# Patient Record
Sex: FEMALE | Race: WHITE | Employment: FULL TIME | URBAN - NONMETROPOLITAN AREA
[De-identification: names, ages, dates, MRNs, and addresses within clinical notes are randomized per-mention and may not be internally consistent; named-entity substitution may affect disease eponyms.]

---

## 2017-06-22 ENCOUNTER — HOSPITAL ENCOUNTER (OUTPATIENT)
Dept: PREADMISSION TESTING | Age: 57
Discharge: HOME | End: 2017-06-22
Payer: COMMERCIAL

## 2017-06-22 VITALS — HEIGHT: 66 IN | BODY MASS INDEX: 47.09 KG/M2 | WEIGHT: 293 LBS

## 2017-06-22 LAB
ANION GAP SERPL CALCULATED.3IONS-SCNC: 14 MMOL/L (ref 7–19)
APTT: 26.3 SEC (ref 26–36.2)
BASOPHILS ABSOLUTE: 0 K/UL (ref 0–0.2)
BASOPHILS RELATIVE PERCENT: 0.4 % (ref 0–1)
BUN BLDV-MCNC: 13 MG/DL (ref 6–20)
CALCIUM SERPL-MCNC: 9.6 MG/DL (ref 8.6–10)
CHLORIDE BLD-SCNC: 97 MMOL/L (ref 98–111)
CO2: 27 MMOL/L (ref 22–29)
CREAT SERPL-MCNC: 0.6 MG/DL (ref 0.5–0.9)
EKG P AXIS: 49 DEGREES
EKG P-R INTERVAL: 132 MS
EKG Q-T INTERVAL: 390 MS
EKG QRS DURATION: 78 MS
EKG QTC CALCULATION (BAZETT): 425 MS
EKG T AXIS: 50 DEGREES
EOSINOPHILS ABSOLUTE: 0.1 K/UL (ref 0–0.6)
EOSINOPHILS RELATIVE PERCENT: 1.5 % (ref 0–5)
GFR NON-AFRICAN AMERICAN: >60
GLUCOSE BLD-MCNC: 174 MG/DL (ref 74–109)
HCT VFR BLD CALC: 45.9 % (ref 37–47)
HEMOGLOBIN: 14.9 G/DL (ref 12–16)
INR BLD: 1 (ref 0.88–1.18)
LYMPHOCYTES ABSOLUTE: 2 K/UL (ref 1.1–4.5)
LYMPHOCYTES RELATIVE PERCENT: 27.5 % (ref 20–40)
MCH RBC QN AUTO: 31.2 PG (ref 27–31)
MCHC RBC AUTO-ENTMCNC: 32.5 G/DL (ref 33–37)
MCV RBC AUTO: 96.2 FL (ref 81–99)
MONOCYTES ABSOLUTE: 0.5 K/UL (ref 0–0.9)
MONOCYTES RELATIVE PERCENT: 6.5 % (ref 0–10)
NEUTROPHILS ABSOLUTE: 4.7 K/UL (ref 1.5–7.5)
NEUTROPHILS RELATIVE PERCENT: 63.7 % (ref 50–65)
PDW BLD-RTO: 12.4 % (ref 11.5–14.5)
PLATELET # BLD: 167 K/UL (ref 130–400)
PMV BLD AUTO: 10.8 FL (ref 9.4–12.3)
POTASSIUM SERPL-SCNC: 4.4 MMOL/L (ref 3.5–5)
PROTHROMBIN TIME: 13.1 SEC (ref 12–14.6)
RBC # BLD: 4.77 M/UL (ref 4.2–5.4)
SODIUM BLD-SCNC: 138 MMOL/L (ref 136–145)
WBC # BLD: 7.3 K/UL (ref 4.8–10.8)

## 2017-06-22 PROCEDURE — 93005 ELECTROCARDIOGRAM TRACING: CPT

## 2017-06-22 PROCEDURE — 85025 COMPLETE CBC W/AUTO DIFF WBC: CPT

## 2017-06-22 PROCEDURE — 87070 CULTURE OTHR SPECIMN AEROBIC: CPT

## 2017-06-22 PROCEDURE — 85730 THROMBOPLASTIN TIME PARTIAL: CPT

## 2017-06-22 PROCEDURE — 85610 PROTHROMBIN TIME: CPT

## 2017-06-22 PROCEDURE — 80048 BASIC METABOLIC PNL TOTAL CA: CPT

## 2017-06-22 RX ORDER — VARENICLINE TARTRATE 1 MG/1
1 TABLET, FILM COATED ORAL DAILY
COMMUNITY

## 2017-06-22 RX ORDER — LISINOPRIL 10 MG/1
TABLET ORAL DAILY
COMMUNITY

## 2017-06-22 RX ORDER — METFORMIN HYDROCHLORIDE 500 MG/1
500 TABLET, EXTENDED RELEASE ORAL
Status: ON HOLD | COMMUNITY
End: 2018-04-12

## 2017-06-23 LAB — MRSA CULTURE ONLY: NORMAL

## 2017-06-27 ENCOUNTER — PREP FOR PROCEDURE (OUTPATIENT)
Dept: SURGERY | Age: 57
End: 2017-06-27

## 2017-06-27 DIAGNOSIS — Z01.818 PRE-OP EVALUATION: Primary | ICD-10-CM

## 2017-06-27 RX ORDER — SODIUM CHLORIDE 0.9 % (FLUSH) 0.9 %
10 SYRINGE (ML) INJECTION PRN
Status: CANCELLED | OUTPATIENT
Start: 2017-06-27

## 2017-06-27 RX ORDER — SODIUM CHLORIDE 0.9 % (FLUSH) 0.9 %
10 SYRINGE (ML) INJECTION EVERY 12 HOURS SCHEDULED
Status: CANCELLED | OUTPATIENT
Start: 2017-06-27

## 2017-06-27 RX ORDER — SODIUM CHLORIDE, SODIUM LACTATE, POTASSIUM CHLORIDE, CALCIUM CHLORIDE 600; 310; 30; 20 MG/100ML; MG/100ML; MG/100ML; MG/100ML
INJECTION, SOLUTION INTRAVENOUS CONTINUOUS
Status: CANCELLED | OUTPATIENT
Start: 2017-06-27

## 2017-06-30 ENCOUNTER — ANESTHESIA (OUTPATIENT)
Dept: OPERATING ROOM | Age: 57
DRG: 470 | End: 2017-06-30
Payer: COMMERCIAL

## 2017-06-30 ENCOUNTER — ANESTHESIA EVENT (OUTPATIENT)
Dept: OPERATING ROOM | Age: 57
DRG: 470 | End: 2017-06-30
Payer: COMMERCIAL

## 2017-06-30 ENCOUNTER — HOSPITAL ENCOUNTER (INPATIENT)
Age: 57
LOS: 3 days | Discharge: HOME HEALTH | DRG: 470 | End: 2017-07-03
Attending: ORTHOPAEDIC SURGERY | Admitting: ORTHOPAEDIC SURGERY
Payer: COMMERCIAL

## 2017-06-30 VITALS
RESPIRATION RATE: 3 BRPM | SYSTOLIC BLOOD PRESSURE: 144 MMHG | DIASTOLIC BLOOD PRESSURE: 50 MMHG | OXYGEN SATURATION: 94 % | TEMPERATURE: 98 F

## 2017-06-30 PROBLEM — M17.12 PRIMARY OSTEOARTHRITIS OF LEFT KNEE: Status: ACTIVE | Noted: 2017-06-30

## 2017-06-30 LAB
ABO/RH: NORMAL
ANTIBODY SCREEN: NORMAL
GLUCOSE BLD-MCNC: 158 MG/DL (ref 70–99)
PERFORMED ON: ABNORMAL

## 2017-06-30 PROCEDURE — 7100000000 HC PACU RECOVERY - FIRST 15 MIN: Performed by: ORTHOPAEDIC SURGERY

## 2017-06-30 PROCEDURE — 2580000003 HC RX 258: Performed by: ANESTHESIOLOGY

## 2017-06-30 PROCEDURE — 6370000000 HC RX 637 (ALT 250 FOR IP): Performed by: ORTHOPAEDIC SURGERY

## 2017-06-30 PROCEDURE — 2720000001 HC MISC SURG SUPPLY STERILE $51-500: Performed by: ORTHOPAEDIC SURGERY

## 2017-06-30 PROCEDURE — 6360000002 HC RX W HCPCS: Performed by: ORTHOPAEDIC SURGERY

## 2017-06-30 PROCEDURE — 2700000000 HC OXYGEN THERAPY PER DAY

## 2017-06-30 PROCEDURE — C1776 JOINT DEVICE (IMPLANTABLE): HCPCS | Performed by: ORTHOPAEDIC SURGERY

## 2017-06-30 PROCEDURE — 86901 BLOOD TYPING SEROLOGIC RH(D): CPT

## 2017-06-30 PROCEDURE — 2500000003 HC RX 250 WO HCPCS: Performed by: NURSE ANESTHETIST, CERTIFIED REGISTERED

## 2017-06-30 PROCEDURE — G8979 MOBILITY GOAL STATUS: HCPCS

## 2017-06-30 PROCEDURE — 2580000003 HC RX 258: Performed by: ORTHOPAEDIC SURGERY

## 2017-06-30 PROCEDURE — 0SRD0J9 REPLACEMENT OF LEFT KNEE JOINT WITH SYNTHETIC SUBSTITUTE, CEMENTED, OPEN APPROACH: ICD-10-PCS | Performed by: ORTHOPAEDIC SURGERY

## 2017-06-30 PROCEDURE — 3600000015 HC SURGERY LEVEL 5 ADDTL 15MIN: Performed by: ORTHOPAEDIC SURGERY

## 2017-06-30 PROCEDURE — 82948 REAGENT STRIP/BLOOD GLUCOSE: CPT

## 2017-06-30 PROCEDURE — 2500000003 HC RX 250 WO HCPCS: Performed by: ORTHOPAEDIC SURGERY

## 2017-06-30 PROCEDURE — 1210000000 HC MED SURG R&B

## 2017-06-30 PROCEDURE — 3700000001 HC ADD 15 MINUTES (ANESTHESIA): Performed by: ORTHOPAEDIC SURGERY

## 2017-06-30 PROCEDURE — 86850 RBC ANTIBODY SCREEN: CPT

## 2017-06-30 PROCEDURE — G8978 MOBILITY CURRENT STATUS: HCPCS

## 2017-06-30 PROCEDURE — 97162 PT EVAL MOD COMPLEX 30 MIN: CPT

## 2017-06-30 PROCEDURE — 64447 NJX AA&/STRD FEMORAL NRV IMG: CPT | Performed by: NURSE ANESTHETIST, CERTIFIED REGISTERED

## 2017-06-30 PROCEDURE — 6370000000 HC RX 637 (ALT 250 FOR IP): Performed by: NURSE ANESTHETIST, CERTIFIED REGISTERED

## 2017-06-30 PROCEDURE — 6360000002 HC RX W HCPCS

## 2017-06-30 PROCEDURE — 2720000003 HC MISC SUTURE/STAPLES/RELOADS/ETC: Performed by: ORTHOPAEDIC SURGERY

## 2017-06-30 PROCEDURE — 3700000000 HC ANESTHESIA ATTENDED CARE: Performed by: ORTHOPAEDIC SURGERY

## 2017-06-30 PROCEDURE — 36415 COLL VENOUS BLD VENIPUNCTURE: CPT

## 2017-06-30 PROCEDURE — 3600000005 HC SURGERY LEVEL 5 BASE: Performed by: ORTHOPAEDIC SURGERY

## 2017-06-30 PROCEDURE — C1713 ANCHOR/SCREW BN/BN,TIS/BN: HCPCS | Performed by: ORTHOPAEDIC SURGERY

## 2017-06-30 PROCEDURE — 94664 DEMO&/EVAL PT USE INHALER: CPT

## 2017-06-30 PROCEDURE — 7100000001 HC PACU RECOVERY - ADDTL 15 MIN: Performed by: ORTHOPAEDIC SURGERY

## 2017-06-30 PROCEDURE — 86900 BLOOD TYPING SEROLOGIC ABO: CPT

## 2017-06-30 PROCEDURE — 6360000002 HC RX W HCPCS: Performed by: NURSE ANESTHETIST, CERTIFIED REGISTERED

## 2017-06-30 DEVICE — COMPONENT PAT DIA32MM KNEE POLY CEM MEDIALIZED ANAT ATTUNE: Type: IMPLANTABLE DEVICE | Site: KNEE | Status: FUNCTIONAL

## 2017-06-30 DEVICE — DISCONTINUED USE 416978 CEMENT PALACOS R SING DOSE 40GR: Type: IMPLANTABLE DEVICE | Site: KNEE | Status: FUNCTIONAL

## 2017-06-30 DEVICE — INSERT TIB SZ 5 THK7MM KNEE POST STBL ROT PLATFRM ATTUNE: Type: IMPLANTABLE DEVICE | Site: KNEE | Status: FUNCTIONAL

## 2017-06-30 DEVICE — COMPONENT FEM SZ 5 L KNEE POST STBL CEM ATTUNE: Type: IMPLANTABLE DEVICE | Site: KNEE | Status: FUNCTIONAL

## 2017-06-30 DEVICE — BASEPLATE TIB SZ 6 KNEE CEM ROT PLATFRM ATTUNE: Type: IMPLANTABLE DEVICE | Site: KNEE | Status: FUNCTIONAL

## 2017-06-30 RX ORDER — MORPHINE SULFATE 4 MG/ML
4 INJECTION, SOLUTION INTRAMUSCULAR; INTRAVENOUS
Status: DISCONTINUED | OUTPATIENT
Start: 2017-06-30 | End: 2017-07-03 | Stop reason: HOSPADM

## 2017-06-30 RX ORDER — VARENICLINE TARTRATE 0.5 MG/1
1 TABLET, FILM COATED ORAL DAILY
Status: DISCONTINUED | OUTPATIENT
Start: 2017-06-30 | End: 2017-07-03 | Stop reason: HOSPADM

## 2017-06-30 RX ORDER — MIDAZOLAM HYDROCHLORIDE 1 MG/ML
2 INJECTION INTRAMUSCULAR; INTRAVENOUS
Status: DISCONTINUED | OUTPATIENT
Start: 2017-06-30 | End: 2017-06-30 | Stop reason: HOSPADM

## 2017-06-30 RX ORDER — CLINDAMYCIN PHOSPHATE 900 MG/50ML
900 INJECTION INTRAVENOUS EVERY 8 HOURS
Status: COMPLETED | OUTPATIENT
Start: 2017-06-30 | End: 2017-06-30

## 2017-06-30 RX ORDER — SODIUM CHLORIDE, SODIUM LACTATE, POTASSIUM CHLORIDE, CALCIUM CHLORIDE 600; 310; 30; 20 MG/100ML; MG/100ML; MG/100ML; MG/100ML
INJECTION, SOLUTION INTRAVENOUS CONTINUOUS
Status: DISCONTINUED | OUTPATIENT
Start: 2017-06-30 | End: 2017-06-30

## 2017-06-30 RX ORDER — SODIUM CHLORIDE 0.9 % (FLUSH) 0.9 %
10 SYRINGE (ML) INJECTION EVERY 12 HOURS SCHEDULED
Status: DISCONTINUED | OUTPATIENT
Start: 2017-06-30 | End: 2017-07-03 | Stop reason: HOSPADM

## 2017-06-30 RX ORDER — SODIUM CHLORIDE, SODIUM LACTATE, POTASSIUM CHLORIDE, CALCIUM CHLORIDE 600; 310; 30; 20 MG/100ML; MG/100ML; MG/100ML; MG/100ML
INJECTION, SOLUTION INTRAVENOUS CONTINUOUS
Status: DISCONTINUED | OUTPATIENT
Start: 2017-06-30 | End: 2017-07-03 | Stop reason: HOSPADM

## 2017-06-30 RX ORDER — ONDANSETRON 2 MG/ML
INJECTION INTRAMUSCULAR; INTRAVENOUS PRN
Status: DISCONTINUED | OUTPATIENT
Start: 2017-06-30 | End: 2017-06-30 | Stop reason: SDUPTHER

## 2017-06-30 RX ORDER — METFORMIN HYDROCHLORIDE 500 MG/1
500 TABLET, EXTENDED RELEASE ORAL
Status: DISCONTINUED | OUTPATIENT
Start: 2017-07-01 | End: 2017-07-03 | Stop reason: HOSPADM

## 2017-06-30 RX ORDER — PROPOFOL 10 MG/ML
INJECTION, EMULSION INTRAVENOUS PRN
Status: DISCONTINUED | OUTPATIENT
Start: 2017-06-30 | End: 2017-06-30 | Stop reason: SDUPTHER

## 2017-06-30 RX ORDER — ESMOLOL HYDROCHLORIDE 10 MG/ML
INJECTION INTRAVENOUS PRN
Status: DISCONTINUED | OUTPATIENT
Start: 2017-06-30 | End: 2017-06-30 | Stop reason: SDUPTHER

## 2017-06-30 RX ORDER — OXYCODONE HYDROCHLORIDE AND ACETAMINOPHEN 5; 325 MG/1; MG/1
2 TABLET ORAL EVERY 4 HOURS PRN
Status: DISCONTINUED | OUTPATIENT
Start: 2017-06-30 | End: 2017-07-03 | Stop reason: HOSPADM

## 2017-06-30 RX ORDER — LACTULOSE 10 G/15ML
10 SOLUTION ORAL 2 TIMES DAILY
Status: DISCONTINUED | OUTPATIENT
Start: 2017-06-30 | End: 2017-07-03 | Stop reason: HOSPADM

## 2017-06-30 RX ORDER — MORPHINE SULFATE 4 MG/ML
4 INJECTION, SOLUTION INTRAMUSCULAR; INTRAVENOUS
Status: DISCONTINUED | OUTPATIENT
Start: 2017-06-30 | End: 2017-06-30 | Stop reason: HOSPADM

## 2017-06-30 RX ORDER — FAMOTIDINE 20 MG/1
20 TABLET, FILM COATED ORAL 2 TIMES DAILY
Status: DISCONTINUED | OUTPATIENT
Start: 2017-06-30 | End: 2017-07-03 | Stop reason: HOSPADM

## 2017-06-30 RX ORDER — LIDOCAINE HYDROCHLORIDE 10 MG/ML
1 INJECTION, SOLUTION EPIDURAL; INFILTRATION; INTRACAUDAL; PERINEURAL
Status: DISCONTINUED | OUTPATIENT
Start: 2017-06-30 | End: 2017-06-30 | Stop reason: HOSPADM

## 2017-06-30 RX ORDER — ACETAMINOPHEN 325 MG/1
650 TABLET ORAL EVERY 4 HOURS PRN
Status: DISCONTINUED | OUTPATIENT
Start: 2017-06-30 | End: 2017-07-03 | Stop reason: HOSPADM

## 2017-06-30 RX ORDER — LISINOPRIL 10 MG/1
10 TABLET ORAL DAILY
Status: DISCONTINUED | OUTPATIENT
Start: 2017-06-30 | End: 2017-07-03 | Stop reason: HOSPADM

## 2017-06-30 RX ORDER — SODIUM CHLORIDE 0.9 % (FLUSH) 0.9 %
10 SYRINGE (ML) INJECTION PRN
Status: DISCONTINUED | OUTPATIENT
Start: 2017-06-30 | End: 2017-06-30 | Stop reason: HOSPADM

## 2017-06-30 RX ORDER — EPHEDRINE SULFATE 50 MG/ML
INJECTION, SOLUTION INTRAVENOUS PRN
Status: DISCONTINUED | OUTPATIENT
Start: 2017-06-30 | End: 2017-06-30 | Stop reason: SDUPTHER

## 2017-06-30 RX ORDER — ONDANSETRON 2 MG/ML
4 INJECTION INTRAMUSCULAR; INTRAVENOUS EVERY 6 HOURS PRN
Status: DISCONTINUED | OUTPATIENT
Start: 2017-06-30 | End: 2017-07-01

## 2017-06-30 RX ORDER — SODIUM CHLORIDE 0.9 % (FLUSH) 0.9 %
10 SYRINGE (ML) INJECTION EVERY 12 HOURS SCHEDULED
Status: DISCONTINUED | OUTPATIENT
Start: 2017-06-30 | End: 2017-06-30 | Stop reason: HOSPADM

## 2017-06-30 RX ORDER — MELOXICAM 7.5 MG/1
15 TABLET ORAL DAILY
Status: DISCONTINUED | OUTPATIENT
Start: 2017-06-30 | End: 2017-07-02

## 2017-06-30 RX ORDER — LIDOCAINE HYDROCHLORIDE 10 MG/ML
INJECTION, SOLUTION INFILTRATION; PERINEURAL PRN
Status: DISCONTINUED | OUTPATIENT
Start: 2017-06-30 | End: 2017-06-30 | Stop reason: SDUPTHER

## 2017-06-30 RX ORDER — DEXAMETHASONE SODIUM PHOSPHATE 10 MG/ML
INJECTION INTRAMUSCULAR; INTRAVENOUS PRN
Status: DISCONTINUED | OUTPATIENT
Start: 2017-06-30 | End: 2017-06-30 | Stop reason: SDUPTHER

## 2017-06-30 RX ORDER — FENTANYL CITRATE 50 UG/ML
50 INJECTION, SOLUTION INTRAMUSCULAR; INTRAVENOUS
Status: DISCONTINUED | OUTPATIENT
Start: 2017-06-30 | End: 2017-06-30 | Stop reason: HOSPADM

## 2017-06-30 RX ORDER — SCOLOPAMINE TRANSDERMAL SYSTEM 1 MG/1
1 PATCH, EXTENDED RELEASE TRANSDERMAL
Status: DISCONTINUED | OUTPATIENT
Start: 2017-06-30 | End: 2017-06-30 | Stop reason: HOSPADM

## 2017-06-30 RX ORDER — MIDAZOLAM HYDROCHLORIDE 1 MG/ML
INJECTION INTRAMUSCULAR; INTRAVENOUS
Status: COMPLETED
Start: 2017-06-30 | End: 2017-06-30

## 2017-06-30 RX ORDER — MORPHINE SULFATE 4 MG/ML
2 INJECTION, SOLUTION INTRAMUSCULAR; INTRAVENOUS
Status: DISCONTINUED | OUTPATIENT
Start: 2017-06-30 | End: 2017-07-03 | Stop reason: HOSPADM

## 2017-06-30 RX ORDER — OXYCODONE HYDROCHLORIDE AND ACETAMINOPHEN 5; 325 MG/1; MG/1
1 TABLET ORAL EVERY 4 HOURS PRN
Status: DISCONTINUED | OUTPATIENT
Start: 2017-06-30 | End: 2017-07-03 | Stop reason: HOSPADM

## 2017-06-30 RX ORDER — ALBUTEROL SULFATE 90 UG/1
AEROSOL, METERED RESPIRATORY (INHALATION) PRN
Status: DISCONTINUED | OUTPATIENT
Start: 2017-06-30 | End: 2017-06-30 | Stop reason: SDUPTHER

## 2017-06-30 RX ORDER — FENTANYL CITRATE 50 UG/ML
INJECTION, SOLUTION INTRAMUSCULAR; INTRAVENOUS PRN
Status: DISCONTINUED | OUTPATIENT
Start: 2017-06-30 | End: 2017-06-30 | Stop reason: SDUPTHER

## 2017-06-30 RX ORDER — DOCUSATE SODIUM 100 MG/1
100 CAPSULE, LIQUID FILLED ORAL 2 TIMES DAILY
Status: DISCONTINUED | OUTPATIENT
Start: 2017-06-30 | End: 2017-07-03 | Stop reason: HOSPADM

## 2017-06-30 RX ORDER — SODIUM CHLORIDE 0.9 % (FLUSH) 0.9 %
10 SYRINGE (ML) INJECTION PRN
Status: DISCONTINUED | OUTPATIENT
Start: 2017-06-30 | End: 2017-07-03 | Stop reason: HOSPADM

## 2017-06-30 RX ORDER — ROCURONIUM BROMIDE 10 MG/ML
INJECTION, SOLUTION INTRAVENOUS PRN
Status: DISCONTINUED | OUTPATIENT
Start: 2017-06-30 | End: 2017-06-30 | Stop reason: SDUPTHER

## 2017-06-30 RX ADMIN — VARENICLINE TARTRATE 1 MG: 0.5 TABLET, FILM COATED ORAL at 14:20

## 2017-06-30 RX ADMIN — ESMOLOL HYDROCHLORIDE 10 MG: 10 INJECTION, SOLUTION INTRAVENOUS at 08:51

## 2017-06-30 RX ADMIN — MIDAZOLAM 2 MG: 1 INJECTION INTRAMUSCULAR; INTRAVENOUS at 07:31

## 2017-06-30 RX ADMIN — CEFAZOLIN 3 G: 1 INJECTION, POWDER, FOR SOLUTION INTRAMUSCULAR; INTRAVENOUS; PARENTERAL at 17:15

## 2017-06-30 RX ADMIN — LISINOPRIL 10 MG: 10 TABLET ORAL at 14:20

## 2017-06-30 RX ADMIN — LIDOCAINE HYDROCHLORIDE 50 MG: 10 INJECTION, SOLUTION INFILTRATION; PERINEURAL at 08:02

## 2017-06-30 RX ADMIN — MORPHINE SULFATE 4 MG: 4 INJECTION, SOLUTION INTRAMUSCULAR; INTRAVENOUS at 14:19

## 2017-06-30 RX ADMIN — SUGAMMADEX 550 MG: 100 INJECTION, SOLUTION INTRAVENOUS at 10:12

## 2017-06-30 RX ADMIN — MORPHINE SULFATE 4 MG: 4 INJECTION, SOLUTION INTRAMUSCULAR; INTRAVENOUS at 17:16

## 2017-06-30 RX ADMIN — HYDROMORPHONE HYDROCHLORIDE 0.5 MG: 1 INJECTION, SOLUTION INTRAMUSCULAR; INTRAVENOUS; SUBCUTANEOUS at 08:53

## 2017-06-30 RX ADMIN — EPHEDRINE SULFATE 10 MG: 50 INJECTION, SOLUTION INTRAMUSCULAR; INTRAVENOUS; SUBCUTANEOUS at 08:27

## 2017-06-30 RX ADMIN — FENTANYL CITRATE 50 MCG: 50 INJECTION INTRAMUSCULAR; INTRAVENOUS at 08:01

## 2017-06-30 RX ADMIN — CLINDAMYCIN PHOSPHATE 900 MG: 18 INJECTION, SOLUTION INTRAVENOUS at 14:31

## 2017-06-30 RX ADMIN — OXYCODONE AND ACETAMINOPHEN 1 TABLET: 5; 325 TABLET ORAL at 20:09

## 2017-06-30 RX ADMIN — SODIUM CHLORIDE, POTASSIUM CHLORIDE, SODIUM LACTATE AND CALCIUM CHLORIDE: 600; 310; 30; 20 INJECTION, SOLUTION INTRAVENOUS at 06:54

## 2017-06-30 RX ADMIN — ONDANSETRON HYDROCHLORIDE 4 MG: 2 INJECTION, SOLUTION INTRAVENOUS at 07:59

## 2017-06-30 RX ADMIN — ESMOLOL HYDROCHLORIDE 10 MG: 10 INJECTION, SOLUTION INTRAVENOUS at 08:49

## 2017-06-30 RX ADMIN — ROCURONIUM BROMIDE 10 MG: 10 INJECTION INTRAVENOUS at 08:57

## 2017-06-30 RX ADMIN — MELOXICAM 15 MG: 7.5 TABLET ORAL at 14:20

## 2017-06-30 RX ADMIN — PROPOFOL 200 MG: 10 INJECTION, EMULSION INTRAVENOUS at 08:02

## 2017-06-30 RX ADMIN — DOCUSATE SODIUM 100 MG: 100 CAPSULE, LIQUID FILLED ORAL at 14:20

## 2017-06-30 RX ADMIN — Medication 10 ML: at 20:10

## 2017-06-30 RX ADMIN — FENTANYL CITRATE 100 MCG: 50 INJECTION INTRAMUSCULAR; INTRAVENOUS at 07:59

## 2017-06-30 RX ADMIN — DEXAMETHASONE SODIUM PHOSPHATE 10 MG: 10 INJECTION INTRAMUSCULAR; INTRAVENOUS at 08:05

## 2017-06-30 RX ADMIN — RIVAROXABAN 10 MG: 10 TABLET, FILM COATED ORAL at 18:17

## 2017-06-30 RX ADMIN — HYDROMORPHONE HYDROCHLORIDE 0.5 MG: 1 INJECTION, SOLUTION INTRAMUSCULAR; INTRAVENOUS; SUBCUTANEOUS at 09:33

## 2017-06-30 RX ADMIN — SODIUM CHLORIDE, SODIUM LACTATE, POTASSIUM CHLORIDE, AND CALCIUM CHLORIDE: 600; 310; 30; 20 INJECTION, SOLUTION INTRAVENOUS at 08:57

## 2017-06-30 RX ADMIN — ROCURONIUM BROMIDE 10 MG: 10 INJECTION INTRAVENOUS at 09:40

## 2017-06-30 RX ADMIN — ROCURONIUM BROMIDE 50 MG: 10 INJECTION INTRAVENOUS at 08:06

## 2017-06-30 RX ADMIN — SODIUM CHLORIDE, SODIUM LACTATE, POTASSIUM CHLORIDE, AND CALCIUM CHLORIDE: 600; 310; 30; 20 INJECTION, SOLUTION INTRAVENOUS at 07:56

## 2017-06-30 RX ADMIN — EPHEDRINE SULFATE 10 MG: 50 INJECTION, SOLUTION INTRAMUSCULAR; INTRAVENOUS; SUBCUTANEOUS at 08:33

## 2017-06-30 RX ADMIN — ONDANSETRON 4 MG: 2 INJECTION INTRAMUSCULAR; INTRAVENOUS at 17:15

## 2017-06-30 RX ADMIN — CEFAZOLIN 3 G: 1 INJECTION, POWDER, FOR SOLUTION INTRAMUSCULAR; INTRAVENOUS; PARENTERAL at 07:56

## 2017-06-30 RX ADMIN — ALBUTEROL SULFATE 2 PUFF: 90 AEROSOL, METERED RESPIRATORY (INHALATION) at 08:04

## 2017-06-30 RX ADMIN — CLINDAMYCIN PHOSPHATE 900 MG: 18 INJECTION, SOLUTION INTRAVENOUS at 20:14

## 2017-06-30 RX ADMIN — FENTANYL CITRATE 100 MCG: 50 INJECTION INTRAMUSCULAR; INTRAVENOUS at 08:12

## 2017-06-30 RX ADMIN — LACTULOSE 10 G: 10 SOLUTION ORAL at 20:09

## 2017-06-30 RX ADMIN — FAMOTIDINE 20 MG: 20 TABLET ORAL at 20:09

## 2017-06-30 RX ADMIN — FAMOTIDINE 20 MG: 20 TABLET ORAL at 14:20

## 2017-06-30 RX ADMIN — ESMOLOL HYDROCHLORIDE 10 MG: 10 INJECTION, SOLUTION INTRAVENOUS at 08:53

## 2017-06-30 RX ADMIN — ESMOLOL HYDROCHLORIDE 10 MG: 10 INJECTION, SOLUTION INTRAVENOUS at 08:47

## 2017-06-30 RX ADMIN — DOCUSATE SODIUM 100 MG: 100 CAPSULE, LIQUID FILLED ORAL at 20:09

## 2017-06-30 ASSESSMENT — PAIN SCALES - GENERAL
PAINLEVEL_OUTOF10: 0
PAINLEVEL_OUTOF10: 8
PAINLEVEL_OUTOF10: 0
PAINLEVEL_OUTOF10: 7
PAINLEVEL_OUTOF10: 7
PAINLEVEL_OUTOF10: 0

## 2017-07-01 PROBLEM — E11.9 DIABETES MELLITUS (HCC): Chronic | Status: ACTIVE | Noted: 2017-07-01

## 2017-07-01 PROBLEM — I10 HYPERTENSION: Chronic | Status: ACTIVE | Noted: 2017-07-01

## 2017-07-01 LAB
GLUCOSE BLD-MCNC: 185 MG/DL (ref 70–99)
GLUCOSE BLD-MCNC: 199 MG/DL (ref 70–99)
HCT VFR BLD CALC: 40.8 % (ref 37–47)
HEMOGLOBIN: 13 G/DL (ref 12–16)
MCH RBC QN AUTO: 31.2 PG (ref 27–31)
MCHC RBC AUTO-ENTMCNC: 31.9 G/DL (ref 33–37)
MCV RBC AUTO: 97.8 FL (ref 81–99)
PDW BLD-RTO: 12.6 % (ref 11.5–14.5)
PERFORMED ON: ABNORMAL
PERFORMED ON: ABNORMAL
PLATELET # BLD: 140 K/UL (ref 130–400)
PMV BLD AUTO: 10.8 FL (ref 9.4–12.3)
RBC # BLD: 4.17 M/UL (ref 4.2–5.4)
WBC # BLD: 9.6 K/UL (ref 4.8–10.8)

## 2017-07-01 PROCEDURE — 6360000002 HC RX W HCPCS: Performed by: ORTHOPAEDIC SURGERY

## 2017-07-01 PROCEDURE — 85027 COMPLETE CBC AUTOMATED: CPT

## 2017-07-01 PROCEDURE — 6370000000 HC RX 637 (ALT 250 FOR IP): Performed by: ORTHOPAEDIC SURGERY

## 2017-07-01 PROCEDURE — 6370000000 HC RX 637 (ALT 250 FOR IP): Performed by: FAMILY MEDICINE

## 2017-07-01 PROCEDURE — 1210000000 HC MED SURG R&B

## 2017-07-01 PROCEDURE — 97116 GAIT TRAINING THERAPY: CPT

## 2017-07-01 PROCEDURE — 36415 COLL VENOUS BLD VENIPUNCTURE: CPT

## 2017-07-01 PROCEDURE — 2580000003 HC RX 258: Performed by: ORTHOPAEDIC SURGERY

## 2017-07-01 PROCEDURE — 97110 THERAPEUTIC EXERCISES: CPT

## 2017-07-01 PROCEDURE — 82948 REAGENT STRIP/BLOOD GLUCOSE: CPT

## 2017-07-01 RX ORDER — ONDANSETRON 2 MG/ML
4 INJECTION INTRAMUSCULAR; INTRAVENOUS EVERY 4 HOURS PRN
Status: DISCONTINUED | OUTPATIENT
Start: 2017-07-01 | End: 2017-07-03 | Stop reason: HOSPADM

## 2017-07-01 RX ORDER — DEXTROSE MONOHYDRATE 25 G/50ML
12.5 INJECTION, SOLUTION INTRAVENOUS PRN
Status: DISCONTINUED | OUTPATIENT
Start: 2017-07-01 | End: 2017-07-03 | Stop reason: HOSPADM

## 2017-07-01 RX ORDER — DEXTROSE MONOHYDRATE 50 MG/ML
100 INJECTION, SOLUTION INTRAVENOUS PRN
Status: DISCONTINUED | OUTPATIENT
Start: 2017-07-01 | End: 2017-07-03 | Stop reason: HOSPADM

## 2017-07-01 RX ORDER — NICOTINE POLACRILEX 4 MG
15 LOZENGE BUCCAL PRN
Status: DISCONTINUED | OUTPATIENT
Start: 2017-07-01 | End: 2017-07-03 | Stop reason: HOSPADM

## 2017-07-01 RX ADMIN — METFORMIN HYDROCHLORIDE 500 MG: 500 TABLET, EXTENDED RELEASE ORAL at 07:48

## 2017-07-01 RX ADMIN — OXYCODONE HYDROCHLORIDE AND ACETAMINOPHEN 2 TABLET: 5; 325 TABLET ORAL at 00:26

## 2017-07-01 RX ADMIN — Medication 10 ML: at 09:56

## 2017-07-01 RX ADMIN — ONDANSETRON 4 MG: 2 INJECTION INTRAMUSCULAR; INTRAVENOUS at 17:03

## 2017-07-01 RX ADMIN — DOCUSATE SODIUM 100 MG: 100 CAPSULE, LIQUID FILLED ORAL at 21:50

## 2017-07-01 RX ADMIN — ONDANSETRON 4 MG: 2 INJECTION INTRAMUSCULAR; INTRAVENOUS at 07:48

## 2017-07-01 RX ADMIN — ONDANSETRON 4 MG: 2 INJECTION INTRAMUSCULAR; INTRAVENOUS at 02:15

## 2017-07-01 RX ADMIN — INSULIN LISPRO 1 UNITS: 100 INJECTION, SOLUTION INTRAVENOUS; SUBCUTANEOUS at 17:20

## 2017-07-01 RX ADMIN — OXYCODONE AND ACETAMINOPHEN 1 TABLET: 5; 325 TABLET ORAL at 21:50

## 2017-07-01 RX ADMIN — LACTULOSE 10 G: 10 SOLUTION ORAL at 21:51

## 2017-07-01 RX ADMIN — VARENICLINE TARTRATE 1 MG: 0.5 TABLET, FILM COATED ORAL at 09:52

## 2017-07-01 RX ADMIN — OXYCODONE AND ACETAMINOPHEN 1 TABLET: 5; 325 TABLET ORAL at 05:05

## 2017-07-01 RX ADMIN — ONDANSETRON 4 MG: 2 INJECTION INTRAMUSCULAR; INTRAVENOUS at 12:59

## 2017-07-01 RX ADMIN — LISINOPRIL 10 MG: 10 TABLET ORAL at 09:52

## 2017-07-01 RX ADMIN — OXYCODONE AND ACETAMINOPHEN 1 TABLET: 5; 325 TABLET ORAL at 18:11

## 2017-07-01 RX ADMIN — LACTULOSE 10 G: 10 SOLUTION ORAL at 09:56

## 2017-07-01 RX ADMIN — RIVAROXABAN 10 MG: 10 TABLET, FILM COATED ORAL at 17:03

## 2017-07-01 RX ADMIN — ONDANSETRON 4 MG: 2 INJECTION INTRAMUSCULAR; INTRAVENOUS at 21:50

## 2017-07-01 RX ADMIN — MELOXICAM 15 MG: 7.5 TABLET ORAL at 09:52

## 2017-07-01 RX ADMIN — OXYCODONE AND ACETAMINOPHEN 1 TABLET: 5; 325 TABLET ORAL at 09:55

## 2017-07-01 RX ADMIN — DOCUSATE SODIUM 100 MG: 100 CAPSULE, LIQUID FILLED ORAL at 09:52

## 2017-07-01 RX ADMIN — FAMOTIDINE 20 MG: 20 TABLET ORAL at 21:50

## 2017-07-01 RX ADMIN — Medication 10 ML: at 21:51

## 2017-07-01 RX ADMIN — MORPHINE SULFATE 4 MG: 4 INJECTION, SOLUTION INTRAMUSCULAR; INTRAVENOUS at 02:15

## 2017-07-01 RX ADMIN — OXYCODONE AND ACETAMINOPHEN 1 TABLET: 5; 325 TABLET ORAL at 13:25

## 2017-07-01 RX ADMIN — FAMOTIDINE 20 MG: 20 TABLET ORAL at 09:52

## 2017-07-01 RX ADMIN — CEFAZOLIN 3 G: 1 INJECTION, POWDER, FOR SOLUTION INTRAMUSCULAR; INTRAVENOUS; PARENTERAL at 00:28

## 2017-07-01 RX ADMIN — OXYCODONE AND ACETAMINOPHEN 1 TABLET: 5; 325 TABLET ORAL at 14:52

## 2017-07-01 ASSESSMENT — PAIN SCALES - GENERAL
PAINLEVEL_OUTOF10: 0
PAINLEVEL_OUTOF10: 0
PAINLEVEL_OUTOF10: 10
PAINLEVEL_OUTOF10: 9
PAINLEVEL_OUTOF10: 7
PAINLEVEL_OUTOF10: 0
PAINLEVEL_OUTOF10: 0
PAINLEVEL_OUTOF10: 7
PAINLEVEL_OUTOF10: 0
PAINLEVEL_OUTOF10: 7
PAINLEVEL_OUTOF10: 7
PAINLEVEL_OUTOF10: 8
PAINLEVEL_OUTOF10: 9
PAINLEVEL_OUTOF10: 0
PAINLEVEL_OUTOF10: 10
PAINLEVEL_OUTOF10: 0

## 2017-07-02 LAB
GLUCOSE BLD-MCNC: 161 MG/DL (ref 70–99)
GLUCOSE BLD-MCNC: 210 MG/DL (ref 70–99)
GLUCOSE BLD-MCNC: 217 MG/DL (ref 70–99)
GLUCOSE BLD-MCNC: 231 MG/DL (ref 70–99)
HCT VFR BLD CALC: 40.4 % (ref 37–47)
HEMOGLOBIN: 12.5 G/DL (ref 12–16)
MCH RBC QN AUTO: 30.9 PG (ref 27–31)
MCHC RBC AUTO-ENTMCNC: 30.9 G/DL (ref 33–37)
MCV RBC AUTO: 99.8 FL (ref 81–99)
PDW BLD-RTO: 12.5 % (ref 11.5–14.5)
PERFORMED ON: ABNORMAL
PLATELET # BLD: 138 K/UL (ref 130–400)
PMV BLD AUTO: 10.7 FL (ref 9.4–12.3)
RBC # BLD: 4.05 M/UL (ref 4.2–5.4)
WBC # BLD: 8.6 K/UL (ref 4.8–10.8)

## 2017-07-02 PROCEDURE — 85027 COMPLETE CBC AUTOMATED: CPT

## 2017-07-02 PROCEDURE — 6370000000 HC RX 637 (ALT 250 FOR IP): Performed by: ORTHOPAEDIC SURGERY

## 2017-07-02 PROCEDURE — 6360000002 HC RX W HCPCS: Performed by: ORTHOPAEDIC SURGERY

## 2017-07-02 PROCEDURE — 2580000003 HC RX 258: Performed by: ORTHOPAEDIC SURGERY

## 2017-07-02 PROCEDURE — 1210000000 HC MED SURG R&B

## 2017-07-02 PROCEDURE — 82948 REAGENT STRIP/BLOOD GLUCOSE: CPT

## 2017-07-02 PROCEDURE — 97116 GAIT TRAINING THERAPY: CPT

## 2017-07-02 PROCEDURE — 6370000000 HC RX 637 (ALT 250 FOR IP): Performed by: FAMILY MEDICINE

## 2017-07-02 PROCEDURE — 97110 THERAPEUTIC EXERCISES: CPT

## 2017-07-02 PROCEDURE — 36415 COLL VENOUS BLD VENIPUNCTURE: CPT

## 2017-07-02 RX ORDER — BISACODYL 10 MG
10 SUPPOSITORY, RECTAL RECTAL DAILY PRN
Status: DISCONTINUED | OUTPATIENT
Start: 2017-07-02 | End: 2017-07-03 | Stop reason: HOSPADM

## 2017-07-02 RX ORDER — LUBIPROSTONE 8 UG/1
8 CAPSULE, GELATIN COATED ORAL 2 TIMES DAILY PRN
Status: DISCONTINUED | OUTPATIENT
Start: 2017-07-02 | End: 2017-07-03 | Stop reason: HOSPADM

## 2017-07-02 RX ORDER — POLYETHYLENE GLYCOL 3350 17 G/17G
17 POWDER, FOR SOLUTION ORAL DAILY PRN
Status: DISCONTINUED | OUTPATIENT
Start: 2017-07-02 | End: 2017-07-03 | Stop reason: HOSPADM

## 2017-07-02 RX ADMIN — MELOXICAM 15 MG: 7.5 TABLET ORAL at 08:06

## 2017-07-02 RX ADMIN — OXYCODONE AND ACETAMINOPHEN 1 TABLET: 5; 325 TABLET ORAL at 19:45

## 2017-07-02 RX ADMIN — DOCUSATE SODIUM 100 MG: 100 CAPSULE, LIQUID FILLED ORAL at 20:52

## 2017-07-02 RX ADMIN — OXYCODONE AND ACETAMINOPHEN 1 TABLET: 5; 325 TABLET ORAL at 17:12

## 2017-07-02 RX ADMIN — INSULIN LISPRO 2 UNITS: 100 INJECTION, SOLUTION INTRAVENOUS; SUBCUTANEOUS at 13:03

## 2017-07-02 RX ADMIN — INSULIN LISPRO 1 UNITS: 100 INJECTION, SOLUTION INTRAVENOUS; SUBCUTANEOUS at 08:07

## 2017-07-02 RX ADMIN — DOCUSATE SODIUM 100 MG: 100 CAPSULE, LIQUID FILLED ORAL at 08:06

## 2017-07-02 RX ADMIN — INSULIN LISPRO 2 UNITS: 100 INJECTION, SOLUTION INTRAVENOUS; SUBCUTANEOUS at 17:12

## 2017-07-02 RX ADMIN — POLYETHYLENE GLYCOL 3350 17 G: 17 POWDER, FOR SOLUTION ORAL at 13:09

## 2017-07-02 RX ADMIN — OXYCODONE AND ACETAMINOPHEN 1 TABLET: 5; 325 TABLET ORAL at 02:48

## 2017-07-02 RX ADMIN — Medication 10 ML: at 11:04

## 2017-07-02 RX ADMIN — LACTULOSE 10 G: 10 SOLUTION ORAL at 08:06

## 2017-07-02 RX ADMIN — FAMOTIDINE 20 MG: 20 TABLET ORAL at 20:52

## 2017-07-02 RX ADMIN — OXYCODONE AND ACETAMINOPHEN 1 TABLET: 5; 325 TABLET ORAL at 11:02

## 2017-07-02 RX ADMIN — ONDANSETRON 4 MG: 2 INJECTION INTRAMUSCULAR; INTRAVENOUS at 20:52

## 2017-07-02 RX ADMIN — OXYCODONE AND ACETAMINOPHEN 1 TABLET: 5; 325 TABLET ORAL at 06:17

## 2017-07-02 RX ADMIN — ONDANSETRON 4 MG: 2 INJECTION INTRAMUSCULAR; INTRAVENOUS at 02:47

## 2017-07-02 RX ADMIN — ONDANSETRON 4 MG: 2 INJECTION INTRAMUSCULAR; INTRAVENOUS at 06:18

## 2017-07-02 RX ADMIN — ASPIRIN 325 MG: 325 TABLET, DELAYED RELEASE ORAL at 17:12

## 2017-07-02 RX ADMIN — FAMOTIDINE 20 MG: 20 TABLET ORAL at 08:06

## 2017-07-02 RX ADMIN — METFORMIN HYDROCHLORIDE 500 MG: 500 TABLET, EXTENDED RELEASE ORAL at 08:06

## 2017-07-02 RX ADMIN — Medication 10 ML: at 20:52

## 2017-07-02 RX ADMIN — LACTULOSE 10 G: 10 SOLUTION ORAL at 20:52

## 2017-07-02 RX ADMIN — INSULIN LISPRO 1 UNITS: 100 INJECTION, SOLUTION INTRAVENOUS; SUBCUTANEOUS at 20:52

## 2017-07-02 RX ADMIN — ONDANSETRON 4 MG: 2 INJECTION INTRAMUSCULAR; INTRAVENOUS at 11:02

## 2017-07-02 RX ADMIN — LISINOPRIL 10 MG: 10 TABLET ORAL at 08:06

## 2017-07-02 RX ADMIN — VARENICLINE TARTRATE 1 MG: 0.5 TABLET, FILM COATED ORAL at 08:06

## 2017-07-02 RX ADMIN — ONDANSETRON 4 MG: 2 INJECTION INTRAMUSCULAR; INTRAVENOUS at 17:12

## 2017-07-02 RX ADMIN — OXYCODONE AND ACETAMINOPHEN 1 TABLET: 5; 325 TABLET ORAL at 07:06

## 2017-07-02 ASSESSMENT — PAIN SCALES - GENERAL
PAINLEVEL_OUTOF10: 5
PAINLEVEL_OUTOF10: 10
PAINLEVEL_OUTOF10: 9
PAINLEVEL_OUTOF10: 0
PAINLEVEL_OUTOF10: 4
PAINLEVEL_OUTOF10: 0
PAINLEVEL_OUTOF10: 9
PAINLEVEL_OUTOF10: 8
PAINLEVEL_OUTOF10: 2

## 2017-07-02 ASSESSMENT — PAIN DESCRIPTION - LOCATION: LOCATION: KNEE

## 2017-07-02 ASSESSMENT — PAIN DESCRIPTION - PAIN TYPE: TYPE: SURGICAL PAIN

## 2017-07-02 ASSESSMENT — PAIN DESCRIPTION - ORIENTATION: ORIENTATION: LEFT

## 2017-07-03 VITALS
TEMPERATURE: 98.4 F | HEIGHT: 66 IN | HEART RATE: 88 BPM | OXYGEN SATURATION: 90 % | RESPIRATION RATE: 18 BRPM | DIASTOLIC BLOOD PRESSURE: 70 MMHG | BODY MASS INDEX: 47.09 KG/M2 | SYSTOLIC BLOOD PRESSURE: 119 MMHG | WEIGHT: 293 LBS

## 2017-07-03 LAB
GLUCOSE BLD-MCNC: 212 MG/DL (ref 70–99)
HCT VFR BLD CALC: 36.3 % (ref 37–47)
HEMOGLOBIN: 11.5 G/DL (ref 12–16)
MCH RBC QN AUTO: 31.4 PG (ref 27–31)
MCHC RBC AUTO-ENTMCNC: 31.7 G/DL (ref 33–37)
MCV RBC AUTO: 99.2 FL (ref 81–99)
PDW BLD-RTO: 12.3 % (ref 11.5–14.5)
PERFORMED ON: ABNORMAL
PLATELET # BLD: 140 K/UL (ref 130–400)
PMV BLD AUTO: 11.3 FL (ref 9.4–12.3)
RBC # BLD: 3.66 M/UL (ref 4.2–5.4)
WBC # BLD: 7.6 K/UL (ref 4.8–10.8)

## 2017-07-03 PROCEDURE — 6370000000 HC RX 637 (ALT 250 FOR IP): Performed by: FAMILY MEDICINE

## 2017-07-03 PROCEDURE — 97530 THERAPEUTIC ACTIVITIES: CPT

## 2017-07-03 PROCEDURE — 36415 COLL VENOUS BLD VENIPUNCTURE: CPT

## 2017-07-03 PROCEDURE — 6360000002 HC RX W HCPCS: Performed by: ORTHOPAEDIC SURGERY

## 2017-07-03 PROCEDURE — 6370000000 HC RX 637 (ALT 250 FOR IP): Performed by: ORTHOPAEDIC SURGERY

## 2017-07-03 PROCEDURE — 97116 GAIT TRAINING THERAPY: CPT

## 2017-07-03 PROCEDURE — 85027 COMPLETE CBC AUTOMATED: CPT

## 2017-07-03 PROCEDURE — 82948 REAGENT STRIP/BLOOD GLUCOSE: CPT

## 2017-07-03 RX ORDER — OXYCODONE AND ACETAMINOPHEN 10; 325 MG/1; MG/1
1 TABLET ORAL EVERY 4 HOURS PRN
Qty: 90 TABLET | Refills: 0 | Status: SHIPPED | OUTPATIENT
Start: 2017-07-03 | End: 2017-07-10

## 2017-07-03 RX ORDER — PROMETHAZINE HYDROCHLORIDE 25 MG/1
25 TABLET ORAL EVERY 6 HOURS PRN
Qty: 40 TABLET | Refills: 2 | Status: SHIPPED | OUTPATIENT
Start: 2017-07-03 | End: 2017-07-10

## 2017-07-03 RX ORDER — ASPIRIN/CALCIUM/MAG/ALUMINUM 325 MG
1 TABLET ORAL DAILY
Qty: 30 TABLET | Refills: 0 | Status: SHIPPED | OUTPATIENT
Start: 2017-07-03

## 2017-07-03 RX ORDER — CEPHALEXIN 500 MG/1
500 CAPSULE ORAL 4 TIMES DAILY
Qty: 40 CAPSULE | Refills: 0 | Status: SHIPPED | OUTPATIENT
Start: 2017-07-03 | End: 2018-03-27 | Stop reason: ALTCHOICE

## 2017-07-03 RX ADMIN — LISINOPRIL 10 MG: 10 TABLET ORAL at 08:06

## 2017-07-03 RX ADMIN — ONDANSETRON 4 MG: 2 INJECTION INTRAMUSCULAR; INTRAVENOUS at 02:22

## 2017-07-03 RX ADMIN — VARENICLINE TARTRATE 1 MG: 0.5 TABLET, FILM COATED ORAL at 08:06

## 2017-07-03 RX ADMIN — BISACODYL 10 MG: 10 SUPPOSITORY RECTAL at 07:54

## 2017-07-03 RX ADMIN — ASPIRIN 325 MG: 325 TABLET, DELAYED RELEASE ORAL at 08:05

## 2017-07-03 RX ADMIN — INSULIN LISPRO 2 UNITS: 100 INJECTION, SOLUTION INTRAVENOUS; SUBCUTANEOUS at 08:03

## 2017-07-03 RX ADMIN — LACTULOSE 10 G: 10 SOLUTION ORAL at 08:05

## 2017-07-03 RX ADMIN — FAMOTIDINE 20 MG: 20 TABLET ORAL at 08:05

## 2017-07-03 RX ADMIN — DOCUSATE SODIUM 100 MG: 100 CAPSULE, LIQUID FILLED ORAL at 08:05

## 2017-07-03 RX ADMIN — OXYCODONE AND ACETAMINOPHEN 1 TABLET: 5; 325 TABLET ORAL at 02:22

## 2017-07-03 RX ADMIN — METFORMIN HYDROCHLORIDE 500 MG: 500 TABLET, EXTENDED RELEASE ORAL at 08:06

## 2017-07-03 RX ADMIN — ONDANSETRON 4 MG: 2 INJECTION INTRAMUSCULAR; INTRAVENOUS at 08:21

## 2017-07-03 RX ADMIN — OXYCODONE AND ACETAMINOPHEN 1 TABLET: 5; 325 TABLET ORAL at 08:21

## 2017-07-03 ASSESSMENT — PAIN DESCRIPTION - PAIN TYPE: TYPE: SURGICAL PAIN

## 2017-07-03 ASSESSMENT — PAIN SCALES - GENERAL
PAINLEVEL_OUTOF10: 5
PAINLEVEL_OUTOF10: 2
PAINLEVEL_OUTOF10: 9

## 2017-07-03 ASSESSMENT — PAIN DESCRIPTION - ORIENTATION: ORIENTATION: LEFT

## 2017-07-03 ASSESSMENT — PAIN DESCRIPTION - LOCATION: LOCATION: KNEE

## 2018-03-23 ENCOUNTER — HOSPITAL ENCOUNTER (OUTPATIENT)
Dept: GENERAL RADIOLOGY | Age: 58
Discharge: HOME | End: 2018-03-23
Payer: COMMERCIAL

## 2018-03-23 ENCOUNTER — HOSPITAL ENCOUNTER (OUTPATIENT)
Dept: WOUND CARE | Age: 58
Discharge: HOME | End: 2018-03-23
Payer: COMMERCIAL

## 2018-03-23 VITALS
HEART RATE: 87 BPM | SYSTOLIC BLOOD PRESSURE: 156 MMHG | RESPIRATION RATE: 18 BRPM | HEIGHT: 68 IN | DIASTOLIC BLOOD PRESSURE: 89 MMHG | WEIGHT: 266 LBS | BODY MASS INDEX: 40.32 KG/M2 | TEMPERATURE: 98.2 F

## 2018-03-23 DIAGNOSIS — E11.621 TYPE 2 DIABETES MELLITUS WITH DIABETIC TOE ULCER (HCC): ICD-10-CM

## 2018-03-23 DIAGNOSIS — F17.210 CIGARETTE SMOKER: ICD-10-CM

## 2018-03-23 DIAGNOSIS — E11.621 DIABETIC ULCER OF TOE OF RIGHT FOOT ASSOCIATED WITH TYPE 2 DIABETES MELLITUS, WITH FAT LAYER EXPOSED (HCC): ICD-10-CM

## 2018-03-23 DIAGNOSIS — L97.512 DIABETIC ULCER OF TOE OF RIGHT FOOT ASSOCIATED WITH TYPE 2 DIABETES MELLITUS, WITH FAT LAYER EXPOSED (HCC): ICD-10-CM

## 2018-03-23 DIAGNOSIS — I70.235 ATHSCL NATIVE ARTERIES OF RIGHT LEG W ULCER OTH PRT FOOT (HCC): ICD-10-CM

## 2018-03-23 DIAGNOSIS — L97.509 TYPE 2 DIABETES MELLITUS WITH DIABETIC TOE ULCER (HCC): ICD-10-CM

## 2018-03-23 DIAGNOSIS — L97.512 ULCER OF RIGHT FOOT, WITH FAT LAYER EXPOSED (HCC): ICD-10-CM

## 2018-03-23 DIAGNOSIS — L97.512 RIGHT FOOT ULCER, WITH FAT LAYER EXPOSED (HCC): ICD-10-CM

## 2018-03-23 DIAGNOSIS — E66.01 MORBID OBESITY (HCC): ICD-10-CM

## 2018-03-23 PROCEDURE — 99213 OFFICE O/P EST LOW 20 MIN: CPT

## 2018-03-23 PROCEDURE — 99214 OFFICE O/P EST MOD 30 MIN: CPT | Performed by: NURSE PRACTITIONER

## 2018-03-23 PROCEDURE — 73630 X-RAY EXAM OF FOOT: CPT

## 2018-03-23 RX ORDER — GABAPENTIN 100 MG/1
300 CAPSULE ORAL 3 TIMES DAILY
COMMUNITY

## 2018-03-23 NOTE — PROGRESS NOTES
mass.  Genitourinary - deferred. Musculoskeletal - ROM appears normal.  No significant edema. Neurologic - alert and oriented X 3. Physiologic. Psychiatric - mood, affect, and behavior appear normal.  Judgment and thought processes appear normal.  Skin - right 3rd toe wound     Post Debridement Measurements and Assessment:  Wound 03/23/18 Toe (Comment  which one) Right WOUND 1 RIGHT MEDIAL 3rd TOE DIABETIC WAG 1 (Active)   Wound Image    3/23/2018  8:31 AM   Wound Type Wound 3/23/2018  8:31 AM   Wound Diabetic Chan 1 3/23/2018  8:31 AM   Dressing Status Old drainage; Intact 3/23/2018  8:31 AM   Dressing Changed Changed/New 3/23/2018  8:31 AM   Wound Cleansed Rinsed/Irrigated with saline 3/23/2018  8:31 AM   Wound Length (cm) 0.7 cm 3/23/2018  9:22 AM   Wound Width (cm) 0.9 cm 3/23/2018  9:22 AM   Wound Depth (cm)  0.1 3/23/2018  9:22 AM   Calculated Wound Size (cm^2) (l*w) 0.63 cm^2 3/23/2018  9:22 AM   Change in Wound Size % (l*w) 0 3/23/2018  9:22 AM   Wound Assessment Granulation tissue;Pink;Red;Slough; Yellow 3/23/2018  8:31 AM   Drainage Amount Moderate 3/23/2018  8:31 AM   Drainage Description Serosanguinous 3/23/2018  8:31 AM   Odor None 3/23/2018  8:31 AM   Margins Attached edges 3/23/2018  8:31 AM   Yenifer-wound Assessment Red; White 3/23/2018  8:31 AM   Non-staged Wound Description Not applicable 4/01/2604  1:97 AM   [place]%Wound Bed 90 3/23/2018  8:31 AM   Yellow%Wound Bed 10 3/23/2018  8:31 AM   Debridement per physician None 3/23/2018  9:22 AM   Time out No 3/23/2018  9:22 AM   Procedural Pain 0 3/23/2018  9:22 AM   Post procedural Pain 0 3/23/2018  9:22 AM   Number of days: 0       Incision 06/30/17 Knee Left (Active)   Number of days: 266      The patients pain isPain Level: 0  . Please refer to nursing measurements and assessment regarding wound pre and post debridement. Plan    1. XRAY today   2. LEAs prior to next visit   3.  Offload between toes       Plan for wound - Dress per physician go to the nearest emergency room.

## 2018-03-27 ENCOUNTER — HOSPITAL ENCOUNTER (OUTPATIENT)
Dept: WOUND CARE | Age: 58
Discharge: HOME | End: 2018-03-27
Payer: COMMERCIAL

## 2018-03-27 ENCOUNTER — HOSPITAL ENCOUNTER (OUTPATIENT)
Dept: NON INVASIVE DIAGNOSTICS | Age: 58
Discharge: HOME | End: 2018-03-27
Payer: COMMERCIAL

## 2018-03-27 VITALS
RESPIRATION RATE: 20 BRPM | DIASTOLIC BLOOD PRESSURE: 90 MMHG | SYSTOLIC BLOOD PRESSURE: 142 MMHG | HEART RATE: 84 BPM | TEMPERATURE: 98.4 F

## 2018-03-27 DIAGNOSIS — F17.210 CIGARETTE SMOKER: ICD-10-CM

## 2018-03-27 DIAGNOSIS — L97.512 ULCER OF RIGHT FOOT, WITH FAT LAYER EXPOSED (HCC): ICD-10-CM

## 2018-03-27 DIAGNOSIS — I70.235 ATHSCL NATIVE ARTERIES OF RIGHT LEG W ULCER OTH PRT FOOT (HCC): ICD-10-CM

## 2018-03-27 DIAGNOSIS — E66.01 MORBID OBESITY (HCC): ICD-10-CM

## 2018-03-27 DIAGNOSIS — E11.621 DIABETIC ULCER OF TOE OF RIGHT FOOT ASSOCIATED WITH TYPE 2 DIABETES MELLITUS, WITH FAT LAYER EXPOSED (HCC): ICD-10-CM

## 2018-03-27 DIAGNOSIS — L97.512 DIABETIC ULCER OF TOE OF RIGHT FOOT ASSOCIATED WITH TYPE 2 DIABETES MELLITUS, WITH FAT LAYER EXPOSED (HCC): ICD-10-CM

## 2018-03-27 PROCEDURE — 93923 UPR/LXTR ART STDY 3+ LVLS: CPT

## 2018-03-27 PROCEDURE — 99213 OFFICE O/P EST LOW 20 MIN: CPT

## 2018-03-27 PROCEDURE — 99213 OFFICE O/P EST LOW 20 MIN: CPT | Performed by: SURGERY

## 2018-03-27 RX ORDER — CIPROFLOXACIN 500 MG/1
500 TABLET, FILM COATED ORAL 2 TIMES DAILY
COMMUNITY
End: 2018-04-11 | Stop reason: ALTCHOICE

## 2018-03-27 ASSESSMENT — PAIN DESCRIPTION - DESCRIPTORS: DESCRIPTORS: BURNING;DISCOMFORT

## 2018-03-27 ASSESSMENT — PAIN DESCRIPTION - ORIENTATION: ORIENTATION: RIGHT

## 2018-03-27 ASSESSMENT — PAIN SCALES - GENERAL: PAINLEVEL_OUTOF10: 8

## 2018-03-27 ASSESSMENT — PAIN DESCRIPTION - ONSET: ONSET: ON-GOING

## 2018-03-27 ASSESSMENT — PAIN DESCRIPTION - PROGRESSION: CLINICAL_PROGRESSION: NOT CHANGED

## 2018-03-27 ASSESSMENT — PAIN DESCRIPTION - LOCATION: LOCATION: FOOT

## 2018-03-27 ASSESSMENT — PAIN DESCRIPTION - PAIN TYPE: TYPE: ACUTE PAIN

## 2018-03-27 ASSESSMENT — PAIN DESCRIPTION - FREQUENCY: FREQUENCY: CONTINUOUS

## 2018-03-27 NOTE — PROGRESS NOTES
(GLUCOPHAGE-XR) 500 MG extended release tablet Take 500 mg by mouth daily (with breakfast) Indications: DIABETES      varenicline (CHANTIX) 1 MG tablet Take 1 mg by mouth daily Indications: SMOKING CESSATION       No current facility-administered medications on file prior to encounter. ALLERGIES    Codeine        Objective:         Vitals:    03/27/18 1556   BP: (!) 142/90   Pulse: 84   Resp: 20   Temp: 98.4 °F (36.9 °C)        BP (!) 142/90   Pulse 84   Temp 98.4 °F (36.9 °C) (Temporal)   Resp 20     ROS:  Constitutional - Alert and oriented x 3, no complaints, pleasant co-operative  Integumentary - edema improved, no erythema, no cellulitis, no new wounds   The patients pain isPain Level: 8 Pain Type: Acute pain. Wound Measurements and Assessment:  Wound 03/23/18 Toe (Comment  which one) Right WOUND 1 RIGHT MEDIAL 3rd TOE DIABETIC WAG 1 (Active)   Wound Image    3/27/2018  4:11 PM   Wound Type Wound 3/27/2018  4:11 PM   Wound Diabetic Chan 1 3/27/2018  4:11 PM   Dressing Status Old drainage 3/27/2018  4:11 PM   Dressing Changed Changed/New 3/23/2018  9:36 AM   Wound Cleansed Other (Comment) 3/27/2018  4:11 PM   Wound Length (cm) 0.8 cm 3/27/2018  4:11 PM   Wound Width (cm) 0.4 cm 3/27/2018  4:11 PM   Wound Depth (cm)  0.2 3/27/2018  4:11 PM   Calculated Wound Size (cm^2) (l*w) 0.32 cm^2 3/27/2018  4:11 PM   Change in Wound Size % (l*w) 49.21 3/27/2018  4:11 PM   Distance Tunneling (cm) 0 cm 3/27/2018  4:11 PM   Tunneling Position ___ O'Clock 0 3/27/2018  4:11 PM   Undermining Starts ___ O'Clock 0 3/27/2018  4:11 PM   Undermining Ends___ O'Clock 0 3/27/2018  4:11 PM   Undermining Maxium Distance (cm) 0 3/27/2018  4:11 PM   Wound Assessment Drainage;Red;Slough; Yellow 3/27/2018  4:11 PM   Drainage Amount Small 3/27/2018  4:11 PM   Drainage Description Serosanguinous 3/27/2018  4:11 PM   Odor None 3/27/2018  4:11 PM   Margins Attached edges 3/27/2018  4:11 PM   Yenifer-wound Assessment Red; White 3/23/2018 8:31 AM   Non-staged Wound Description Not applicable 0/72/6651  8:91 PM   [place]%Wound Bed 30 3/27/2018  4:11 PM   Red%Wound Bed 60 3/27/2018  4:11 PM   Yellow%Wound Bed 10 3/27/2018  4:11 PM   Black%Wound Bed 0 3/27/2018  4:11 PM   Purple%Wound Bed 0 3/27/2018  4:11 PM   Other%Wound Bed 0 3/27/2018  4:11 PM   Debridement per physician None 3/23/2018  9:22 AM   Time out No 3/23/2018  9:22 AM   Procedural Pain 0 3/23/2018  9:22 AM   Post procedural Pain 0 3/23/2018  9:22 AM   Number of days: 4       Incision 06/30/17 Knee Left (Active)   Number of days: 270        Wound is is unchanged. Please refer to nursing documentation for wound measurements. 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 Discussed appropriate home care of this wound. Wound redressed. 2. Patient instructions were given. 3. Follow up: 1 week(s). Discharge Instructions       Visit Discharge/Physician Orders    Discharge condition: Stable    Discharge to: Home    Left via:Private automobile    Accompanied by: Daughter    Dressing Orders: Right Third Toe:   Wash with soap and water. Apply Aquacell AG to wound bed. Cover with gauze. Secure with medipore tape. Change twice daily. Keep better check on Blood Sugar  Stop smoking   Treatment Orders: Protein rich diet (unless restricted by your physician); Multivitamin daily;    Elevate legs when sitting, avoid standing for long periods of time. [facility] followup visit __________1 week with Dr Christophe Jimenez two weeks with Dr Gallego_________________  (Please note your next appointment above and if you are unable to keep, kindly give a 24 hour notice. Thank you.)    If you experience any of the following, please call the [facility] [id number] during business hours:    * Increase in Pain  * Temperature over 101  * Increase in drainage from your wound  * Drainage with a foul odor  * Bleeding  * Increase in swelling  * Need for compression bandage changes due to slippage, breakthrough drainage. If you need medical attention outside of the business hours of the [facility] [address] please contact your PCP or go to the nearest emergency room.         Electronically signed by Erica Silva MD on 3/27/2018 at 4:45 PM

## 2018-04-03 ENCOUNTER — HOSPITAL ENCOUNTER (OUTPATIENT)
Dept: WOUND CARE | Age: 58
Discharge: HOME | End: 2018-04-03
Payer: COMMERCIAL

## 2018-04-03 VITALS
TEMPERATURE: 96.4 F | HEIGHT: 68 IN | RESPIRATION RATE: 18 BRPM | BODY MASS INDEX: 40.32 KG/M2 | WEIGHT: 266 LBS | DIASTOLIC BLOOD PRESSURE: 89 MMHG | SYSTOLIC BLOOD PRESSURE: 137 MMHG | HEART RATE: 93 BPM

## 2018-04-03 DIAGNOSIS — L97.512 DIABETIC ULCER OF TOE OF RIGHT FOOT ASSOCIATED WITH TYPE 2 DIABETES MELLITUS, WITH FAT LAYER EXPOSED (HCC): ICD-10-CM

## 2018-04-03 DIAGNOSIS — L97.512 ULCER OF RIGHT FOOT, WITH FAT LAYER EXPOSED (HCC): ICD-10-CM

## 2018-04-03 DIAGNOSIS — E66.01 MORBID OBESITY (HCC): ICD-10-CM

## 2018-04-03 DIAGNOSIS — I70.235 ATHSCL NATIVE ARTERIES OF RIGHT LEG W ULCER OTH PRT FOOT (HCC): ICD-10-CM

## 2018-04-03 DIAGNOSIS — E11.621 DIABETIC ULCER OF TOE OF RIGHT FOOT ASSOCIATED WITH TYPE 2 DIABETES MELLITUS, WITH FAT LAYER EXPOSED (HCC): ICD-10-CM

## 2018-04-03 DIAGNOSIS — F17.210 CIGARETTE SMOKER: ICD-10-CM

## 2018-04-03 PROBLEM — E11.9 DIABETES MELLITUS (HCC): Status: ACTIVE | Noted: 2017-07-01

## 2018-04-03 PROBLEM — I10 HYPERTENSION: Status: ACTIVE | Noted: 2017-07-01

## 2018-04-03 PROCEDURE — 97597 DBRDMT OPN WND 1ST 20 CM/<: CPT | Performed by: SURGERY

## 2018-04-03 PROCEDURE — 97597 DBRDMT OPN WND 1ST 20 CM/<: CPT

## 2018-04-03 ASSESSMENT — PAIN DESCRIPTION - FREQUENCY: FREQUENCY: CONTINUOUS

## 2018-04-03 ASSESSMENT — PAIN DESCRIPTION - ONSET: ONSET: ON-GOING

## 2018-04-03 ASSESSMENT — PAIN DESCRIPTION - PROGRESSION: CLINICAL_PROGRESSION: NOT CHANGED

## 2018-04-03 ASSESSMENT — PAIN DESCRIPTION - PAIN TYPE: TYPE: ACUTE PAIN

## 2018-04-03 ASSESSMENT — PAIN DESCRIPTION - DESCRIPTORS: DESCRIPTORS: BURNING

## 2018-04-03 ASSESSMENT — PAIN SCALES - GENERAL: PAINLEVEL_OUTOF10: 6

## 2018-04-03 ASSESSMENT — PAIN DESCRIPTION - ORIENTATION: ORIENTATION: RIGHT

## 2018-04-03 ASSESSMENT — PAIN DESCRIPTION - LOCATION: LOCATION: FOOT

## 2018-04-11 ENCOUNTER — HOSPITAL ENCOUNTER (OUTPATIENT)
Dept: WOUND CARE | Age: 58
Discharge: HOME | End: 2018-04-11
Payer: COMMERCIAL

## 2018-04-11 ENCOUNTER — HOSPITAL ENCOUNTER (OUTPATIENT)
Dept: PREADMISSION TESTING | Age: 58
Setting detail: OUTPATIENT SURGERY
Discharge: HOME | End: 2018-04-15
Payer: COMMERCIAL

## 2018-04-11 ENCOUNTER — PREP FOR PROCEDURE (OUTPATIENT)
Dept: VASCULAR SURGERY | Age: 58
End: 2018-04-11

## 2018-04-11 VITALS
HEIGHT: 68 IN | BODY MASS INDEX: 40.32 KG/M2 | TEMPERATURE: 97 F | DIASTOLIC BLOOD PRESSURE: 93 MMHG | RESPIRATION RATE: 16 BRPM | SYSTOLIC BLOOD PRESSURE: 171 MMHG | WEIGHT: 266 LBS | HEART RATE: 90 BPM

## 2018-04-11 VITALS — HEIGHT: 68 IN | BODY MASS INDEX: 40.32 KG/M2 | WEIGHT: 266 LBS

## 2018-04-11 DIAGNOSIS — I70.235 ATHSCL NATIVE ARTERIES OF RIGHT LEG W ULCER OTH PRT FOOT (HCC): ICD-10-CM

## 2018-04-11 DIAGNOSIS — M86.9 OSTEOMYELITIS OF TOE OF RIGHT FOOT (HCC): Chronic | ICD-10-CM

## 2018-04-11 DIAGNOSIS — E66.01 MORBID OBESITY (HCC): ICD-10-CM

## 2018-04-11 DIAGNOSIS — E11.621 DIABETIC ULCER OF TOE OF RIGHT FOOT ASSOCIATED WITH TYPE 2 DIABETES MELLITUS, WITH FAT LAYER EXPOSED (HCC): ICD-10-CM

## 2018-04-11 DIAGNOSIS — L97.512 ULCER OF RIGHT FOOT, WITH FAT LAYER EXPOSED (HCC): ICD-10-CM

## 2018-04-11 DIAGNOSIS — L97.512 DIABETIC ULCER OF TOE OF RIGHT FOOT ASSOCIATED WITH TYPE 2 DIABETES MELLITUS, WITH FAT LAYER EXPOSED (HCC): ICD-10-CM

## 2018-04-11 DIAGNOSIS — F17.210 CIGARETTE SMOKER: ICD-10-CM

## 2018-04-11 LAB
ANION GAP SERPL CALCULATED.3IONS-SCNC: 14 MMOL/L (ref 7–19)
BASOPHILS ABSOLUTE: 0 K/UL (ref 0–0.2)
BASOPHILS RELATIVE PERCENT: 0.4 % (ref 0–1)
BUN BLDV-MCNC: 14 MG/DL (ref 6–20)
CALCIUM SERPL-MCNC: 9.1 MG/DL (ref 8.6–10)
CHLORIDE BLD-SCNC: 93 MMOL/L (ref 98–111)
CO2: 26 MMOL/L (ref 22–29)
CREAT SERPL-MCNC: <0.5 MG/DL (ref 0.5–0.9)
EKG P AXIS: 52 DEGREES
EKG P-R INTERVAL: 138 MS
EKG Q-T INTERVAL: 444 MS
EKG QRS DURATION: 84 MS
EKG QTC CALCULATION (BAZETT): 451 MS
EKG T AXIS: 33 DEGREES
EOSINOPHILS ABSOLUTE: 0.2 K/UL (ref 0–0.6)
EOSINOPHILS RELATIVE PERCENT: 3 % (ref 0–5)
GFR NON-AFRICAN AMERICAN: >60
GLUCOSE BLD-MCNC: 326 MG/DL (ref 74–109)
HCT VFR BLD CALC: 51.3 % (ref 37–47)
HEMOGLOBIN: 16.5 G/DL (ref 12–16)
LYMPHOCYTES ABSOLUTE: 1.9 K/UL (ref 1.1–4.5)
LYMPHOCYTES RELATIVE PERCENT: 28.6 % (ref 20–40)
MCH RBC QN AUTO: 30 PG (ref 27–31)
MCHC RBC AUTO-ENTMCNC: 32.2 G/DL (ref 33–37)
MCV RBC AUTO: 93.3 FL (ref 81–99)
MONOCYTES ABSOLUTE: 0.5 K/UL (ref 0–0.9)
MONOCYTES RELATIVE PERCENT: 7 % (ref 0–10)
NEUTROPHILS ABSOLUTE: 4.1 K/UL (ref 1.5–7.5)
NEUTROPHILS RELATIVE PERCENT: 60.7 % (ref 50–65)
PDW BLD-RTO: 12.3 % (ref 11.5–14.5)
PLATELET # BLD: 143 K/UL (ref 130–400)
PMV BLD AUTO: 11 FL (ref 9.4–12.3)
POTASSIUM SERPL-SCNC: 4.2 MMOL/L (ref 3.5–5)
RBC # BLD: 5.5 M/UL (ref 4.2–5.4)
SODIUM BLD-SCNC: 133 MMOL/L (ref 136–145)
WBC # BLD: 6.7 K/UL (ref 4.8–10.8)

## 2018-04-11 PROCEDURE — 99213 OFFICE O/P EST LOW 20 MIN: CPT

## 2018-04-11 PROCEDURE — 85025 COMPLETE CBC W/AUTO DIFF WBC: CPT

## 2018-04-11 PROCEDURE — 93005 ELECTROCARDIOGRAM TRACING: CPT

## 2018-04-11 PROCEDURE — 80048 BASIC METABOLIC PNL TOTAL CA: CPT

## 2018-04-11 PROCEDURE — 99212 OFFICE O/P EST SF 10 MIN: CPT

## 2018-04-11 PROCEDURE — 99214 OFFICE O/P EST MOD 30 MIN: CPT | Performed by: SURGERY

## 2018-04-11 RX ORDER — SODIUM CHLORIDE 0.9 % (FLUSH) 0.9 %
10 SYRINGE (ML) INJECTION PRN
Status: CANCELLED | OUTPATIENT
Start: 2018-04-11

## 2018-04-11 RX ORDER — M-VIT,TX,IRON,MINS/CALC/FOLIC 27MG-0.4MG
1 TABLET ORAL DAILY
COMMUNITY

## 2018-04-11 RX ORDER — SODIUM CHLORIDE 0.9 % (FLUSH) 0.9 %
10 SYRINGE (ML) INJECTION EVERY 12 HOURS SCHEDULED
Status: CANCELLED | OUTPATIENT
Start: 2018-04-11

## 2018-04-12 ENCOUNTER — HOSPITAL ENCOUNTER (OUTPATIENT)
Age: 58
Setting detail: OUTPATIENT SURGERY
Discharge: HOME | End: 2018-04-12
Attending: SURGERY | Admitting: SURGERY
Payer: COMMERCIAL

## 2018-04-12 ENCOUNTER — ANESTHESIA EVENT (OUTPATIENT)
Dept: OPERATING ROOM | Age: 58
End: 2018-04-12
Payer: COMMERCIAL

## 2018-04-12 ENCOUNTER — ANESTHESIA (OUTPATIENT)
Dept: OPERATING ROOM | Age: 58
End: 2018-04-12
Payer: COMMERCIAL

## 2018-04-12 VITALS
HEIGHT: 68 IN | DIASTOLIC BLOOD PRESSURE: 84 MMHG | RESPIRATION RATE: 17 BRPM | SYSTOLIC BLOOD PRESSURE: 172 MMHG | OXYGEN SATURATION: 92 % | BODY MASS INDEX: 40.32 KG/M2 | TEMPERATURE: 97.9 F | HEART RATE: 74 BPM | WEIGHT: 266 LBS

## 2018-04-12 VITALS
DIASTOLIC BLOOD PRESSURE: 69 MMHG | RESPIRATION RATE: 2 BRPM | SYSTOLIC BLOOD PRESSURE: 147 MMHG | OXYGEN SATURATION: 96 % | TEMPERATURE: 98 F

## 2018-04-12 LAB
GLUCOSE BLD-MCNC: 201 MG/DL (ref 70–99)
PERFORMED ON: ABNORMAL

## 2018-04-12 PROCEDURE — 28820 AMPUTATION OF TOE: CPT | Performed by: SURGERY

## 2018-04-12 PROCEDURE — 2580000003 HC RX 258: Performed by: SURGERY

## 2018-04-12 PROCEDURE — 6370000000 HC RX 637 (ALT 250 FOR IP): Performed by: SURGERY

## 2018-04-12 PROCEDURE — 7100000000 HC PACU RECOVERY - FIRST 15 MIN: Performed by: SURGERY

## 2018-04-12 PROCEDURE — 6360000002 HC RX W HCPCS: Performed by: SURGERY

## 2018-04-12 PROCEDURE — 7100000011 HC PHASE II RECOVERY - ADDTL 15 MIN: Performed by: SURGERY

## 2018-04-12 PROCEDURE — 3600000014 HC SURGERY LEVEL 4 ADDTL 15MIN: Performed by: SURGERY

## 2018-04-12 PROCEDURE — 3700000000 HC ANESTHESIA ATTENDED CARE: Performed by: SURGERY

## 2018-04-12 PROCEDURE — 6360000002 HC RX W HCPCS: Performed by: NURSE ANESTHETIST, CERTIFIED REGISTERED

## 2018-04-12 PROCEDURE — 88305 TISSUE EXAM BY PATHOLOGIST: CPT

## 2018-04-12 PROCEDURE — 3600000004 HC SURGERY LEVEL 4 BASE: Performed by: SURGERY

## 2018-04-12 PROCEDURE — 7100000001 HC PACU RECOVERY - ADDTL 15 MIN: Performed by: SURGERY

## 2018-04-12 PROCEDURE — 7100000010 HC PHASE II RECOVERY - FIRST 15 MIN: Performed by: SURGERY

## 2018-04-12 PROCEDURE — 82948 REAGENT STRIP/BLOOD GLUCOSE: CPT

## 2018-04-12 PROCEDURE — 2500000003 HC RX 250 WO HCPCS: Performed by: ANESTHESIOLOGY

## 2018-04-12 PROCEDURE — 2580000003 HC RX 258: Performed by: NURSE ANESTHETIST, CERTIFIED REGISTERED

## 2018-04-12 PROCEDURE — 3700000001 HC ADD 15 MINUTES (ANESTHESIA): Performed by: SURGERY

## 2018-04-12 PROCEDURE — 2500000003 HC RX 250 WO HCPCS: Performed by: NURSE ANESTHETIST, CERTIFIED REGISTERED

## 2018-04-12 PROCEDURE — 2720000001 HC MISC SURG SUPPLY STERILE $51-500: Performed by: SURGERY

## 2018-04-12 PROCEDURE — 2580000003 HC RX 258: Performed by: ANESTHESIOLOGY

## 2018-04-12 RX ORDER — MIDAZOLAM HYDROCHLORIDE 1 MG/ML
2 INJECTION INTRAMUSCULAR; INTRAVENOUS
Status: DISCONTINUED | OUTPATIENT
Start: 2018-04-12 | End: 2018-04-12 | Stop reason: HOSPADM

## 2018-04-12 RX ORDER — DIPHENHYDRAMINE HYDROCHLORIDE 50 MG/ML
12.5 INJECTION INTRAMUSCULAR; INTRAVENOUS
Status: DISCONTINUED | OUTPATIENT
Start: 2018-04-12 | End: 2018-04-12 | Stop reason: HOSPADM

## 2018-04-12 RX ORDER — FENTANYL CITRATE 50 UG/ML
INJECTION, SOLUTION INTRAMUSCULAR; INTRAVENOUS PRN
Status: DISCONTINUED | OUTPATIENT
Start: 2018-04-12 | End: 2018-04-12 | Stop reason: SDUPTHER

## 2018-04-12 RX ORDER — LABETALOL HYDROCHLORIDE 5 MG/ML
5 INJECTION, SOLUTION INTRAVENOUS EVERY 10 MIN PRN
Status: DISCONTINUED | OUTPATIENT
Start: 2018-04-12 | End: 2018-04-12 | Stop reason: HOSPADM

## 2018-04-12 RX ORDER — SODIUM CHLORIDE 0.9 % (FLUSH) 0.9 %
10 SYRINGE (ML) INJECTION EVERY 12 HOURS SCHEDULED
Status: DISCONTINUED | OUTPATIENT
Start: 2018-04-12 | End: 2018-04-12 | Stop reason: HOSPADM

## 2018-04-12 RX ORDER — FENTANYL CITRATE 50 UG/ML
25 INJECTION, SOLUTION INTRAMUSCULAR; INTRAVENOUS
Status: DISCONTINUED | OUTPATIENT
Start: 2018-04-12 | End: 2018-04-12 | Stop reason: HOSPADM

## 2018-04-12 RX ORDER — MIDAZOLAM HYDROCHLORIDE 1 MG/ML
INJECTION INTRAMUSCULAR; INTRAVENOUS PRN
Status: DISCONTINUED | OUTPATIENT
Start: 2018-04-12 | End: 2018-04-12 | Stop reason: SDUPTHER

## 2018-04-12 RX ORDER — DEXAMETHASONE SODIUM PHOSPHATE 10 MG/ML
INJECTION INTRAMUSCULAR; INTRAVENOUS PRN
Status: DISCONTINUED | OUTPATIENT
Start: 2018-04-12 | End: 2018-04-12 | Stop reason: SDUPTHER

## 2018-04-12 RX ORDER — MEPERIDINE HYDROCHLORIDE 50 MG/ML
12.5 INJECTION INTRAMUSCULAR; INTRAVENOUS; SUBCUTANEOUS EVERY 5 MIN PRN
Status: DISCONTINUED | OUTPATIENT
Start: 2018-04-12 | End: 2018-04-12 | Stop reason: HOSPADM

## 2018-04-12 RX ORDER — FENTANYL CITRATE 50 UG/ML
50 INJECTION, SOLUTION INTRAMUSCULAR; INTRAVENOUS
Status: DISCONTINUED | OUTPATIENT
Start: 2018-04-12 | End: 2018-04-12 | Stop reason: HOSPADM

## 2018-04-12 RX ORDER — SODIUM CHLORIDE 0.9 % (FLUSH) 0.9 %
10 SYRINGE (ML) INJECTION PRN
Status: DISCONTINUED | OUTPATIENT
Start: 2018-04-12 | End: 2018-04-12 | Stop reason: HOSPADM

## 2018-04-12 RX ORDER — MORPHINE SULFATE 4 MG/ML
2 INJECTION, SOLUTION INTRAMUSCULAR; INTRAVENOUS EVERY 5 MIN PRN
Status: DISCONTINUED | OUTPATIENT
Start: 2018-04-12 | End: 2018-04-12 | Stop reason: HOSPADM

## 2018-04-12 RX ORDER — ENALAPRILAT 2.5 MG/2ML
1.25 INJECTION INTRAVENOUS
Status: DISCONTINUED | OUTPATIENT
Start: 2018-04-12 | End: 2018-04-12 | Stop reason: HOSPADM

## 2018-04-12 RX ORDER — HYDROCODONE BITARTRATE AND ACETAMINOPHEN 7.5; 325 MG/1; MG/1
1 TABLET ORAL ONCE
Status: COMPLETED | OUTPATIENT
Start: 2018-04-12 | End: 2018-04-12

## 2018-04-12 RX ORDER — SODIUM CHLORIDE, SODIUM LACTATE, POTASSIUM CHLORIDE, CALCIUM CHLORIDE 600; 310; 30; 20 MG/100ML; MG/100ML; MG/100ML; MG/100ML
INJECTION, SOLUTION INTRAVENOUS CONTINUOUS PRN
Status: DISCONTINUED | OUTPATIENT
Start: 2018-04-12 | End: 2018-04-12 | Stop reason: SDUPTHER

## 2018-04-12 RX ORDER — HYDROCODONE BITARTRATE AND ACETAMINOPHEN 7.5; 325 MG/1; MG/1
1 TABLET ORAL EVERY 6 HOURS PRN
Qty: 25 TABLET | Refills: 0 | Status: SHIPPED | OUTPATIENT
Start: 2018-04-12 | End: 2018-04-15

## 2018-04-12 RX ORDER — HYDROMORPHONE HCL IN 0.9% NACL 0.5 MG/ML
0.5 SYRINGE (ML) INTRAVENOUS EVERY 5 MIN PRN
Status: DISCONTINUED | OUTPATIENT
Start: 2018-04-12 | End: 2018-04-12 | Stop reason: HOSPADM

## 2018-04-12 RX ORDER — ONDANSETRON 2 MG/ML
INJECTION INTRAMUSCULAR; INTRAVENOUS PRN
Status: DISCONTINUED | OUTPATIENT
Start: 2018-04-12 | End: 2018-04-12 | Stop reason: SDUPTHER

## 2018-04-12 RX ORDER — ONDANSETRON 2 MG/ML
4 INJECTION INTRAMUSCULAR; INTRAVENOUS
Status: DISCONTINUED | OUTPATIENT
Start: 2018-04-12 | End: 2018-04-12 | Stop reason: HOSPADM

## 2018-04-12 RX ORDER — SODIUM CHLORIDE, SODIUM LACTATE, POTASSIUM CHLORIDE, CALCIUM CHLORIDE 600; 310; 30; 20 MG/100ML; MG/100ML; MG/100ML; MG/100ML
INJECTION, SOLUTION INTRAVENOUS CONTINUOUS
Status: DISCONTINUED | OUTPATIENT
Start: 2018-04-12 | End: 2018-04-12 | Stop reason: HOSPADM

## 2018-04-12 RX ORDER — MORPHINE SULFATE 4 MG/ML
1 INJECTION, SOLUTION INTRAMUSCULAR; INTRAVENOUS EVERY 5 MIN PRN
Status: DISCONTINUED | OUTPATIENT
Start: 2018-04-12 | End: 2018-04-12 | Stop reason: HOSPADM

## 2018-04-12 RX ORDER — LIDOCAINE HYDROCHLORIDE 10 MG/ML
1 INJECTION, SOLUTION EPIDURAL; INFILTRATION; INTRACAUDAL; PERINEURAL
Status: COMPLETED | OUTPATIENT
Start: 2018-04-12 | End: 2018-04-12

## 2018-04-12 RX ORDER — HYDRALAZINE HYDROCHLORIDE 20 MG/ML
5 INJECTION INTRAMUSCULAR; INTRAVENOUS EVERY 10 MIN PRN
Status: DISCONTINUED | OUTPATIENT
Start: 2018-04-12 | End: 2018-04-12 | Stop reason: HOSPADM

## 2018-04-12 RX ORDER — PROPOFOL 10 MG/ML
INJECTION, EMULSION INTRAVENOUS PRN
Status: DISCONTINUED | OUTPATIENT
Start: 2018-04-12 | End: 2018-04-12 | Stop reason: SDUPTHER

## 2018-04-12 RX ORDER — HYDROMORPHONE HCL IN 0.9% NACL 0.5 MG/ML
0.25 SYRINGE (ML) INTRAVENOUS EVERY 5 MIN PRN
Status: DISCONTINUED | OUTPATIENT
Start: 2018-04-12 | End: 2018-04-12 | Stop reason: HOSPADM

## 2018-04-12 RX ORDER — EPHEDRINE SULFATE 50 MG/ML
INJECTION, SOLUTION INTRAVENOUS PRN
Status: DISCONTINUED | OUTPATIENT
Start: 2018-04-12 | End: 2018-04-12 | Stop reason: SDUPTHER

## 2018-04-12 RX ORDER — LIDOCAINE HYDROCHLORIDE 10 MG/ML
INJECTION, SOLUTION EPIDURAL; INFILTRATION; INTRACAUDAL; PERINEURAL PRN
Status: DISCONTINUED | OUTPATIENT
Start: 2018-04-12 | End: 2018-04-12 | Stop reason: SDUPTHER

## 2018-04-12 RX ORDER — PROMETHAZINE HYDROCHLORIDE 25 MG/ML
6.25 INJECTION, SOLUTION INTRAMUSCULAR; INTRAVENOUS
Status: DISCONTINUED | OUTPATIENT
Start: 2018-04-12 | End: 2018-04-12 | Stop reason: HOSPADM

## 2018-04-12 RX ORDER — METFORMIN HYDROCHLORIDE 500 MG/1
500 TABLET, EXTENDED RELEASE ORAL
Qty: 30 TABLET | Refills: 3 | Status: SHIPPED | OUTPATIENT
Start: 2018-04-14

## 2018-04-12 RX ADMIN — SODIUM CHLORIDE, POTASSIUM CHLORIDE, SODIUM LACTATE AND CALCIUM CHLORIDE: 600; 310; 30; 20 INJECTION, SOLUTION INTRAVENOUS at 10:32

## 2018-04-12 RX ADMIN — CEFAZOLIN 3 G: 10 INJECTION, POWDER, FOR SOLUTION INTRAVENOUS at 14:18

## 2018-04-12 RX ADMIN — PROPOFOL 200 MG: 10 INJECTION, EMULSION INTRAVENOUS at 14:14

## 2018-04-12 RX ADMIN — PROPOFOL 50 MG: 10 INJECTION, EMULSION INTRAVENOUS at 14:36

## 2018-04-12 RX ADMIN — DEXAMETHASONE SODIUM PHOSPHATE 10 MG: 10 INJECTION INTRAMUSCULAR; INTRAVENOUS at 14:24

## 2018-04-12 RX ADMIN — EPHEDRINE SULFATE 10 MG: 50 INJECTION, SOLUTION INTRAMUSCULAR; INTRAVENOUS; SUBCUTANEOUS at 14:27

## 2018-04-12 RX ADMIN — MORPHINE SULFATE 2 MG: 4 INJECTION INTRAVENOUS at 15:24

## 2018-04-12 RX ADMIN — FENTANYL CITRATE 50 MCG: 50 INJECTION, SOLUTION INTRAMUSCULAR; INTRAVENOUS at 14:18

## 2018-04-12 RX ADMIN — LIDOCAINE HYDROCHLORIDE 1 ML: 10 INJECTION, SOLUTION EPIDURAL; INFILTRATION; INTRACAUDAL; PERINEURAL at 10:32

## 2018-04-12 RX ADMIN — FENTANYL CITRATE 75 MCG: 50 INJECTION, SOLUTION INTRAMUSCULAR; INTRAVENOUS at 14:14

## 2018-04-12 RX ADMIN — PHENYLEPHRINE HYDROCHLORIDE 100 MCG: 10 INJECTION INTRAVENOUS at 14:50

## 2018-04-12 RX ADMIN — HYDROCODONE BITARTRATE AND ACETAMINOPHEN 1 TABLET: 7.5; 325 TABLET ORAL at 16:22

## 2018-04-12 RX ADMIN — MIDAZOLAM HYDROCHLORIDE 2 MG: 1 INJECTION, SOLUTION INTRAMUSCULAR; INTRAVENOUS at 14:10

## 2018-04-12 RX ADMIN — LIDOCAINE HYDROCHLORIDE 5 ML: 10 INJECTION, SOLUTION EPIDURAL; INFILTRATION; INTRACAUDAL; PERINEURAL at 14:14

## 2018-04-12 RX ADMIN — SODIUM CHLORIDE, SODIUM LACTATE, POTASSIUM CHLORIDE, AND CALCIUM CHLORIDE: 600; 310; 30; 20 INJECTION, SOLUTION INTRAVENOUS at 14:10

## 2018-04-12 RX ADMIN — PHENYLEPHRINE HYDROCHLORIDE 100 MCG: 10 INJECTION INTRAVENOUS at 14:36

## 2018-04-12 RX ADMIN — VANCOMYCIN HYDROCHLORIDE 2000 MG: 1 INJECTION, POWDER, LYOPHILIZED, FOR SOLUTION INTRAVENOUS at 10:55

## 2018-04-12 RX ADMIN — ONDANSETRON HYDROCHLORIDE 4 MG: 2 SOLUTION INTRAMUSCULAR; INTRAVENOUS at 14:46

## 2018-04-12 ASSESSMENT — PAIN - FUNCTIONAL ASSESSMENT: PAIN_FUNCTIONAL_ASSESSMENT: 0-10

## 2018-04-12 ASSESSMENT — PAIN DESCRIPTION - LOCATION: LOCATION: FOOT

## 2018-04-12 ASSESSMENT — PAIN SCALES - GENERAL
PAINLEVEL_OUTOF10: 0
PAINLEVEL_OUTOF10: 2
PAINLEVEL_OUTOF10: 8
PAINLEVEL_OUTOF10: 5
PAINLEVEL_OUTOF10: 4

## 2018-04-12 ASSESSMENT — PAIN DESCRIPTION - FREQUENCY: FREQUENCY: CONTINUOUS

## 2018-04-12 ASSESSMENT — PAIN DESCRIPTION - DESCRIPTORS: DESCRIPTORS: BURNING;THROBBING

## 2018-04-12 ASSESSMENT — LIFESTYLE VARIABLES: SMOKING_STATUS: 1

## 2018-04-12 ASSESSMENT — PAIN DESCRIPTION - ORIENTATION: ORIENTATION: RIGHT

## 2018-04-12 ASSESSMENT — PAIN DESCRIPTION - PAIN TYPE: TYPE: SURGICAL PAIN

## 2018-04-18 ENCOUNTER — HOSPITAL ENCOUNTER (OUTPATIENT)
Dept: WOUND CARE | Age: 58
Discharge: HOME | End: 2018-04-18
Payer: COMMERCIAL

## 2018-04-18 VITALS
WEIGHT: 266 LBS | DIASTOLIC BLOOD PRESSURE: 87 MMHG | TEMPERATURE: 98.1 F | RESPIRATION RATE: 16 BRPM | HEART RATE: 82 BPM | HEIGHT: 68 IN | SYSTOLIC BLOOD PRESSURE: 135 MMHG | BODY MASS INDEX: 40.32 KG/M2

## 2018-04-18 PROCEDURE — 99213 OFFICE O/P EST LOW 20 MIN: CPT

## 2018-04-18 ASSESSMENT — PAIN SCALES - GENERAL: PAINLEVEL_OUTOF10: 0

## 2018-04-19 PROCEDURE — 99024 POSTOP FOLLOW-UP VISIT: CPT | Performed by: SURGERY

## 2018-04-25 ENCOUNTER — HOSPITAL ENCOUNTER (OUTPATIENT)
Dept: WOUND CARE | Age: 58
Discharge: HOME | End: 2018-04-25
Payer: COMMERCIAL

## 2018-04-25 VITALS
HEART RATE: 102 BPM | SYSTOLIC BLOOD PRESSURE: 129 MMHG | TEMPERATURE: 98.2 F | HEIGHT: 68 IN | RESPIRATION RATE: 14 BRPM | WEIGHT: 266 LBS | DIASTOLIC BLOOD PRESSURE: 76 MMHG | BODY MASS INDEX: 40.32 KG/M2

## 2018-04-25 DIAGNOSIS — I70.235 ATHSCL NATIVE ARTERIES OF RIGHT LEG W ULCER OTH PRT FOOT (HCC): ICD-10-CM

## 2018-04-25 DIAGNOSIS — L97.512 DIABETIC ULCER OF TOE OF RIGHT FOOT ASSOCIATED WITH TYPE 2 DIABETES MELLITUS, WITH FAT LAYER EXPOSED (HCC): ICD-10-CM

## 2018-04-25 DIAGNOSIS — F17.210 CIGARETTE SMOKER: ICD-10-CM

## 2018-04-25 DIAGNOSIS — E11.621 DIABETIC ULCER OF TOE OF RIGHT FOOT ASSOCIATED WITH TYPE 2 DIABETES MELLITUS, WITH FAT LAYER EXPOSED (HCC): ICD-10-CM

## 2018-04-25 DIAGNOSIS — E66.01 MORBID OBESITY (HCC): ICD-10-CM

## 2018-04-25 DIAGNOSIS — L97.512 ULCER OF RIGHT FOOT, WITH FAT LAYER EXPOSED (HCC): ICD-10-CM

## 2018-04-25 PROCEDURE — 29580 STRAPPING UNNA BOOT: CPT

## 2018-05-02 ENCOUNTER — HOSPITAL ENCOUNTER (OUTPATIENT)
Dept: WOUND CARE | Age: 58
Discharge: HOME | End: 2018-05-02
Payer: COMMERCIAL

## 2018-05-02 PROCEDURE — 99024 POSTOP FOLLOW-UP VISIT: CPT | Performed by: SURGERY

## 2018-05-02 PROCEDURE — 99212 OFFICE O/P EST SF 10 MIN: CPT

## 2018-08-26 ENCOUNTER — HOSPITAL ENCOUNTER (EMERGENCY)
Facility: HOSPITAL | Age: 58
Discharge: HOME | End: 2018-08-26
Admitting: NURSE PRACTITIONER

## 2018-08-26 VITALS
SYSTOLIC BLOOD PRESSURE: 165 MMHG | HEART RATE: 69 BPM | OXYGEN SATURATION: 96 % | TEMPERATURE: 98 F | HEIGHT: 65 IN | DIASTOLIC BLOOD PRESSURE: 97 MMHG | BODY MASS INDEX: 44.98 KG/M2 | RESPIRATION RATE: 20 BRPM | WEIGHT: 270 LBS

## 2018-08-26 DIAGNOSIS — L02.91 ABSCESS: Primary | ICD-10-CM

## 2018-08-26 LAB
ALBUMIN SERPL-MCNC: 3.7 G/DL (ref 3.5–5)
ALBUMIN/GLOB SERPL: 1.4 G/DL (ref 1.1–2.5)
ALP SERPL-CCNC: 51 U/L (ref 24–120)
ALT SERPL W P-5'-P-CCNC: 35 U/L (ref 0–54)
ANION GAP SERPL CALCULATED.3IONS-SCNC: 7 MMOL/L (ref 4–13)
AST SERPL-CCNC: 26 U/L (ref 7–45)
BASOPHILS # BLD AUTO: 0.04 10*3/MM3 (ref 0–0.2)
BASOPHILS NFR BLD AUTO: 0.4 % (ref 0–2)
BILIRUB SERPL-MCNC: 0.4 MG/DL (ref 0.1–1)
BUN BLD-MCNC: 16 MG/DL (ref 5–21)
BUN/CREAT SERPL: 28.6 (ref 7–25)
CALCIUM SPEC-SCNC: 9 MG/DL (ref 8.4–10.4)
CHLORIDE SERPL-SCNC: 105 MMOL/L (ref 98–110)
CO2 SERPL-SCNC: 29 MMOL/L (ref 24–31)
CREAT BLD-MCNC: 0.56 MG/DL (ref 0.5–1.4)
DEPRECATED RDW RBC AUTO: 42.9 FL (ref 40–54)
EOSINOPHIL # BLD AUTO: 0.14 10*3/MM3 (ref 0–0.7)
EOSINOPHIL NFR BLD AUTO: 1.5 % (ref 0–4)
ERYTHROCYTE [DISTWIDTH] IN BLOOD BY AUTOMATED COUNT: 12.5 % (ref 12–15)
GFR SERPL CREATININE-BSD FRML MDRD: 111 ML/MIN/1.73
GLOBULIN UR ELPH-MCNC: 2.6 GM/DL
GLUCOSE BLD-MCNC: 147 MG/DL (ref 70–100)
HCT VFR BLD AUTO: 44 % (ref 37–47)
HGB BLD-MCNC: 14.5 G/DL (ref 12–16)
IMM GRANULOCYTES # BLD: 0.04 10*3/MM3 (ref 0–0.03)
IMM GRANULOCYTES NFR BLD: 0.4 % (ref 0–5)
LYMPHOCYTES # BLD AUTO: 2.04 10*3/MM3 (ref 0.72–4.86)
LYMPHOCYTES NFR BLD AUTO: 21.1 % (ref 15–45)
MCH RBC QN AUTO: 30.6 PG (ref 28–32)
MCHC RBC AUTO-ENTMCNC: 33 G/DL (ref 33–36)
MCV RBC AUTO: 92.8 FL (ref 82–98)
MONOCYTES # BLD AUTO: 0.51 10*3/MM3 (ref 0.19–1.3)
MONOCYTES NFR BLD AUTO: 5.3 % (ref 4–12)
NEUTROPHILS # BLD AUTO: 6.88 10*3/MM3 (ref 1.87–8.4)
NEUTROPHILS NFR BLD AUTO: 71.3 % (ref 39–78)
NRBC BLD MANUAL-RTO: 0 /100 WBC (ref 0–0)
PLATELET # BLD AUTO: 173 10*3/MM3 (ref 130–400)
PMV BLD AUTO: 10.6 FL (ref 6–12)
POTASSIUM BLD-SCNC: 4.3 MMOL/L (ref 3.5–5.3)
PROT SERPL-MCNC: 6.3 G/DL (ref 6.3–8.7)
RBC # BLD AUTO: 4.74 10*6/MM3 (ref 4.2–5.4)
SODIUM BLD-SCNC: 141 MMOL/L (ref 135–145)
WBC NRBC COR # BLD: 9.65 10*3/MM3 (ref 4.8–10.8)

## 2018-08-26 PROCEDURE — 87040 BLOOD CULTURE FOR BACTERIA: CPT | Performed by: NURSE PRACTITIONER

## 2018-08-26 PROCEDURE — 80053 COMPREHEN METABOLIC PANEL: CPT | Performed by: NURSE PRACTITIONER

## 2018-08-26 PROCEDURE — 96365 THER/PROPH/DIAG IV INF INIT: CPT

## 2018-08-26 PROCEDURE — 85025 COMPLETE CBC W/AUTO DIFF WBC: CPT | Performed by: NURSE PRACTITIONER

## 2018-08-26 PROCEDURE — 99283 EMERGENCY DEPT VISIT LOW MDM: CPT

## 2018-08-26 RX ORDER — GINSENG 100 MG
CAPSULE ORAL 2 TIMES DAILY
Qty: 14 G | Refills: 0 | Status: ON HOLD | OUTPATIENT
Start: 2018-08-26 | End: 2022-05-10

## 2018-08-26 RX ORDER — GABAPENTIN 300 MG/1
300 CAPSULE ORAL 3 TIMES DAILY
COMMUNITY

## 2018-08-26 RX ORDER — CLINDAMYCIN HYDROCHLORIDE 300 MG/1
300 CAPSULE ORAL 4 TIMES DAILY
Qty: 40 CAPSULE | Refills: 0 | Status: SHIPPED | OUTPATIENT
Start: 2018-08-26 | End: 2018-09-05

## 2018-08-26 RX ORDER — LISINOPRIL 40 MG/1
40 TABLET ORAL DAILY
COMMUNITY
End: 2022-05-11 | Stop reason: HOSPADM

## 2018-08-26 RX ORDER — CLINDAMYCIN PHOSPHATE 600 MG/50ML
600 INJECTION INTRAVENOUS ONCE
Status: COMPLETED | OUTPATIENT
Start: 2018-08-26 | End: 2018-08-26

## 2018-08-26 RX ADMIN — CLINDAMYCIN IN 5 PERCENT DEXTROSE 600 MG: 12 INJECTION, SOLUTION INTRAVENOUS at 19:30

## 2018-08-31 LAB
BACTERIA SPEC AEROBE CULT: NORMAL
BACTERIA SPEC AEROBE CULT: NORMAL

## 2019-05-23 ENCOUNTER — OFFICE VISIT (OUTPATIENT)
Dept: WOUND CARE | Facility: HOSPITAL | Age: 59
End: 2019-05-23

## 2019-05-23 PROCEDURE — G0463 HOSPITAL OUTPT CLINIC VISIT: HCPCS

## 2019-05-29 ENCOUNTER — OFFICE VISIT (OUTPATIENT)
Dept: WOUND CARE | Facility: HOSPITAL | Age: 59
End: 2019-05-29

## 2019-05-29 PROCEDURE — G0463 HOSPITAL OUTPT CLINIC VISIT: HCPCS

## 2021-10-06 ENCOUNTER — HOSPITAL ENCOUNTER (OUTPATIENT)
Dept: GENERAL RADIOLOGY | Facility: HOSPITAL | Age: 61
Discharge: HOME | End: 2021-10-06
Admitting: NURSE PRACTITIONER

## 2021-10-06 ENCOUNTER — TRANSCRIBE ORDERS (OUTPATIENT)
Dept: ADMINISTRATIVE | Facility: HOSPITAL | Age: 61
End: 2021-10-06

## 2021-10-06 DIAGNOSIS — R05.9 COUGH: ICD-10-CM

## 2021-10-06 DIAGNOSIS — R06.00 DYSPNEA, UNSPECIFIED TYPE: ICD-10-CM

## 2021-10-06 DIAGNOSIS — R05.9 COUGH: Primary | ICD-10-CM

## 2021-10-06 DIAGNOSIS — Z72.0 TOBACCO USE: ICD-10-CM

## 2021-10-06 PROCEDURE — 71046 X-RAY EXAM CHEST 2 VIEWS: CPT

## 2021-11-24 ENCOUNTER — HOSPITAL ENCOUNTER (OUTPATIENT)
Dept: GENERAL RADIOLOGY | Facility: HOSPITAL | Age: 61
Discharge: HOME | End: 2021-11-24
Admitting: FAMILY MEDICINE

## 2021-11-24 ENCOUNTER — TRANSCRIBE ORDERS (OUTPATIENT)
Dept: ADMINISTRATIVE | Facility: HOSPITAL | Age: 61
End: 2021-11-24

## 2021-11-24 DIAGNOSIS — J18.9 PNEUMONIA DUE TO INFECTIOUS ORGANISM, UNSPECIFIED LATERALITY, UNSPECIFIED PART OF LUNG: Primary | ICD-10-CM

## 2021-11-24 PROCEDURE — 71046 X-RAY EXAM CHEST 2 VIEWS: CPT

## 2021-12-22 ENCOUNTER — TRANSCRIBE ORDERS (OUTPATIENT)
Dept: ADMINISTRATIVE | Facility: HOSPITAL | Age: 61
End: 2021-12-22

## 2021-12-22 ENCOUNTER — HOSPITAL ENCOUNTER (OUTPATIENT)
Dept: GENERAL RADIOLOGY | Facility: HOSPITAL | Age: 61
Discharge: HOME | End: 2021-12-22
Admitting: NURSE PRACTITIONER

## 2021-12-22 DIAGNOSIS — J18.9 PNEUMONIA DUE TO INFECTIOUS ORGANISM, UNSPECIFIED LATERALITY, UNSPECIFIED PART OF LUNG: Primary | ICD-10-CM

## 2021-12-22 DIAGNOSIS — J18.9 PNEUMONIA DUE TO INFECTIOUS ORGANISM, UNSPECIFIED LATERALITY, UNSPECIFIED PART OF LUNG: ICD-10-CM

## 2021-12-22 PROCEDURE — 71046 X-RAY EXAM CHEST 2 VIEWS: CPT

## 2022-05-09 ENCOUNTER — HOSPITAL ENCOUNTER (INPATIENT)
Facility: HOSPITAL | Age: 62
LOS: 2 days | Discharge: HOME | End: 2022-05-11
Attending: EMERGENCY MEDICINE | Admitting: FAMILY MEDICINE

## 2022-05-09 ENCOUNTER — APPOINTMENT (OUTPATIENT)
Dept: GENERAL RADIOLOGY | Facility: HOSPITAL | Age: 62
End: 2022-05-09

## 2022-05-09 ENCOUNTER — APPOINTMENT (OUTPATIENT)
Dept: CT IMAGING | Facility: HOSPITAL | Age: 62
End: 2022-05-09

## 2022-05-09 DIAGNOSIS — R42 DIZZINESS: Primary | ICD-10-CM

## 2022-05-09 DIAGNOSIS — J43.9 PULMONARY EMPHYSEMA, UNSPECIFIED EMPHYSEMA TYPE: ICD-10-CM

## 2022-05-09 DIAGNOSIS — D75.1 POLYCYTHEMIA: ICD-10-CM

## 2022-05-09 DIAGNOSIS — F17.200 SMOKER: ICD-10-CM

## 2022-05-09 DIAGNOSIS — E11.69 TYPE 2 DIABETES MELLITUS WITH OTHER SPECIFIED COMPLICATION, UNSPECIFIED WHETHER LONG TERM INSULIN USE: ICD-10-CM

## 2022-05-09 DIAGNOSIS — I48.91 ATRIAL FIBRILLATION WITH RAPID VENTRICULAR RESPONSE: ICD-10-CM

## 2022-05-09 DIAGNOSIS — I48.91 ATRIAL FIBRILLATION WITH RVR: ICD-10-CM

## 2022-05-09 PROBLEM — E11.9 DM (DIABETES MELLITUS): Status: ACTIVE | Noted: 2022-05-09

## 2022-05-09 PROBLEM — J44.9 COPD (CHRONIC OBSTRUCTIVE PULMONARY DISEASE) (HCC): Status: ACTIVE | Noted: 2022-05-09

## 2022-05-09 LAB
ALBUMIN SERPL-MCNC: 4.2 G/DL (ref 3.5–5.2)
ALBUMIN/GLOB SERPL: 1.8 G/DL
ALP SERPL-CCNC: 65 U/L (ref 39–117)
ALT SERPL W P-5'-P-CCNC: 18 U/L (ref 1–33)
ANION GAP SERPL CALCULATED.3IONS-SCNC: 12 MMOL/L (ref 5–15)
APTT PPP: 26.7 SECONDS (ref 24.1–35)
AST SERPL-CCNC: 16 U/L (ref 1–32)
BASOPHILS # BLD AUTO: 0.04 10*3/MM3 (ref 0–0.2)
BASOPHILS NFR BLD AUTO: 0.4 % (ref 0–1.5)
BILIRUB SERPL-MCNC: 0.5 MG/DL (ref 0–1.2)
BUN SERPL-MCNC: 22 MG/DL (ref 8–23)
BUN/CREAT SERPL: 33.3 (ref 7–25)
CALCIUM SPEC-SCNC: 10.2 MG/DL (ref 8.6–10.5)
CHLORIDE SERPL-SCNC: 103 MMOL/L (ref 98–107)
CO2 SERPL-SCNC: 22 MMOL/L (ref 22–29)
CREAT SERPL-MCNC: 0.66 MG/DL (ref 0.57–1)
D DIMER PPP FEU-MCNC: 1 MCGFEU/ML (ref 0–0.5)
DEPRECATED RDW RBC AUTO: 50.1 FL (ref 37–54)
EGFRCR SERPLBLD CKD-EPI 2021: 98.7 ML/MIN/1.73
EOSINOPHIL # BLD AUTO: 0.08 10*3/MM3 (ref 0–0.4)
EOSINOPHIL NFR BLD AUTO: 0.9 % (ref 0.3–6.2)
ERYTHROCYTE [DISTWIDTH] IN BLOOD BY AUTOMATED COUNT: 14 % (ref 12.3–15.4)
GLOBULIN UR ELPH-MCNC: 2.3 GM/DL
GLUCOSE BLDC GLUCOMTR-MCNC: 185 MG/DL (ref 70–130)
GLUCOSE SERPL-MCNC: 198 MG/DL (ref 65–99)
HCT VFR BLD AUTO: 53 % (ref 34–46.6)
HGB BLD-MCNC: 17.1 G/DL (ref 12–15.9)
IMM GRANULOCYTES # BLD AUTO: 0.03 10*3/MM3 (ref 0–0.05)
IMM GRANULOCYTES NFR BLD AUTO: 0.3 % (ref 0–0.5)
INR PPP: 1.06 (ref 0.91–1.09)
LYMPHOCYTES # BLD AUTO: 1.77 10*3/MM3 (ref 0.7–3.1)
LYMPHOCYTES NFR BLD AUTO: 18.9 % (ref 19.6–45.3)
MCH RBC QN AUTO: 31.5 PG (ref 26.6–33)
MCHC RBC AUTO-ENTMCNC: 32.3 G/DL (ref 31.5–35.7)
MCV RBC AUTO: 97.6 FL (ref 79–97)
MONOCYTES # BLD AUTO: 0.57 10*3/MM3 (ref 0.1–0.9)
MONOCYTES NFR BLD AUTO: 6.1 % (ref 5–12)
NEUTROPHILS NFR BLD AUTO: 6.88 10*3/MM3 (ref 1.7–7)
NEUTROPHILS NFR BLD AUTO: 73.4 % (ref 42.7–76)
NRBC BLD AUTO-RTO: 0 /100 WBC (ref 0–0.2)
NT-PROBNP SERPL-MCNC: 2383 PG/ML (ref 0–900)
PLATELET # BLD AUTO: 157 10*3/MM3 (ref 140–450)
PMV BLD AUTO: 10.5 FL (ref 6–12)
POTASSIUM SERPL-SCNC: 4.7 MMOL/L (ref 3.5–5.2)
PROT SERPL-MCNC: 6.5 G/DL (ref 6–8.5)
PROTHROMBIN TIME: 13.4 SECONDS (ref 11.9–14.6)
RBC # BLD AUTO: 5.43 10*6/MM3 (ref 3.77–5.28)
SARS-COV-2 RNA PNL SPEC NAA+PROBE: NOT DETECTED
SODIUM SERPL-SCNC: 137 MMOL/L (ref 136–145)
T4 FREE SERPL-MCNC: 1.28 NG/DL (ref 0.93–1.7)
TROPONIN T SERPL-MCNC: <0.01 NG/ML (ref 0–0.03)
TROPONIN T SERPL-MCNC: <0.01 NG/ML (ref 0–0.03)
TSH SERPL DL<=0.05 MIU/L-ACNC: 2.02 UIU/ML (ref 0.27–4.2)
WBC NRBC COR # BLD: 9.37 10*3/MM3 (ref 3.4–10.8)

## 2022-05-09 PROCEDURE — 87635 SARS-COV-2 COVID-19 AMP PRB: CPT | Performed by: EMERGENCY MEDICINE

## 2022-05-09 PROCEDURE — 94640 AIRWAY INHALATION TREATMENT: CPT

## 2022-05-09 PROCEDURE — 25010000002 ENOXAPARIN PER 10 MG: Performed by: FAMILY MEDICINE

## 2022-05-09 PROCEDURE — 94799 UNLISTED PULMONARY SVC/PX: CPT

## 2022-05-09 PROCEDURE — 84484 ASSAY OF TROPONIN QUANT: CPT | Performed by: EMERGENCY MEDICINE

## 2022-05-09 PROCEDURE — 94761 N-INVAS EAR/PLS OXIMETRY MLT: CPT

## 2022-05-09 PROCEDURE — 85610 PROTHROMBIN TIME: CPT | Performed by: EMERGENCY MEDICINE

## 2022-05-09 PROCEDURE — 84439 ASSAY OF FREE THYROXINE: CPT | Performed by: NURSE PRACTITIONER

## 2022-05-09 PROCEDURE — 93010 ELECTROCARDIOGRAM REPORT: CPT | Performed by: INTERNAL MEDICINE

## 2022-05-09 PROCEDURE — 93005 ELECTROCARDIOGRAM TRACING: CPT | Performed by: EMERGENCY MEDICINE

## 2022-05-09 PROCEDURE — 0 IOPAMIDOL PER 1 ML: Performed by: EMERGENCY MEDICINE

## 2022-05-09 PROCEDURE — 84484 ASSAY OF TROPONIN QUANT: CPT | Performed by: FAMILY MEDICINE

## 2022-05-09 PROCEDURE — 93005 ELECTROCARDIOGRAM TRACING: CPT | Performed by: FAMILY MEDICINE

## 2022-05-09 PROCEDURE — 99284 EMERGENCY DEPT VISIT MOD MDM: CPT

## 2022-05-09 PROCEDURE — 99222 1ST HOSP IP/OBS MODERATE 55: CPT | Performed by: INTERNAL MEDICINE

## 2022-05-09 PROCEDURE — 85379 FIBRIN DEGRADATION QUANT: CPT | Performed by: EMERGENCY MEDICINE

## 2022-05-09 PROCEDURE — 94664 DEMO&/EVAL PT USE INHALER: CPT

## 2022-05-09 PROCEDURE — 82962 GLUCOSE BLOOD TEST: CPT

## 2022-05-09 PROCEDURE — 71275 CT ANGIOGRAPHY CHEST: CPT

## 2022-05-09 PROCEDURE — 80053 COMPREHEN METABOLIC PANEL: CPT | Performed by: EMERGENCY MEDICINE

## 2022-05-09 PROCEDURE — 85025 COMPLETE CBC W/AUTO DIFF WBC: CPT | Performed by: EMERGENCY MEDICINE

## 2022-05-09 PROCEDURE — 85730 THROMBOPLASTIN TIME PARTIAL: CPT | Performed by: EMERGENCY MEDICINE

## 2022-05-09 PROCEDURE — 83880 ASSAY OF NATRIURETIC PEPTIDE: CPT | Performed by: EMERGENCY MEDICINE

## 2022-05-09 PROCEDURE — 71045 X-RAY EXAM CHEST 1 VIEW: CPT

## 2022-05-09 PROCEDURE — 84443 ASSAY THYROID STIM HORMONE: CPT | Performed by: NURSE PRACTITIONER

## 2022-05-09 RX ORDER — ALUMINA, MAGNESIA, AND SIMETHICONE 2400; 2400; 240 MG/30ML; MG/30ML; MG/30ML
15 SUSPENSION ORAL EVERY 6 HOURS PRN
Status: DISCONTINUED | OUTPATIENT
Start: 2022-05-09 | End: 2022-05-11 | Stop reason: HOSPADM

## 2022-05-09 RX ORDER — GABAPENTIN 300 MG/1
300 CAPSULE ORAL 3 TIMES DAILY
Status: DISCONTINUED | OUTPATIENT
Start: 2022-05-09 | End: 2022-05-11 | Stop reason: HOSPADM

## 2022-05-09 RX ORDER — NYSTATIN 100000 [USP'U]/G
POWDER TOPICAL EVERY 12 HOURS SCHEDULED
Status: DISCONTINUED | OUTPATIENT
Start: 2022-05-09 | End: 2022-05-11 | Stop reason: HOSPADM

## 2022-05-09 RX ORDER — LISINOPRIL 10 MG/1
10 TABLET ORAL DAILY
Status: DISCONTINUED | OUTPATIENT
Start: 2022-05-10 | End: 2022-05-11 | Stop reason: HOSPADM

## 2022-05-09 RX ORDER — SODIUM CHLORIDE 0.9 % (FLUSH) 0.9 %
10 SYRINGE (ML) INJECTION AS NEEDED
Status: DISCONTINUED | OUTPATIENT
Start: 2022-05-09 | End: 2022-05-11 | Stop reason: HOSPADM

## 2022-05-09 RX ORDER — AMLODIPINE BESYLATE 10 MG/1
10 TABLET ORAL DAILY
COMMUNITY
End: 2022-05-11 | Stop reason: HOSPADM

## 2022-05-09 RX ORDER — SODIUM CHLORIDE 0.9 % (FLUSH) 0.9 %
10 SYRINGE (ML) INJECTION EVERY 12 HOURS SCHEDULED
Status: DISCONTINUED | OUTPATIENT
Start: 2022-05-09 | End: 2022-05-11 | Stop reason: HOSPADM

## 2022-05-09 RX ORDER — FAMOTIDINE 10 MG/ML
20 INJECTION, SOLUTION INTRAVENOUS 2 TIMES DAILY
Status: DISCONTINUED | OUTPATIENT
Start: 2022-05-09 | End: 2022-05-11 | Stop reason: HOSPADM

## 2022-05-09 RX ORDER — BUDESONIDE AND FORMOTEROL FUMARATE DIHYDRATE 160; 4.5 UG/1; UG/1
2 AEROSOL RESPIRATORY (INHALATION)
Status: DISCONTINUED | OUTPATIENT
Start: 2022-05-09 | End: 2022-05-11 | Stop reason: HOSPADM

## 2022-05-09 RX ORDER — ONDANSETRON 2 MG/ML
4 INJECTION INTRAMUSCULAR; INTRAVENOUS EVERY 6 HOURS PRN
Status: DISCONTINUED | OUTPATIENT
Start: 2022-05-09 | End: 2022-05-11 | Stop reason: HOSPADM

## 2022-05-09 RX ORDER — ENOXAPARIN SODIUM 150 MG/ML
1 INJECTION SUBCUTANEOUS EVERY 12 HOURS
Status: DISCONTINUED | OUTPATIENT
Start: 2022-05-09 | End: 2022-05-11

## 2022-05-09 RX ORDER — SIMETHICONE 80 MG
80 TABLET,CHEWABLE ORAL 4 TIMES DAILY PRN
Status: DISCONTINUED | OUTPATIENT
Start: 2022-05-09 | End: 2022-05-11 | Stop reason: HOSPADM

## 2022-05-09 RX ORDER — METOPROLOL TARTRATE 50 MG/1
50 TABLET, FILM COATED ORAL EVERY 12 HOURS SCHEDULED
Status: DISCONTINUED | OUTPATIENT
Start: 2022-05-09 | End: 2022-05-10

## 2022-05-09 RX ADMIN — GABAPENTIN 300 MG: 300 CAPSULE ORAL at 16:52

## 2022-05-09 RX ADMIN — ALUMINUM HYDROXIDE, MAGNESIUM HYDROXIDE, AND DIMETHICONE 15 ML: 400; 400; 40 SUSPENSION ORAL at 13:50

## 2022-05-09 RX ADMIN — DILTIAZEM HYDROCHLORIDE 30 MG: 30 TABLET, FILM COATED ORAL at 17:50

## 2022-05-09 RX ADMIN — IOPAMIDOL 100 ML: 755 INJECTION, SOLUTION INTRAVENOUS at 08:38

## 2022-05-09 RX ADMIN — GABAPENTIN 300 MG: 300 CAPSULE ORAL at 21:16

## 2022-05-09 RX ADMIN — BUDESONIDE AND FORMOTEROL FUMARATE DIHYDRATE 2 PUFF: 160; 4.5 AEROSOL RESPIRATORY (INHALATION) at 20:11

## 2022-05-09 RX ADMIN — Medication 10 ML: at 21:16

## 2022-05-09 RX ADMIN — SODIUM CHLORIDE, POTASSIUM CHLORIDE, SODIUM LACTATE AND CALCIUM CHLORIDE 1000 ML: 600; 310; 30; 20 INJECTION, SOLUTION INTRAVENOUS at 07:51

## 2022-05-09 RX ADMIN — NYSTATIN: 100000 POWDER TOPICAL at 16:49

## 2022-05-09 RX ADMIN — DILTIAZEM HYDROCHLORIDE 5 MG/HR: 5 INJECTION, SOLUTION INTRAVENOUS at 08:03

## 2022-05-09 RX ADMIN — SIMETHICONE 80 MG: 80 TABLET, CHEWABLE ORAL at 17:50

## 2022-05-09 RX ADMIN — METOPROLOL TARTRATE 25 MG: 25 TABLET, FILM COATED ORAL at 14:19

## 2022-05-09 RX ADMIN — NYSTATIN: 100000 POWDER TOPICAL at 21:16

## 2022-05-09 RX ADMIN — METOPROLOL TARTRATE 50 MG: 50 TABLET, FILM COATED ORAL at 21:16

## 2022-05-09 RX ADMIN — FAMOTIDINE 20 MG: 10 INJECTION INTRAVENOUS at 21:16

## 2022-05-09 RX ADMIN — Medication 10 ML: at 14:20

## 2022-05-09 RX ADMIN — ENOXAPARIN SODIUM 120 MG: 120 INJECTION SUBCUTANEOUS at 14:19

## 2022-05-09 RX ADMIN — DILTIAZEM HYDROCHLORIDE 15 MG/HR: 5 INJECTION, SOLUTION INTRAVENOUS at 16:45

## 2022-05-09 NOTE — CONSULTS
Referring Provider: No Known Provider    Reason for Consultation: Atrial Fibrillation     Chief complaint:   Chief Complaint   Patient presents with   • Dizziness       Subjective .     History of present illness:  Claudette Keeling is a 61 y.o. female with hypertension, type 2 diabetes, emphysema and tobacco abuse presented to [facility] ER today after having an episodes of dizziness and near syncope at work. Patient denies any previous cardiac history. She reports that her mother had a history of congestive heart failure. Patient reports that she has been feeling worsening shortness of breath/dyspnea on exertion for the past couple of days. On arrival to ER patient was noted to be in Atrial fibrillation with rates in 130's. Ddimer was 1.00, Troponin <0.010 x 2, BNP 2, 383. CXR showed mild cardiomegaly with no consolidating infiltrates.,CTA chest was negative for pulmonary embolism, no pneumonia or pneumothorax. Patient is lying in bed comfortably resting now with family at bedside. She denies any chest pain. She denies any leg swelling. She reports that she chronically sleeps elevated in the bed, she denies any orthopnea or PND prior to admission. She reports some heart racing and palpitations. Patient reports that she snores and denies any sleep study in the past.     History  Past Medical History:   Diagnosis Date   • Arthritis    • Diabetes mellitus (HCC)    • Hypertension    • Neuropathy    ,   Past Surgical History:   Procedure Laterality Date   • APPENDECTOMY     • HYSTERECTOMY     • JOINT REPLACEMENT Left     knee   • TOE OSTEOPHYTE REMOVAL     ,   Family History   Problem Relation Age of Onset   • Hypertension Mother    • Diabetes Mother    • Hypertension Father    • Diabetes Father    ,   Social History     Tobacco Use   • Smoking status: Current Every Day Smoker     Packs/day: 1.00     Years: 25.00     Pack years: 25.00     Types: Cigarettes   Substance Use Topics   • Alcohol use: No   • Drug use: No   ,      Medications    Prior to Admission medications    Medication Sig Start Date End Date Taking? Authorizing Provider   amLODIPine (NORVASC) 10 MG tablet Take 10 mg by mouth Daily.   Yes Bibiana Noguera MD   bacitracin 500 UNIT/GM ointment Apply  topically to the appropriate area as directed 2 (Two) Times a Day. 8/26/18  Yes Kaylan Nascimento APRN   empagliflozin (JARDIANCE) 25 MG tablet tablet Take  by mouth Daily.   Yes Bibiana Noguera MD   gabapentin (NEURONTIN) 100 MG capsule Take 300 mg by mouth 3 (Three) Times a Day.   Yes Bibiana Noguera MD   Glycopyrrolate-Formoterol 9-4.8 MCG/ACT aerosol Inhale As Needed.   Yes Bibiana Noguera MD   lisinopril (PRINIVIL,ZESTRIL) 10 MG tablet Take 10 mg by mouth Daily.   Yes Bibiana Noguera MD   MELOXICAM PO Take 15 mg by mouth Daily.   Yes Bibiana Noguera MD   metFORMIN (GLUCOPHAGE) 500 MG tablet Take 1,000 mg by mouth 2 (Two) Times a Day With Meals.   Yes Bibiana Noguera MD   Semaglutide (OZEMPIC) 0.25 or 0.5 MG/DOSE solution pen-injector Inject 0.25 mg under the skin into the appropriate area as directed Every 7 (Seven) Days.  5/9/22  Bibiana Noguera MD       Current Facility-Administered Medications   Medication Dose Route Frequency Provider Last Rate Last Admin   • aluminum-magnesium hydroxide-simethicone (MAALOX MAX) 400-400-40 MG/5ML suspension 15 mL  15 mL Oral Q6H PRN Brad Moran MD   15 mL at 05/09/22 1350   • budesonide-formoterol (SYMBICORT) 160-4.5 MCG/ACT inhaler 2 puff  2 puff Inhalation BID - RT Brad Moran MD       • dilTIAZem (CARDIZEM) 125 mg in 125 mL NS infusion  5-15 mg/hr Intravenous Titrated Brad Moran MD 15 mL/hr at 05/09/22 1257 15 mg/hr at 05/09/22 1257   • [START ON 5/10/2022] empagliflozin (JARDIANCE) tablet 25 mg  25 mg Oral Daily Brad Moran MD       • Enoxaparin Sodium (LOVENOX) syringe 120 mg  1 mg/kg Subcutaneous Q12H Brad Moran MD   120 mg at 05/09/22 1419   • famotidine  "(PEPCID) injection 20 mg  20 mg Intravenous BID Brad Moran MD       • gabapentin (NEURONTIN) capsule 300 mg  300 mg Oral TID Brad Moran MD       • [START ON 5/10/2022] lisinopril (PRINIVIL,ZESTRIL) tablet 10 mg  10 mg Oral Daily Brad Moran MD       • metoprolol tartrate (LOPRESSOR) tablet 25 mg  25 mg Oral Q12H Brad Moran MD   25 mg at 05/09/22 1419   • nystatin (MYCOSTATIN) powder   Topical Q12H Brad Moran MD       • ondansetron (ZOFRAN) injection 4 mg  4 mg Intravenous Q6H PRN Brad Moran MD       • simethicone (MYLICON) chewable tablet 80 mg  80 mg Oral 4x Daily PRN Brad Moran MD       • sodium chloride 0.9 % flush 10 mL  10 mL Intravenous PRN Brad Moran MD       • sodium chloride 0.9 % flush 10 mL  10 mL Intravenous Q12H Brad Moran MD   10 mL at 05/09/22 1420   • sodium chloride 0.9 % flush 10 mL  10 mL Intravenous PRN Brad Moran MD           Allergies:  Codeine    Review of Systems  Review of Systems   Eyes: Negative for visual disturbance.   Cardiovascular: Positive for dyspnea on exertion, irregular heartbeat, near-syncope and palpitations. Negative for chest pain, leg swelling, orthopnea, paroxysmal nocturnal dyspnea and syncope.   Hematologic/Lymphatic: Does not bruise/bleed easily.   All other systems reviewed and are negative.      Objective     Physical Exam:  Patient Vitals for the past 24 hrs:   BP Temp Temp src Pulse Resp SpO2 Height Weight   05/09/22 1520 109/84 98.4 °F (36.9 °C) Oral 107 20 92 % -- --   05/09/22 1254 110/78 98 °F (36.7 °C) Oral (!) 121 20 93 % 165.1 cm (65\") 119 kg (261 lb 9.6 oz)   05/09/22 1215 117/98 -- -- 117 18 95 % -- --   05/09/22 1119 107/62 -- -- 119 18 94 % -- --   05/09/22 1027 -- -- -- 113 18 96 % -- --   05/09/22 1001 (!) 140/102 -- -- 117 -- 94 % -- --   05/09/22 0946 116/91 -- -- (!) 136 -- 97 % -- --   05/09/22 0943 -- -- -- (!) 160 -- 97 % -- --   05/09/22 0931 111/86 -- -- (!) 122 -- 95 % -- --   05/09/22 0916 126/76 " "-- -- (!) 128 -- -- -- --   05/09/22 0849 -- -- -- (!) 123 -- -- -- --   05/09/22 0848 118/89 -- -- -- -- -- -- --   05/09/22 0843 (!) 135/105 -- -- (!) 155 -- -- -- --   05/09/22 0838 (!) 135/105 -- -- 112 -- 92 % -- --   05/09/22 0816 (!) 129/102 -- -- (!) 149 -- 96 % -- --   05/09/22 0803 100/80 -- -- (!) 154 -- -- -- --   05/09/22 0746 97/83 -- -- (!) 161 -- 92 % -- --   05/09/22 0733 -- -- -- (!) 152 -- 91 % -- --   05/09/22 0721 114/78 97.6 °F (36.4 °C) Oral 110 20 96 % 162.6 cm (64\") 120 kg (265 lb)       Intake/Output Summary (Last 24 hours) at 5/9/2022 1635  Last data filed at 5/9/2022 1520  Gross per 24 hour   Intake 1240 ml   Output 800 ml   Net 440 ml     Telemetry: Atrial Fibrillation rate currently 92; rates 100-114 with rates up to 140's earlier today     Vitals reviewed.   Constitutional:       General: Not in acute distress.     Appearance: Normal appearance. Well-developed. Morbidly obese.   Eyes:      Pupils: Pupils are equal, round, and reactive to light.   HENT:      Head: Normocephalic and atraumatic.      Nose: Nose normal.   Neck:      Vascular: No carotid bruit.   Pulmonary:      Effort: Pulmonary effort is normal. No respiratory distress.      Breath sounds: Wheezing present. No rales.   Cardiovascular:      Tachycardia present. Irregularly irregular rhythm.      No gallop.   Edema:     Peripheral edema absent.   Abdominal:      General: There is no distension.      Palpations: Abdomen is soft.   Musculoskeletal: Normal range of motion.      Cervical back: Normal range of motion and neck supple. Skin:     General: Skin is warm.      Findings: No erythema or rash.   Neurological:      General: No focal deficit present.      Mental Status: Alert and oriented to person, place, and time.   Psychiatric:         Attention and Perception: Attention normal.         Mood and Affect: Mood normal.         Speech: Speech normal.         Behavior: Behavior normal.         Thought Content: Thought " content normal.         Judgment: Judgment normal.         Results Review:   I reviewed the patient's new clinical results.    Lab Results (last 72 hours)     Procedure Component Value Units Date/Time    Troponin [[id number]]  (Normal) Collected: 05/09/22 1330    Specimen: Blood Updated: 05/09/22 1407     Troponin T <0.010 ng/mL     Narrative:      Troponin T Reference Range:  <= 0.03 ng/mL-   Negative for AMI  >0.03 ng/mL-     Abnormal for myocardial necrosis.  Clinicians would have to utilize clinical acumen, EKG, Troponin and serial changes to determine if it is an Acute Myocardial Infarction or myocardial injury due to an underlying chronic condition.       Results may be falsely decreased if patient taking Biotin.      COVID-19,[facility] IN-HOUSE,Nasal Swab No Transport Media 3-4 HR TAT - Swab, Nasal Cavity [[id number]]  (Normal) Collected: 05/09/22 0752    Specimen: Swab from Nasal Cavity Updated: 05/09/22 0837     COVID19 Not Detected    Narrative:      Fact sheet for providers: https://www.fda.gov/media/269658/download     Fact sheet for patients: https://www.fda.gov/media/433913/download    Test performed by PCR.    Consider negative results in combination with clinical observations, patient history, and epidemiological information.    Comprehensive Metabolic Panel [[id number]]  (Abnormal) Collected: 05/09/22 0743    Specimen: Blood Updated: 05/09/22 0814     Glucose 198 mg/dL      BUN 22 mg/dL      Creatinine 0.66 mg/dL      Sodium 137 mmol/L      Potassium 4.7 mmol/L      Comment: Slight hemolysis detected by analyzer. Results may be affected.        Chloride 103 mmol/L      CO2 22.0 mmol/L      Calcium 10.2 mg/dL      Total Protein 6.5 g/dL      Albumin 4.20 g/dL      ALT (SGPT) 18 U/L      AST (SGOT) 16 U/L      Comment: Slight hemolysis detected by analyzer. Results may be affected.        Alkaline Phosphatase 65 U/L      Total Bilirubin 0.5 mg/dL      Globulin 2.3 gm/dL      A/G Ratio 1.8 g/dL       BUN/Creatinine Ratio 33.3     Anion Gap 12.0 mmol/L      eGFR 98.7 mL/min/1.73      Comment: National Kidney Foundation and American Society of Nephrology (ASN) Task Force recommended calculation based on the Chronic Kidney Disease Epidemiology Collaboration (CKD-EPI) equation refit without adjustment for race.       Narrative:      GFR Normal >60  Chronic Kidney Disease <60  Kidney Failure <15      Troponin [[id number]]  (Normal) Collected: 05/09/22 0743    Specimen: Blood Updated: 05/09/22 0810     Troponin T <0.010 ng/mL     Narrative:      Troponin T Reference Range:  <= 0.03 ng/mL-   Negative for AMI  >0.03 ng/mL-     Abnormal for myocardial necrosis.  Clinicians would have to utilize clinical acumen, EKG, Troponin and serial changes to determine if it is an Acute Myocardial Infarction or myocardial injury due to an underlying chronic condition.       Results may be falsely decreased if patient taking Biotin.      BNP [[id number]]  (Abnormal) Collected: 05/09/22 0743    Specimen: Blood Updated: 05/09/22 0809     proBNP 2,383.0 pg/mL     Narrative:      Among patients with dyspnea, NT-proBNP is highly sensitive for the detection of acute congestive heart failure. In addition NT-proBNP of <300 pg/ml effectively rules out acute congestive heart failure with 99% negative predictive value.    Results may be falsely decreased if patient taking Biotin.      Protime-INR [[id number]]  (Normal) Collected: 05/09/22 0742    Specimen: Blood Updated: 05/09/22 0803     Protime 13.4 Seconds      INR 1.06    aPTT [[id number]]  (Normal) Collected: 05/09/22 0742    Specimen: Blood Updated: 05/09/22 0803     PTT 26.7 seconds     D-dimer, Quantitative [[id number]]  (Abnormal) Collected: 05/09/22 0742    Specimen: Blood Updated: 05/09/22 0803     D-Dimer, Quantitative 1.00 MCGFEU/mL     Narrative:      Reference Range is 0-0.50 MCGFEU/mL. However, results <0.50 MCGFEU/mL tends to rule out DVT or PE. Results >0.50 MCGFEU/mL are not useful  in predicting absence or presence of DVT or PE.      CBC & Differential [[id number]]  (Abnormal) Collected: 05/09/22 0743    Specimen: Blood Updated: 05/09/22 0753    Narrative:      The following orders were created for panel order CBC & Differential.  Procedure                               Abnormality         Status                     ---------                               -----------         ------                     CBC Auto Differential[[id number]]        Abnormal            Final result                 Please view results for these tests on the individual orders.    CBC Auto Differential [[id number]]  (Abnormal) Collected: 05/09/22 0743    Specimen: Blood Updated: 05/09/22 0753     WBC 9.37 10*3/mm3      RBC 5.43 10*6/mm3      Hemoglobin 17.1 g/dL      Hematocrit 53.0 %      MCV 97.6 fL      MCH 31.5 pg      MCHC 32.3 g/dL      RDW 14.0 %      RDW-SD 50.1 fl      MPV 10.5 fL      Platelets 157 10*3/mm3      Neutrophil % 73.4 %      Lymphocyte % 18.9 %      Monocyte % 6.1 %      Eosinophil % 0.9 %      Basophil % 0.4 %      Immature Grans % 0.3 %      Neutrophils, Absolute 6.88 10*3/mm3      Lymphocytes, Absolute 1.77 10*3/mm3      Monocytes, Absolute 0.57 10*3/mm3      Eosinophils, Absolute 0.08 10*3/mm3      Basophils, Absolute 0.04 10*3/mm3      Immature Grans, Absolute 0.03 10*3/mm3      nRBC 0.0 /100 WBC     POC Glucose Once [[id number]]  (Abnormal) Collected: 05/09/22 0740    Specimen: Blood Updated: 05/09/22 0751     Glucose 185 mg/dL      Comment: : STACEY Favio Vivi ID: [id number]             No results found for: ECHOEFEST    Imaging Results (Last 72 Hours)     Procedure Component Value Units Date/Time    CT Angiogram Chest [[id number]] Collected: 05/09/22 0843     Updated: 05/09/22 0853    Narrative:      EXAMINATION: CT ANGIOGRAM CHEST- 5/9/2022 8:43 AM CDT     HISTORY: Pulmonary embolism (PE) suspected, unknown D-dimer;  R42-Dizziness and giddiness; I48.91-Unspecified atrial  fibrillation     DOSE: 556 mGycm (Automatic exposure control technique was implemented in  an effort to keep the radiation dose as low as possible without  compromising image quality)     REPORT: Spiral CT of the chest was performed after administration of  intravenous contrast from the thoracic inlet through the upper abdomen  using CTA protocol. Reconstructed 3-D, coronal and sagittal images were  also reviewed.     Comparison: Chest x-ray on the same day.     The contrast bolus is satisfactory, there is mild dilation of the main  pulmonary artery, with a diameter 3.7 cm. No filling defects are seen in  the bony arteries. Heart size is normal. No evidence of right heart  strain is identified. There is mild ectasia of the ascending aorta, with  a maximum diameter 3.8 cm. No aortic dissection is identified. A small  volume of calcified plaque within the thoracic aorta at the origins of  the great vessels. The visualized thyroid gland appears homogeneous. A  small volume of calcified plaque is noted within the coronary arteries.  No pleural effusion is identified. There is no evidence of intrathoracic  lymphadenopathy. Review of lung windows demonstrate mild changes of  paraseptal emphysema. There is mild bibasilar atelectasis. No evidence  of pneumonia is identified. There is no pneumothorax. No lung mass or  suspicious pulmonary nodule is identified. Small pocket of soft tissue  gas is noted to the right of the trachea at the thoracic inlet, of  uncertain significance or etiology, this has no obvious direct  connection to the trachea or esophagus. This measures approximately 14  mm. No abscess is identified. The visualized upper abdomen shows  decreased attenuation of the liver parenchyma compatible with steatosis.  Review of bone windows shows no acute abnormality. Extensive  degenerative changes are noted in the thoracic spine.       Impression:      1. No evidence of pulmonary embolism or acute intrathoracic  pathology.  2. Paraseptal emphysema, no evidence of pneumonia, pneumothorax or  pleural effusion.  3. Small 1.4 cm pocket of soft tissue air to the right of the trachea  and esophagus at the thoracic inlet could represent small tracheal  diverticulum, though no direct connection with the trachea is  visualized.  4. Fatty infiltration of the liver.              This report was finalized on 05/09/2022 08:49 by Dr. Ervin Ramsey MD.    XR Chest 1 View [[id number]] Collected: 05/09/22 0837     Updated: 05/09/22 0842    Narrative:      EXAMINATION: XR CHEST 1 VW- 5/9/2022 8:37 AM CDT     HISTORY: soa; R42-Dizziness and giddiness; I48.91-Unspecified atrial  fibrillation.     REPORT: A frontal view of the chest was obtained.     COMPARISON: Chest x-rays 12/22/2021.     There is mild volume loss in the lung bases, there are central and  basilar vascular congestion with cardiomegaly, heart size is probably  exaggerated by technique. No lung consolidation is identified. There is  no pneumothorax or definite pleural effusion. The osseous structures and  upper abdomen are unremarkable.       Impression:      Mild cardiomegaly with mild central and basilar vascular  congestion and volume loss in both lung bases. No consolidating  infiltrates.  This report was finalized on 05/09/2022 08:39 by Dr. Ervin Ramsey MD.          Assessment/Plan     Atrial Fibrillation: Telemetry reveals rates of 92 currently; rates have been 100-114 with increase to 140's earlier today. Patient is on cardizem drip; will start oral cardizem and wean drip as tolerated. Increase metoprolol for better rate control. [id number] score is at least a 2; patient is on therapeutic lovenox at this time and will need to be transitioned to oral anticoagulation prior to discharge.     Type 2 diabetes mellitus    Dizziness    COPD/Emphysema    Tobacco abuse    Obesity     Plan:   - echo pending  - lipid panel and A1C ordered for am labs  - add thyroid studies to  labwork  - consider sleep study outpatient for possible sleep apnea   - start oral Cardizem and wean drip as tolerated   - increase metoprolol for better rate control  -[id number] score is at least a 2; patient is on therapeutic lovenox at this time and will need transitioned to oral anticoagulation prior to discharge  - continue Telemetry      Further orders per Dr Bond    Thank you for asking us to follow this patient with you.     Please note this cardiology consultation note is the result of a face to face consultation with the patient, in addition to reviewing medical records at length by myself, FLORENCIA Bautista.    Electronically signed by FLORENCIA Bautista, 05/09/22, 4:30 PM CDT.

## 2022-05-09 NOTE — ED PROVIDER NOTES
Emergency Medicine Provider Note    Subjective:    HISTORY OF PRESENT ILLNESS     This is a very pleasant 61 y.o. female with a past medical history of HTN, DM who presents to the emergency department today with a chief complaint of dizziness. Sudden in onset this morning, constant, moderate, no exacerbating or relieving factors. Feels like she is going to pass out but she hasn't. No exacerbating or relieving factors. Associated with shortness of breath which is chronic. No chest pain. No history of afib.           History is obtained from the patient.       Review of Systems: All other systems are reviewed and are negative other than noted in the HPI.    Past Medical History:  Past Medical History:   Diagnosis Date   • Arthritis    • Diabetes mellitus (HCC)    • Hypertension    • Neuropathy        Allergies:  Allergies   Allergen Reactions   • Codeine GI Intolerance       Past Surgical History:  Past Surgical History:   Procedure Laterality Date   • APPENDECTOMY     • HYSTERECTOMY     • JOINT REPLACEMENT Left     knee   • TOE OSTEOPHYTE REMOVAL         Family History:  Family History   Problem Relation Age of Onset   • Hypertension Mother    • Diabetes Mother    • Hypertension Father    • Diabetes Father        Social History:  Social History     Socioeconomic History   • Marital status:    Tobacco Use   • Smoking status: Current Every Day Smoker     Packs/day: 1.00     Years: 25.00     Pack years: 25.00     Types: Cigarettes   Substance and Sexual Activity   • Alcohol use: No   • Drug use: No       Home Medications:  Prior to Admission medications    Medication Sig Start Date End Date Taking? Authorizing Provider   bacitracin 500 UNIT/GM ointment Apply  topically to the appropriate area as directed 2 (Two) Times a Day. 8/26/18   Kaylan Nascimento APRN   gabapentin (NEURONTIN) 100 MG capsule Take 100 mg by mouth 3 (Three) Times a Day.    Provider, MD Bibiana   Glycopyrrolate-Formoterol (BEVESPI  AEROSPHERE) 9-4.8 MCG/ACT aerosol Inhale As Needed.    Bibiana Noguera MD   lisinopril (PRINIVIL,ZESTRIL) 10 MG tablet Take 10 mg by mouth Daily.    Bibiana Noguera MD   MELOXICAM PO Take  by mouth Daily.    Bibiana Noguera MD   metFORMIN (GLUCOPHAGE) 500 MG tablet Take 1,000 mg by mouth 2 (Two) Times a Day With Meals.    Bibiana Noguera MD   Semaglutide (OZEMPIC) 0.25 or 0.5 MG/DOSE solution pen-injector Inject 0.25 mg under the skin into the appropriate area as directed Every 7 (Seven) Days.    Bibiana Noguera MD         Objective:    PHYSICAL EXAM     Vitals:   Vitals:    05/11/22 0736   BP: 136/92   Pulse: 65   Resp: 18   Temp: 97.4 °F (36.3 °C)   SpO2: 90%     GENERAL: Well appearing, in no acute distress.   HEENT: Moist mucous membranes, oropharynx clear without lesions, exudates, thrush.   EYES: No scleral icterus, conjunctivae clear.   NECK: No cervical lymphadenopathy, no stiffness.  CARDIAC: Tachycardic, irregular rhythm, no murmurs, 2+ peripheral pulses in all four extremities, normal capillary refill.   PULMONARY: Normal work of breathing on room air, lungs are clear to auscultation bilaterally without wheezes, crackles, rhonchi.  ABDOMINAL: Normal bowel sounds, abdomen is soft, non-tender, non-distended, no hepatomegaly or splenomegaly.   MUSCULOSKELETAL: Normal range of motion, no lower extremity edema.  NEUROLOGIC: Alert and oriented x 3, EOM grossly intact and moves all four extremities with normal strength.  SKIN: Warm and dry without rashes.   PSYCHIATRIC: Mood and affect are normal.     PROCEDURES     Procedures    LAB AND RADIOLOGY RESULTS     Lab Results (last 24 hours)     ** No results found for the last 24 hours. **          Adult Transthoracic Echo Complete With Contrast if Necessary Per Protocol    Result Date: 5/10/2022  Narrative: · Left ventricular ejection fraction appears to be 61 - 65%. Left ventricular systolic function is normal. · Left ventricular wall  thickness is consistent with moderate concentric hypertrophy. · Left ventricular diastolic dysfunction is noted. · Normal right ventricular cavity size and systolic function noted. · There is mild biatrial enlargement. · There is no significant (greater than mild) valvular dysfunction. · Estimated right ventricular systolic pressure from tricuspid regurgitation is normal (<35 mmHg).      XR Chest 1 View    Result Date: 5/9/2022  Narrative: EXAMINATION: XR CHEST 1 VW- 5/9/2022 8:37 AM CDT  HISTORY: soa; R42-Dizziness and giddiness; I48.91-Unspecified atrial fibrillation.  REPORT: A frontal view of the chest was obtained.  COMPARISON: Chest x-rays 12/22/2021.  There is mild volume loss in the lung bases, there are central and basilar vascular congestion with cardiomegaly, heart size is probably exaggerated by technique. No lung consolidation is identified. There is no pneumothorax or definite pleural effusion. The osseous structures and upper abdomen are unremarkable.      Impression: Mild cardiomegaly with mild central and basilar vascular congestion and volume loss in both lung bases. No consolidating infiltrates. This report was finalized on 05/09/2022 08:39 by Dr. Evrin Ramsey MD.    CT Angiogram Chest    Result Date: 5/9/2022  Narrative: EXAMINATION: CT ANGIOGRAM CHEST- 5/9/2022 8:43 AM CDT  HISTORY: Pulmonary embolism (PE) suspected, unknown D-dimer; R42-Dizziness and giddiness; I48.91-Unspecified atrial fibrillation  DOSE: 556 mGycm (Automatic exposure control technique was implemented in an effort to keep the radiation dose as low as possible without compromising image quality)  REPORT: Spiral CT of the chest was performed after administration of intravenous contrast from the thoracic inlet through the upper abdomen using CTA protocol. Reconstructed 3-D, coronal and sagittal images were also reviewed.  Comparison: Chest x-ray on the same day.  The contrast bolus is satisfactory, there is mild dilation of  the main pulmonary artery, with a diameter 3.7 cm. No filling defects are seen in the bony arteries. Heart size is normal. No evidence of right heart strain is identified. There is mild ectasia of the ascending aorta, with a maximum diameter 3.8 cm. No aortic dissection is identified. A small volume of calcified plaque within the thoracic aorta at the origins of the great vessels. The visualized thyroid gland appears homogeneous. A small volume of calcified plaque is noted within the coronary arteries. No pleural effusion is identified. There is no evidence of intrathoracic lymphadenopathy. Review of lung windows demonstrate mild changes of paraseptal emphysema. There is mild bibasilar atelectasis. No evidence of pneumonia is identified. There is no pneumothorax. No lung mass or suspicious pulmonary nodule is identified. Small pocket of soft tissue gas is noted to the right of the trachea at the thoracic inlet, of uncertain significance or etiology, this has no obvious direct connection to the trachea or esophagus. This measures approximately 14 mm. No abscess is identified. The visualized upper abdomen shows decreased attenuation of the liver parenchyma compatible with steatosis. Review of bone windows shows no acute abnormality. Extensive degenerative changes are noted in the thoracic spine.      Impression: 1. No evidence of pulmonary embolism or acute intrathoracic pathology. 2. Paraseptal emphysema, no evidence of pneumonia, pneumothorax or pleural effusion. 3. Small 1.4 cm pocket of soft tissue air to the right of the trachea and esophagus at the thoracic inlet could represent small tracheal diverticulum, though no direct connection with the trachea is visualized. 4. Fatty infiltration of the liver.     This report was finalized on 05/09/2022 08:49 by Dr. Ervin Ramsey MD.      ED course:    Medications   lactated ringers bolus 1,000 mL (0 mL Intravenous Stopped 5/9/22 0821)   iopamidol (ISOVUE-370) 76 %  injection 100 mL (100 mL Intravenous Given 5/9/22 0838)   perflutren (DEFINITY) lipid microspheres injection 8.476 mg (8.476 mg Intravenous Given 5/10/22 6838)          Amount and/or complexity of data reviewed:    • Clinical lab tests ordered and reviewed.  • Tests in the radiology section ordered and reviewed.  • Independent visualization of imaging, tracing, or specimen is remarkable for EKG with afib with RVR, narrow QRS, no st elevation or depression concerning for acute ischemia.  • Discuss the patient with another provider: Hospitalist      Risk of significant complications, morbidity, and/or mortality.    •  Presenting problem: high  •  Diagnostic procedures: high  •  Management options: high        MEDICAL DECISION MAKING     Patient presents with dizziness. Upon arrival to the Emergency Department patient is in no acute distress but is noted to be in atrial fibrillation with a rapid ventricular response.  IV access is obtained and labs are sent.  Lab work and imaging overall is reassuring.  Patient is started on diltiazem infusion due to atrial fibrillation with rapid ventricular response.  I have discussed with the hospitalist and she has been admitted in stable condition.        Diagnosis:    Final diagnoses:   Dizziness   Atrial fibrillation with rapid ventricular response (HCC)         ED Disposition:     ED Disposition     ED Disposition   Decision to Admit    Condition   --    Comment   Level of Care: Telemetry [5]   Diagnosis: Dizziness [[id number]]   Admitting Physician: CHLOE RODRIGUEZ [[id number]]   Attending Physician: CHLOE RODRIGUEZ [[id number]]   Certification: I Certify That Inpatient Hospital Services Are Medically Necessary For Greater Than 2 Midnights               Reymundo Torrez MD  [address] [facility] [address]  [phone]    Follow up on 5/16/2022  @ 11:15 AM    Paul Coleman, APRN  [address] [facility] [address]  [facility] [address]  [phone]    Follow up on 6/6/2022  @ 9:00 AM          Medication List      New Prescriptions    apixaban 5 MG tablet tablet  Commonly known as: ELIQUIS  Take 1 tablet by mouth Every 12 (Twelve) Hours. Indications: Atrial Fibrillation     dilTIAZem  MG 24 hr capsule  Commonly known as: CARDIZEM CD  Take 1 capsule by mouth Daily.     famotidine 20 MG tablet  Commonly known as: Pepcid  Take 1 tablet by mouth 2 (Two) Times a Day As Needed for Heartburn or Indigestion.     metFORMIN  MG 24 hr tablet  Commonly known as: GLUCOPHAGE-XR  Take 2 tablets by mouth Daily With Breakfast.  Replaces: metFORMIN 500 MG tablet     metoprolol tartrate 100 MG tablet  Commonly known as: LOPRESSOR  Take 1 tablet by mouth Every 12 (Twelve) Hours.     rosuvastatin 5 MG tablet  Commonly known as: Crestor  Take 1 tablet by mouth Daily.        Changed    lisinopril 10 MG tablet  Commonly known as: PRINIVIL,ZESTRIL  Take 1 tablet by mouth Daily.  What changed:   · medication strength  · how much to take        Stop    amLODIPine 10 MG tablet  Commonly known as: NORVASC     cloNIDine 0.1 MG tablet  Commonly known as: CATAPRES     MELOXICAM PO     metFORMIN 500 MG tablet  Commonly known as: GLUCOPHAGE  Replaced by: metFORMIN  MG 24 hr tablet           Where to Get Your Medications      These medications were sent to [facility] [place] [address]    Hours: 7AM-5PM Mon-Fri Phone: [phone]   · dilTIAZem  MG 24 hr capsule     These medications were sent to [facility] [address] [place] [address] [address] [facility] [phone] [facility] [phone]   [address] [place], [place] [address]    Phone: [phone]   · apixaban 5 MG tablet tablet  · famotidine 20 MG tablet  · lisinopril 10 MG tablet  · metFORMIN  MG 24 hr tablet  · metoprolol tartrate 100 MG tablet  · rosuvastatin 5 MG tablet              Jose Maria Carroll MD  05/23/22 0119

## 2022-05-09 NOTE — ED NOTES
Pt in bed. No s/s distress noted. Denies any needs/pain/discomforts. Family at bedside. Pt's sat 90-91%- O2 increased to 3L/NC. encouraged proper breathing techniques. Sat increased to 95%. POC dicussed. Will cont monitoring. Advised to call any needs. Call light in reach.

## 2022-05-09 NOTE — PROGRESS NOTES
.      [facility] Medicine Services  Smoking Cessation      Claudette Keeling is a 61 y.o. yo female admitted under my service at [facility].  Patient has a smoking history.  Smoking cessation was discussed with the patient using the 5 A's Tobacco Cessation Intervention Model.  Total time spent counseling the patient on smoking cessation was 3 minutes.      ASK:  Patient's smoking history includes:  Social History     Tobacco Use   Smoking Status Current Every Day Smoker   • Packs/day: 1.00   • Years: 25.00   • Pack years: 25.00   • Types: Cigarettes   Smokeless Tobacco Not on file        ADVISE:  Patient was advised to quit smoking.  They were informed of the negative implications on their health including increase risk of: cardiovascular disease, cancer, stroke, lung disease (COPD, emphysema).  The health and financial benefits of quitting smoking were also discussed with the patient.      ASSIST:  - Recommended that the patient set a date for quitting smoking, ideally within 2 to 4 weeks  - Recommended that the patient tell family/friends about quitting and request their support  - Discussed with them the anticipated challenges, especially within the first few weeks, including nicotine withdrawal syndrome.  - Suggested to remove tobacco products from their environment.  Recommended to make their home and environment smoke free if at all possible.  - Discussed nicotine replacement therapies that are available including: patch, gum, lozenge, inhalers.  - Discussed FDA-approved medications that are available including bupropion, varenicline.  - Discussed utilizing FDA-approved medications and nicotine replacement in combination can yield improved results.  - Other resources discussed with the patient may have included: smokefree.gov; 1-800-QUIT-NOW; becomeanex.org    Nicotine replacement has been made available to the patient while admitted to the hospital if they choose  to use it.    Tobacco cessation information pack will be provided to the patient prior to discharge.    ASSESS:    Patient stated she is ready to quit smoking.    ARRANGE:    It was recommended that the patient arrange follow-up with their primary care after discharge for continued discussion of smoking cessation.      Electronically signed by Brad Moran MD, 05/09/22, 09:46 CDT.

## 2022-05-09 NOTE — H&P
[facility] Medicine Services  HISTORY AND PHYSICAL    Date of Admission: 5/9/2022  Primary Care Physician: Reymundo Torrez MD    Subjective     Chief Complaint: New onset atrial for with RVR/diabetes/emphysema/smoker    History of Present Illness  Patient is a 61-year-old  female presented the ER with complaining of dizziness.  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  Patient stated patient was at work when this occurred.  About 6 AM this morning patient felt dizzy, cold sweat, nausea.  Patient went outside and took a protein shake thinking her sugar was low.  Patient felt better.  Patient came inside and stop making to breakfast burrito and put in the counter.  Patient began with symptoms of blurry vision and double vision.  Patient called out to a colleague.  Patient was brought to the back and sat down.  Patient and proceeded to call her daughter, her daughter came and brought her to the ER to be evaluated.  Episode shortness of breath with symptoms.  Patient denies any chest pain.  During course evaluation ER showed patient has atrial fibrillation with RVR rate was 150.  Patient is currently on a Cardizem drip.  BNP is elevated.  Normal troponin.  Patient is a chronic smoker.  Patient is currently 3 L of oxygen.    Review of Systems   Constitutional: Positive for activity change, appetite change and fatigue. Negative for chills and fever.   HENT: Negative for hearing loss, nosebleeds, tinnitus and trouble swallowing.    Eyes: Negative for visual disturbance.   Respiratory: Positive for shortness of breath. Negative for cough, chest tightness and wheezing.    Cardiovascular: Negative for chest pain, palpitations and leg swelling.   Gastrointestinal: Positive for nausea. Negative for abdominal distention, abdominal pain, blood in stool, constipation, diarrhea and vomiting.   Endocrine: Negative for cold  intolerance, heat intolerance, polydipsia, polyphagia and polyuria.   Genitourinary: Negative for decreased urine volume, difficulty urinating, dysuria, flank pain, frequency and hematuria.   Musculoskeletal: Negative for arthralgias, joint swelling and myalgias.   Skin: Negative for rash.   Allergic/Immunologic: Negative for immunocompromised state.   Neurological: Positive for dizziness and weakness. Negative for syncope, light-headedness and headaches.   Hematological: Negative for adenopathy. Does not bruise/bleed easily.   Psychiatric/Behavioral: Negative for confusion and sleep disturbance. The patient is not nervous/anxious.         Otherwise complete ROS reviewed and negative except as mentioned in the HPI.      Past Medical History:   Past Medical History:   Diagnosis Date   • Arthritis    • Diabetes mellitus (HCC)    • Hypertension    • Neuropathy        Past Surgical History:  Past Surgical History:   Procedure Laterality Date   • APPENDECTOMY     • HYSTERECTOMY     • JOINT REPLACEMENT Left     knee   • TOE OSTEOPHYTE REMOVAL         Family History: family history includes Diabetes in her father and mother; Hypertension in her father and mother.    Social History:  reports that she has been smoking cigarettes. She has a 25.00 pack-year smoking history. She does not have any smokeless tobacco history on file. She reports that she does not drink alcohol and does not use drugs.    Allergies:  Allergies   Allergen Reactions   • Codeine GI Intolerance     Medications:  Prior to Admission medications    Medication Sig Start Date End Date Taking? Authorizing Provider   amLODIPine (NORVASC) 10 MG tablet Take 10 mg by mouth Daily.   Yes Bibiana Noguera MD   bacitracin 500 UNIT/GM ointment Apply  topically to the appropriate area as directed 2 (Two) Times a Day. 8/26/18  Yes Kaylan Nascimento APRN   empagliflozin (JARDIANCE) 25 MG tablet tablet Take  by mouth Daily.   Yes Bibiana Noguera MD  "  gabapentin (NEURONTIN) 100 MG capsule Take 300 mg by mouth 3 (Three) Times a Day.   Yes Bibiana Noguera MD   Glycopyrrolate-Formoterol 9-4.8 MCG/ACT aerosol Inhale As Needed.   Yes Bibiana Noguera MD   lisinopril (PRINIVIL,ZESTRIL) 10 MG tablet Take 10 mg by mouth Daily.   Yes Bibiana Noguera MD   MELOXICAM PO Take 15 mg by mouth Daily.   Yes Bibiana Noguera MD   metFORMIN (GLUCOPHAGE) 500 MG tablet Take 1,000 mg by mouth 2 (Two) Times a Day With Meals.   Yes Bibiana Noguera MD   Semaglutide (OZEMPIC) 0.25 or 0.5 MG/DOSE solution pen-injector Inject 0.25 mg under the skin into the appropriate area as directed Every 7 (Seven) Days.  5/9/22  Bibiana Noguera MD       I have utilized all available immediate resources to obtain, update, and review the patient's current medications.    Objective     Vital Signs: /76   Pulse (!) 128   Temp 97.6 °F (36.4 °C) (Oral)   Resp 20   Ht 162.6 cm (64\")   Wt 120 kg (265 lb)   SpO2 92%   BMI 45.49 kg/m²   Physical Exam  Vitals and nursing note reviewed.   Constitutional:       Appearance: She is well-developed.   HENT:      Head: Normocephalic and atraumatic.   Eyes:      Conjunctiva/sclera: Conjunctivae normal.      Pupils: Pupils are equal, round, and reactive to light.   Neck:      Vascular: No JVD.   Cardiovascular:      Rate and Rhythm: Tachycardia present. Rhythm irregular.      Heart sounds: Normal heart sounds. No murmur heard.    No friction rub. No gallop.   Pulmonary:      Effort: Pulmonary effort is normal. No respiratory distress.      Breath sounds: Normal breath sounds. No wheezing or rales.   Chest:      Chest wall: No tenderness.   Abdominal:      General: Bowel sounds are normal. There is no distension.      Palpations: Abdomen is soft.      Tenderness: There is no abdominal tenderness. There is no guarding or rebound.      Comments: Obesity.   Musculoskeletal:         General: No tenderness or deformity. Normal " range of motion.      Cervical back: Neck supple.   Skin:     General: Skin is warm and dry.      Capillary Refill: Capillary refill takes 2 to 3 seconds.      Findings: No rash.   Neurological:      General: No focal deficit present.      Mental Status: She is alert and oriented to person, place, and time.      Cranial Nerves: No cranial nerve deficit.      Motor: Weakness present. No abnormal muscle tone.      Deep Tendon Reflexes: Reflexes normal.   Psychiatric:         Behavior: Behavior normal.         Thought Content: Thought content normal.             Results Reviewed:    Lab Results (last 24 hours)     Procedure Component Value Units Date/Time    COVID-19,[facility] IN-HOUSE,Nasal Swab No Transport Media 3-4 HR TAT - Swab, Nasal Cavity [[id number]]  (Normal) Collected: 05/09/22 0752    Specimen: Swab from Nasal Cavity Updated: 05/09/22 0837     COVID19 Not Detected    Narrative:      Fact sheet for providers: https://www.fda.gov/media/954517/download     Fact sheet for patients: https://www.fda.gov/media/063271/download    Test performed by PCR.    Consider negative results in combination with clinical observations, patient history, and epidemiological information.    Comprehensive Metabolic Panel [[id number]]  (Abnormal) Collected: 05/09/22 0743    Specimen: Blood Updated: 05/09/22 0814     Glucose 198 mg/dL      BUN 22 mg/dL      Creatinine 0.66 mg/dL      Sodium 137 mmol/L      Potassium 4.7 mmol/L      Comment: Slight hemolysis detected by analyzer. Results may be affected.        Chloride 103 mmol/L      CO2 22.0 mmol/L      Calcium 10.2 mg/dL      Total Protein 6.5 g/dL      Albumin 4.20 g/dL      ALT (SGPT) 18 U/L      AST (SGOT) 16 U/L      Comment: Slight hemolysis detected by analyzer. Results may be affected.        Alkaline Phosphatase 65 U/L      Total Bilirubin 0.5 mg/dL      Globulin 2.3 gm/dL      A/G Ratio 1.8 g/dL      BUN/Creatinine Ratio 33.3     Anion Gap 12.0 mmol/L      eGFR 98.7  mL/min/1.73      Comment: National Kidney Foundation and American Society of Nephrology (ASN) Task Force recommended calculation based on the Chronic Kidney Disease Epidemiology Collaboration (CKD-EPI) equation refit without adjustment for race.       Narrative:      GFR Normal >60  Chronic Kidney Disease <60  Kidney Failure <15      Troponin [[id number]]  (Normal) Collected: 05/09/22 0743    Specimen: Blood Updated: 05/09/22 0810     Troponin T <0.010 ng/mL     Narrative:      Troponin T Reference Range:  <= 0.03 ng/mL-   Negative for AMI  >0.03 ng/mL-     Abnormal for myocardial necrosis.  Clinicians would have to utilize clinical acumen, EKG, Troponin and serial changes to determine if it is an Acute Myocardial Infarction or myocardial injury due to an underlying chronic condition.       Results may be falsely decreased if patient taking Biotin.      BNP [[id number]]  (Abnormal) Collected: 05/09/22 0743    Specimen: Blood Updated: 05/09/22 0809     proBNP 2,383.0 pg/mL     Narrative:      Among patients with dyspnea, NT-proBNP is highly sensitive for the detection of acute congestive heart failure. In addition NT-proBNP of <300 pg/ml effectively rules out acute congestive heart failure with 99% negative predictive value.    Results may be falsely decreased if patient taking Biotin.      Protime-INR [[id number]]  (Normal) Collected: 05/09/22 0742    Specimen: Blood Updated: 05/09/22 0803     Protime 13.4 Seconds      INR 1.06    aPTT [[id number]]  (Normal) Collected: 05/09/22 0742    Specimen: Blood Updated: 05/09/22 0803     PTT 26.7 seconds     D-dimer, Quantitative [[id number]]  (Abnormal) Collected: 05/09/22 0742    Specimen: Blood Updated: 05/09/22 0803     D-Dimer, Quantitative 1.00 MCGFEU/mL     Narrative:      Reference Range is 0-0.50 MCGFEU/mL. However, results <0.50 MCGFEU/mL tends to rule out DVT or PE. Results >0.50 MCGFEU/mL are not useful in predicting absence or presence of DVT or PE.      CBC &  Differential [[id number]]  (Abnormal) Collected: 05/09/22 0743    Specimen: Blood Updated: 05/09/22 0753    Narrative:      The following orders were created for panel order CBC & Differential.  Procedure                               Abnormality         Status                     ---------                               -----------         ------                     CBC Auto Differential[[id number]]        Abnormal            Final result                 Please view results for these tests on the individual orders.    CBC Auto Differential [[id number]]  (Abnormal) Collected: 05/09/22 0743    Specimen: Blood Updated: 05/09/22 0753     WBC 9.37 10*3/mm3      RBC 5.43 10*6/mm3      Hemoglobin 17.1 g/dL      Hematocrit 53.0 %      MCV 97.6 fL      MCH 31.5 pg      MCHC 32.3 g/dL      RDW 14.0 %      RDW-SD 50.1 fl      MPV 10.5 fL      Platelets 157 10*3/mm3      Neutrophil % 73.4 %      Lymphocyte % 18.9 %      Monocyte % 6.1 %      Eosinophil % 0.9 %      Basophil % 0.4 %      Immature Grans % 0.3 %      Neutrophils, Absolute 6.88 10*3/mm3      Lymphocytes, Absolute 1.77 10*3/mm3      Monocytes, Absolute 0.57 10*3/mm3      Eosinophils, Absolute 0.08 10*3/mm3      Basophils, Absolute 0.04 10*3/mm3      Immature Grans, Absolute 0.03 10*3/mm3      nRBC 0.0 /100 WBC     POC Glucose Once [[id number]]  (Abnormal) Collected: 05/09/22 0740    Specimen: Blood Updated: 05/09/22 0751     Glucose 185 mg/dL      Comment: : STACEY Trinidad ID: [id number]              Radiology Data:    Imaging Results (Last 24 Hours)     Procedure Component Value Units Date/Time    CT Angiogram Chest [[id number]] Collected: 05/09/22 0843     Updated: 05/09/22 0853    Narrative:      EXAMINATION: CT ANGIOGRAM CHEST- 5/9/2022 8:43 AM CDT     HISTORY: Pulmonary embolism (PE) suspected, unknown D-dimer;  R42-Dizziness and giddiness; I48.91-Unspecified atrial fibrillation     DOSE: 556 mGycm (Automatic exposure control technique was  implemented in  an effort to keep the radiation dose as low as possible without  compromising image quality)     REPORT: Spiral CT of the chest was performed after administration of  intravenous contrast from the thoracic inlet through the upper abdomen  using CTA protocol. Reconstructed 3-D, coronal and sagittal images were  also reviewed.     Comparison: Chest x-ray on the same day.     The contrast bolus is satisfactory, there is mild dilation of the main  pulmonary artery, with a diameter 3.7 cm. No filling defects are seen in  the bony arteries. Heart size is normal. No evidence of right heart  strain is identified. There is mild ectasia of the ascending aorta, with  a maximum diameter 3.8 cm. No aortic dissection is identified. A small  volume of calcified plaque within the thoracic aorta at the origins of  the great vessels. The visualized thyroid gland appears homogeneous. A  small volume of calcified plaque is noted within the coronary arteries.  No pleural effusion is identified. There is no evidence of intrathoracic  lymphadenopathy. Review of lung windows demonstrate mild changes of  paraseptal emphysema. There is mild bibasilar atelectasis. No evidence  of pneumonia is identified. There is no pneumothorax. No lung mass or  suspicious pulmonary nodule is identified. Small pocket of soft tissue  gas is noted to the right of the trachea at the thoracic inlet, of  uncertain significance or etiology, this has no obvious direct  connection to the trachea or esophagus. This measures approximately 14  mm. No abscess is identified. The visualized upper abdomen shows  decreased attenuation of the liver parenchyma compatible with steatosis.  Review of bone windows shows no acute abnormality. Extensive  degenerative changes are noted in the thoracic spine.       Impression:      1. No evidence of pulmonary embolism or acute intrathoracic pathology.  2. Paraseptal emphysema, no evidence of pneumonia, pneumothorax  or  pleural effusion.  3. Small 1.4 cm pocket of soft tissue air to the right of the trachea  and esophagus at the thoracic inlet could represent small tracheal  diverticulum, though no direct connection with the trachea is  visualized.  4. Fatty infiltration of the liver.              This report was finalized on 05/09/2022 08:49 by Dr. Ervin Ramsey MD.    XR Chest 1 View [[id number]] Collected: 05/09/22 0837     Updated: 05/09/22 0842    Narrative:      EXAMINATION: XR CHEST 1 VW- 5/9/2022 8:37 AM CDT     HISTORY: soa; R42-Dizziness and giddiness; I48.91-Unspecified atrial  fibrillation.     REPORT: A frontal view of the chest was obtained.     COMPARISON: Chest x-rays 12/22/2021.     There is mild volume loss in the lung bases, there are central and  basilar vascular congestion with cardiomegaly, heart size is probably  exaggerated by technique. No lung consolidation is identified. There is  no pneumothorax or definite pleural effusion. The osseous structures and  upper abdomen are unremarkable.       Impression:      Mild cardiomegaly with mild central and basilar vascular  congestion and volume loss in both lung bases. No consolidating  infiltrates.  This report was finalized on 05/09/2022 08:39 by Dr. Ervin Ramsey MD.          I have personally reviewed and interpreted the radiology studies and ECG obtained at time of admission.     Assessment / Plan      Assessment & Plan  Active Hospital Problems    Diagnosis    • Dizziness    • Emphysema lung (HCC)    • Atrial fibrillation with RVR (HCC)    • Smoker    • DM (diabetes mellitus) (HCC)    • Polycythemia      Plans    New onset atrial fibrillation with RVR/cardiomegaly/hypertension.  BNP 2380.  Troponin negative.  Continue Cardizem drip.  Lisinopril . Lopressor.  DC Norvasc.  Lovenox therapeutic.  Echocardiogram    COPD/paraseptal emphysema/small 1.4 cm pocket of soft tissue air in the right trachea and esophagus at the thoracic inlet- represent small  tracheal diverticulum-no direct connection to the trachea is visualized.  Symbicort .  Chest x-ray-Mild cardiomegaly with mild central and basilar vascular congestion and volume loss in both lung bases,  No consolidating infiltrates.  CTA of the chest-No evidence of pulmonary embolism or acute intrathoracic pathology, Paraseptal emphysema- no evidence of pneumonia or pneumothorax or pleural effusion, Small 1.4 cm pocket of soft tissue air to the right of the trachea  and esophagus at the thoracic inlet could represent small tracheal diverticulum- though no direct connection with the trachea is visualized, fatty infiltration of the liver.  Patient is currently on 3 L of oxygen.    Fatty liver.  Lipid profile .    Diabetes.  Sliding scale.  Jardiance.  Hold metformin due to GI side effects.  Hemoglobin A1c .      Polycythemia.    Secondary to smoking.    Neuropathy/arthritis.  Neurontin.  Hold meloxicam due to GI side effects.    Nausea.  Pepcid.  Zofran as needed.    Chronic smoker.  Discussed with patient cutting back and stop smoking.  Patient currently refused nicotine patch.    Nutrition.  Cardiac diet.     COVID-19-negative.    Code Status/Advanced Care Plan: Full code.    The patient's surrogate decision maker is daughterMike.      I discussed the patient's findings and my recommendations with: Patient.    Estimated length of stay: 2 to 4 days    Electronically signed by Brad Moran MD, 05/09/22, 9:20 AM CDT.

## 2022-05-09 NOTE — ED NOTES
Pt in bed. No s/s distress noted. Family at bedside. Pt requests to void. BSC provided. SBA without diff. Tolerated well. Denies any pain/discomforts. Cardizem increased prior to BSC due to HR 130s-150s. Noted IVF not infusing- upon assessment- slight swelling noted to LFA site. Admits tender to touch. PIV dcd. POC dicussed. Will cont monitoring. Advised to call any needs. Call light in reach.

## 2022-05-09 NOTE — ED NOTES
"Pt in bed. No s/s distress noted. Denies needs. Asmt completed. Daughter at bedside. Pt placed on continuous monitoring- noted AFIB. Pt states she has had dizzy spell this AM with blurred vision but admits to feeling better now. Pt admits to occasional SOB and chest tightness for past couple of days. Speaks full sentences without diff. RA sat 88-89%. Encouraged proper breathing technique. Sat increased to 90%. Placed on 2L/NC. MD at bedside- aware. Pt denies any recent illnesses. Denies hx AFIB. Pt states drank 36oz coffee already this morning. States \"I drink my coffee. I love coffee.\" Pt advised to switch to decaf. POC discussed. Verbalized understanding. Will cont monitoring. Call light in reach. Advised to call any needs.   "

## 2022-05-09 NOTE — ED NOTES
Pt in bed. No s/s distress noted. Denies any needs/pain/discomforts. Family at bedside. HR 140s-120s AFIB. POC dicussed. Will cont monitoring. Advised to call any needs. Call light in reach.

## 2022-05-09 NOTE — ED NOTES
Pt in bed. No s/s distress noted. Denies any needs/pain/discomforts. Family at bedside. AFIB 120s-140s. POC dicussed. Will cont monitoring. Advised to call any needs. Call light in reach.

## 2022-05-10 ENCOUNTER — APPOINTMENT (OUTPATIENT)
Dept: CARDIOLOGY | Facility: HOSPITAL | Age: 62
End: 2022-05-10

## 2022-05-10 LAB
ALBUMIN SERPL-MCNC: 3.8 G/DL (ref 3.5–5.2)
ALBUMIN/GLOB SERPL: 1.7 G/DL
ALP SERPL-CCNC: 51 U/L (ref 39–117)
ALT SERPL W P-5'-P-CCNC: 15 U/L (ref 1–33)
ANION GAP SERPL CALCULATED.3IONS-SCNC: 5 MMOL/L (ref 5–15)
AST SERPL-CCNC: 12 U/L (ref 1–32)
BASOPHILS # BLD AUTO: 0.03 10*3/MM3 (ref 0–0.2)
BASOPHILS NFR BLD AUTO: 0.4 % (ref 0–1.5)
BH CV ECHO MEAS - AO MAX PG: 8.8 MMHG
BH CV ECHO MEAS - AO MEAN PG: 4 MMHG
BH CV ECHO MEAS - AO ROOT DIAM: 3.2 CM
BH CV ECHO MEAS - AO V2 MAX: 148 CM/SEC
BH CV ECHO MEAS - AO V2 VTI: 29.3 CM
BH CV ECHO MEAS - AVA(I,D): 3 CM2
BH CV ECHO MEAS - EDV(CUBED): 89.9 ML
BH CV ECHO MEAS - EDV(MOD-SP4): 147 ML
BH CV ECHO MEAS - EF(MOD-SP4): 62.5 %
BH CV ECHO MEAS - ESV(CUBED): 28.7 ML
BH CV ECHO MEAS - ESV(MOD-SP4): 55.1 ML
BH CV ECHO MEAS - FS: 31.7 %
BH CV ECHO MEAS - IVS/LVPW: 1.01 CM
BH CV ECHO MEAS - IVSD: 1.69 CM
BH CV ECHO MEAS - LA DIMENSION: 4.9 CM
BH CV ECHO MEAS - LAT PEAK E' VEL: 8.9 CM/SEC
BH CV ECHO MEAS - LV MASS(C)D: 328.3 GRAMS
BH CV ECHO MEAS - LV MAX PG: 6.7 MMHG
BH CV ECHO MEAS - LV MEAN PG: 4 MMHG
BH CV ECHO MEAS - LV V1 MAX: 129 CM/SEC
BH CV ECHO MEAS - LV V1 VTI: 28.4 CM
BH CV ECHO MEAS - LVIDD: 4.5 CM
BH CV ECHO MEAS - LVIDS: 3.1 CM
BH CV ECHO MEAS - LVOT AREA: 3.1 CM2
BH CV ECHO MEAS - LVOT DIAM: 2 CM
BH CV ECHO MEAS - LVPWD: 1.68 CM
BH CV ECHO MEAS - MED PEAK E' VEL: 9.4 CM/SEC
BH CV ECHO MEAS - MR MAX PG: 70.6 MMHG
BH CV ECHO MEAS - MR MAX VEL: 420 CM/SEC
BH CV ECHO MEAS - MR MEAN PG: 47 MMHG
BH CV ECHO MEAS - MR MEAN VEL: 316 CM/SEC
BH CV ECHO MEAS - MR VTI: 90.7 CM
BH CV ECHO MEAS - MV DEC SLOPE: 511 CM/SEC2
BH CV ECHO MEAS - MV DEC TIME: 0.3 MSEC
BH CV ECHO MEAS - MV E MAX VEL: 144 CM/SEC
BH CV ECHO MEAS - MV P1/2T: 82.5 MSEC
BH CV ECHO MEAS - MVA(P1/2T): 2.7 CM2
BH CV ECHO MEAS - PA V2 MAX: 132 CM/SEC
BH CV ECHO MEAS - PI END-D VEL: 113 CM/SEC
BH CV ECHO MEAS - RAP SYSTOLE: 10 MMHG
BH CV ECHO MEAS - RVSP: 45.3 MMHG
BH CV ECHO MEAS - SV(LVOT): 89.2 ML
BH CV ECHO MEAS - SV(MOD-SP4): 91.9 ML
BH CV ECHO MEAS - TR MAX PG: 35.3 MMHG
BH CV ECHO MEAS - TR MAX VEL: 297 CM/SEC
BH CV ECHO MEASUREMENTS AVERAGE E/E' RATIO: 15.74
BH CV XLRA - RV BASE: 4.4 CM
BH CV XLRA - RV LENGTH: 8.1 CM
BH CV XLRA - RV MID: 3.4 CM
BILIRUB SERPL-MCNC: 0.5 MG/DL (ref 0–1.2)
BUN SERPL-MCNC: 19 MG/DL (ref 8–23)
BUN/CREAT SERPL: 32.8 (ref 7–25)
CALCIUM SPEC-SCNC: 9.3 MG/DL (ref 8.6–10.5)
CHLORIDE SERPL-SCNC: 101 MMOL/L (ref 98–107)
CHOLEST SERPL-MCNC: 149 MG/DL (ref 0–200)
CO2 SERPL-SCNC: 31 MMOL/L (ref 22–29)
CREAT SERPL-MCNC: 0.58 MG/DL (ref 0.57–1)
DEPRECATED RDW RBC AUTO: 51.8 FL (ref 37–54)
EGFRCR SERPLBLD CKD-EPI 2021: 101.8 ML/MIN/1.73
EOSINOPHIL # BLD AUTO: 0.12 10*3/MM3 (ref 0–0.4)
EOSINOPHIL NFR BLD AUTO: 1.7 % (ref 0.3–6.2)
ERYTHROCYTE [DISTWIDTH] IN BLOOD BY AUTOMATED COUNT: 14 % (ref 12.3–15.4)
GLOBULIN UR ELPH-MCNC: 2.3 GM/DL
GLUCOSE SERPL-MCNC: 143 MG/DL (ref 65–99)
HBA1C MFR BLD: 7.4 % (ref 4.8–5.6)
HCT VFR BLD AUTO: 51.7 % (ref 34–46.6)
HDLC SERPL-MCNC: 37 MG/DL (ref 40–60)
HGB BLD-MCNC: 16.2 G/DL (ref 12–15.9)
LDLC SERPL CALC-MCNC: 90 MG/DL (ref 0–100)
LDLC/HDLC SERPL: 2.36 {RATIO}
LEFT ATRIUM VOLUME INDEX: 34.2 ML/M2
LEFT ATRIUM VOLUME: 75.9 CM3
LYMPHOCYTES # BLD AUTO: 1.72 10*3/MM3 (ref 0.7–3.1)
LYMPHOCYTES NFR BLD AUTO: 24.9 % (ref 19.6–45.3)
MAXIMAL PREDICTED HEART RATE: 157 BPM
MCH RBC QN AUTO: 31.3 PG (ref 26.6–33)
MCHC RBC AUTO-ENTMCNC: 31.3 G/DL (ref 31.5–35.7)
MCV RBC AUTO: 100 FL (ref 79–97)
MONOCYTES # BLD AUTO: 0.54 10*3/MM3 (ref 0.1–0.9)
MONOCYTES NFR BLD AUTO: 7.8 % (ref 5–12)
NEUTROPHILS NFR BLD AUTO: 4.48 10*3/MM3 (ref 1.7–7)
NEUTROPHILS NFR BLD AUTO: 64.9 % (ref 42.7–76)
PLATELET # BLD AUTO: 143 10*3/MM3 (ref 140–450)
PMV BLD AUTO: 10.5 FL (ref 6–12)
POTASSIUM SERPL-SCNC: 4.9 MMOL/L (ref 3.5–5.2)
PROT SERPL-MCNC: 6.1 G/DL (ref 6–8.5)
RBC # BLD AUTO: 5.17 10*6/MM3 (ref 3.77–5.28)
SODIUM SERPL-SCNC: 137 MMOL/L (ref 136–145)
STRESS TARGET HR: 133 BPM
TRIGL SERPL-MCNC: 124 MG/DL (ref 0–150)
TSH SERPL DL<=0.05 MIU/L-ACNC: 1.34 UIU/ML (ref 0.27–4.2)
VLDLC SERPL-MCNC: 22 MG/DL (ref 5–40)
WBC NRBC COR # BLD: 6.91 10*3/MM3 (ref 3.4–10.8)

## 2022-05-10 PROCEDURE — 93306 TTE W/DOPPLER COMPLETE: CPT | Performed by: INTERNAL MEDICINE

## 2022-05-10 PROCEDURE — 80053 COMPREHEN METABOLIC PANEL: CPT | Performed by: FAMILY MEDICINE

## 2022-05-10 PROCEDURE — 25010000002 PERFLUTREN 6.52 MG/ML SUSPENSION: Performed by: FAMILY MEDICINE

## 2022-05-10 PROCEDURE — 94799 UNLISTED PULMONARY SVC/PX: CPT

## 2022-05-10 PROCEDURE — 94761 N-INVAS EAR/PLS OXIMETRY MLT: CPT

## 2022-05-10 PROCEDURE — 80061 LIPID PANEL: CPT | Performed by: FAMILY MEDICINE

## 2022-05-10 PROCEDURE — 93306 TTE W/DOPPLER COMPLETE: CPT

## 2022-05-10 PROCEDURE — 93010 ELECTROCARDIOGRAM REPORT: CPT | Performed by: INTERNAL MEDICINE

## 2022-05-10 PROCEDURE — 85025 COMPLETE CBC W/AUTO DIFF WBC: CPT | Performed by: FAMILY MEDICINE

## 2022-05-10 PROCEDURE — 84443 ASSAY THYROID STIM HORMONE: CPT | Performed by: FAMILY MEDICINE

## 2022-05-10 PROCEDURE — 83036 HEMOGLOBIN GLYCOSYLATED A1C: CPT | Performed by: FAMILY MEDICINE

## 2022-05-10 PROCEDURE — 99232 SBSQ HOSP IP/OBS MODERATE 35: CPT | Performed by: NURSE PRACTITIONER

## 2022-05-10 PROCEDURE — 25010000002 ENOXAPARIN PER 10 MG: Performed by: FAMILY MEDICINE

## 2022-05-10 RX ORDER — METOPROLOL TARTRATE 100 MG/1
100 TABLET ORAL EVERY 12 HOURS SCHEDULED
Status: DISCONTINUED | OUTPATIENT
Start: 2022-05-10 | End: 2022-05-11 | Stop reason: HOSPADM

## 2022-05-10 RX ORDER — CLONIDINE HYDROCHLORIDE 0.1 MG/1
0.1 TABLET ORAL 2 TIMES DAILY PRN
COMMUNITY
End: 2022-05-11 | Stop reason: HOSPADM

## 2022-05-10 RX ADMIN — FAMOTIDINE 20 MG: 10 INJECTION INTRAVENOUS at 08:14

## 2022-05-10 RX ADMIN — BUDESONIDE AND FORMOTEROL FUMARATE DIHYDRATE 2 PUFF: 160; 4.5 AEROSOL RESPIRATORY (INHALATION) at 18:56

## 2022-05-10 RX ADMIN — ENOXAPARIN SODIUM 120 MG: 120 INJECTION SUBCUTANEOUS at 00:46

## 2022-05-10 RX ADMIN — ENOXAPARIN SODIUM 120 MG: 120 INJECTION SUBCUTANEOUS at 14:38

## 2022-05-10 RX ADMIN — Medication 10 ML: at 08:12

## 2022-05-10 RX ADMIN — METOPROLOL TARTRATE 50 MG: 50 TABLET, FILM COATED ORAL at 08:12

## 2022-05-10 RX ADMIN — GABAPENTIN 300 MG: 300 CAPSULE ORAL at 15:08

## 2022-05-10 RX ADMIN — GABAPENTIN 300 MG: 300 CAPSULE ORAL at 20:32

## 2022-05-10 RX ADMIN — DILTIAZEM HYDROCHLORIDE 30 MG: 30 TABLET, FILM COATED ORAL at 00:46

## 2022-05-10 RX ADMIN — Medication 10 ML: at 20:32

## 2022-05-10 RX ADMIN — DILTIAZEM HYDROCHLORIDE 30 MG: 30 TABLET, FILM COATED ORAL at 05:34

## 2022-05-10 RX ADMIN — LISINOPRIL 10 MG: 10 TABLET ORAL at 08:13

## 2022-05-10 RX ADMIN — BUDESONIDE AND FORMOTEROL FUMARATE DIHYDRATE 2 PUFF: 160; 4.5 AEROSOL RESPIRATORY (INHALATION) at 06:46

## 2022-05-10 RX ADMIN — PERFLUTREN 8.48 MG: 6.52 INJECTION, SUSPENSION INTRAVENOUS at 13:59

## 2022-05-10 RX ADMIN — NYSTATIN: 100000 POWDER TOPICAL at 08:13

## 2022-05-10 RX ADMIN — METOPROLOL TARTRATE 100 MG: 100 TABLET, FILM COATED ORAL at 20:32

## 2022-05-10 RX ADMIN — GABAPENTIN 300 MG: 300 CAPSULE ORAL at 08:14

## 2022-05-10 RX ADMIN — DILTIAZEM HYDROCHLORIDE 30 MG: 30 TABLET, FILM COATED ORAL at 11:19

## 2022-05-10 RX ADMIN — EMPAGLIFLOZIN 25 MG: 25 TABLET, FILM COATED ORAL at 08:12

## 2022-05-10 RX ADMIN — NYSTATIN: 100000 POWDER TOPICAL at 20:33

## 2022-05-10 RX ADMIN — FAMOTIDINE 20 MG: 10 INJECTION INTRAVENOUS at 20:32

## 2022-05-10 RX ADMIN — DILTIAZEM HYDROCHLORIDE 30 MG: 30 TABLET, FILM COATED ORAL at 17:16

## 2022-05-10 NOTE — PROGRESS NOTES
[facility] Medicine Services  INPATIENT PROGRESS NOTE    Length of Stay: 1  Date of Admission: 5/9/2022  Primary Care Physician: Reymundo Torrez MD    Subjective   Chief Complaint: New onset atrial for with RVR/diabetes/emphysema/smoker    HPI   Dizziness is improving.  Heart rate is controlled.  Blood pressure stable, afebrile.  Echocardiogram still pending.  Possible discharge tomorrow discussed with patient.  Patient currently on 3 L of oxygen.    Review of Systems   Constitutional: Positive for activity change, appetite change and fatigue. Negative for chills and fever.   HENT: Negative for hearing loss, nosebleeds, tinnitus and trouble swallowing.    Eyes: Negative for visual disturbance.   Respiratory: Positive for shortness of breath. Negative for cough, chest tightness and wheezing.    Cardiovascular: Negative for chest pain, palpitations and leg swelling.   Gastrointestinal: Positive for nausea. Negative for abdominal distention, abdominal pain, blood in stool, constipation, diarrhea and vomiting.   Endocrine: Negative for cold intolerance, heat intolerance, polydipsia, polyphagia and polyuria.   Genitourinary: Negative for decreased urine volume, difficulty urinating, dysuria, flank pain, frequency and hematuria.   Musculoskeletal: Negative for arthralgias, joint swelling and myalgias.   Skin: Negative for rash.   Allergic/Immunologic: Negative for immunocompromised state.   Neurological: Positive for dizziness and weakness. Negative for syncope, light-headedness and headaches.   Hematological: Negative for adenopathy. Does not bruise/bleed easily.   Psychiatric/Behavioral: Negative for confusion and sleep disturbance. The patient is not nervous/anxious.      All pertinent negatives and positives are as above. All other systems have been reviewed and are negative unless otherwise stated.     Objective    Temp:  [97.5 °F (36.4 °C)-98.4 °F (36.9 °C)] 98.1 °F (36.7  °C)  Heart Rate:  [] 61  Resp:  [18-20] 18  BP: (107-140)/() 124/71    Intake/Output Summary (Last 24 hours) at 5/10/2022 0922  Last data filed at 5/10/2022 0732  Gross per 24 hour   Intake 480 ml   Output 2800 ml   Net -2320 ml     Physical Exam  Vitals and nursing note reviewed.   Constitutional:       Appearance: She is well-developed.   HENT:      Head: Normocephalic and atraumatic.   Eyes:      Conjunctiva/sclera: Conjunctivae normal.      Pupils: Pupils are equal, round, and reactive to light.   Neck:      Vascular: No JVD.   Cardiovascular:      Rate and Rhythm: Rate is controlled present. Rhythm irregular.      Heart sounds: Normal heart sounds. No murmur heard.    No friction rub. No gallop.   Pulmonary:      Effort: Pulmonary effort is normal. No respiratory distress.      Breath sounds: Normal breath sounds. No wheezing or rales.   Chest:      Chest wall: No tenderness.   Abdominal:      General: Bowel sounds are normal. There is no distension.      Palpations: Abdomen is soft.      Tenderness: There is no abdominal tenderness. There is no guarding or rebound.      Comments: Morbid obesity.   Musculoskeletal:         General: No tenderness or deformity. Normal range of motion.      Cervical back: Neck supple.   Skin:     General: Skin is warm and dry.      Capillary Refill: Capillary refill takes 2 to 3 seconds.      Findings: No rash.   Neurological:      General: No focal deficit present.      Mental Status: She is alert and oriented to person, place, and time.      Cranial Nerves: No cranial nerve deficit.      Motor: Weakness present. No abnormal muscle tone.      Deep Tendon Reflexes: Reflexes normal.   Psychiatric:         Behavior: Behavior normal.         Thought Content: Thought content normal.     Results Review:  I have reviewed the labs, radiology results, and diagnostic studies.    Lab Results (last 24 hours)     Procedure Component Value Units Date/Time    Lipid Panel [[id number]]   (Abnormal) Collected: 05/10/22 0530    Specimen: Blood Updated: 05/10/22 0625     Total Cholesterol 149 mg/dL      Triglycerides 124 mg/dL      HDL Cholesterol 37 mg/dL      LDL Cholesterol  90 mg/dL      VLDL Cholesterol 22 mg/dL      LDL/HDL Ratio 2.36    Narrative:      Cholesterol Reference Ranges  (U.S. Department of Health and Human Services ATP III Classifications)    Desirable          <200 mg/dL  Borderline High    200-239 mg/dL  High Risk          >240 mg/dL      Triglyceride Reference Ranges  (U.S. Department of Health and Human Services ATP III Classifications)    Normal           <150 mg/dL  Borderline High  150-199 mg/dL  High             200-499 mg/dL  Very High        >500 mg/dL    HDL Reference Ranges  (U.S. Department of Health and Human Services ATP III Classifications)    Low     <40 mg/dl (major risk factor for CHD)  High    >60 mg/dl ('negative' risk factor for CHD)        LDL Reference Ranges  (U.S. Department of Health and Human Services ATP III Classifications)    Optimal          <100 mg/dL  Near Optimal     100-129 mg/dL  Borderline High  130-159 mg/dL  High             160-189 mg/dL  Very High        >189 mg/dL    Comprehensive Metabolic Panel [[id number]]  (Abnormal) Collected: 05/10/22 0530    Specimen: Blood Updated: 05/10/22 0625     Glucose 143 mg/dL      BUN 19 mg/dL      Creatinine 0.58 mg/dL      Sodium 137 mmol/L      Potassium 4.9 mmol/L      Comment: Slight hemolysis detected by analyzer. Results may be affected.        Chloride 101 mmol/L      CO2 31.0 mmol/L      Calcium 9.3 mg/dL      Total Protein 6.1 g/dL      Albumin 3.80 g/dL      ALT (SGPT) 15 U/L      AST (SGOT) 12 U/L      Alkaline Phosphatase 51 U/L      Total Bilirubin 0.5 mg/dL      Globulin 2.3 gm/dL      A/G Ratio 1.7 g/dL      BUN/Creatinine Ratio 32.8     Anion Gap 5.0 mmol/L      eGFR 101.8 mL/min/1.73      Comment: National Kidney Foundation and American Society of Nephrology (ASN) Task Force recommended  calculation based on the Chronic Kidney Disease Epidemiology Collaboration (CKD-EPI) equation refit without adjustment for race.       Narrative:      GFR Normal >60  Chronic Kidney Disease <60  Kidney Failure <15      TSH [[id number]]  (Normal) Collected: 05/10/22 0530    Specimen: Blood Updated: 05/10/22 0625     TSH 1.340 uIU/mL     Hemoglobin A1c [[id number]]  (Abnormal) Collected: 05/10/22 0530    Specimen: Blood Updated: 05/10/22 0621     Hemoglobin A1C 7.40 %     Narrative:      Hemoglobin A1C Ranges:    Increased Risk for Diabetes  5.7% to 6.4%  Diabetes                     >= 6.5%  Diabetic Goal                < 7.0%    CBC & Differential [[id number]]  (Abnormal) Collected: 05/10/22 0530    Specimen: Blood Updated: 05/10/22 0600    Narrative:      The following orders were created for panel order CBC & Differential.  Procedure                               Abnormality         Status                     ---------                               -----------         ------                     CBC Auto Differential[[id number]]        Abnormal            Final result                 Please view results for these tests on the individual orders.    CBC Auto Differential [[id number]]  (Abnormal) Collected: 05/10/22 0530    Specimen: Blood Updated: 05/10/22 0600     WBC 6.91 10*3/mm3      RBC 5.17 10*6/mm3      Hemoglobin 16.2 g/dL      Hematocrit 51.7 %      .0 fL      MCH 31.3 pg      MCHC 31.3 g/dL      RDW 14.0 %      RDW-SD 51.8 fl      MPV 10.5 fL      Platelets 143 10*3/mm3      Neutrophil % 64.9 %      Lymphocyte % 24.9 %      Monocyte % 7.8 %      Eosinophil % 1.7 %      Basophil % 0.4 %      Neutrophils, Absolute 4.48 10*3/mm3      Lymphocytes, Absolute 1.72 10*3/mm3      Monocytes, Absolute 0.54 10*3/mm3      Eosinophils, Absolute 0.12 10*3/mm3      Basophils, Absolute 0.03 10*3/mm3     TSH [[id number]]  (Normal) Collected: 05/09/22 3424    Specimen: Blood Updated: 05/09/22 1841     TSH 2.020 uIU/mL      T4, Free [[id number]]  (Normal) Collected: 05/09/22 1754    Specimen: Blood Updated: 05/09/22 1841     Free T4 1.28 ng/dL     Narrative:      Results may be falsely increased if patient taking Biotin.      Troponin [[id number]]  (Normal) Collected: 05/09/22 1330    Specimen: Blood Updated: 05/09/22 1407     Troponin T <0.010 ng/mL     Narrative:      Troponin T Reference Range:  <= 0.03 ng/mL-   Negative for AMI  >0.03 ng/mL-     Abnormal for myocardial necrosis.  Clinicians would have to utilize clinical acumen, EKG, Troponin and serial changes to determine if it is an Acute Myocardial Infarction or myocardial injury due to an underlying chronic condition.       Results may be falsely decreased if patient taking Biotin.            Cultures:    No results found for: BLOODCX, URINECX, WOUNDCX, MRSACX, RESPCX, STOOLCX    Radiology Data:    Imaging Results (Last 24 Hours)     ** No results found for the last 24 hours. **          Allergies   Allergen Reactions   • Codeine GI Intolerance       Scheduled meds:   budesonide-formoterol, 2 puff, Inhalation, BID - RT  dilTIAZem, 30 mg, Oral, Q6H  empagliflozin, 25 mg, Oral, Daily  enoxaparin, 1 mg/kg, Subcutaneous, Q12H  famotidine, 20 mg, Intravenous, BID  gabapentin, 300 mg, Oral, TID  lisinopril, 10 mg, Oral, Daily  metoprolol tartrate, 50 mg, Oral, Q12H  nystatin, , Topical, Q12H  sodium chloride, 10 mL, Intravenous, Q12H        PRN meds:  •  aluminum-magnesium hydroxide-simethicone  •  ondansetron  •  simethicone  •  [COMPLETED] Insert peripheral IV **AND** sodium chloride  •  sodium chloride    Assessment/Plan       Dizziness    Emphysema lung (HCC)    Atrial fibrillation with RVR (HCC)    Smoker    DM (diabetes mellitus) (HCC)    Polycythemia    Plan:  New onset atrial fibrillation with RVR/cardiomegaly/hypertension.  BNP 2380. Troponin negative.  Continue Cardizem drip.   Cardizem p.o. started cardiology. Lisinopril . Lopressor.  DC Norvasc.  Lovenox  therapeutic.  Echocardiogram-preliminary ejection fraction 60%, waiting for official cardiologist reading.     COPD/paraseptal emphysema/small 1.4 cm pocket of soft tissue air in the right trachea and esophagus at the thoracic inlet- represent small tracheal diverticulum-no direct connection to the trachea is visualized.  Symbicort .  Chest x-ray-Mild cardiomegaly with mild central and basilar vascular congestion and volume loss in both lung bases,  No consolidating infiltrates.  CTA of the chest-No evidence of pulmonary embolism or acute intrathoracic pathology, Paraseptal emphysema- no evidence of pneumonia or pneumothorax or pleural effusion, Small 1.4 cm pocket of soft tissue air to the right of the trachea  and esophagus at the thoracic inlet could represent small tracheal diverticulum- though no direct connection with the trachea is visualized, fatty infiltration of the liver.  Patient is currently on 2 L of oxygen.     Fatty liver.       Diabetes.  Sliding scale.  Jardiance.  Hold metformin due to GI side effects.  Hemoglobin A1c 7.4.      Polycythemia. Improving. Secondary to smoking.     Neuropathy/arthritis.  Neurontin.  Hold meloxicam due to GI side effects.     Nausea.  Pepcid.  Zofran as needed.     Chronic smoker.  Discussed with patient cutting back and stop smoking.  Patient currently refused nicotine patch.    Morbid obesity.  BMI is 43.     Nutrition.  Cardiac/consistent carb diet.     COVID-19-negative.    Discharge Plannin-3 days.    Electronically signed by Brad Moran MD, 05/10/22, 9:55 AM CDT.

## 2022-05-10 NOTE — PLAN OF CARE
Goal Outcome Evaluation:      VSS. Rate controled a-fib . Patient slept well throughout evening. No complaints.

## 2022-05-10 NOTE — PROGRESS NOTES
[facility] -  Progress Note     LOS: 1 day   Patient Care Team:  Reymundo Torrez MD as PCP - General (Family Medicine)    Chief Complaint: Dizziness    Subjective     Interval History:     Patient Complaints: Patient is lying in bed comfortably. Patient reports some improvement in shortness of breath. She reports dyspnea on exertion and palpitations when she gets up to the restroom. She denies any leg swelling, orthopnea or PND. Echo revealed LVEF 61-65%, moderate LVH, diastolic dysfunction, no significant valvular disease. Patient is on O2 at 3L via NC with oxygen saturation 90-92% during examination    Review of Systems:     Review of Systems   Respiratory: Positive for shortness of breath.    Cardiovascular: Positive for palpitations. Negative for chest pain and leg swelling.   All other systems reviewed and are negative.    Objective     Vital Sign Min/Max for last 24 hours  Temp  Min: 97.5 °F (36.4 °C)  Max: 98.1 °F (36.7 °C)   BP  Min: 107/60  Max: 124/71   Pulse  Min: 61  Max: 94   Resp  Min: 18  Max: 20   SpO2  Min: 90 %  Max: 97 %   No data recorded   Weight  Min: 118 kg (261 lb)  Max: 118 kg (261 lb)         05/10/22  1409   Weight: 118 kg (261 lb)       Intake/Output Summary (Last 24 hours) at 5/10/2022 1536  Last data filed at 5/10/2022 0732  Gross per 24 hour   Intake 240 ml   Output 2500 ml   Net -2260 ml         Telemetry: Atrial Fibrillation rates  on rest but when ambulating gets up to 140's      Physical Exam:    Vitals reviewed.   Constitutional:       General: Not in acute distress.     Appearance: Normal appearance. Well-developed. Morbidly obese.   Eyes:      Pupils: Pupils are equal, round, and reactive to light.   HENT:      Head: Normocephalic and atraumatic.      Nose: Nose normal.   Neck:      Vascular: No carotid bruit.   Pulmonary:      Effort: Pulmonary effort is normal. No respiratory distress.      Breath sounds: Normal breath sounds. No wheezing. No  rales.   Cardiovascular:      Normal rates at rest and tachycardic with ambulation    Irregularly irregular rhythm.      Murmurs: There is a systolic murmur.   Edema:     Peripheral edema absent.   Abdominal:      General: There is no distension.      Palpations: Abdomen is soft.   Musculoskeletal: Normal range of motion.      Cervical back: Normal range of motion and neck supple. Skin:     General: Skin is warm.      Findings: No erythema or rash.   Neurological:      General: No focal deficit present.      Mental Status: Alert and oriented to person, place, and time.   Psychiatric:         Attention and Perception: Attention normal.         Mood and Affect: Mood normal.         Speech: Speech normal.         Behavior: Behavior normal.         Thought Content: Thought content normal.         Judgment: Judgment normal.       Results Review:   Lab Results (last 72 hours)     Procedure Component Value Units Date/Time    Lipid Panel [[id number]]  (Abnormal) Collected: 05/10/22 0530    Specimen: Blood Updated: 05/10/22 0625     Total Cholesterol 149 mg/dL      Triglycerides 124 mg/dL      HDL Cholesterol 37 mg/dL      LDL Cholesterol  90 mg/dL      VLDL Cholesterol 22 mg/dL      LDL/HDL Ratio 2.36    Narrative:      Cholesterol Reference Ranges  (U.S. Department of Health and Human Services ATP III Classifications)    Desirable          <200 mg/dL  Borderline High    200-239 mg/dL  High Risk          >240 mg/dL      Triglyceride Reference Ranges  (U.S. Department of Health and Human Services ATP III Classifications)    Normal           <150 mg/dL  Borderline High  150-199 mg/dL  High             200-499 mg/dL  Very High        >500 mg/dL    HDL Reference Ranges  (U.S. Department of Health and Human Services ATP III Classifications)    Low     <40 mg/dl (major risk factor for CHD)  High    >60 mg/dl ('negative' risk factor for CHD)        LDL Reference Ranges  (U.S. Department of Health and Human Services ATP III  Classifications)    Optimal          <100 mg/dL  Near Optimal     100-129 mg/dL  Borderline High  130-159 mg/dL  High             160-189 mg/dL  Very High        >189 mg/dL    Comprehensive Metabolic Panel [[id number]]  (Abnormal) Collected: 05/10/22 0530    Specimen: Blood Updated: 05/10/22 0625     Glucose 143 mg/dL      BUN 19 mg/dL      Creatinine 0.58 mg/dL      Sodium 137 mmol/L      Potassium 4.9 mmol/L      Comment: Slight hemolysis detected by analyzer. Results may be affected.        Chloride 101 mmol/L      CO2 31.0 mmol/L      Calcium 9.3 mg/dL      Total Protein 6.1 g/dL      Albumin 3.80 g/dL      ALT (SGPT) 15 U/L      AST (SGOT) 12 U/L      Alkaline Phosphatase 51 U/L      Total Bilirubin 0.5 mg/dL      Globulin 2.3 gm/dL      A/G Ratio 1.7 g/dL      BUN/Creatinine Ratio 32.8     Anion Gap 5.0 mmol/L      eGFR 101.8 mL/min/1.73      Comment: National Kidney Foundation and American Society of Nephrology (ASN) Task Force recommended calculation based on the Chronic Kidney Disease Epidemiology Collaboration (CKD-EPI) equation refit without adjustment for race.       Narrative:      GFR Normal >60  Chronic Kidney Disease <60  Kidney Failure <15      TSH [[id number]]  (Normal) Collected: 05/10/22 0530    Specimen: Blood Updated: 05/10/22 0625     TSH 1.340 uIU/mL     Hemoglobin A1c [[id number]]  (Abnormal) Collected: 05/10/22 0530    Specimen: Blood Updated: 05/10/22 0621     Hemoglobin A1C 7.40 %     Narrative:      Hemoglobin A1C Ranges:    Increased Risk for Diabetes  5.7% to 6.4%  Diabetes                     >= 6.5%  Diabetic Goal                < 7.0%    CBC & Differential [[id number]]  (Abnormal) Collected: 05/10/22 0530    Specimen: Blood Updated: 05/10/22 0600    Narrative:      The following orders were created for panel order CBC & Differential.  Procedure                               Abnormality         Status                     ---------                               -----------         ------                      CBC Auto Differential[[id number]]        Abnormal            Final result                 Please view results for these tests on the individual orders.    CBC Auto Differential [[id number]]  (Abnormal) Collected: 05/10/22 0530    Specimen: Blood Updated: 05/10/22 0600     WBC 6.91 10*3/mm3      RBC 5.17 10*6/mm3      Hemoglobin 16.2 g/dL      Hematocrit 51.7 %      .0 fL      MCH 31.3 pg      MCHC 31.3 g/dL      RDW 14.0 %      RDW-SD 51.8 fl      MPV 10.5 fL      Platelets 143 10*3/mm3      Neutrophil % 64.9 %      Lymphocyte % 24.9 %      Monocyte % 7.8 %      Eosinophil % 1.7 %      Basophil % 0.4 %      Neutrophils, Absolute 4.48 10*3/mm3      Lymphocytes, Absolute 1.72 10*3/mm3      Monocytes, Absolute 0.54 10*3/mm3      Eosinophils, Absolute 0.12 10*3/mm3      Basophils, Absolute 0.03 10*3/mm3     TSH [[id number]]  (Normal) Collected: 05/09/22 1754    Specimen: Blood Updated: 05/09/22 1841     TSH 2.020 uIU/mL     T4, Free [[id number]]  (Normal) Collected: 05/09/22 1754    Specimen: Blood Updated: 05/09/22 1841     Free T4 1.28 ng/dL     Narrative:      Results may be falsely increased if patient taking Biotin.      Troponin [[id number]]  (Normal) Collected: 05/09/22 1330    Specimen: Blood Updated: 05/09/22 1407     Troponin T <0.010 ng/mL     Narrative:      Troponin T Reference Range:  <= 0.03 ng/mL-   Negative for AMI  >0.03 ng/mL-     Abnormal for myocardial necrosis.  Clinicians would have to utilize clinical acumen, EKG, Troponin and serial changes to determine if it is an Acute Myocardial Infarction or myocardial injury due to an underlying chronic condition.       Results may be falsely decreased if patient taking Biotin.      COVID-19,[facility] IN-HOUSE,Nasal Swab No Transport Media 3-4 HR TAT - Swab, Nasal Cavity [[id number]]  (Normal) Collected: 05/09/22 0752    Specimen: Swab from Nasal Cavity Updated: 05/09/22 0837     COVID19 Not Detected    Narrative:      Fact sheet for  providers: https://www.fda.gov/media/106593/download     Fact sheet for patients: https://www.fda.gov/media/784188/download    Test performed by PCR.    Consider negative results in combination with clinical observations, patient history, and epidemiological information.    Comprehensive Metabolic Panel [[id number]]  (Abnormal) Collected: 05/09/22 0743    Specimen: Blood Updated: 05/09/22 0814     Glucose 198 mg/dL      BUN 22 mg/dL      Creatinine 0.66 mg/dL      Sodium 137 mmol/L      Potassium 4.7 mmol/L      Comment: Slight hemolysis detected by analyzer. Results may be affected.        Chloride 103 mmol/L      CO2 22.0 mmol/L      Calcium 10.2 mg/dL      Total Protein 6.5 g/dL      Albumin 4.20 g/dL      ALT (SGPT) 18 U/L      AST (SGOT) 16 U/L      Comment: Slight hemolysis detected by analyzer. Results may be affected.        Alkaline Phosphatase 65 U/L      Total Bilirubin 0.5 mg/dL      Globulin 2.3 gm/dL      A/G Ratio 1.8 g/dL      BUN/Creatinine Ratio 33.3     Anion Gap 12.0 mmol/L      eGFR 98.7 mL/min/1.73      Comment: National Kidney Foundation and American Society of Nephrology (ASN) Task Force recommended calculation based on the Chronic Kidney Disease Epidemiology Collaboration (CKD-EPI) equation refit without adjustment for race.       Narrative:      GFR Normal >60  Chronic Kidney Disease <60  Kidney Failure <15      Troponin [[id number]]  (Normal) Collected: 05/09/22 0743    Specimen: Blood Updated: 05/09/22 0810     Troponin T <0.010 ng/mL     Narrative:      Troponin T Reference Range:  <= 0.03 ng/mL-   Negative for AMI  >0.03 ng/mL-     Abnormal for myocardial necrosis.  Clinicians would have to utilize clinical acumen, EKG, Troponin and serial changes to determine if it is an Acute Myocardial Infarction or myocardial injury due to an underlying chronic condition.       Results may be falsely decreased if patient taking Biotin.      BNP [[id number]]  (Abnormal) Collected: 05/09/22 0743     Specimen: Blood Updated: 05/09/22 0809     proBNP 2,383.0 pg/mL     Narrative:      Among patients with dyspnea, NT-proBNP is highly sensitive for the detection of acute congestive heart failure. In addition NT-proBNP of <300 pg/ml effectively rules out acute congestive heart failure with 99% negative predictive value.    Results may be falsely decreased if patient taking Biotin.      Protime-INR [[id number]]  (Normal) Collected: 05/09/22 0742    Specimen: Blood Updated: 05/09/22 0803     Protime 13.4 Seconds      INR 1.06    aPTT [[id number]]  (Normal) Collected: 05/09/22 0742    Specimen: Blood Updated: 05/09/22 0803     PTT 26.7 seconds     D-dimer, Quantitative [[id number]]  (Abnormal) Collected: 05/09/22 0742    Specimen: Blood Updated: 05/09/22 0803     D-Dimer, Quantitative 1.00 MCGFEU/mL     Narrative:      Reference Range is 0-0.50 MCGFEU/mL. However, results <0.50 MCGFEU/mL tends to rule out DVT or PE. Results >0.50 MCGFEU/mL are not useful in predicting absence or presence of DVT or PE.      CBC & Differential [[id number]]  (Abnormal) Collected: 05/09/22 0743    Specimen: Blood Updated: 05/09/22 0753    Narrative:      The following orders were created for panel order CBC & Differential.  Procedure                               Abnormality         Status                     ---------                               -----------         ------                     CBC Auto Differential[[id number]]        Abnormal            Final result                 Please view results for these tests on the individual orders.    CBC Auto Differential [[id number]]  (Abnormal) Collected: 05/09/22 0743    Specimen: Blood Updated: 05/09/22 0753     WBC 9.37 10*3/mm3      RBC 5.43 10*6/mm3      Hemoglobin 17.1 g/dL      Hematocrit 53.0 %      MCV 97.6 fL      MCH 31.5 pg      MCHC 32.3 g/dL      RDW 14.0 %      RDW-SD 50.1 fl      MPV 10.5 fL      Platelets 157 10*3/mm3      Neutrophil % 73.4 %      Lymphocyte % 18.9 %      Monocyte  % 6.1 %      Eosinophil % 0.9 %      Basophil % 0.4 %      Immature Grans % 0.3 %      Neutrophils, Absolute 6.88 10*3/mm3      Lymphocytes, Absolute 1.77 10*3/mm3      Monocytes, Absolute 0.57 10*3/mm3      Eosinophils, Absolute 0.08 10*3/mm3      Basophils, Absolute 0.04 10*3/mm3      Immature Grans, Absolute 0.03 10*3/mm3      nRBC 0.0 /100 WBC     POC Glucose Once [[id number]]  (Abnormal) Collected: 05/09/22 0740    Specimen: Blood Updated: 05/09/22 0751     Glucose 185 mg/dL      Comment: : DAFNEKATHRYN Stahl UrsulajaysonMeter ID: [id number]                 Echo EF Estimated  No results found for: ECHOEFEST      Cath Ejection Fraction Quantitative  No results found for: CATHEF        Medication Review: yes  Current Facility-Administered Medications   Medication Dose Route Frequency Provider Last Rate Last Admin   • aluminum-magnesium hydroxide-simethicone (MAALOX MAX) 400-400-40 MG/5ML suspension 15 mL  15 mL Oral Q6H PRN Brad Moran MD   15 mL at 05/09/22 1350   • budesonide-formoterol (SYMBICORT) 160-4.5 MCG/ACT inhaler 2 puff  2 puff Inhalation BID - RT Brad Moran MD   2 puff at 05/10/22 0646   • dilTIAZem (CARDIZEM) 125 mg in 125 mL NS infusion  5-15 mg/hr Intravenous Titrated Brad Moran MD   Stopped at 05/09/22 1900   • dilTIAZem (CARDIZEM) tablet 30 mg  30 mg Oral Q6H Paul Coleman APRN   30 mg at 05/10/22 1119   • empagliflozin (JARDIANCE) tablet 25 mg  25 mg Oral Daily Brad Moran MD   25 mg at 05/10/22 0812   • Enoxaparin Sodium (LOVENOX) syringe 120 mg  1 mg/kg Subcutaneous Q12H Brad Moran MD   120 mg at 05/10/22 1438   • famotidine (PEPCID) injection 20 mg  20 mg Intravenous BID Brad Moran MD   20 mg at 05/10/22 0814   • gabapentin (NEURONTIN) capsule 300 mg  300 mg Oral TID Brad Moran MD   300 mg at 05/10/22 1508   • lisinopril (PRINIVIL,ZESTRIL) tablet 10 mg  10 mg Oral Daily Brad Moran MD   10 mg at 05/10/22 0813   • metoprolol tartrate (LOPRESSOR) tablet 50  mg  50 mg Oral Q12H Paul Coleman APRN   50 mg at 05/10/22 0812   • nystatin (MYCOSTATIN) powder   Topical Q12H Brad Moran MD   Given at 05/10/22 0813   • ondansetron (ZOFRAN) injection 4 mg  4 mg Intravenous Q6H PRN Brad Moran MD       • simethicone (MYLICON) chewable tablet 80 mg  80 mg Oral 4x Daily PRN Brad Moran MD   80 mg at 05/09/22 1750   • sodium chloride 0.9 % flush 10 mL  10 mL Intravenous PRN Brad Moran MD       • sodium chloride 0.9 % flush 10 mL  10 mL Intravenous Q12H Brad Moran MD   10 mL at 05/10/22 0812   • sodium chloride 0.9 % flush 10 mL  10 mL Intravenous PRN Brad Moran MD         Assessment/plan:     Atrial Fibrillation: Telemetry reveals rates of 92 currently; rates have been  at rest and with increase to 140's earlier today. Patient is off cardizem drip; continue cardizem 30 every 6 hours and titrate as needed for heart rate control. Increase metoprolol to 100mg    [id number] score is 3; patient is on therapeutic lovenox at this time and will need to be transitioned to oral anticoagulation prior to discharge.      Type 2 diabetes mellitus     Dizziness     COPD/Emphysema     Tobacco abuse     Obesity      Plan:   - consider sleep study outpatient for possible sleep apnea   - continue cardizem 30 every 6 hours and titrate as needed for heart rate control  - increase metoprolol to 100 mg   -[id number] score is a 3; patient is on therapeutic lovenox at this time and will need transitioned to oral anticoagulation prior to discharge  - continue Telemetry      Electronically signed by FLORENCIA Bautista, 05/10/22, 3:23 PM CDT.

## 2022-05-10 NOTE — PLAN OF CARE
Goal Outcome Evaluation:              Outcome Evaluation: VSS HR AFL , HR up to 144 when up with acitivity, cardiology adjusting meds, no c/o of pain, down for echo today, possible home tomorrow,

## 2022-05-11 ENCOUNTER — READMISSION MANAGEMENT (OUTPATIENT)
Dept: CALL CENTER | Facility: HOSPITAL | Age: 62
End: 2022-05-11

## 2022-05-11 VITALS
WEIGHT: 261 LBS | HEART RATE: 65 BPM | DIASTOLIC BLOOD PRESSURE: 92 MMHG | OXYGEN SATURATION: 90 % | TEMPERATURE: 97.4 F | SYSTOLIC BLOOD PRESSURE: 136 MMHG | HEIGHT: 65 IN | RESPIRATION RATE: 18 BRPM | BODY MASS INDEX: 43.49 KG/M2

## 2022-05-11 LAB
ANION GAP SERPL CALCULATED.3IONS-SCNC: 7 MMOL/L (ref 5–15)
BUN SERPL-MCNC: 19 MG/DL (ref 8–23)
BUN/CREAT SERPL: 30.6 (ref 7–25)
CALCIUM SPEC-SCNC: 9.4 MG/DL (ref 8.6–10.5)
CHLORIDE SERPL-SCNC: 102 MMOL/L (ref 98–107)
CO2 SERPL-SCNC: 28 MMOL/L (ref 22–29)
CREAT SERPL-MCNC: 0.62 MG/DL (ref 0.57–1)
DEPRECATED RDW RBC AUTO: 51.8 FL (ref 37–54)
EGFRCR SERPLBLD CKD-EPI 2021: 100.2 ML/MIN/1.73
ERYTHROCYTE [DISTWIDTH] IN BLOOD BY AUTOMATED COUNT: 13.8 % (ref 12.3–15.4)
GLUCOSE SERPL-MCNC: 202 MG/DL (ref 65–99)
HCT VFR BLD AUTO: 51.3 % (ref 34–46.6)
HGB BLD-MCNC: 15.7 G/DL (ref 12–15.9)
MCH RBC QN AUTO: 30.7 PG (ref 26.6–33)
MCHC RBC AUTO-ENTMCNC: 30.6 G/DL (ref 31.5–35.7)
MCV RBC AUTO: 100.2 FL (ref 79–97)
PLATELET # BLD AUTO: 125 10*3/MM3 (ref 140–450)
PMV BLD AUTO: 11 FL (ref 6–12)
POTASSIUM SERPL-SCNC: 4.9 MMOL/L (ref 3.5–5.2)
QT INTERVAL: 304 MS
QT INTERVAL: 354 MS
QT INTERVAL: 356 MS
QTC INTERVAL: 450 MS
QTC INTERVAL: 456 MS
QTC INTERVAL: 485 MS
RBC # BLD AUTO: 5.12 10*6/MM3 (ref 3.77–5.28)
SODIUM SERPL-SCNC: 137 MMOL/L (ref 136–145)
WBC NRBC COR # BLD: 6.25 10*3/MM3 (ref 3.4–10.8)

## 2022-05-11 PROCEDURE — 94761 N-INVAS EAR/PLS OXIMETRY MLT: CPT

## 2022-05-11 PROCEDURE — 94799 UNLISTED PULMONARY SVC/PX: CPT

## 2022-05-11 PROCEDURE — 80048 BASIC METABOLIC PNL TOTAL CA: CPT | Performed by: FAMILY MEDICINE

## 2022-05-11 PROCEDURE — 85027 COMPLETE CBC AUTOMATED: CPT | Performed by: FAMILY MEDICINE

## 2022-05-11 PROCEDURE — 25010000002 ENOXAPARIN PER 10 MG: Performed by: FAMILY MEDICINE

## 2022-05-11 PROCEDURE — 94664 DEMO&/EVAL PT USE INHALER: CPT

## 2022-05-11 PROCEDURE — 99232 SBSQ HOSP IP/OBS MODERATE 35: CPT | Performed by: NURSE PRACTITIONER

## 2022-05-11 PROCEDURE — 94618 PULMONARY STRESS TESTING: CPT

## 2022-05-11 RX ORDER — DILTIAZEM HYDROCHLORIDE 60 MG/1
60 TABLET, FILM COATED ORAL EVERY 6 HOURS SCHEDULED
Status: DISCONTINUED | OUTPATIENT
Start: 2022-05-11 | End: 2022-05-11

## 2022-05-11 RX ORDER — DILTIAZEM HYDROCHLORIDE EXTENDED-RELEASE TABLETS 240 MG/1
240 TABLET, EXTENDED RELEASE ORAL DAILY
Qty: 90 TABLET | Refills: 1 | Status: SHIPPED | OUTPATIENT
Start: 2022-05-11 | End: 2022-05-11 | Stop reason: HOSPADM

## 2022-05-11 RX ORDER — METOPROLOL TARTRATE 100 MG/1
100 TABLET ORAL EVERY 12 HOURS SCHEDULED
Qty: 180 TABLET | Refills: 1 | Status: ON HOLD | OUTPATIENT
Start: 2022-05-11 | End: 2022-06-01 | Stop reason: SDUPTHER

## 2022-05-11 RX ORDER — FAMOTIDINE 20 MG/1
20 TABLET, FILM COATED ORAL 2 TIMES DAILY PRN
Qty: 180 TABLET | Refills: 0 | Status: SHIPPED | OUTPATIENT
Start: 2022-05-11

## 2022-05-11 RX ORDER — LISINOPRIL 10 MG/1
10 TABLET ORAL DAILY
Qty: 90 TABLET | Refills: 0 | Status: SHIPPED | OUTPATIENT
Start: 2022-05-12

## 2022-05-11 RX ORDER — ROSUVASTATIN CALCIUM 5 MG/1
5 TABLET, COATED ORAL DAILY
Qty: 90 TABLET | Refills: 1 | Status: SHIPPED | OUTPATIENT
Start: 2022-05-11

## 2022-05-11 RX ORDER — METFORMIN HYDROCHLORIDE 500 MG/1
1000 TABLET, EXTENDED RELEASE ORAL
Qty: 180 TABLET | Refills: 1 | Status: SHIPPED | OUTPATIENT
Start: 2022-05-11

## 2022-05-11 RX ORDER — DILTIAZEM HYDROCHLORIDE 120 MG/1
120 CAPSULE, COATED, EXTENDED RELEASE ORAL
Qty: 30 CAPSULE | Refills: 0 | Status: SHIPPED | OUTPATIENT
Start: 2022-05-11 | End: 2022-06-06 | Stop reason: SDUPTHER

## 2022-05-11 RX ORDER — DILTIAZEM HYDROCHLORIDE 120 MG/1
120 CAPSULE, COATED, EXTENDED RELEASE ORAL
Status: DISCONTINUED | OUTPATIENT
Start: 2022-05-11 | End: 2022-05-11 | Stop reason: HOSPADM

## 2022-05-11 RX ADMIN — EMPAGLIFLOZIN 25 MG: 25 TABLET, FILM COATED ORAL at 08:08

## 2022-05-11 RX ADMIN — DILTIAZEM HYDROCHLORIDE 120 MG: 120 CAPSULE, COATED, EXTENDED RELEASE ORAL at 12:06

## 2022-05-11 RX ADMIN — GABAPENTIN 300 MG: 300 CAPSULE ORAL at 08:07

## 2022-05-11 RX ADMIN — Medication 10 ML: at 08:09

## 2022-05-11 RX ADMIN — ENOXAPARIN SODIUM 120 MG: 120 INJECTION SUBCUTANEOUS at 00:52

## 2022-05-11 RX ADMIN — DILTIAZEM HYDROCHLORIDE 30 MG: 30 TABLET, FILM COATED ORAL at 00:51

## 2022-05-11 RX ADMIN — LISINOPRIL 10 MG: 10 TABLET ORAL at 08:08

## 2022-05-11 RX ADMIN — NYSTATIN: 100000 POWDER TOPICAL at 08:08

## 2022-05-11 RX ADMIN — METOPROLOL TARTRATE 100 MG: 100 TABLET, FILM COATED ORAL at 08:08

## 2022-05-11 RX ADMIN — FAMOTIDINE 20 MG: 10 INJECTION INTRAVENOUS at 08:08

## 2022-05-11 RX ADMIN — DILTIAZEM HYDROCHLORIDE 30 MG: 30 TABLET, FILM COATED ORAL at 05:49

## 2022-05-11 RX ADMIN — BUDESONIDE AND FORMOTEROL FUMARATE DIHYDRATE 2 PUFF: 160; 4.5 AEROSOL RESPIRATORY (INHALATION) at 07:07

## 2022-05-11 RX ADMIN — APIXABAN 5 MG: 5 TABLET, FILM COATED ORAL at 12:06

## 2022-05-11 NOTE — PROGRESS NOTES
[facility] -  Progress Note     LOS: 2 days   Patient Care Team:  Reymundo Torrez MD as PCP - General (Family Medicine)    Chief Complaint: dizziness     Subjective     Interval History:     Patient Complaints: Patient is sitting up on room air this am finishing breakfast. She reports that she is feeling good this am. Patients oxygen saturation is currently 91-93%. She denies any chest pain, orthopnea or PND. She denies any heart racing or palpitations this am. She is wanting to go home today. Patient walked in the fish to evaluate walking oximetry, monitor Telemetry while walking heart rates from .     Review of Systems:     Review of Systems   Respiratory: Positive for shortness of breath (improved).    All other systems reviewed and are negative.    Objective     Vital Sign Min/Max for last 24 hours  Temp  Min: 97.4 °F (36.3 °C)  Max: 97.9 °F (36.6 °C)   BP  Min: 101/62  Max: 136/92   Pulse  Min: 65  Max: 94   Resp  Min: 18  Max: 18   SpO2  Min: 90 %  Max: 96 %   No data recorded   Weight  Min: 118 kg (261 lb)  Max: 118 kg (261 lb)         05/10/22  1409   Weight: 118 kg (261 lb)     No intake or output data in the 24 hours ending 05/11/22 0929    Telemetry: Atrial Fibrillation 101-107 this am;  during ambulation in fish      Physical Exam:    Vitals reviewed.   Constitutional:       General: Not in acute distress.     Appearance: Normal appearance. Well-developed. Obese.   Eyes:      Pupils: Pupils are equal, round, and reactive to light.   HENT:      Head: Normocephalic and atraumatic.      Nose: Nose normal.   Neck:      Vascular: No carotid bruit.   Pulmonary:      Effort: Pulmonary effort is normal. No respiratory distress.      Breath sounds: Normal breath sounds. No wheezing. No rales.   Cardiovascular:      Normal rate. Irregularly irregular rhythm.      Murmurs: There is a systolic murmur.   Edema:     Peripheral edema absent.   Abdominal:      General: There is no  distension.      Palpations: Abdomen is soft.   Musculoskeletal: Normal range of motion.      Cervical back: Normal range of motion and neck supple. Skin:     General: Skin is warm.      Findings: No erythema or rash.   Neurological:      Mental Status: Alert and oriented to person, place, and time.   Psychiatric:         Speech: Speech normal.         Behavior: Behavior normal.         Thought Content: Thought content normal.         Judgment: Judgment normal.       Results Review:   Lab Results (last 72 hours)     Procedure Component Value Units Date/Time    Basic Metabolic Panel [[id number]]  (Abnormal) Collected: 05/11/22 0522    Specimen: Blood Updated: 05/11/22 0608     Glucose 202 mg/dL      BUN 19 mg/dL      Creatinine 0.62 mg/dL      Sodium 137 mmol/L      Potassium 4.9 mmol/L      Comment: Slight hemolysis detected by analyzer. Results may be affected.        Chloride 102 mmol/L      CO2 28.0 mmol/L      Calcium 9.4 mg/dL      BUN/Creatinine Ratio 30.6     Anion Gap 7.0 mmol/L      eGFR 100.2 mL/min/1.73      Comment: National Kidney Foundation and American Society of Nephrology (ASN) Task Force recommended calculation based on the Chronic Kidney Disease Epidemiology Collaboration (CKD-EPI) equation refit without adjustment for race.       Narrative:      GFR Normal >60  Chronic Kidney Disease <60  Kidney Failure <15      CBC (No Diff) [[id number]]  (Abnormal) Collected: 05/11/22 0522    Specimen: Blood Updated: 05/11/22 0539     WBC 6.25 10*3/mm3      RBC 5.12 10*6/mm3      Hemoglobin 15.7 g/dL      Hematocrit 51.3 %      .2 fL      MCH 30.7 pg      MCHC 30.6 g/dL      RDW 13.8 %      RDW-SD 51.8 fl      MPV 11.0 fL      Platelets 125 10*3/mm3     Lipid Panel [[id number]]  (Abnormal) Collected: 05/10/22 0530    Specimen: Blood Updated: 05/10/22 0625     Total Cholesterol 149 mg/dL      Triglycerides 124 mg/dL      HDL Cholesterol 37 mg/dL      LDL Cholesterol  90 mg/dL      VLDL Cholesterol 22 mg/dL       LDL/HDL Ratio 2.36    Narrative:      Cholesterol Reference Ranges  (U.S. Department of Health and Human Services ATP III Classifications)    Desirable          <200 mg/dL  Borderline High    200-239 mg/dL  High Risk          >240 mg/dL      Triglyceride Reference Ranges  (U.S. Department of Health and Human Services ATP III Classifications)    Normal           <150 mg/dL  Borderline High  150-199 mg/dL  High             200-499 mg/dL  Very High        >500 mg/dL    HDL Reference Ranges  (U.S. Department of Health and Human Services ATP III Classifications)    Low     <40 mg/dl (major risk factor for CHD)  High    >60 mg/dl ('negative' risk factor for CHD)        LDL Reference Ranges  (U.S. Department of Health and Human Services ATP III Classifications)    Optimal          <100 mg/dL  Near Optimal     100-129 mg/dL  Borderline High  130-159 mg/dL  High             160-189 mg/dL  Very High        >189 mg/dL    Comprehensive Metabolic Panel [[id number]]  (Abnormal) Collected: 05/10/22 0530    Specimen: Blood Updated: 05/10/22 0625     Glucose 143 mg/dL      BUN 19 mg/dL      Creatinine 0.58 mg/dL      Sodium 137 mmol/L      Potassium 4.9 mmol/L      Comment: Slight hemolysis detected by analyzer. Results may be affected.        Chloride 101 mmol/L      CO2 31.0 mmol/L      Calcium 9.3 mg/dL      Total Protein 6.1 g/dL      Albumin 3.80 g/dL      ALT (SGPT) 15 U/L      AST (SGOT) 12 U/L      Alkaline Phosphatase 51 U/L      Total Bilirubin 0.5 mg/dL      Globulin 2.3 gm/dL      A/G Ratio 1.7 g/dL      BUN/Creatinine Ratio 32.8     Anion Gap 5.0 mmol/L      eGFR 101.8 mL/min/1.73      Comment: National Kidney Foundation and American Society of Nephrology (ASN) Task Force recommended calculation based on the Chronic Kidney Disease Epidemiology Collaboration (CKD-EPI) equation refit without adjustment for race.       Narrative:      GFR Normal >60  Chronic Kidney Disease <60  Kidney Failure <15      TSH [[id number]]   (Normal) Collected: 05/10/22 0530    Specimen: Blood Updated: 05/10/22 0625     TSH 1.340 uIU/mL     Hemoglobin A1c [[id number]]  (Abnormal) Collected: 05/10/22 0530    Specimen: Blood Updated: 05/10/22 0621     Hemoglobin A1C 7.40 %     Narrative:      Hemoglobin A1C Ranges:    Increased Risk for Diabetes  5.7% to 6.4%  Diabetes                     >= 6.5%  Diabetic Goal                < 7.0%    CBC & Differential [[id number]]  (Abnormal) Collected: 05/10/22 0530    Specimen: Blood Updated: 05/10/22 0600    Narrative:      The following orders were created for panel order CBC & Differential.  Procedure                               Abnormality         Status                     ---------                               -----------         ------                     CBC Auto Differential[[id number]]        Abnormal            Final result                 Please view results for these tests on the individual orders.    CBC Auto Differential [[id number]]  (Abnormal) Collected: 05/10/22 0530    Specimen: Blood Updated: 05/10/22 0600     WBC 6.91 10*3/mm3      RBC 5.17 10*6/mm3      Hemoglobin 16.2 g/dL      Hematocrit 51.7 %      .0 fL      MCH 31.3 pg      MCHC 31.3 g/dL      RDW 14.0 %      RDW-SD 51.8 fl      MPV 10.5 fL      Platelets 143 10*3/mm3      Neutrophil % 64.9 %      Lymphocyte % 24.9 %      Monocyte % 7.8 %      Eosinophil % 1.7 %      Basophil % 0.4 %      Neutrophils, Absolute 4.48 10*3/mm3      Lymphocytes, Absolute 1.72 10*3/mm3      Monocytes, Absolute 0.54 10*3/mm3      Eosinophils, Absolute 0.12 10*3/mm3      Basophils, Absolute 0.03 10*3/mm3     TSH [[id number]]  (Normal) Collected: 05/09/22 1754    Specimen: Blood Updated: 05/09/22 1841     TSH 2.020 uIU/mL     T4, Free [[id number]]  (Normal) Collected: 05/09/22 1754    Specimen: Blood Updated: 05/09/22 1841     Free T4 1.28 ng/dL     Narrative:      Results may be falsely increased if patient taking Biotin.      Troponin [[id number]]  (Normal)  Collected: 05/09/22 1330    Specimen: Blood Updated: 05/09/22 1407     Troponin T <0.010 ng/mL     Narrative:      Troponin T Reference Range:  <= 0.03 ng/mL-   Negative for AMI  >0.03 ng/mL-     Abnormal for myocardial necrosis.  Clinicians would have to utilize clinical acumen, EKG, Troponin and serial changes to determine if it is an Acute Myocardial Infarction or myocardial injury due to an underlying chronic condition.       Results may be falsely decreased if patient taking Biotin.      COVID-19,[facility] IN-HOUSE,Nasal Swab No Transport Media 3-4 HR TAT - Swab, Nasal Cavity [[id number]]  (Normal) Collected: 05/09/22 0752    Specimen: Swab from Nasal Cavity Updated: 05/09/22 0837     COVID19 Not Detected    Narrative:      Fact sheet for providers: https://www.fda.gov/media/130760/download     Fact sheet for patients: https://www.fda.gov/media/786758/download    Test performed by PCR.    Consider negative results in combination with clinical observations, patient history, and epidemiological information.    Comprehensive Metabolic Panel [[id number]]  (Abnormal) Collected: 05/09/22 0743    Specimen: Blood Updated: 05/09/22 0814     Glucose 198 mg/dL      BUN 22 mg/dL      Creatinine 0.66 mg/dL      Sodium 137 mmol/L      Potassium 4.7 mmol/L      Comment: Slight hemolysis detected by analyzer. Results may be affected.        Chloride 103 mmol/L      CO2 22.0 mmol/L      Calcium 10.2 mg/dL      Total Protein 6.5 g/dL      Albumin 4.20 g/dL      ALT (SGPT) 18 U/L      AST (SGOT) 16 U/L      Comment: Slight hemolysis detected by analyzer. Results may be affected.        Alkaline Phosphatase 65 U/L      Total Bilirubin 0.5 mg/dL      Globulin 2.3 gm/dL      A/G Ratio 1.8 g/dL      BUN/Creatinine Ratio 33.3     Anion Gap 12.0 mmol/L      eGFR 98.7 mL/min/1.73      Comment: National Kidney Foundation and American Society of Nephrology (ASN) Task Force recommended calculation based on the Chronic Kidney Disease  Epidemiology Collaboration (CKD-EPI) equation refit without adjustment for race.       Narrative:      GFR Normal >60  Chronic Kidney Disease <60  Kidney Failure <15      Troponin [[id number]]  (Normal) Collected: 05/09/22 0743    Specimen: Blood Updated: 05/09/22 0810     Troponin T <0.010 ng/mL     Narrative:      Troponin T Reference Range:  <= 0.03 ng/mL-   Negative for AMI  >0.03 ng/mL-     Abnormal for myocardial necrosis.  Clinicians would have to utilize clinical acumen, EKG, Troponin and serial changes to determine if it is an Acute Myocardial Infarction or myocardial injury due to an underlying chronic condition.       Results may be falsely decreased if patient taking Biotin.      BNP [[id number]]  (Abnormal) Collected: 05/09/22 0743    Specimen: Blood Updated: 05/09/22 0809     proBNP 2,383.0 pg/mL     Narrative:      Among patients with dyspnea, NT-proBNP is highly sensitive for the detection of acute congestive heart failure. In addition NT-proBNP of <300 pg/ml effectively rules out acute congestive heart failure with 99% negative predictive value.    Results may be falsely decreased if patient taking Biotin.      Protime-INR [[id number]]  (Normal) Collected: 05/09/22 0742    Specimen: Blood Updated: 05/09/22 0803     Protime 13.4 Seconds      INR 1.06    aPTT [[id number]]  (Normal) Collected: 05/09/22 0742    Specimen: Blood Updated: 05/09/22 0803     PTT 26.7 seconds     D-dimer, Quantitative [[id number]]  (Abnormal) Collected: 05/09/22 0742    Specimen: Blood Updated: 05/09/22 0803     D-Dimer, Quantitative 1.00 MCGFEU/mL     Narrative:      Reference Range is 0-0.50 MCGFEU/mL. However, results <0.50 MCGFEU/mL tends to rule out DVT or PE. Results >0.50 MCGFEU/mL are not useful in predicting absence or presence of DVT or PE.      CBC & Differential [[id number]]  (Abnormal) Collected: 05/09/22 0743    Specimen: Blood Updated: 05/09/22 0753    Narrative:      The following orders were created for panel order  CBC & Differential.  Procedure                               Abnormality         Status                     ---------                               -----------         ------                     CBC Auto Differential[[id number]]        Abnormal            Final result                 Please view results for these tests on the individual orders.    CBC Auto Differential [[id number]]  (Abnormal) Collected: 05/09/22 0743    Specimen: Blood Updated: 05/09/22 0753     WBC 9.37 10*3/mm3      RBC 5.43 10*6/mm3      Hemoglobin 17.1 g/dL      Hematocrit 53.0 %      MCV 97.6 fL      MCH 31.5 pg      MCHC 32.3 g/dL      RDW 14.0 %      RDW-SD 50.1 fl      MPV 10.5 fL      Platelets 157 10*3/mm3      Neutrophil % 73.4 %      Lymphocyte % 18.9 %      Monocyte % 6.1 %      Eosinophil % 0.9 %      Basophil % 0.4 %      Immature Grans % 0.3 %      Neutrophils, Absolute 6.88 10*3/mm3      Lymphocytes, Absolute 1.77 10*3/mm3      Monocytes, Absolute 0.57 10*3/mm3      Eosinophils, Absolute 0.08 10*3/mm3      Basophils, Absolute 0.04 10*3/mm3      Immature Grans, Absolute 0.03 10*3/mm3      nRBC 0.0 /100 WBC     POC Glucose Once [[id number]]  (Abnormal) Collected: 05/09/22 0740    Specimen: Blood Updated: 05/09/22 0751     Glucose 185 mg/dL      Comment: : DAWNJAYSHREE Stahl Maria IsabelMeter ID: [id number]                 Echo EF Estimated  No results found for: ECHOEFEST      Cath Ejection Fraction Quantitative  No results found for: CATHEF        Medication Review: yes  Current Facility-Administered Medications   Medication Dose Route Frequency Provider Last Rate Last Admin   • aluminum-magnesium hydroxide-simethicone (MAALOX MAX) 400-400-40 MG/5ML suspension 15 mL  15 mL Oral Q6H PRN Brad Moran MD   15 mL at 05/09/22 1350   • apixaban (ELIQUIS) tablet 5 mg  5 mg Oral Q12H Brad Moran MD       • budesonide-formoterol (SYMBICORT) 160-4.5 MCG/ACT inhaler 2 puff  2 puff Inhalation BID - RT Brad Moran MD   2 puff at 05/11/22  0707   • dilTIAZem (CARDIZEM) 125 mg in 125 mL NS infusion  5-15 mg/hr Intravenous Titrated Brad Moran MD   Stopped at 05/09/22 1900   • dilTIAZem CD (CARDIZEM CD) 24 hr capsule 120 mg  120 mg Oral Q24H Paul Coleman APRN       • empagliflozin (JARDIANCE) tablet 25 mg  25 mg Oral Daily Brad Moran MD   25 mg at 05/11/22 0808   • famotidine (PEPCID) injection 20 mg  20 mg Intravenous BID Brad Moran MD   20 mg at 05/11/22 0808   • gabapentin (NEURONTIN) capsule 300 mg  300 mg Oral TID Brad Moran MD   300 mg at 05/11/22 0807   • lisinopril (PRINIVIL,ZESTRIL) tablet 10 mg  10 mg Oral Daily Brad Moran MD   10 mg at 05/11/22 0808   • metoprolol tartrate (LOPRESSOR) tablet 100 mg  100 mg Oral Q12H Paul Coleman APRN   100 mg at 05/11/22 0808   • nystatin (MYCOSTATIN) powder   Topical Q12H Brad Moran MD   Given at 05/11/22 0808   • ondansetron (ZOFRAN) injection 4 mg  4 mg Intravenous Q6H PRN Brad Moran MD       • simethicone (MYLICON) chewable tablet 80 mg  80 mg Oral 4x Daily PRN Brad Moran MD   80 mg at 05/09/22 1750   • sodium chloride 0.9 % flush 10 mL  10 mL Intravenous PRN Brad Moran MD       • sodium chloride 0.9 % flush 10 mL  10 mL Intravenous Q12H Brad Moran MD   10 mL at 05/11/22 0809   • sodium chloride 0.9 % flush 10 mL  10 mL Intravenous PRN Brad Moran MD           Assessment & Plan     Atrial Fibrillation: Telemetry reveals rates 101-107 this am;  during ambulation in fish    Transition Cardizem to CD and continue metoprolol. Eliquis to be started at discharge     [id number] score is 3; Eliquis to be started at discharge     Type 2 diabetes mellitus     Dizziness     COPD/Emphysema     Tobacco abuse     Obesity      Plan:   - Outpatient sleep study ordered by Hospitalist   - Transition cardizem to CD  - continue metoprolol   -[id number] score is a 3; Eliquis to be started at discharge  -patient will follow up with me in office in 3 weeks or  sooner if needed    Electronically signed by FLORENCIA Bautista, 05/11/22, 8:11 AM CDT.

## 2022-05-11 NOTE — PAYOR COMM NOTE
"REF:  [id number]    [facility]  ANA,  [phone]  OR  FAX  [phone]      Keeling, Claudette (61 y.o. Female)             Date of Birth   1960    Social Security Number       [place]   [address] [facility] [address]    Home Phone   [phone]    MRN   [id number]       Denominational   Other    Marital Status                               Admission Date   5/9/22    Admission Type   Emergency    Admitting Provider   Brad Moran MD    Attending Provider       Department, Room/Bed   [facility] 4B, 408/1       Discharge Date   5/11/2022    Discharge Disposition   Home or Self Care    Discharge Destination   Home                            Attending Provider: (none)   Allergies: Codeine    Isolation: None   Infection: None   Code Status: CPR   Advance Care Planning Activity    Ht: 165.1 cm (65\")   Wt: 118 kg (261 lb)    Admission Cmt: None   Principal Problem: None                Active Insurance as of 5/9/2022     Primary Coverage     Payor Plan Insurance Group Employer/Plan Group    [facility] [id number]     Payor Plan Address Payor Plan Phone Number Payor Plan Fax Number Effective Dates    [address]   1/1/2022 - None Entered    [facility] [address]       Subscriber Name Subscriber Birth Date Member ID       KEELING,CLAUDETTE 1960 [id number]                 Emergency Contacts      (Rel.) Home Phone Work Phone Mobile Phone    Bickerstaff,Rachael (Daughter) [phone] -- --    Bickerstaff,Robyn (Daughter) [phone]               Discharge Summary      Brad Moran MD at 05/11/22 0828              [facility]  DISCHARGE SUMMARY       Date of Admission: 5/9/2022  Date of Discharge:  5/11/2022  Primary Care Physician: Reymundo Torrez MD    Presenting Problem/History of Present Illness:  Dizziness [R42]     Final Discharge " Diagnoses:  Active Hospital Problems    Diagnosis    • Dizziness    • Emphysema lung (HCC)    • Atrial fibrillation with RVR (HCC)    • Smoker    • DM (diabetes mellitus) (HCC)    • Polycythemia        Consults: Cardiology .    Pertinent Test Results:   Results for orders placed during the hospital encounter of 05/09/22    Adult Transthoracic Echo Complete With Contrast if Necessary Per Protocol    Interpretation Summary  · Left ventricular ejection fraction appears to be 61 - 65%. Left ventricular systolic function is normal.  · Left ventricular wall thickness is consistent with moderate concentric hypertrophy.  · Left ventricular diastolic dysfunction is noted.  · Normal right ventricular cavity size and systolic function noted.  · There is mild biatrial enlargement.  · There is no significant (greater than mild) valvular dysfunction.  · Estimated right ventricular systolic pressure from tricuspid regurgitation is normal (<35 mmHg).    .  Imaging Results (All)     Procedure Component Value Units Date/Time    CT Angiogram Chest [[id number]] Collected: 05/09/22 0843     Updated: 05/09/22 0853    Narrative:      EXAMINATION: CT ANGIOGRAM CHEST- 5/9/2022 8:43 AM CDT     HISTORY: Pulmonary embolism (PE) suspected, unknown D-dimer;  R42-Dizziness and giddiness; I48.91-Unspecified atrial fibrillation     DOSE: 556 mGycm (Automatic exposure control technique was implemented in  an effort to keep the radiation dose as low as possible without  compromising image quality)     REPORT: Spiral CT of the chest was performed after administration of  intravenous contrast from the thoracic inlet through the upper abdomen  using CTA protocol. Reconstructed 3-D, coronal and sagittal images were  also reviewed.     Comparison: Chest x-ray on the same day.     The contrast bolus is satisfactory, there is mild dilation of the main  pulmonary artery, with a diameter 3.7 cm. No filling defects are seen in  the bony arteries. Heart size is  normal. No evidence of right heart  strain is identified. There is mild ectasia of the ascending aorta, with  a maximum diameter 3.8 cm. No aortic dissection is identified. A small  volume of calcified plaque within the thoracic aorta at the origins of  the great vessels. The visualized thyroid gland appears homogeneous. A  small volume of calcified plaque is noted within the coronary arteries.  No pleural effusion is identified. There is no evidence of intrathoracic  lymphadenopathy. Review of lung windows demonstrate mild changes of  paraseptal emphysema. There is mild bibasilar atelectasis. No evidence  of pneumonia is identified. There is no pneumothorax. No lung mass or  suspicious pulmonary nodule is identified. Small pocket of soft tissue  gas is noted to the right of the trachea at the thoracic inlet, of  uncertain significance or etiology, this has no obvious direct  connection to the trachea or esophagus. This measures approximately 14  mm. No abscess is identified. The visualized upper abdomen shows  decreased attenuation of the liver parenchyma compatible with steatosis.  Review of bone windows shows no acute abnormality. Extensive  degenerative changes are noted in the thoracic spine.       Impression:      1. No evidence of pulmonary embolism or acute intrathoracic pathology.  2. Paraseptal emphysema, no evidence of pneumonia, pneumothorax or  pleural effusion.  3. Small 1.4 cm pocket of soft tissue air to the right of the trachea  and esophagus at the thoracic inlet could represent small tracheal  diverticulum, though no direct connection with the trachea is  visualized.  4. Fatty infiltration of the liver.              This report was finalized on 05/09/2022 08:49 by Dr. Ervin Ramsey MD.    XR Chest 1 View [[id number]] Collected: 05/09/22 0837     Updated: 05/09/22 0842    Narrative:      EXAMINATION: XR CHEST 1 VW- 5/9/2022 8:37 AM CDT     HISTORY: soa; R42-Dizziness and giddiness;  I48.91-Unspecified atrial  fibrillation.     REPORT: A frontal view of the chest was obtained.     COMPARISON: Chest x-rays 12/22/2021.     There is mild volume loss in the lung bases, there are central and  basilar vascular congestion with cardiomegaly, heart size is probably  exaggerated by technique. No lung consolidation is identified. There is  no pneumothorax or definite pleural effusion. The osseous structures and  upper abdomen are unremarkable.       Impression:      Mild cardiomegaly with mild central and basilar vascular  congestion and volume loss in both lung bases. No consolidating  infiltrates.  This report was finalized on 05/09/2022 08:39 by Dr. Ervin Ramsey MD.        Chief Complaint on Day of Discharge: none    History of Present Illness on Day of Discharge:   Patient is a 61-year-old  female presented the ER with complaining of dizziness.  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  Patient stated patient was at work when this occurred.  About 6 AM this morning patient felt dizzy, cold sweat, nausea.  Patient went outside and took a protein shake thinking her sugar was low.  Patient felt better.  Patient came inside and stop making to breakfast burrito and put in the counter.  Patient began with symptoms of blurry vision and double vision.  Patient called out to a colleague.  Patient was brought to the back and sat down.  Patient and proceeded to call her daughter, her daughter came and brought her to the ER to be evaluated.  Episode shortness of breath with symptoms.  Patient denies any chest pain.  During course evaluation ER showed patient has atrial fibrillation with RVR rate was 150.  Patient is currently on a Cardizem drip.  BNP is elevated.  Normal troponin.  Patient is a chronic smoker.  Patient is currently 3 L of oxygen.    Hospital Course:  The patient is a 61 y.o. female who presented to [facility]  with atrial for with RVR/COPD/emphysema/smoker/diabetes/polycythemia.      New onset atrial fibrillation with RVR/cardiomegaly/hypertension.  BNP 2380. Troponin negative.  Continue Cardizem drip.   Cardizem p.o. started cardiology. Lisinopril . Lopressor.  DC Norvasc.  Lovenox therapeutic.  Echocardiogram- ejection fraction 61 to 65%, moderate concentric hypertrophy, diastolic dysfunction, mild atrial enlargement, no significant valvular dysfunction, tricuspid regurgitation.    COPD/paraseptal emphysema/small 1.4 cm pocket of soft tissue air in the right trachea and esophagus at the thoracic inlet- represent small tracheal diverticulum-no direct connection to the trachea is visualized.  Symbicort .  Chest x-ray-Mild cardiomegaly with mild central and basilar vascular congestion and volume loss in both lung bases,  No consolidating infiltrates.  CTA of the chest-No evidence of pulmonary embolism or acute intrathoracic pathology, Paraseptal emphysema- no evidence of pneumonia or pneumothorax or pleural effusion, Small 1.4 cm pocket of soft tissue air to the right of the trachea  and esophagus at the thoracic inlet could represent small tracheal diverticulum- though no direct connection with the trachea is visualized, fatty infiltration of the liver.  Patient currently on room air.  Plan for walking oximetry before discharge.     Fatty liver.       Diabetes.  Sliding scale.  Jardiance.  Hold metformin due to GI side effects.  Hemoglobin A1c 7.4.      Polycythemia. Improving. Secondary to smoking.     Neuropathy/arthritis.  Neurontin.  Hold meloxicam due to GI side effects.     Nausea.  Pepcid.  Zofran as needed.     Chronic smoker.  Discussed with patient cutting back and stop smoking.  Patient currently refused nicotine patch.     Morbid obesity.  BMI is 43.  Diet and exercise discussed with patient.     Nutrition.  Cardiac/consistent carb diet.     COVID-19-negative.     Plan to discharge home today.  Consult sleep  "medicine for outpatient sleep study.  Follow-up with cardiology in about 3 weeks.  Follow-up PCP 1 week.    Condition on Discharge: Stable.    Physical Exam on Discharge:  /92 (BP Location: Right arm, Patient Position: Lying)   Pulse 65   Temp 97.4 °F (36.3 °C) (Oral)   Resp 18   Ht 165.1 cm (65\")   Wt 118 kg (261 lb)   SpO2 90%   BMI 43.43 kg/m²   Physical Exam  Vitals and nursing note reviewed.   Constitutional:       Appearance: She is well-developed.   HENT:      Head: Normocephalic.   Eyes:      Conjunctiva/sclera: Conjunctivae normal.      Pupils: Pupils are equal, round, and reactive to light.   Neck:      Vascular: No JVD.   Cardiovascular:      Rate and Rhythm: Normal rate. Rhythm irregular.      Heart sounds: Normal heart sounds. No murmur heard.    No friction rub. No gallop.   Pulmonary:      Effort: Pulmonary effort is normal. No respiratory distress.      Breath sounds: Normal breath sounds. No wheezing or rales.   Chest:      Chest wall: No tenderness.   Abdominal:      General: Bowel sounds are normal. There is no distension.      Palpations: Abdomen is soft.      Tenderness: There is no abdominal tenderness. There is no guarding or rebound.      Comments: Morbid obesity   Musculoskeletal:         General: No tenderness or deformity. Normal range of motion.      Cervical back: Neck supple.   Skin:     General: Skin is warm and dry.      Capillary Refill: Capillary refill takes 2 to 3 seconds.      Findings: No rash.   Neurological:      General: No focal deficit present.      Mental Status: She is alert and oriented to person, place, and time.      Cranial Nerves: No cranial nerve deficit.      Motor: No abnormal muscle tone.      Deep Tendon Reflexes: Reflexes normal.   Psychiatric:         Behavior: Behavior normal.         Thought Content: Thought content normal.         Judgment: Judgment normal.           Discharge Disposition:  Home or Self Care    Discharge Medications:   "   Discharge Medications      New Medications      Instructions Start Date   apixaban 5 MG tablet tablet  Commonly known as: ELIQUIS   5 mg, Oral, Every 12 Hours Scheduled      dilTIAZem  MG 24 hr tablet  Commonly known as: Cardizem LA   240 mg, Oral, Daily      famotidine 20 MG tablet  Commonly known as: Pepcid   20 mg, Oral, 2 Times Daily PRN      metFORMIN  MG 24 hr tablet  Commonly known as: GLUCOPHAGE-XR  Replaces: metFORMIN 500 MG tablet   1,000 mg, Oral, Daily With Breakfast      metoprolol tartrate 100 MG tablet  Commonly known as: LOPRESSOR   100 mg, Oral, Every 12 Hours Scheduled      rosuvastatin 5 MG tablet  Commonly known as: Crestor   5 mg, Oral, Daily         Changes to Medications      Instructions Start Date   lisinopril 10 MG tablet  Commonly known as: PRINIVIL,ZESTRIL  What changed:   · medication strength  · how much to take   10 mg, Oral, Daily   Start Date: May 12, 2022        Continue These Medications      Instructions Start Date   empagliflozin 25 MG tablet tablet  Commonly known as: JARDIANCE   25 mg, Oral, Daily      gabapentin 300 MG capsule  Commonly known as: NEURONTIN   300 mg, Oral, 3 Times Daily      Glycopyrrolate-Formoterol 9-4.8 MCG/ACT aerosol   1 puff, Inhalation, 2 Times Daily         Stop These Medications    amLODIPine 10 MG tablet  Commonly known as: NORVASC     cloNIDine 0.1 MG tablet  Commonly known as: CATAPRES     MELOXICAM PO     metFORMIN 500 MG tablet  Commonly known as: GLUCOPHAGE  Replaced by: metFORMIN  MG 24 hr tablet            Discharge Diet:   Diet Instructions     Advance Diet As Tolerated            Activity at Discharge:   Activity Instructions     Activity as Tolerated            Discharge Care Plan/Instructions: Discharged home family .    Follow-up Appointments:   Follow-up PCP 1 week.  Follow with cardiology in 3 weeks.  Order outpatient sleep study.    Electronically signed by Brad Moran MD, 05/11/22, 09:22 CDT.    Time: [facility]  than 30 minutes.        Electronically signed by Chloe Rodriguez MD at 05/11/22 0922       Discharge Order (From admission, onward)     Start     Ordered    05/11/22 0835  Discharge patient  Once        Expected Discharge Date: 05/11/22    Discharge Disposition: Home or Self Care    Physician of Record for Attribution - Please select from Treatment Team: CHLOE RODRIGUEZ [[id number]]    Review needed by CMO to determine Physician of Record: No       Question Answer Comment   Physician of Record for Attribution - Please select from Treatment Team CHLOE RODRIGUEZ    Review needed by CMO to determine Physician of Record No        05/11/22 0843

## 2022-05-11 NOTE — DISCHARGE SUMMARY
[facility] Medicine Services  DISCHARGE SUMMARY       Date of Admission: 5/9/2022  Date of Discharge:  5/11/2022  Primary Care Physician: Reymundo Torrez MD    Presenting Problem/History of Present Illness:  Dizziness [R42]     Final Discharge Diagnoses:  Active Hospital Problems    Diagnosis    • Dizziness    • Emphysema lung (HCC)    • Atrial fibrillation with RVR (HCC)    • Smoker    • DM (diabetes mellitus) (HCC)    • Polycythemia        Consults: Cardiology .    Pertinent Test Results:   Results for orders placed during the hospital encounter of 05/09/22    Adult Transthoracic Echo Complete With Contrast if Necessary Per Protocol    Interpretation Summary  · Left ventricular ejection fraction appears to be 61 - 65%. Left ventricular systolic function is normal.  · Left ventricular wall thickness is consistent with moderate concentric hypertrophy.  · Left ventricular diastolic dysfunction is noted.  · Normal right ventricular cavity size and systolic function noted.  · There is mild biatrial enlargement.  · There is no significant (greater than mild) valvular dysfunction.  · Estimated right ventricular systolic pressure from tricuspid regurgitation is normal (<35 mmHg).    .  Imaging Results (All)     Procedure Component Value Units Date/Time    CT Angiogram Chest [[id number]] Collected: 05/09/22 0843     Updated: 05/09/22 0853    Narrative:      EXAMINATION: CT ANGIOGRAM CHEST- 5/9/2022 8:43 AM CDT     HISTORY: Pulmonary embolism (PE) suspected, unknown D-dimer;  R42-Dizziness and giddiness; I48.91-Unspecified atrial fibrillation     DOSE: 556 mGycm (Automatic exposure control technique was implemented in  an effort to keep the radiation dose as low as possible without  compromising image quality)     REPORT: Spiral CT of the chest was performed after administration of  intravenous contrast from the thoracic inlet through the upper abdomen  using CTA protocol. Reconstructed  3-D, coronal and sagittal images were  also reviewed.     Comparison: Chest x-ray on the same day.     The contrast bolus is satisfactory, there is mild dilation of the main  pulmonary artery, with a diameter 3.7 cm. No filling defects are seen in  the bony arteries. Heart size is normal. No evidence of right heart  strain is identified. There is mild ectasia of the ascending aorta, with  a maximum diameter 3.8 cm. No aortic dissection is identified. A small  volume of calcified plaque within the thoracic aorta at the origins of  the great vessels. The visualized thyroid gland appears homogeneous. A  small volume of calcified plaque is noted within the coronary arteries.  No pleural effusion is identified. There is no evidence of intrathoracic  lymphadenopathy. Review of lung windows demonstrate mild changes of  paraseptal emphysema. There is mild bibasilar atelectasis. No evidence  of pneumonia is identified. There is no pneumothorax. No lung mass or  suspicious pulmonary nodule is identified. Small pocket of soft tissue  gas is noted to the right of the trachea at the thoracic inlet, of  uncertain significance or etiology, this has no obvious direct  connection to the trachea or esophagus. This measures approximately 14  mm. No abscess is identified. The visualized upper abdomen shows  decreased attenuation of the liver parenchyma compatible with steatosis.  Review of bone windows shows no acute abnormality. Extensive  degenerative changes are noted in the thoracic spine.       Impression:      1. No evidence of pulmonary embolism or acute intrathoracic pathology.  2. Paraseptal emphysema, no evidence of pneumonia, pneumothorax or  pleural effusion.  3. Small 1.4 cm pocket of soft tissue air to the right of the trachea  and esophagus at the thoracic inlet could represent small tracheal  diverticulum, though no direct connection with the trachea is  visualized.  4. Fatty infiltration of the liver.              This  report was finalized on 05/09/2022 08:49 by Dr. Ervin Ramsey MD.    XR Chest 1 View [[id number]] Collected: 05/09/22 0837     Updated: 05/09/22 0842    Narrative:      EXAMINATION: XR CHEST 1 VW- 5/9/2022 8:37 AM CDT     HISTORY: soa; R42-Dizziness and giddiness; I48.91-Unspecified atrial  fibrillation.     REPORT: A frontal view of the chest was obtained.     COMPARISON: Chest x-rays 12/22/2021.     There is mild volume loss in the lung bases, there are central and  basilar vascular congestion with cardiomegaly, heart size is probably  exaggerated by technique. No lung consolidation is identified. There is  no pneumothorax or definite pleural effusion. The osseous structures and  upper abdomen are unremarkable.       Impression:      Mild cardiomegaly with mild central and basilar vascular  congestion and volume loss in both lung bases. No consolidating  infiltrates.  This report was finalized on 05/09/2022 08:39 by Dr. Ervin Ramsey MD.        Chief Complaint on Day of Discharge: none    History of Present Illness on Day of Discharge:   Patient is a 61-year-old  female presented the ER with complaining of dizziness.  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  Patient stated patient was at work when this occurred.  About 6 AM this morning patient felt dizzy, cold sweat, nausea.  Patient went outside and took a protein shake thinking her sugar was low.  Patient felt better.  Patient came inside and stop making to breakfast burrito and put in the counter.  Patient began with symptoms of blurry vision and double vision.  Patient called out to a colleague.  Patient was brought to the back and sat down.  Patient and proceeded to call her daughter, her daughter came and brought her to the ER to be evaluated.  Episode shortness of breath with symptoms.  Patient denies any chest pain.  During course evaluation ER showed patient has atrial  fibrillation with RVR rate was 150.  Patient is currently on a Cardizem drip.  BNP is elevated.  Normal troponin.  Patient is a chronic smoker.  Patient is currently 3 L of oxygen.    Hospital Course:  The patient is a 61 y.o. female who presented to [facility] with atrial for with RVR/COPD/emphysema/smoker/diabetes/polycythemia.      New onset atrial fibrillation with RVR/cardiomegaly/hypertension.  BNP 2380. Troponin negative.  Continue Cardizem drip.   Cardizem p.o. started cardiology. Lisinopril . Lopressor.  DC Norvasc.  Lovenox therapeutic.  Echocardiogram- ejection fraction 61 to 65%, moderate concentric hypertrophy, diastolic dysfunction, mild atrial enlargement, no significant valvular dysfunction, tricuspid regurgitation.    COPD/paraseptal emphysema/small 1.4 cm pocket of soft tissue air in the right trachea and esophagus at the thoracic inlet- represent small tracheal diverticulum-no direct connection to the trachea is visualized.  Symbicort .  Chest x-ray-Mild cardiomegaly with mild central and basilar vascular congestion and volume loss in both lung bases,  No consolidating infiltrates.  CTA of the chest-No evidence of pulmonary embolism or acute intrathoracic pathology, Paraseptal emphysema- no evidence of pneumonia or pneumothorax or pleural effusion, Small 1.4 cm pocket of soft tissue air to the right of the trachea  and esophagus at the thoracic inlet could represent small tracheal diverticulum- though no direct connection with the trachea is visualized, fatty infiltration of the liver.  Patient currently on room air.  Plan for walking oximetry before discharge.     Fatty liver.       Diabetes.  Sliding scale.  Jardiance.  Hold metformin due to GI side effects.  Hemoglobin A1c 7.4.      Polycythemia. Improving. Secondary to smoking.     Neuropathy/arthritis.  Neurontin.  Hold meloxicam due to GI side effects.     Nausea.  Pepcid.  Zofran as needed.     Chronic smoker.  Discussed with  "patient cutting back and stop smoking.  Patient currently refused nicotine patch.     Morbid obesity.  BMI is 43.  Diet and exercise discussed with patient.     Nutrition.  Cardiac/consistent carb diet.     COVID-19-negative.     Plan to discharge home today.  Consult sleep medicine for outpatient sleep study.  Follow-up with cardiology in about 3 weeks.  Follow-up PCP 1 week.    Condition on Discharge: Stable.    Physical Exam on Discharge:  /92 (BP Location: Right arm, Patient Position: Lying)   Pulse 65   Temp 97.4 °F (36.3 °C) (Oral)   Resp 18   Ht 165.1 cm (65\")   Wt 118 kg (261 lb)   SpO2 90%   BMI 43.43 kg/m²   Physical Exam  Vitals and nursing note reviewed.   Constitutional:       Appearance: She is well-developed.   HENT:      Head: Normocephalic.   Eyes:      Conjunctiva/sclera: Conjunctivae normal.      Pupils: Pupils are equal, round, and reactive to light.   Neck:      Vascular: No JVD.   Cardiovascular:      Rate and Rhythm: Normal rate. Rhythm irregular.      Heart sounds: Normal heart sounds. No murmur heard.    No friction rub. No gallop.   Pulmonary:      Effort: Pulmonary effort is normal. No respiratory distress.      Breath sounds: Normal breath sounds. No wheezing or rales.   Chest:      Chest wall: No tenderness.   Abdominal:      General: Bowel sounds are normal. There is no distension.      Palpations: Abdomen is soft.      Tenderness: There is no abdominal tenderness. There is no guarding or rebound.      Comments: Morbid obesity   Musculoskeletal:         General: No tenderness or deformity. Normal range of motion.      Cervical back: Neck supple.   Skin:     General: Skin is warm and dry.      Capillary Refill: Capillary refill takes 2 to 3 seconds.      Findings: No rash.   Neurological:      General: No focal deficit present.      Mental Status: She is alert and oriented to person, place, and time.      Cranial Nerves: No cranial nerve deficit.      Motor: No abnormal " muscle tone.      Deep Tendon Reflexes: Reflexes normal.   Psychiatric:         Behavior: Behavior normal.         Thought Content: Thought content normal.         Judgment: Judgment normal.           Discharge Disposition:  Home or Self Care    Discharge Medications:     Discharge Medications      New Medications      Instructions Start Date   apixaban 5 MG tablet tablet  Commonly known as: ELIQUIS   5 mg, Oral, Every 12 Hours Scheduled      dilTIAZem  MG 24 hr tablet  Commonly known as: Cardizem LA   240 mg, Oral, Daily      famotidine 20 MG tablet  Commonly known as: Pepcid   20 mg, Oral, 2 Times Daily PRN      metFORMIN  MG 24 hr tablet  Commonly known as: GLUCOPHAGE-XR  Replaces: metFORMIN 500 MG tablet   1,000 mg, Oral, Daily With Breakfast      metoprolol tartrate 100 MG tablet  Commonly known as: LOPRESSOR   100 mg, Oral, Every 12 Hours Scheduled      rosuvastatin 5 MG tablet  Commonly known as: Crestor   5 mg, Oral, Daily         Changes to Medications      Instructions Start Date   lisinopril 10 MG tablet  Commonly known as: PRINIVIL,ZESTRIL  What changed:   · medication strength  · how much to take   10 mg, Oral, Daily   Start Date: May 12, 2022        Continue These Medications      Instructions Start Date   empagliflozin 25 MG tablet tablet  Commonly known as: JARDIANCE   25 mg, Oral, Daily      gabapentin 300 MG capsule  Commonly known as: NEURONTIN   300 mg, Oral, 3 Times Daily      Glycopyrrolate-Formoterol 9-4.8 MCG/ACT aerosol   1 puff, Inhalation, 2 Times Daily         Stop These Medications    amLODIPine 10 MG tablet  Commonly known as: NORVASC     cloNIDine 0.1 MG tablet  Commonly known as: CATAPRES     MELOXICAM PO     metFORMIN 500 MG tablet  Commonly known as: GLUCOPHAGE  Replaced by: metFORMIN  MG 24 hr tablet            Discharge Diet:   Diet Instructions     Advance Diet As Tolerated            Activity at Discharge:   Activity Instructions     Activity as Tolerated             Discharge Care Plan/Instructions: Discharged home family .    Follow-up Appointments:   Follow-up PCP 1 week.  Follow with cardiology in 3 weeks.  Order outpatient sleep study.    Electronically signed by Brad Moran MD, 05/11/22, 09:22 CDT.    Time: Greater than 30 minutes.

## 2022-05-11 NOTE — PLAN OF CARE
Goal Outcome Evaluation:      VSS. Patient remains a-fib but rate controlled. Continues to require O2 when sleeping to stay 92%. Plan for ambulatory study in am and possible d/c.

## 2022-05-11 NOTE — PROGRESS NOTES
Exercise Oximetry    Patient Name:Claudette Keeling   MRN: [id number]   Date: 05/11/22             ROOM AIR BASELINE   SpO2% 93   Heart Rate 75   Blood Pressure      EXERCISE ON ROOM AIR SpO2% EXERCISE ON O2 @  LPM SpO2%   1 MINUTE  93 1 MINUTE    2 MINUTES 93 2 MINUTES    3 MINUTES 94 3 MINUTES    4 MINUTES 95 4 MINUTES    5 MINUTES 95 5 MINUTES    6 MINUTES  6 MINUTES               Distance Walked  200 Distance Walked   Dyspnea (Boyd Scale)  7 Dyspnea (Boyd Scale)   Fatigue (Boyd Scale)  3 Fatigue (Boyd Scale)   SpO2% Post Exercise  95 SpO2% Post Exercise   HR Post Exercise  73 HR Post Exercise   Time to Recovery  5 minutes Time to Recovery     Comments:   Recommendation: No oxygen needs at this time.

## 2022-05-12 NOTE — OUTREACH NOTE
Prep Survey    Flowsheet Row Responses   [facility] facility patient discharged from? [place]   Is LACE score < 7 ? No   Emergency Room discharge w/ pulse ox? No   Eligibility Readm Mgmt   Discharge diagnosis Dizziness, Atrial fibrillation with RVR    Does the patient have one of the following disease processes/diagnoses(primary or secondary)? Other   Does the patient have Home health ordered? No   Is there a DME ordered? No   Medication alerts for this patient Eliquis    General alerts for this patient Hx: COPD    Prep survey completed? Yes          FABIOLA PATEL - Registered Nurse

## 2022-05-17 ENCOUNTER — READMISSION MANAGEMENT (OUTPATIENT)
Dept: CALL CENTER | Facility: HOSPITAL | Age: 62
End: 2022-05-17

## 2022-05-17 NOTE — OUTREACH NOTE
Medical Week 1 Survey    Flowsheet Row Responses   [facility] [place] patient discharged from? [place]   Does the patient have one of the following disease processes/diagnoses(primary or secondary)? Other   Week 1 attempt successful? Yes   Call start time 1230   Call end time 1234   General alerts for this patient Hx: COPD    Discharge diagnosis Dizziness, Atrial fibrillation with RVR    Is patient permission given to speak with other caregiver? Yes   List who call center can speak with dtrs   Medication alerts for this patient now doing Metoprolol 50mg BID r/t intermittent low HR.   Meds reviewed with patient/caregiver? Yes   Is the patient having any side effects they believe may be caused by any medication additions or changes? No   Does the patient have all medications ordered at discharge? Yes   Is the patient taking all medications as directed (includes completed medication regime)? Yes   Does the patient have a primary care provider?  Yes   Does the patient have an appointment with their PCP within 7 days of discharge? Yes   Has the patient kept scheduled appointments due by today? Yes   DME comments using pulse ox for HR,    Psychosocial issues? No   Psychosocial comments Glenna Arceo is RN.   Comments SOA is her baseline. Denies dizziness.    Did the patient receive a copy of their discharge instructions? Yes   Nursing interventions Reviewed instructions with patient   What is the patient's perception of their health status since discharge? Improving   Is the patient/caregiver able to teach back signs and symptoms related to disease process for when to call PCP? Yes   Is the patient/caregiver able to teach back signs and symptoms related to disease process for when to call 911? Yes   Is the patient/caregiver able to teach back the hierarchy of who to call/visit for symptoms/problems? PCP, Specialist, Home health nurse, Urgent Care, ED, 911 Yes   If the patient is a current smoker, are they able to teach  back resources for cessation? --  [decreased from 1pk to 1/2 pk/day.]   Week 1 call completed? Yes          SUSANA GARCIA - Registered Nurse

## 2022-05-25 ENCOUNTER — READMISSION MANAGEMENT (OUTPATIENT)
Dept: CALL CENTER | Facility: HOSPITAL | Age: 62
End: 2022-05-25

## 2022-05-25 NOTE — OUTREACH NOTE
Medical Week 2 Survey    Flowsheet Row Responses   [facility] patient discharged from? [place]   Does the patient have one of the following disease processes/diagnoses(primary or secondary)? Other   Week 2 attempt successful? Yes   Call start time 0952   Discharge diagnosis Dizziness, Atrial fibrillation with RVR    Call end time 1001   Person spoke with today (if not patient) and relationship Patient   Meds reviewed with patient/caregiver? Yes   Is the patient having any side effects they believe may be caused by any medication additions or changes? No   Does the patient have all medications ordered at discharge? Yes   Is the patient taking all medications as directed (includes completed medication regime)? Yes   Does the patient have a primary care provider?  Yes   Does the patient have an appointment with their PCP within 7 days of discharge? Yes   Has the patient kept scheduled appointments due by today? Yes   Psychosocial issues? No   Did the patient receive a copy of their discharge instructions? Yes   Nursing interventions Reviewed instructions with patient   What is the patient's perception of their health status since discharge? Improving   Is the patient/caregiver able to teach back signs and symptoms related to disease process for when to call PCP? Yes   Is the patient/caregiver able to teach back signs and symptoms related to disease process for when to call 911? Yes   Is the patient/caregiver able to teach back the hierarchy of who to call/visit for symptoms/problems? PCP, Specialist, Home health nurse, Urgent Care, ED, 911 Yes   If the patient is a current smoker, are they able to teach back resources for cessation? [id number]   Week 2 Call Completed? Yes   Wrap up additional comments Pt states she is doing better. No questions/concerns.          SAVITA LOVELL - Registered Nurse

## 2022-05-31 ENCOUNTER — APPOINTMENT (OUTPATIENT)
Dept: ULTRASOUND IMAGING | Facility: HOSPITAL | Age: 62
End: 2022-05-31

## 2022-05-31 ENCOUNTER — APPOINTMENT (OUTPATIENT)
Dept: CT IMAGING | Facility: HOSPITAL | Age: 62
End: 2022-05-31

## 2022-05-31 ENCOUNTER — HOSPITAL ENCOUNTER (INPATIENT)
Facility: HOSPITAL | Age: 62
LOS: 2 days | Discharge: HOME | End: 2022-06-02
Attending: EMERGENCY MEDICINE | Admitting: INTERNAL MEDICINE

## 2022-05-31 ENCOUNTER — APPOINTMENT (OUTPATIENT)
Dept: GENERAL RADIOLOGY | Facility: HOSPITAL | Age: 62
End: 2022-05-31

## 2022-05-31 DIAGNOSIS — I50.9 ACUTE CONGESTIVE HEART FAILURE, UNSPECIFIED HEART FAILURE TYPE: ICD-10-CM

## 2022-05-31 DIAGNOSIS — R09.02 HYPOXIA: ICD-10-CM

## 2022-05-31 DIAGNOSIS — I48.91 ATRIAL FIBRILLATION WITH RAPID VENTRICULAR RESPONSE: Primary | ICD-10-CM

## 2022-05-31 PROBLEM — I50.33 ACUTE ON CHRONIC DIASTOLIC CHF (CONGESTIVE HEART FAILURE): Status: ACTIVE | Noted: 2022-05-31

## 2022-05-31 PROBLEM — Z72.0 TOBACCO ABUSE: Status: ACTIVE | Noted: 2022-05-09

## 2022-05-31 PROBLEM — E11.65 TYPE 2 DIABETES MELLITUS WITH HYPERGLYCEMIA, WITHOUT LONG-TERM CURRENT USE OF INSULIN: Status: ACTIVE | Noted: 2022-05-09

## 2022-05-31 LAB
ALBUMIN SERPL-MCNC: 4.1 G/DL (ref 3.5–5.2)
ALBUMIN/GLOB SERPL: 1.8 G/DL
ALP SERPL-CCNC: 56 U/L (ref 39–117)
ALT SERPL W P-5'-P-CCNC: 16 U/L (ref 1–33)
ANION GAP SERPL CALCULATED.3IONS-SCNC: 9 MMOL/L (ref 5–15)
AST SERPL-CCNC: 14 U/L (ref 1–32)
BASOPHILS # BLD AUTO: 0.03 10*3/MM3 (ref 0–0.2)
BASOPHILS NFR BLD AUTO: 0.4 % (ref 0–1.5)
BILIRUB SERPL-MCNC: 0.5 MG/DL (ref 0–1.2)
BUN SERPL-MCNC: 17 MG/DL (ref 8–23)
BUN/CREAT SERPL: 30.4 (ref 7–25)
CALCIUM SPEC-SCNC: 9 MG/DL (ref 8.6–10.5)
CHLORIDE SERPL-SCNC: 102 MMOL/L (ref 98–107)
CO2 SERPL-SCNC: 27 MMOL/L (ref 22–29)
CREAT SERPL-MCNC: 0.56 MG/DL (ref 0.57–1)
D DIMER PPP FEU-MCNC: 1.65 MCGFEU/ML (ref 0–0.5)
D-LACTATE SERPL-SCNC: 0.8 MMOL/L (ref 0.5–2)
DEPRECATED RDW RBC AUTO: 49.6 FL (ref 37–54)
EGFRCR SERPLBLD CKD-EPI 2021: 102.7 ML/MIN/1.73
EOSINOPHIL # BLD AUTO: 0.09 10*3/MM3 (ref 0–0.4)
EOSINOPHIL NFR BLD AUTO: 1.3 % (ref 0.3–6.2)
ERYTHROCYTE [DISTWIDTH] IN BLOOD BY AUTOMATED COUNT: 13.7 % (ref 12.3–15.4)
GLOBULIN UR ELPH-MCNC: 2.3 GM/DL
GLUCOSE BLDC GLUCOMTR-MCNC: 124 MG/DL (ref 70–130)
GLUCOSE BLDC GLUCOMTR-MCNC: 136 MG/DL (ref 70–130)
GLUCOSE BLDC GLUCOMTR-MCNC: 207 MG/DL (ref 70–130)
GLUCOSE SERPL-MCNC: 173 MG/DL (ref 65–99)
HCT VFR BLD AUTO: 49.8 % (ref 34–46.6)
HGB BLD-MCNC: 15.8 G/DL (ref 12–15.9)
INR PPP: 1.45 (ref 0.91–1.09)
LYMPHOCYTES # BLD AUTO: 1.15 10*3/MM3 (ref 0.7–3.1)
LYMPHOCYTES NFR BLD AUTO: 16.4 % (ref 19.6–45.3)
MCH RBC QN AUTO: 31.1 PG (ref 26.6–33)
MCHC RBC AUTO-ENTMCNC: 31.7 G/DL (ref 31.5–35.7)
MCV RBC AUTO: 98 FL (ref 79–97)
MONOCYTES # BLD AUTO: 0.43 10*3/MM3 (ref 0.1–0.9)
MONOCYTES NFR BLD AUTO: 6.1 % (ref 5–12)
NEUTROPHILS NFR BLD AUTO: 5.31 10*3/MM3 (ref 1.7–7)
NEUTROPHILS NFR BLD AUTO: 75.7 % (ref 42.7–76)
NT-PROBNP SERPL-MCNC: 1148 PG/ML (ref 0–900)
PLATELET # BLD AUTO: 122 10*3/MM3 (ref 140–450)
PMV BLD AUTO: 10.9 FL (ref 6–12)
POTASSIUM SERPL-SCNC: 4.6 MMOL/L (ref 3.5–5.2)
PROT SERPL-MCNC: 6.4 G/DL (ref 6–8.5)
PROTHROMBIN TIME: 17.1 SECONDS (ref 11.9–14.6)
QT INTERVAL: 358 MS
QTC INTERVAL: 461 MS
RBC # BLD AUTO: 5.08 10*6/MM3 (ref 3.77–5.28)
SARS-COV-2 RNA PNL SPEC NAA+PROBE: NOT DETECTED
SODIUM SERPL-SCNC: 138 MMOL/L (ref 136–145)
TROPONIN T SERPL-MCNC: <0.01 NG/ML (ref 0–0.03)
WBC NRBC COR # BLD: 7.02 10*3/MM3 (ref 3.4–10.8)

## 2022-05-31 PROCEDURE — 83880 ASSAY OF NATRIURETIC PEPTIDE: CPT | Performed by: EMERGENCY MEDICINE

## 2022-05-31 PROCEDURE — 93010 ELECTROCARDIOGRAM REPORT: CPT | Performed by: INTERNAL MEDICINE

## 2022-05-31 PROCEDURE — 94799 UNLISTED PULMONARY SVC/PX: CPT

## 2022-05-31 PROCEDURE — 82962 GLUCOSE BLOOD TEST: CPT

## 2022-05-31 PROCEDURE — 25010000002 AZITHROMYCIN PER 500 MG: Performed by: EMERGENCY MEDICINE

## 2022-05-31 PROCEDURE — 25010000002 CEFTRIAXONE PER 250 MG: Performed by: EMERGENCY MEDICINE

## 2022-05-31 PROCEDURE — 71275 CT ANGIOGRAPHY CHEST: CPT

## 2022-05-31 PROCEDURE — 0 IOPAMIDOL PER 1 ML: Performed by: EMERGENCY MEDICINE

## 2022-05-31 PROCEDURE — 83605 ASSAY OF LACTIC ACID: CPT | Performed by: EMERGENCY MEDICINE

## 2022-05-31 PROCEDURE — 87040 BLOOD CULTURE FOR BACTERIA: CPT | Performed by: EMERGENCY MEDICINE

## 2022-05-31 PROCEDURE — 93971 EXTREMITY STUDY: CPT

## 2022-05-31 PROCEDURE — 99284 EMERGENCY DEPT VISIT MOD MDM: CPT

## 2022-05-31 PROCEDURE — 93005 ELECTROCARDIOGRAM TRACING: CPT | Performed by: EMERGENCY MEDICINE

## 2022-05-31 PROCEDURE — 25010000002 FUROSEMIDE PER 20 MG: Performed by: EMERGENCY MEDICINE

## 2022-05-31 PROCEDURE — 85025 COMPLETE CBC W/AUTO DIFF WBC: CPT | Performed by: EMERGENCY MEDICINE

## 2022-05-31 PROCEDURE — 87635 SARS-COV-2 COVID-19 AMP PRB: CPT | Performed by: EMERGENCY MEDICINE

## 2022-05-31 PROCEDURE — 85610 PROTHROMBIN TIME: CPT | Performed by: EMERGENCY MEDICINE

## 2022-05-31 PROCEDURE — 94640 AIRWAY INHALATION TREATMENT: CPT

## 2022-05-31 PROCEDURE — 80053 COMPREHEN METABOLIC PANEL: CPT | Performed by: EMERGENCY MEDICINE

## 2022-05-31 PROCEDURE — 63710000001 INSULIN LISPRO (HUMAN) PER 5 UNITS: Performed by: INTERNAL MEDICINE

## 2022-05-31 PROCEDURE — 71045 X-RAY EXAM CHEST 1 VIEW: CPT

## 2022-05-31 PROCEDURE — 84484 ASSAY OF TROPONIN QUANT: CPT | Performed by: EMERGENCY MEDICINE

## 2022-05-31 PROCEDURE — 94664 DEMO&/EVAL PT USE INHALER: CPT

## 2022-05-31 PROCEDURE — 85379 FIBRIN DEGRADATION QUANT: CPT | Performed by: EMERGENCY MEDICINE

## 2022-05-31 RX ORDER — NICOTINE POLACRILEX 4 MG
15 LOZENGE BUCCAL
Status: DISCONTINUED | OUTPATIENT
Start: 2022-05-31 | End: 2022-06-02 | Stop reason: HOSPADM

## 2022-05-31 RX ORDER — VARENICLINE TARTRATE 0.5 MG/1
0.5 TABLET, FILM COATED ORAL DAILY
Status: DISCONTINUED | OUTPATIENT
Start: 2022-05-31 | End: 2022-06-02 | Stop reason: HOSPADM

## 2022-05-31 RX ORDER — NITROGLYCERIN 0.4 MG/1
0.4 TABLET SUBLINGUAL
Status: DISCONTINUED | OUTPATIENT
Start: 2022-05-31 | End: 2022-06-02 | Stop reason: HOSPADM

## 2022-05-31 RX ORDER — DILTIAZEM HCL IN NACL,ISO-OSM 125 MG/125
5-15 PLASTIC BAG, INJECTION (ML) INTRAVENOUS
Status: DISCONTINUED | OUTPATIENT
Start: 2022-05-31 | End: 2022-06-01

## 2022-05-31 RX ORDER — ACETAMINOPHEN 160 MG/5ML
650 SOLUTION ORAL EVERY 4 HOURS PRN
Status: DISCONTINUED | OUTPATIENT
Start: 2022-05-31 | End: 2022-06-02 | Stop reason: HOSPADM

## 2022-05-31 RX ORDER — ACETAMINOPHEN 650 MG/1
650 SUPPOSITORY RECTAL EVERY 4 HOURS PRN
Status: DISCONTINUED | OUTPATIENT
Start: 2022-05-31 | End: 2022-06-02 | Stop reason: HOSPADM

## 2022-05-31 RX ORDER — FUROSEMIDE 10 MG/ML
40 INJECTION INTRAMUSCULAR; INTRAVENOUS
Status: DISCONTINUED | OUTPATIENT
Start: 2022-06-01 | End: 2022-06-01

## 2022-05-31 RX ORDER — VARENICLINE TARTRATE 0.5 MG/1
0.5 TABLET, FILM COATED ORAL 2 TIMES DAILY WITH MEALS
Status: DISCONTINUED | OUTPATIENT
Start: 2022-06-03 | End: 2022-06-02 | Stop reason: HOSPADM

## 2022-05-31 RX ORDER — GABAPENTIN 300 MG/1
300 CAPSULE ORAL 3 TIMES DAILY
Status: DISCONTINUED | OUTPATIENT
Start: 2022-05-31 | End: 2022-06-02 | Stop reason: HOSPADM

## 2022-05-31 RX ORDER — NICOTINE 21 MG/24HR
1 PATCH, TRANSDERMAL 24 HOURS TRANSDERMAL
Status: DISCONTINUED | OUTPATIENT
Start: 2022-05-31 | End: 2022-06-02 | Stop reason: HOSPADM

## 2022-05-31 RX ORDER — ONDANSETRON 4 MG/1
4 TABLET, FILM COATED ORAL EVERY 6 HOURS PRN
Status: DISCONTINUED | OUTPATIENT
Start: 2022-05-31 | End: 2022-06-02 | Stop reason: HOSPADM

## 2022-05-31 RX ORDER — SODIUM CHLORIDE 0.9 % (FLUSH) 0.9 %
10 SYRINGE (ML) INJECTION AS NEEDED
Status: DISCONTINUED | OUTPATIENT
Start: 2022-05-31 | End: 2022-06-02 | Stop reason: HOSPADM

## 2022-05-31 RX ORDER — ROSUVASTATIN CALCIUM 10 MG/1
5 TABLET, COATED ORAL NIGHTLY
Status: DISCONTINUED | OUTPATIENT
Start: 2022-05-31 | End: 2022-06-02 | Stop reason: HOSPADM

## 2022-05-31 RX ORDER — BUDESONIDE AND FORMOTEROL FUMARATE DIHYDRATE 160; 4.5 UG/1; UG/1
2 AEROSOL RESPIRATORY (INHALATION)
Status: DISCONTINUED | OUTPATIENT
Start: 2022-05-31 | End: 2022-06-02 | Stop reason: HOSPADM

## 2022-05-31 RX ORDER — ALBUTEROL SULFATE 2.5 MG/3ML
2.5 SOLUTION RESPIRATORY (INHALATION) ONCE
Status: COMPLETED | OUTPATIENT
Start: 2022-05-31 | End: 2022-05-31

## 2022-05-31 RX ORDER — FUROSEMIDE 10 MG/ML
80 INJECTION INTRAMUSCULAR; INTRAVENOUS ONCE
Status: COMPLETED | OUTPATIENT
Start: 2022-05-31 | End: 2022-05-31

## 2022-05-31 RX ORDER — DILTIAZEM HYDROCHLORIDE 5 MG/ML
10 INJECTION INTRAVENOUS ONCE
Status: COMPLETED | OUTPATIENT
Start: 2022-05-31 | End: 2022-05-31

## 2022-05-31 RX ORDER — METOPROLOL TARTRATE 50 MG/1
50 TABLET, FILM COATED ORAL EVERY 12 HOURS SCHEDULED
Status: DISCONTINUED | OUTPATIENT
Start: 2022-05-31 | End: 2022-06-02 | Stop reason: HOSPADM

## 2022-05-31 RX ORDER — ONDANSETRON 2 MG/ML
4 INJECTION INTRAMUSCULAR; INTRAVENOUS EVERY 6 HOURS PRN
Status: DISCONTINUED | OUTPATIENT
Start: 2022-05-31 | End: 2022-06-02 | Stop reason: HOSPADM

## 2022-05-31 RX ORDER — ACETAMINOPHEN 325 MG/1
650 TABLET ORAL EVERY 4 HOURS PRN
Status: DISCONTINUED | OUTPATIENT
Start: 2022-05-31 | End: 2022-06-02 | Stop reason: HOSPADM

## 2022-05-31 RX ORDER — DEXTROSE MONOHYDRATE 25 G/50ML
25 INJECTION, SOLUTION INTRAVENOUS
Status: DISCONTINUED | OUTPATIENT
Start: 2022-05-31 | End: 2022-06-02 | Stop reason: HOSPADM

## 2022-05-31 RX ORDER — INSULIN LISPRO 100 [IU]/ML
0-14 INJECTION, SOLUTION INTRAVENOUS; SUBCUTANEOUS
Status: DISCONTINUED | OUTPATIENT
Start: 2022-05-31 | End: 2022-06-02 | Stop reason: HOSPADM

## 2022-05-31 RX ORDER — FAMOTIDINE 20 MG/1
20 TABLET, FILM COATED ORAL 2 TIMES DAILY PRN
Status: DISCONTINUED | OUTPATIENT
Start: 2022-05-31 | End: 2022-06-02 | Stop reason: HOSPADM

## 2022-05-31 RX ORDER — SODIUM CHLORIDE 0.9 % (FLUSH) 0.9 %
10 SYRINGE (ML) INJECTION EVERY 12 HOURS SCHEDULED
Status: DISCONTINUED | OUTPATIENT
Start: 2022-05-31 | End: 2022-06-02 | Stop reason: HOSPADM

## 2022-05-31 RX ADMIN — DILTIAZEM HYDROCHLORIDE 30 MG: 30 TABLET, FILM COATED ORAL at 17:30

## 2022-05-31 RX ADMIN — GABAPENTIN 300 MG: 300 CAPSULE ORAL at 15:33

## 2022-05-31 RX ADMIN — DILTIAZEM HYDROCHLORIDE 30 MG: 30 TABLET, FILM COATED ORAL at 15:30

## 2022-05-31 RX ADMIN — SODIUM CHLORIDE 1 G: 9 INJECTION, SOLUTION INTRAVENOUS at 07:54

## 2022-05-31 RX ADMIN — ROSUVASTATIN CALCIUM 5 MG: 10 TABLET, FILM COATED ORAL at 21:12

## 2022-05-31 RX ADMIN — AZITHROMYCIN MONOHYDRATE 500 MG: 500 INJECTION, POWDER, LYOPHILIZED, FOR SOLUTION INTRAVENOUS at 09:24

## 2022-05-31 RX ADMIN — Medication 10 ML: at 21:13

## 2022-05-31 RX ADMIN — DILTIAZEM HYDROCHLORIDE 10 MG: 5 INJECTION INTRAVENOUS at 06:51

## 2022-05-31 RX ADMIN — IOPAMIDOL 100 ML: 755 INJECTION, SOLUTION INTRAVENOUS at 09:03

## 2022-05-31 RX ADMIN — INSULIN LISPRO 5 UNITS: 100 INJECTION, SOLUTION INTRAVENOUS; SUBCUTANEOUS at 17:30

## 2022-05-31 RX ADMIN — APIXABAN 5 MG: 5 TABLET, FILM COATED ORAL at 21:12

## 2022-05-31 RX ADMIN — Medication 5 MG/HR: at 08:08

## 2022-05-31 RX ADMIN — ALBUTEROL SULFATE 2.5 MG: 2.5 SOLUTION RESPIRATORY (INHALATION) at 06:42

## 2022-05-31 RX ADMIN — DILTIAZEM HYDROCHLORIDE 30 MG: 30 TABLET, FILM COATED ORAL at 23:13

## 2022-05-31 RX ADMIN — NICOTINE 1 PATCH: 14 PATCH, EXTENDED RELEASE TRANSDERMAL at 15:30

## 2022-05-31 RX ADMIN — GABAPENTIN 300 MG: 300 CAPSULE ORAL at 21:12

## 2022-05-31 RX ADMIN — VARENICLINE TARTRATE 0.5 MG: 0.5 TABLET, FILM COATED ORAL at 15:30

## 2022-05-31 RX ADMIN — FUROSEMIDE 80 MG: 10 INJECTION, SOLUTION INTRAMUSCULAR; INTRAVENOUS at 10:56

## 2022-05-31 RX ADMIN — METOPROLOL TARTRATE 50 MG: 50 TABLET, FILM COATED ORAL at 21:12

## 2022-06-01 ENCOUNTER — READMISSION MANAGEMENT (OUTPATIENT)
Dept: CALL CENTER | Facility: HOSPITAL | Age: 62
End: 2022-06-01

## 2022-06-01 LAB
ALBUMIN SERPL-MCNC: 4 G/DL (ref 3.5–5.2)
ALBUMIN/GLOB SERPL: 1.6 G/DL
ALP SERPL-CCNC: 52 U/L (ref 39–117)
ALT SERPL W P-5'-P-CCNC: 17 U/L (ref 1–33)
ANION GAP SERPL CALCULATED.3IONS-SCNC: 7 MMOL/L (ref 5–15)
AST SERPL-CCNC: 14 U/L (ref 1–32)
BASOPHILS # BLD AUTO: 0.04 10*3/MM3 (ref 0–0.2)
BASOPHILS NFR BLD AUTO: 0.6 % (ref 0–1.5)
BILIRUB SERPL-MCNC: 0.8 MG/DL (ref 0–1.2)
BUN SERPL-MCNC: 14 MG/DL (ref 8–23)
BUN/CREAT SERPL: 23.3 (ref 7–25)
CALCIUM SPEC-SCNC: 9.5 MG/DL (ref 8.6–10.5)
CHLORIDE SERPL-SCNC: 99 MMOL/L (ref 98–107)
CO2 SERPL-SCNC: 32 MMOL/L (ref 22–29)
CREAT SERPL-MCNC: 0.6 MG/DL (ref 0.57–1)
DEPRECATED RDW RBC AUTO: 51.1 FL (ref 37–54)
EGFRCR SERPLBLD CKD-EPI 2021: 101 ML/MIN/1.73
EOSINOPHIL # BLD AUTO: 0.1 10*3/MM3 (ref 0–0.4)
EOSINOPHIL NFR BLD AUTO: 1.5 % (ref 0.3–6.2)
ERYTHROCYTE [DISTWIDTH] IN BLOOD BY AUTOMATED COUNT: 13.8 % (ref 12.3–15.4)
GLOBULIN UR ELPH-MCNC: 2.5 GM/DL
GLUCOSE BLDC GLUCOMTR-MCNC: 114 MG/DL (ref 70–130)
GLUCOSE BLDC GLUCOMTR-MCNC: 161 MG/DL (ref 70–130)
GLUCOSE BLDC GLUCOMTR-MCNC: 187 MG/DL (ref 70–130)
GLUCOSE SERPL-MCNC: 133 MG/DL (ref 65–99)
HBA1C MFR BLD: 7.3 % (ref 4.8–5.6)
HCT VFR BLD AUTO: 50.7 % (ref 34–46.6)
HGB BLD-MCNC: 15.5 G/DL (ref 12–15.9)
LYMPHOCYTES # BLD AUTO: 0.83 10*3/MM3 (ref 0.7–3.1)
LYMPHOCYTES NFR BLD AUTO: 12.7 % (ref 19.6–45.3)
MAGNESIUM SERPL-MCNC: 1.9 MG/DL (ref 1.6–2.4)
MCH RBC QN AUTO: 30.8 PG (ref 26.6–33)
MCHC RBC AUTO-ENTMCNC: 30.6 G/DL (ref 31.5–35.7)
MCV RBC AUTO: 100.6 FL (ref 79–97)
MONOCYTES # BLD AUTO: 0.46 10*3/MM3 (ref 0.1–0.9)
MONOCYTES NFR BLD AUTO: 7 % (ref 5–12)
NEUTROPHILS NFR BLD AUTO: 5.1 10*3/MM3 (ref 1.7–7)
NEUTROPHILS NFR BLD AUTO: 77.9 % (ref 42.7–76)
PHOSPHATE SERPL-MCNC: 4.1 MG/DL (ref 2.5–4.5)
PLATELET # BLD AUTO: 108 10*3/MM3 (ref 140–450)
PMV BLD AUTO: 10.7 FL (ref 6–12)
POTASSIUM SERPL-SCNC: 4.4 MMOL/L (ref 3.5–5.2)
PROT SERPL-MCNC: 6.5 G/DL (ref 6–8.5)
RBC # BLD AUTO: 5.04 10*6/MM3 (ref 3.77–5.28)
SODIUM SERPL-SCNC: 138 MMOL/L (ref 136–145)
TSH SERPL DL<=0.05 MIU/L-ACNC: 1.82 UIU/ML (ref 0.27–4.2)
WBC NRBC COR # BLD: 6.55 10*3/MM3 (ref 3.4–10.8)

## 2022-06-01 PROCEDURE — 84100 ASSAY OF PHOSPHORUS: CPT | Performed by: INTERNAL MEDICINE

## 2022-06-01 PROCEDURE — 82962 GLUCOSE BLOOD TEST: CPT

## 2022-06-01 PROCEDURE — 80053 COMPREHEN METABOLIC PANEL: CPT | Performed by: INTERNAL MEDICINE

## 2022-06-01 PROCEDURE — 94640 AIRWAY INHALATION TREATMENT: CPT

## 2022-06-01 PROCEDURE — 83036 HEMOGLOBIN GLYCOSYLATED A1C: CPT | Performed by: INTERNAL MEDICINE

## 2022-06-01 PROCEDURE — 84443 ASSAY THYROID STIM HORMONE: CPT | Performed by: INTERNAL MEDICINE

## 2022-06-01 PROCEDURE — 85025 COMPLETE CBC W/AUTO DIFF WBC: CPT | Performed by: INTERNAL MEDICINE

## 2022-06-01 PROCEDURE — 94799 UNLISTED PULMONARY SVC/PX: CPT

## 2022-06-01 PROCEDURE — 83735 ASSAY OF MAGNESIUM: CPT | Performed by: INTERNAL MEDICINE

## 2022-06-01 PROCEDURE — 63710000001 INSULIN LISPRO (HUMAN) PER 5 UNITS: Performed by: INTERNAL MEDICINE

## 2022-06-01 PROCEDURE — 25010000002 FUROSEMIDE PER 20 MG: Performed by: INTERNAL MEDICINE

## 2022-06-01 RX ORDER — DILTIAZEM HYDROCHLORIDE 120 MG/1
120 CAPSULE, COATED, EXTENDED RELEASE ORAL
Status: DISCONTINUED | OUTPATIENT
Start: 2022-06-02 | End: 2022-06-02 | Stop reason: HOSPADM

## 2022-06-01 RX ORDER — FUROSEMIDE 20 MG/1
20 TABLET ORAL
Status: DISCONTINUED | OUTPATIENT
Start: 2022-06-02 | End: 2022-06-02 | Stop reason: HOSPADM

## 2022-06-01 RX ORDER — VARENICLINE TARTRATE 1 MG/1
TABLET, FILM COATED ORAL
Qty: 60 TABLET | Refills: 2 | Status: SHIPPED | OUTPATIENT
Start: 2022-06-03 | End: 2022-08-12

## 2022-06-01 RX ORDER — FUROSEMIDE 20 MG/1
20 TABLET ORAL DAILY
Qty: 45 TABLET | Refills: 0 | Status: SHIPPED | OUTPATIENT
Start: 2022-06-01 | End: 2022-06-06 | Stop reason: SDUPTHER

## 2022-06-01 RX ORDER — METOPROLOL TARTRATE 100 MG/1
50 TABLET ORAL EVERY 12 HOURS SCHEDULED
Start: 2022-06-01 | End: 2022-06-06 | Stop reason: SDUPTHER

## 2022-06-01 RX ADMIN — EMPAGLIFLOZIN 25 MG: 25 TABLET, FILM COATED ORAL at 08:33

## 2022-06-01 RX ADMIN — INSULIN LISPRO 3 UNITS: 100 INJECTION, SOLUTION INTRAVENOUS; SUBCUTANEOUS at 08:32

## 2022-06-01 RX ADMIN — ROSUVASTATIN CALCIUM 5 MG: 10 TABLET, FILM COATED ORAL at 21:38

## 2022-06-01 RX ADMIN — DILTIAZEM HYDROCHLORIDE 30 MG: 30 TABLET, FILM COATED ORAL at 05:43

## 2022-06-01 RX ADMIN — APIXABAN 5 MG: 5 TABLET, FILM COATED ORAL at 21:38

## 2022-06-01 RX ADMIN — Medication 10 ML: at 08:33

## 2022-06-01 RX ADMIN — GABAPENTIN 300 MG: 300 CAPSULE ORAL at 08:32

## 2022-06-01 RX ADMIN — METOPROLOL TARTRATE 50 MG: 50 TABLET, FILM COATED ORAL at 08:33

## 2022-06-01 RX ADMIN — NICOTINE 1 PATCH: 14 PATCH, EXTENDED RELEASE TRANSDERMAL at 08:33

## 2022-06-01 RX ADMIN — BUDESONIDE AND FORMOTEROL FUMARATE DIHYDRATE 2 PUFF: 160; 4.5 AEROSOL RESPIRATORY (INHALATION) at 06:37

## 2022-06-01 RX ADMIN — GABAPENTIN 300 MG: 300 CAPSULE ORAL at 18:00

## 2022-06-01 RX ADMIN — DILTIAZEM HYDROCHLORIDE 30 MG: 30 TABLET, FILM COATED ORAL at 12:42

## 2022-06-01 RX ADMIN — METOPROLOL TARTRATE 50 MG: 50 TABLET, FILM COATED ORAL at 21:38

## 2022-06-01 RX ADMIN — INSULIN LISPRO 3 UNITS: 100 INJECTION, SOLUTION INTRAVENOUS; SUBCUTANEOUS at 12:43

## 2022-06-01 RX ADMIN — APIXABAN 5 MG: 5 TABLET, FILM COATED ORAL at 08:33

## 2022-06-01 RX ADMIN — GABAPENTIN 300 MG: 300 CAPSULE ORAL at 21:38

## 2022-06-01 RX ADMIN — VARENICLINE TARTRATE 0.5 MG: 0.5 TABLET, FILM COATED ORAL at 08:33

## 2022-06-01 RX ADMIN — BUDESONIDE AND FORMOTEROL FUMARATE DIHYDRATE 2 PUFF: 160; 4.5 AEROSOL RESPIRATORY (INHALATION) at 20:46

## 2022-06-01 RX ADMIN — Medication 10 ML: at 21:39

## 2022-06-01 RX ADMIN — DILTIAZEM HYDROCHLORIDE 30 MG: 30 TABLET, FILM COATED ORAL at 23:13

## 2022-06-01 RX ADMIN — DILTIAZEM HYDROCHLORIDE 30 MG: 30 TABLET, FILM COATED ORAL at 18:00

## 2022-06-01 RX ADMIN — FUROSEMIDE 40 MG: 10 INJECTION, SOLUTION INTRAMUSCULAR; INTRAVENOUS at 18:00

## 2022-06-01 RX ADMIN — FUROSEMIDE 40 MG: 10 INJECTION, SOLUTION INTRAMUSCULAR; INTRAVENOUS at 08:32

## 2022-06-01 NOTE — PLAN OF CARE
Goal Outcome Evaluation:  Plan of Care Reviewed With: patient        Progress: improving  Outcome Evaluation: VSS.  [id number] with a 2.15 pause on tele.  C/o pain in L shoulderblade area, but did not want any pain medication.  O2 remained at 90% or above at 3L all shift.  Cardizem gtt turned off at 2310.  PO Cardizem given.  HR has bumped up to 120s on occassion but sustained in 90s to low 100s.  Safety maintained.

## 2022-06-01 NOTE — OUTREACH NOTE
Medical Week 3 Survey    Flowsheet Row Responses   [facility] facility patient discharged from? [place]   Does the patient have one of the following disease processes/diagnoses(primary or secondary)? Other   Week 3 attempt successful? No   Revoke Readmitted          FABIOLA PATEL - Registered Nurse

## 2022-06-01 NOTE — CASE MANAGEMENT/SOCIAL WORK
Discharge Planning Assessment  [facility]     Patient Name: Claudette Keeling  MRN: [id number]  Today's Date: 6/1/2022    Admit Date: 5/31/2022     Discharge Needs Assessment     Row Name 06/01/22 1129       Living Environment    People in Home alone    Current Living Arrangements home    Primary Care Provided by self    Family Caregiver if Needed child(martina), adult    Quality of Family Relationships helpful;supportive    Able to Return to Prior Arrangements yes       Transition Planning    Patient/Family Anticipates Transition to home with family       Discharge Needs Assessment    Equipment Currently Used at Home shower chair;walker, rolling;commode               Discharge Plan     Row Name 06/01/22 1131       Plan    Plan Spoke with patient in room. Patient lives alone but has recently been staying with her daughter. Has PCP and Rx drug plan. May need home oxygen if qualifies. Plans to continue to stay with daughter.  Will continue to follow for discharge needs.              Continued Care and Services - Admitted Since 5/31/2022    Coordination has not been started for this encounter.       Expected Discharge Date and Time     Expected Discharge Date Expected Discharge Time    Jun 2, 2022          Demographic Summary    No documentation.                Functional Status    No documentation.                Psychosocial    No documentation.                Abuse/Neglect    No documentation.                Legal    No documentation.                Substance Abuse    No documentation.                Patient Forms    No documentation.                   Merlina A Fletcher, RN

## 2022-06-02 ENCOUNTER — READMISSION MANAGEMENT (OUTPATIENT)
Dept: CALL CENTER | Facility: HOSPITAL | Age: 62
End: 2022-06-02

## 2022-06-02 VITALS
OXYGEN SATURATION: 93 % | HEART RATE: 95 BPM | DIASTOLIC BLOOD PRESSURE: 78 MMHG | BODY MASS INDEX: 45.79 KG/M2 | WEIGHT: 274.8 LBS | RESPIRATION RATE: 18 BRPM | HEIGHT: 65 IN | SYSTOLIC BLOOD PRESSURE: 125 MMHG | TEMPERATURE: 97.5 F

## 2022-06-02 LAB
ANION GAP SERPL CALCULATED.3IONS-SCNC: 8 MMOL/L (ref 5–15)
BUN SERPL-MCNC: 19 MG/DL (ref 8–23)
BUN/CREAT SERPL: 30.2 (ref 7–25)
CALCIUM SPEC-SCNC: 9.8 MG/DL (ref 8.6–10.5)
CHLORIDE SERPL-SCNC: 97 MMOL/L (ref 98–107)
CO2 SERPL-SCNC: 32 MMOL/L (ref 22–29)
CREAT SERPL-MCNC: 0.63 MG/DL (ref 0.57–1)
EGFRCR SERPLBLD CKD-EPI 2021: 99.8 ML/MIN/1.73
GLUCOSE BLDC GLUCOMTR-MCNC: 104 MG/DL (ref 70–130)
GLUCOSE BLDC GLUCOMTR-MCNC: 154 MG/DL (ref 70–130)
GLUCOSE SERPL-MCNC: 183 MG/DL (ref 65–99)
MAGNESIUM SERPL-MCNC: 2.2 MG/DL (ref 1.6–2.4)
POTASSIUM SERPL-SCNC: 5.1 MMOL/L (ref 3.5–5.2)
SODIUM SERPL-SCNC: 137 MMOL/L (ref 136–145)

## 2022-06-02 PROCEDURE — 80048 BASIC METABOLIC PNL TOTAL CA: CPT | Performed by: INTERNAL MEDICINE

## 2022-06-02 PROCEDURE — 63710000001 INSULIN LISPRO (HUMAN) PER 5 UNITS: Performed by: INTERNAL MEDICINE

## 2022-06-02 PROCEDURE — 94799 UNLISTED PULMONARY SVC/PX: CPT

## 2022-06-02 PROCEDURE — 82962 GLUCOSE BLOOD TEST: CPT

## 2022-06-02 PROCEDURE — 94664 DEMO&/EVAL PT USE INHALER: CPT

## 2022-06-02 PROCEDURE — 83735 ASSAY OF MAGNESIUM: CPT | Performed by: INTERNAL MEDICINE

## 2022-06-02 RX ORDER — VARENICLINE TARTRATE 0.5 MG/1
0.5 TABLET, FILM COATED ORAL 2 TIMES DAILY
Qty: 11 TABLET | Refills: 0 | Status: SHIPPED | OUTPATIENT
Start: 2022-06-02 | End: 2022-07-02

## 2022-06-02 RX ADMIN — Medication 10 ML: at 08:35

## 2022-06-02 RX ADMIN — APIXABAN 5 MG: 5 TABLET, FILM COATED ORAL at 08:34

## 2022-06-02 RX ADMIN — METOPROLOL TARTRATE 50 MG: 50 TABLET, FILM COATED ORAL at 08:34

## 2022-06-02 RX ADMIN — VARENICLINE TARTRATE 0.5 MG: 0.5 TABLET, FILM COATED ORAL at 08:34

## 2022-06-02 RX ADMIN — BUDESONIDE AND FORMOTEROL FUMARATE DIHYDRATE 2 PUFF: 160; 4.5 AEROSOL RESPIRATORY (INHALATION) at 06:40

## 2022-06-02 RX ADMIN — FUROSEMIDE 20 MG: 20 TABLET ORAL at 08:34

## 2022-06-02 RX ADMIN — GABAPENTIN 300 MG: 300 CAPSULE ORAL at 08:34

## 2022-06-02 RX ADMIN — EMPAGLIFLOZIN 25 MG: 25 TABLET, FILM COATED ORAL at 08:34

## 2022-06-02 RX ADMIN — INSULIN LISPRO 3 UNITS: 100 INJECTION, SOLUTION INTRAVENOUS; SUBCUTANEOUS at 08:33

## 2022-06-02 RX ADMIN — NICOTINE 1 PATCH: 14 PATCH, EXTENDED RELEASE TRANSDERMAL at 08:36

## 2022-06-02 RX ADMIN — DILTIAZEM HYDROCHLORIDE 120 MG: 120 CAPSULE, COATED, EXTENDED RELEASE ORAL at 08:34

## 2022-06-02 NOTE — PROGRESS NOTES
[facility] Medicine Services  INPATIENT PROGRESS NOTE    Patient Name: Claudette Keeling  Date of Admission: 5/31/2022  Today's Date: 06/01/22  Length of Stay: 1  Primary Care Physician: Reymundo Torrez MD    Subjective   Chief Complaint: shortness of breath  HPI     Patient was seen and examined at bedside.  Patient doing a lot better.  PAitent has no complaints.  Patient would like to use meds-to-beds.  Patient would like to go home tomorrow.  Diuresed well.  Rates much better.        Intake/Output Summary (Last 24 hours) at 6/1/2022 2133  Last data filed at 6/1/2022 2026  Gross per 24 hour   Intake --   Output 5600 ml   Net -5600 ml             Review of Systems   Constitutional: Negative for activity change, fatigue and fever.   Respiratory: Negative for cough and shortness of breath.    Cardiovascular: Positive for leg swelling. Negative for chest pain and palpitations.        All pertinent negatives and positives are as above. All other systems have been reviewed and are negative unless otherwise stated.     Objective    Temp:  [97.4 °F (36.3 °C)-98 °F (36.7 °C)] 97.9 °F (36.6 °C)  Heart Rate:  [] 75  Resp:  [16-18] 16  BP: (110-146)/(66-86) 146/86  Physical Exam  Vitals and nursing note reviewed.   Constitutional:       Appearance: Normal appearance.   HENT:      Head: Normocephalic and atraumatic.      Mouth/Throat:      Mouth: Mucous membranes are moist.      Pharynx: Oropharynx is clear.   Eyes:      General: No scleral icterus.     Conjunctiva/sclera: Conjunctivae normal.   Cardiovascular:      Rate and Rhythm: Normal rate. Rhythm irregular.      Heart sounds: No murmur heard.  Pulmonary:      Effort: Pulmonary effort is normal.      Breath sounds: Normal breath sounds.   Abdominal:      General: Abdomen is flat. Bowel sounds are normal.      Palpations: Abdomen is soft.   Musculoskeletal:      Right lower leg: Edema (improved) present.      Left lower leg:  Edema (improved) present.   Skin:     General: Skin is warm and dry.      Coloration: Skin is not pale.   Neurological:      General: No focal deficit present.      Mental Status: She is alert and oriented to person, place, and time.   Psychiatric:         Mood and Affect: Mood normal.         Behavior: Behavior normal.             Results Review:  I have reviewed the labs, radiology results, and diagnostic studies.    Laboratory Data:   Results from last 7 days   Lab Units 06/01/22  0611 05/31/22  0646   WBC 10*3/mm3 6.55 7.02   HEMOGLOBIN g/dL 15.5 15.8   HEMATOCRIT % 50.7* 49.8*   PLATELETS 10*3/mm3 108* 122*        Results from last 7 days   Lab Units 06/01/22  0611 05/31/22  0646   SODIUM mmol/L 138 138   POTASSIUM mmol/L 4.4 4.6   CHLORIDE mmol/L 99 102   CO2 mmol/L 32.0* 27.0   BUN mg/dL 14 17   CREATININE mg/dL 0.60 0.56*   CALCIUM mg/dL 9.5 9.0   BILIRUBIN mg/dL 0.8 0.5   ALK PHOS U/L 52 56   ALT (SGPT) U/L 17 16   AST (SGOT) U/L 14 14   GLUCOSE mg/dL 133* 173*       Culture Data:   Blood Culture   Date Value Ref Range Status   05/31/2022 No growth at 24 hours  Preliminary   05/31/2022 No growth at 24 hours  Preliminary       Radiology Data:   Imaging Results (Last 24 Hours)     ** No results found for the last 24 hours. **          I have reviewed the patient's current medications.     Assessment/Plan     Active Hospital Problems    Diagnosis    • Atrial fibrillation with rapid ventricular response (HCC)    • Acute on chronic diastolic CHF (congestive heart failure) (HCC)    • Polycythemia    • Tobacco abuse    • Emphysema lung (HCC)    • Type 2 diabetes mellitus with hyperglycemia, without long-term current use of insulin ([facility])        Plan:  Patient admitted by Dr. Whaley on 5/31 due to shortness of breath and atrial fibrillation with RVR.  Patient was found to be in decompensated diastolic heart failure (HFpEF) likely due to her atrial fibrillation with RVR.  Patient was recently discharged for similar  issues and was not discharged on diuretics.    Patient was placed on a cardizem gtt and PO cardizem was started.  Drip has since been titrated off and tolerating PO.  Will switch to long-acting at time of discharge.  Continue eliquis for stroke prevention.    Patient was found to have acute pulmonary edema and lower extremity edemea.  Suspect she has HFpEF due to her poorly controlled atrial fibrillation.  Patient was diuresed with IV furosemide and will transition to PO at time of discharge.  Patient not previously on diuretics at home.    Previous echo reviewed,  Results for orders placed during the hospital encounter of 05/09/22    Adult Transthoracic Echo Complete With Contrast if Necessary Per Protocol    Interpretation Summary  · Left ventricular ejection fraction appears to be 61 - 65%. Left ventricular systolic function is normal.  · Left ventricular wall thickness is consistent with moderate concentric hypertrophy.  · Left ventricular diastolic dysfunction is noted.  · Normal right ventricular cavity size and systolic function noted.  · There is mild biatrial enlargement.  · There is no significant (greater than mild) valvular dysfunction.  · Estimated right ventricular systolic pressure from tricuspid regurgitation is normal (<35 mmHg).    Daily weights.  Strict I/O.  Net negative >5.5 L thus far.    apixban will serve for VTE PPx as well    Home medications reviewed and resume as necessary.    Walk ox in AM    AM labs              Discharge Planning: I expect the patient to be discharged to home in 1-2 days.    Electronically signed by Alf Whaley MD, 06/01/22, 21:31 CDT.

## 2022-06-02 NOTE — PLAN OF CARE
Goal Outcome Evaluation:  Plan of Care Reviewed With: patient           Outcome Evaluation: VSS. AF  on tele. C/o of pain below left shoulder blade, but declined pain medication. Sats WNL on 2L NC. Up ad izaiah. Patient has been able to sleep majority of the night. Safety maintained.

## 2022-06-02 NOTE — PROGRESS NOTES
Exercise Oximetry    Patient Name:Claudette Keeling   MRN: [id number]   Date: 06/02/22             ROOM AIR BASELINE   SpO2% 88   Heart Rate    Blood Pressure 111     EXERCISE ON ROOM AIR SpO2% EXERCISE ON O2 @  2LPM SpO2%   1 MINUTE  1 MINUTE 89   2 MINUTES  2 MINUTES 92   3 MINUTES  3 MINUTES 93   4 MINUTES  4 MINUTES 92   5 MINUTES  5 MINUTES    6 MINUTES  6 MINUTES               Distance Walked  Was at rest in chair  Distance Walked ambulated in room   Dyspnea (Boyd Scale)   Dyspnea (Boyd Scale)   Fatigue (Boyd Scale)   Fatigue (Boyd Scale)   SpO2% Post Exercise   SpO2% Post Exercise   HR Post Exercise   HR Post Exercise  135   Time to Recovery   Time to Recovery     Comments:  RECOMMENDATION FOR 2LPM NC 24/7

## 2022-06-02 NOTE — PROGRESS NOTES
Continued Stay Note  [facility]     Patient Name: Claudette Keeling  MRN: [id number]  Today's Date: 6/2/2022    Admit Date: 5/31/2022     Discharge Plan     Row Name 06/02/22 0849       Plan    Plan Home Oxygen    Patient/Family in Agreement with Plan yes    Provided Post Acute Provider List? Yes    Post Acute Provider List DME Supplier    Delivered To Patient    Method of Delivery In person    Final Discharge Disposition Code 01 - home or self-care    Final Note Patient is discharged home today with orders for home oxygen and patient did qualify. Spoke to patient to provide oxygen suppliers and she would like to use [employer]. SW provided referral to Hannah with [employer] and she confirmed portable tank will be delivered to room shortly. Patient denies any other dc planning needs.                       Expected Discharge Date and Time     Expected Discharge Date Expected Discharge Time    Jun 2, 2022             AARON Marin

## 2022-06-02 NOTE — OUTREACH NOTE
AMI Week 1 Survey    Flowsheet Row Responses   [facility] facility patient discharged from? [place]   Does the patient have one of the following disease processes/diagnoses(primary or secondary)? Other   Discharge diagnosis shortness of breath, palpiations          KAREN TATUM - Registered Nurse

## 2022-06-02 NOTE — DISCHARGE SUMMARY
[facility] Medicine Services  DISCHARGE SUMMARY       Date of Admission: 5/31/2022  Date of Discharge:  6/2/2022  Primary Care Physician: Reymundo Torrez MD    Presenting Problem/History of Present Illness:  shortness of breath, palpiations    Final Discharge Diagnoses:  Active Hospital Problems    Diagnosis    • Atrial fibrillation with rapid ventricular response (HCC)    • Acute on chronic diastolic CHF (congestive heart failure) (HCC)    • Polycythemia    • Tobacco abuse    • Emphysema lung (HCC)    • Type 2 diabetes mellitus with hyperglycemia, without long-term current use of insulin ([facility])        Consults: None    Procedures Performed: None    Pertinent Test Results:   Results for orders placed during the hospital encounter of 05/09/22    Adult Transthoracic Echo Complete With Contrast if Necessary Per Protocol    Interpretation Summary  · Left ventricular ejection fraction appears to be 61 - 65%. Left ventricular systolic function is normal.  · Left ventricular wall thickness is consistent with moderate concentric hypertrophy.  · Left ventricular diastolic dysfunction is noted.  · Normal right ventricular cavity size and systolic function noted.  · There is mild biatrial enlargement.  · There is no significant (greater than mild) valvular dysfunction.  · Estimated right ventricular systolic pressure from tricuspid regurgitation is normal (<35 mmHg).      Imaging Results (All)     Procedure Component Value Units Date/Time    US Venous Doppler Lower Extremity Left (duplex) [[id number]] Collected: 05/31/22 1120     Updated: 05/31/22 1126    Narrative:      EXAMINATION: US VENOUS DOPPLER LOWER EXTREMITY LEFT (DUPLEX)-     5/31/2022 8:33 AM CDT     HISTORY: sweling     The color flow Doppler waveform analysis of the deep veins of the left  lower extremity is performed. There is no previous study for comparison.     The common femoral vein, superficial femoral vein, the  popliteal vein,  posterior tibial and anterior tibial veins and peroneal veins are  examined.     All the above mentioned veins are patent with no evidence of a thrombus.  They are normally compressible. Normal signal and augmentation after  distal compression. No reflux after proximal compression.       Impression:      1. No evidence of a deep vein thrombosis in the left lower extremity.  This report was finalized on 05/31/2022 11:23 by Dr. Chase Moreau MD.    CT Angiogram Chest [[id number]] Collected: 05/31/22 0922     Updated: 05/31/22 0929    Narrative:      EXAMINATION: CT ANGIOGRAM CHEST-      5/31/2022 8:58 AM CDT     HISTORY: Pulmonary embolism (PE) suspected, unknown D-dimer.  Atrial fibrillation and COPD. Shortness of breath for 2 days. Upcoming  cardiologist appointment.     In order to have a CT radiation dose as low as reasonably achievable  Automated Exposure Control was utilized for adjustment of the mA and/or  KV according to patient size.     DLP in mGycm= 648.     CT angiogram chest.  CT angiography protocol.   CT imaging with bolus IV contrast injection.   Under concurrent supervision axial, sagittal, coronal, and  three-dimensional data sets were constructed.     Cardiomegaly.  Normal-size thoracic aorta.     Mildly enlarged though symmetric and normally opacified pulmonary  arteries.  No pulmonary embolism.     No mediastinal mass.     Interstitial lung disease has the appearance of interstitial edema.  There is also a small amount of right pleural fluid and there is right  basilar infiltrate for which atelectasis is favored over pneumonia.     No pneumothorax.     No acute abnormality is seen within the upper abdomen.     Summary:  1. No pulmonary embolism.  2. Cardiomegaly with interstitial edema and small right pleural  effusion. Early failure change is favored.                                   This report was finalized on 05/31/2022 09:26 by Dr. Georgi Hurley MD.    XR Chest 1 View  [[id number]] Collected: 05/31/22 0717     Updated: 05/31/22 0721    Narrative:      EXAMINATION: XR CHEST 1 VW-     5/31/2022 6:36 AM CDT     HISTORY: Short of breath.     One view chest x-ray.     Comparison is made with May 9, 2022.     Heart size is magnified by the portable projection.  The mediastinum is normal.     Interstitial lung disease with a few granulomas.     There is increased interstitial infiltrate or edema to right lower lobe.  Developing pneumonia is suspected.     There is no pneumothorax.     Summary:  1. Right lower lobe infiltrate for which pneumonia is favored.     This report was finalized on 05/31/2022 07:18 by Dr. Georgi Hurley MD.        LAB RESULTS:      Lab 06/01/22  0611 05/31/22 0646   WBC 6.55 7.02   HEMOGLOBIN 15.5 15.8   HEMATOCRIT 50.7* 49.8*   PLATELETS 108* 122*   NEUTROS ABS 5.10 5.31   LYMPHS ABS 0.83 1.15   MONOS ABS 0.46 0.43   EOS ABS 0.10 0.09   .6* 98.0*   LACTATE  --  0.8   PROTIME  --  17.1*   D DIMER QUANT  --  1.65*         Lab 06/01/22  0611 05/31/22 0646   SODIUM 138 138   POTASSIUM 4.4 4.6   CHLORIDE 99 102   CO2 32.0* 27.0   ANION GAP 7.0 9.0   BUN 14 17   CREATININE 0.60 0.56*   EGFR 101.0 102.7   GLUCOSE 133* 173*   CALCIUM 9.5 9.0   MAGNESIUM 1.9  --    PHOSPHORUS 4.1  --    HEMOGLOBIN A1C 7.30*  --    TSH 1.820  --          Lab 06/01/22  0611 05/31/22 0646   TOTAL PROTEIN 6.5 6.4   ALBUMIN 4.00 4.10   GLOBULIN 2.5 2.3   ALT (SGPT) 17 16   AST (SGOT) 14 14   BILIRUBIN 0.8 0.5   ALK PHOS 52 56         Lab 05/31/22  0646   PROBNP 1,148.0*   TROPONIN T <0.010   PROTIME 17.1*   INR 1.45*                 Brief Urine Lab Results     None        Microbiology Results (last 10 days)     Procedure Component Value - Date/Time    Blood Culture - Blood, Arm, Right [[id number]]  (Normal) Collected: 05/31/22 0700    Lab Status: Preliminary result Specimen: Blood from Arm, Right Updated: 06/02/22 0715     Blood Culture No growth at 2 days    COVID-19,[employer]  IN-HOUSE,Nasal Swab No Transport Media 3-4 HR TAT - Swab, Nasal Cavity [[id number]]  (Normal) Collected: 05/31/22 0646    Lab Status: Final result Specimen: Swab from Nasal Cavity Updated: 05/31/22 0735     COVID19 Not Detected    Narrative:      Fact sheet for providers: https://www.fda.gov/media/439864/download     Fact sheet for patients: https://www.fda.gov/media/083889/download    Test performed by PCR.    Consider negative results in combination with clinical observations, patient history, and epidemiological information.  Fact sheet for providers: https://www.fda.gov/media/567805/download     Fact sheet for patients: https://www.[employer].gov/media/375308/download    Test performed by PCR.    Consider negative results in combination with clinical observations, patient history, and epidemiological information.    Blood Culture - Blood, Arm, Left [[id number]]  (Normal) Collected: 05/31/22 0645    Lab Status: Preliminary result Specimen: Blood from Arm, Left Updated: 06/02/22 0701     Blood Culture No growth at 2 days          Hospital Course:     Patient admitted by Dr. Whaley on 5/31 due to shortness of breath and atrial fibrillation with RVR.  Patient was found to be in decompensated diastolic heart failure (HFpEF) likely due to her atrial fibrillation with RVR.  Patient was recently discharged for similar issues and was not discharged on diuretics.     Patient was placed on a cardizem gtt and PO cardizem was started.  Drip has since been titrated off and tolerating PO.  Will switch to long-acting at time of discharge.  Continue eliquis for stroke prevention.     Patient was found to have acute pulmonary edema and lower extremity edemea.  Suspect she has HFpEF due to her poorly controlled atrial fibrillation.  Patient was diuresed with IV furosemide and will transition to PO at time of discharge.  Patient not previously on diuretics at home.     Previous echo reviewed,  Results for orders placed during the hospital  "encounter of 05/09/22     Adult Transthoracic Echo Complete With Contrast if Necessary Per Protocol     Interpretation Summary  · Left ventricular ejection fraction appears to be 61 - 65%. Left ventricular systolic function is normal.  · Left ventricular wall thickness is consistent with moderate concentric hypertrophy.  · Left ventricular diastolic dysfunction is noted.  · Normal right ventricular cavity size and systolic function noted.  · There is mild biatrial enlargement.  · There is no significant (greater than mild) valvular dysfunction.  · Estimated right ventricular systolic pressure from tricuspid regurgitation is normal (<35 mmHg).     Daily weights.  Strict I/O.  Net negative >5.5 L thus far.     beatrice will serve for VTE PPx as well     Home medications reviewed and resume as necessary.      Exercise Oximetry     Patient Name:Claudette Keeling   MRN: [id number]   Date: 06/02/22                                                                                                     ROOM AIR BASELINE   SpO2% 88   Heart Rate    Blood Pressure 111      EXERCISE ON ROOM AIR SpO2% EXERCISE ON O2 @  2LPM SpO2%   1 MINUTE   1 MINUTE 89   2 MINUTES   2 MINUTES 92   3 MINUTES   3 MINUTES 93   4 MINUTES   4 MINUTES 92   5 MINUTES   5 MINUTES     6 MINUTES   6 MINUTES                                                                                                                   Distance Walked  Was at rest in chair  Distance Walked ambulated in room   Dyspnea (Boyd Scale)   Dyspnea (Boyd Scale)   Fatigue (Boyd Scale)   Fatigue (Boyd Scale)   SpO2% Post Exercise   SpO2% Post Exercise   HR Post Exercise   HR Post Exercise  135   Time to Recovery   Time to Recovery      Comments:  RECOMMENDATION FOR 2LPM NC 24/7      Physical Exam on Discharge:  /79 (BP Location: Right arm, Patient Position: Sitting)   Pulse 93   Temp 97.6 °F (36.4 °C) (Oral)   Resp 18   Ht 165.1 cm (65\")   Wt 125 kg (274 lb 12.8 oz)   SpO2 " 92%   BMI 45.73 kg/m²   Physical Exam  Vitals and nursing note reviewed.   Constitutional:       Appearance: Normal appearance.   HENT:      Head: Normocephalic and atraumatic.      Mouth/Throat:      Mouth: Mucous membranes are moist.      Pharynx: Oropharynx is clear.   Eyes:      General: No scleral icterus.     Conjunctiva/sclera: Conjunctivae normal.   Cardiovascular:      Rate and Rhythm: Normal rate. Rhythm irregular.      Heart sounds: No murmur heard.  Pulmonary:      Effort: Pulmonary effort is normal.      Breath sounds: Normal breath sounds.   Abdominal:      General: Abdomen is flat. Bowel sounds are normal.      Palpations: Abdomen is soft.   Musculoskeletal:      Right lower leg: Edema (improved) present.      Left lower leg: Edema (improved) present.   Skin:     General: Skin is warm and dry.      Coloration: Skin is not pale.   Neurological:      General: No focal deficit present.      Mental Status: She is alert and oriented to person, place, and time.   Psychiatric:         Mood and Affect: Mood normal.         Behavior: Behavior normal.     Condition on Discharge: Stable    Discharge Disposition:  Home or Self Care    Discharge Medications:     Discharge Medications      New Medications      Instructions Start Date   furosemide 20 MG tablet  Commonly known as: LASIX   20 mg, Oral, Daily, If gain 2 lbs in 24 hours or an accumulative 5 lbs, take an additional dose in the afternoon.         Continue These Medications      Instructions Start Date   apixaban 5 MG tablet tablet  Commonly known as: ELIQUIS   5 mg, Oral, Every 12 Hours Scheduled      dilTIAZem  MG 24 hr capsule  Commonly known as: CARDIZEM CD   120 mg, Oral, Every 24 Hours Scheduled      empagliflozin 25 MG tablet tablet  Commonly known as: JARDIANCE   25 mg, Oral, Daily      famotidine 20 MG tablet  Commonly known as: Pepcid   20 mg, Oral, 2 Times Daily PRN      gabapentin 300 MG capsule  Commonly known as: NEURONTIN   300 mg,  Oral, 3 Times Daily      Glycopyrrolate-Formoterol 9-4.8 MCG/ACT aerosol   1 puff, Inhalation, 2 Times Daily      lisinopril 10 MG tablet  Commonly known as: PRINIVIL,ZESTRIL   10 mg, Oral, Daily      metFORMIN  MG 24 hr tablet  Commonly known as: GLUCOPHAGE-XR   1,000 mg, Oral, Daily With Breakfast      metoprolol tartrate 100 MG tablet  Commonly known as: LOPRESSOR   50 mg, Oral, Every 12 Hours Scheduled, States takes half a 100mg tablet.      rosuvastatin 5 MG tablet  Commonly known as: Crestor   5 mg, Oral, Daily             Discharge Diet:   Diet Instructions     Diet: Consistent Carbohydrate, Cardiac; Thin      Discharge Diet:  Consistent Carbohydrate  Cardiac       Fluid Consistency: Thin          Activity at Discharge:     Follow-up Appointments:   Future Appointments   Date Time Provider Department Center   6/6/2022  9:00 AM Paul Coleman APRN [facility]       Test Results Pending at Discharge: None    Electronically signed by Alf Whaley MD, 06/02/22, 08:05 CDT.    Time: 35 minutes.

## 2022-06-05 LAB
BACTERIA SPEC AEROBE CULT: NORMAL
BACTERIA SPEC AEROBE CULT: NORMAL

## 2022-06-06 ENCOUNTER — OFFICE VISIT (OUTPATIENT)
Dept: CARDIOLOGY | Facility: CLINIC | Age: 62
End: 2022-06-06

## 2022-06-06 VITALS
HEART RATE: 64 BPM | OXYGEN SATURATION: 95 % | HEIGHT: 65 IN | WEIGHT: 273 LBS | SYSTOLIC BLOOD PRESSURE: 120 MMHG | DIASTOLIC BLOOD PRESSURE: 74 MMHG | BODY MASS INDEX: 45.48 KG/M2

## 2022-06-06 DIAGNOSIS — Z79.01 CHRONIC ANTICOAGULATION: ICD-10-CM

## 2022-06-06 DIAGNOSIS — J43.8 OTHER EMPHYSEMA: ICD-10-CM

## 2022-06-06 DIAGNOSIS — I50.32 CHRONIC DIASTOLIC CHF (CONGESTIVE HEART FAILURE): ICD-10-CM

## 2022-06-06 DIAGNOSIS — I48.19 PERSISTENT ATRIAL FIBRILLATION: Primary | ICD-10-CM

## 2022-06-06 DIAGNOSIS — E66.1 CLASS 3 DRUG-INDUCED OBESITY WITH SERIOUS COMORBIDITY AND BODY MASS INDEX (BMI) OF 45.0 TO 49.9 IN ADULT: ICD-10-CM

## 2022-06-06 DIAGNOSIS — E11.65 TYPE 2 DIABETES MELLITUS WITH HYPERGLYCEMIA, WITHOUT LONG-TERM CURRENT USE OF INSULIN: ICD-10-CM

## 2022-06-06 DIAGNOSIS — Z72.0 TOBACCO ABUSE: ICD-10-CM

## 2022-06-06 PROBLEM — E66.813 CLASS 3 DRUG-INDUCED OBESITY WITH SERIOUS COMORBIDITY AND BODY MASS INDEX (BMI) OF 45.0 TO 49.9 IN ADULT: Status: ACTIVE | Noted: 2022-06-06

## 2022-06-06 PROCEDURE — 99214 OFFICE O/P EST MOD 30 MIN: CPT | Performed by: NURSE PRACTITIONER

## 2022-06-06 PROCEDURE — 93000 ELECTROCARDIOGRAM COMPLETE: CPT | Performed by: NURSE PRACTITIONER

## 2022-06-06 RX ORDER — METOPROLOL TARTRATE 100 MG/1
50 TABLET ORAL EVERY 12 HOURS SCHEDULED
Status: ON HOLD
Start: 2022-06-06 | End: 2022-08-16

## 2022-06-06 RX ORDER — DILTIAZEM HYDROCHLORIDE 120 MG/1
120 CAPSULE, COATED, EXTENDED RELEASE ORAL
Qty: 30 CAPSULE | Refills: 0 | Status: SHIPPED | OUTPATIENT
Start: 2022-06-06

## 2022-06-06 RX ORDER — MULTIPLE VITAMINS W/ MINERALS TAB 9MG-400MCG
1 TAB ORAL DAILY
COMMUNITY

## 2022-06-06 RX ORDER — FUROSEMIDE 20 MG/1
20 TABLET ORAL DAILY
Qty: 45 TABLET | Refills: 0 | Status: SHIPPED | OUTPATIENT
Start: 2022-06-06 | End: 2022-07-25 | Stop reason: SDUPTHER

## 2022-06-06 NOTE — PROGRESS NOTES
Subjective:     Encounter Date: 06/06/2022      Patient ID: Claudette Keeling is a 61 y.o. female with atrial fibrillation, chronic anticoagulation, chronic diastolic congestive heart failure, type 2 diabetes, COPD/tobacco abuse and obesity.    Chief Complaint: hospital follow up  Atrial Fibrillation  Presents for follow-up visit. Symptoms include palpitations. Symptoms are negative for bradycardia, chest pain, dizziness, hemodynamic instability, hypertension, hypotension, shortness of breath, syncope, tachycardia and weakness. The symptoms have been stable. Past medical history includes atrial fibrillation and CHF. There are no medication compliance problems.   Congestive Heart Failure  Presents for follow-up visit. Associated symptoms include palpitations. Pertinent negatives include no chest pain, chest pressure, claudication, edema, fatigue, muscle weakness, nocturia, orthopnea, paroxysmal nocturnal dyspnea, shortness of breath or unexpected weight change. The symptoms have been stable. Compliance with total regimen is 51-75%. Compliance with diet is 51-75%. Compliance with exercise is 51-75%. Compliance with medications is 51-75%.     Patient presents today for management of atrial fibrillation and diastolic congestive heart failure. Patient was admitted to [facility] on 5/09/2022 after an episode of dizziness and near syncope at work. She was found to be in atrial fibrillation with rates in 130's. ER workup revealed d-dimer elevated at 1.00, Troponin <0.010 x 2, BNP 2, 383. CXR showed mild cardiomegaly with no consolidating infiltrates.,CTA chest was negative for pulmonary embolism, no pneumonia or pneumothorax. She was started on cardizem drip and metoprolol as well as oral cardizem was added to rate control patient. Echo was done that revealed LVEF 61-65%, moderate LVH, diastolic dysfunction, no significant valvular disease.  She was anticoagulated with Eliquis. She was discharged on 5/11/2022. Patient was  readmitted on 5/31/2022 due to shortness of breath. She was found to have pulmonary edema and lower extremity edema, she was diuresed with IV furosemide during admission. She was discharged home on lasix 20 mg daily.   Today she reports that her breathing is much improved with the lasix. She states that she got her scale today and will start to monitor it daily. She denies any chest pain. She states that she has some palpitations and has been monitoring her heart rate. She states that it has remained  for the most part. She states that she hasn't had any leg swelling since discharge. Patient denies any dizziness or lightheadedness since hospitalization. patient reports that she talked to the company today and they are sending her the equipment for a 3 night sleep study to be done at her house this week. She denies any bleeding issues. She follows with Dr Torrez as PCP.    Previous Cardiac Testing and Procedures:   -Echo (5/10/2022): LVEF 61-65%, moderate LVH, diastolic dysfunction, no significant valvular disease  -Hgb A1C (5/10/2022): 7.4  -Lipid panel (5/10/2022): total cholesterol 149, hdl 37, LDL 90 and triglycerides 124    The following portions of the patient's history were reviewed and updated as appropriate: allergies, current medications, past family history, past medical history, past social history, past surgical history and problem list.    Allergies   Allergen Reactions   • Codeine GI Intolerance       Current Outpatient Medications:   •  apixaban (ELIQUIS) 5 MG tablet tablet, Take 1 tablet by mouth Every 12 (Twelve) Hours. Indications: Atrial Fibrillation, Disp: 180 tablet, Rfl: 1  •  dilTIAZem CD (CARDIZEM CD) 120 MG 24 hr capsule, Take 1 capsule by mouth Daily., Disp: 30 capsule, Rfl: 0  •  empagliflozin (JARDIANCE) 25 MG tablet tablet, Take 25 mg by mouth Daily., Disp: , Rfl:   •  famotidine (Pepcid) 20 MG tablet, Take 1 tablet by mouth 2 (Two) Times a Day As Needed for Heartburn or  Indigestion., Disp: 180 tablet, Rfl: 0  •  furosemide (LASIX) 20 MG tablet, Take 1 tablet by mouth Daily. If gain 2 lbs in 24 hours or an accumulative 5 lbs, take an additional dose in the afternoon., Disp: 45 tablet, Rfl: 0  •  gabapentin (NEURONTIN) 300 MG capsule, Take 300 mg by mouth 3 (Three) Times a Day., Disp: , Rfl:   •  Glycopyrrolate-Formoterol 9-4.8 MCG/ACT aerosol, Inhale 1 puff 2 (Two) Times a Day., Disp: , Rfl:   •  lisinopril (PRINIVIL,ZESTRIL) 10 MG tablet, Take 1 tablet by mouth Daily., Disp: 90 tablet, Rfl: 0  •  metFORMIN ER (GLUCOPHAGE-XR) 500 MG 24 hr tablet, Take 2 tablets by mouth Daily With Breakfast., Disp: 180 tablet, Rfl: 1  •  metoprolol tartrate (LOPRESSOR) 100 MG tablet, Take 0.5 tablets by mouth Every 12 (Twelve) Hours. States takes half a 100mg tablet., Disp: , Rfl:   •  multivitamin with minerals tablet tablet, Take 1 tablet by mouth Daily., Disp: , Rfl:   •  O2 (OXYGEN), Inhale 2 L/min Every Night., Disp: , Rfl:   •  rosuvastatin (Crestor) 5 MG tablet, Take 1 tablet by mouth Daily., Disp: 90 tablet, Rfl: 1  •  varenicline (Chantix) 0.5 MG tablet, Take 1 tablet by mouth daily for 3 days then 1 tablet twice daily for 4 days then start 1mg tablets., Disp: 11 tablet, Rfl: 0  •  varenicline (CHANTIX) 1 MG tablet, Take 1 tablet by mouth twice daily. (Patient taking differently: Take 1 tablet by mouth twice daily.), Disp: 60 tablet, Rfl: 2  Past Medical History:   Diagnosis Date   • Arthritis    • Atrial fibrillation (HCC)    • Diabetes mellitus (HCC)    • Hypertension    • Neuropathy    • Sleep apnea      Social History     Socioeconomic History   • Marital status:    Tobacco Use   • Smoking status: Current Every Day Smoker     Packs/day: 0.50     Years: 25.00     Pack years: 12.50     Types: Cigarettes   • Smokeless tobacco: Never Used   Vaping Use   • Vaping Use: Never used   Substance and Sexual Activity   • Alcohol use: Never   • Drug use: Never       Review of Systems    Constitutional: Negative for fatigue, malaise/fatigue, unexpected weight change, weight gain and weight loss.   HENT: Negative for nosebleeds.    Cardiovascular: Positive for dyspnea on exertion (improving) and palpitations. Negative for chest pain, claudication, irregular heartbeat, leg swelling, paroxysmal nocturnal dyspnea and syncope.   Respiratory: Negative for shortness of breath.    Hematologic/Lymphatic: Bruises/bleeds easily.   Musculoskeletal: Negative for muscle weakness.   Genitourinary: Negative for hematuria and nocturia.   Neurological: Negative for dizziness and weakness.   All other systems reviewed and are negative.         Objective:     Vitals reviewed.   Constitutional:       General: Not in acute distress.     Appearance: Normal appearance. Well-developed. Morbidly obese.   Eyes:      Pupils: Pupils are equal, round, and reactive to light.   HENT:      Head: Normocephalic and atraumatic.      Nose: Nose normal.   Neck:      Vascular: No carotid bruit.   Pulmonary:      Effort: Pulmonary effort is normal. No respiratory distress.      Breath sounds: Normal breath sounds. No wheezing. No rales.   Cardiovascular:      Tachycardia present. Irregularly irregular rhythm.      Murmurs: There is no murmur.   Edema:     Peripheral edema present.     Ankle: bilateral trace edema of the ankle.  Abdominal:      General: There is no distension.      Palpations: Abdomen is soft.   Musculoskeletal: Normal range of motion.      Cervical back: Normal range of motion and neck supple. Skin:     General: Skin is warm.      Findings: No erythema or rash.   Neurological:      General: No focal deficit present.      Mental Status: Alert and oriented to person, place, and time.   Psychiatric:         Attention and Perception: Attention normal.         Mood and Affect: Mood normal.         Speech: Speech normal.         Behavior: Behavior normal.         Thought Content: Thought content normal.         Judgment:  "Judgment normal.         /74   Pulse 64   Ht 165.1 cm (65\")   Wt 124 kg (273 lb)   SpO2 95%   BMI 45.43 kg/m²       ECG 12 Lead    Date/Time: 6/6/2022 9:20 AM  Performed by: Paul Coleman APRN  Authorized by: Paul Coleman APRN   Comparison: compared with previous ECG from 5/31/2022  Similar to previous ECG  Rhythm: atrial fibrillation  Rate: tachycardic  BPM: 115              Lab Review:     Results for orders placed during the hospital encounter of 05/09/22    Adult Transthoracic Echo Complete With Contrast if Necessary Per Protocol    Interpretation Summary  · Left ventricular ejection fraction appears to be 61 - 65%. Left ventricular systolic function is normal.  · Left ventricular wall thickness is consistent with moderate concentric hypertrophy.  · Left ventricular diastolic dysfunction is noted.  · Normal right ventricular cavity size and systolic function noted.  · There is mild biatrial enlargement.  · There is no significant (greater than mild) valvular dysfunction.  · Estimated right ventricular systolic pressure from tricuspid regurgitation is normal (<35 mmHg).    I have personally reviewed echo, hospitalization notes, labs and past office notes prior to patients visit  Assessment:          Diagnosis Plan   1. Persistent atrial fibrillation (HCC)  Cardioversion External in Cardiology Department    COVID-19,APTIMA PANTHER,PAD IN-HOUSE,NP/OP/NASAL SWAB IN UTM/VTM/SALINE/LIQUID AMIES TRANSPORT MEDIA/NP WASH OR ASPIRATE, 24 HR TAT - Swab, Nasal Cavity   2. Chronic anticoagulation     3. Chronic diastolic CHF (congestive heart failure) (HCC)     4. Other emphysema (HCC)     5. Tobacco abuse     6. Type 2 diabetes mellitus with hyperglycemia, without long-term current use of insulin (HCC)     7. Class 3 drug-induced obesity with serious comorbidity and body mass index (BMI) of 45.0 to 49.9 in adult (HCC)            Plan:       1. Persistent Atrial Fibrillation: EKG today revels Atrial " Fibrillation rate of 115. Patient reports heart rate at home has been controlled the past couple of days running . Will set her up for a cardioversion to be done after 6/7/2022 (4 weeks of anticoagulation completed). Continue Cardizem and metoprolol. Patient is on Eliquis and denies any bleeding issues.    2. Anticoagulation: Patient is on Eliquis and denies any bleeding issues. Discussed importance of no missed doses for 1 month following cardioversion    3. Chronic diastolic congestive heart failure: Euvolemic in office. Patient has been taking lasix daily with much improvement. She reports that she got her scale today and will start monitoring daily weights. Reviewed signs and symptoms of CHF and what to report to office with patient. Patient is to continue lasix 20 mg daily. Discussed importance of daily weights. Recommended her weigh every morning after urinating and recording it. If she notices a weight gain of 2-3 lbs overnight/5 lbs in a week then she can take an additional Lasix 20 mg and call office. Recommended low salt diet. Discussed keeping legs elevated as much as possible when sitting throughout the day. Recommended compression stockings or ACE wraps to patient if needed for leg swelling.    4/5. COPD/tobacco abuse: Claudette Keeling  reports that she has been smoking cigarettes. She has a 12.50 pack-year smoking history. She has never used smokeless tobacco.. I have educated her on the risk of diseases from using tobacco products such as cancer, COPD and heart disease. I advised her to quit and she is not willing to quit.  I spent 3  minutes counseling the patient.     6. Type 2 diabetes: managed and followed by PCP    7. Obesity: Class 3 Severe Obesity (BMI >=40). Obesity-related health conditions include the following: hypertension and diabetes mellitus. Obesity is unchanged. BMI is is above average; BMI management plan is completed. We discussed portion control and increasing  exercise.    Current outpatient and discharge medications have been reconciled for the patient.  Reviewed by: FLORENCIA Bautista    Patient is to return to office in 4-6 weeks after cardioversion or sooner if needed

## 2022-06-07 ENCOUNTER — READMISSION MANAGEMENT (OUTPATIENT)
Dept: CALL CENTER | Facility: HOSPITAL | Age: 62
End: 2022-06-07

## 2022-06-07 NOTE — OUTREACH NOTE
Medical Week 1 Survey    Flowsheet Row Responses   [facility] patient discharged from? [place]   Does the patient have one of the following disease processes/diagnoses(primary or secondary)? Other   Week 1 attempt successful? Yes   Call start time 1336   Call end time 1338   Meds reviewed with patient/caregiver? Yes   Is the patient having any side effects they believe may be caused by any medication additions or changes? No   Does the patient have all medications ordered at discharge? Yes   Is the patient taking all medications as directed (includes completed medication regime)? Yes   Does the patient have a primary care provider?  Yes   Does the patient have an appointment with their PCP within 7 days of discharge? Greater than 7 days   Comments regarding PCP PATIENT HAS SEEN HER CARDIOLOGIST, AND STATES SHE IS GOING TO HAVE A CARDIOVERSION ON 6/14/22.    What is preventing the patient from scheduling follow up appointments within 7 days of discharge? Earlier appointment not available   Nursing Interventions Verified appointment date/time/provider   Has the patient kept scheduled appointments due by today? Yes   Has home health visited the patient within 72 hours of discharge? N/A   Psychosocial issues? No   Did the patient receive a copy of their discharge instructions? Yes   Nursing interventions Reviewed instructions with patient   What is the patient's perception of their health status since discharge? Improving   Is the patient/caregiver able to teach back signs and symptoms related to disease process for when to call PCP? Yes   Is the patient/caregiver able to teach back signs and symptoms related to disease process for when to call 911? Yes   Is the patient/caregiver able to teach back the hierarchy of who to call/visit for symptoms/problems? PCP, Specialist, Home health nurse, Urgent Care, ED, 911 Yes   If the patient is a current smoker, are they able to teach back resources for cessation? Smoking  cessation medications   Week 1 call completed? Yes          KEVIN LOVELL - Licensed Nurse

## 2022-06-10 ENCOUNTER — LAB (OUTPATIENT)
Dept: LAB | Facility: HOSPITAL | Age: 62
End: 2022-06-10

## 2022-06-10 DIAGNOSIS — I48.19 PERSISTENT ATRIAL FIBRILLATION: ICD-10-CM

## 2022-06-10 LAB — SARS-COV-2 ORF1AB RESP QL NAA+PROBE: NOT DETECTED

## 2022-06-10 PROCEDURE — U0004 COV-19 TEST NON-CDC HGH THRU: HCPCS

## 2022-06-10 PROCEDURE — C9803 HOPD COVID-19 SPEC COLLECT: HCPCS

## 2022-06-14 ENCOUNTER — HOSPITAL ENCOUNTER (OUTPATIENT)
Dept: CARDIOLOGY | Facility: HOSPITAL | Age: 62
Setting detail: HOSPITAL OUTPATIENT SURGERY
Discharge: HOME | End: 2022-06-14

## 2022-06-14 ENCOUNTER — ANESTHESIA EVENT (OUTPATIENT)
Dept: CARDIOLOGY | Facility: HOSPITAL | Age: 62
End: 2022-06-14

## 2022-06-14 ENCOUNTER — ANESTHESIA (OUTPATIENT)
Dept: CARDIOLOGY | Facility: HOSPITAL | Age: 62
End: 2022-06-14

## 2022-06-14 VITALS
BODY MASS INDEX: 41.04 KG/M2 | OXYGEN SATURATION: 93 % | HEIGHT: 68 IN | RESPIRATION RATE: 20 BRPM | HEART RATE: 64 BPM | DIASTOLIC BLOOD PRESSURE: 61 MMHG | WEIGHT: 270.8 LBS | SYSTOLIC BLOOD PRESSURE: 115 MMHG | TEMPERATURE: 96.9 F

## 2022-06-14 DIAGNOSIS — I48.19 PERSISTENT ATRIAL FIBRILLATION: ICD-10-CM

## 2022-06-14 LAB
MAXIMAL PREDICTED HEART RATE: 157 BPM
STRESS TARGET HR: 133 BPM

## 2022-06-14 PROCEDURE — 92960 CARDIOVERSION ELECTRIC EXT: CPT

## 2022-06-14 PROCEDURE — 92960 CARDIOVERSION ELECTRIC EXT: CPT | Performed by: INTERNAL MEDICINE

## 2022-06-14 PROCEDURE — 25010000002 PROPOFOL 1000 MG/100ML EMULSION: Performed by: NURSE ANESTHETIST, CERTIFIED REGISTERED

## 2022-06-14 RX ORDER — PROPOFOL 10 MG/ML
INJECTION, EMULSION INTRAVENOUS AS NEEDED
Status: DISCONTINUED | OUTPATIENT
Start: 2022-06-14 | End: 2022-06-14 | Stop reason: SURG

## 2022-06-14 RX ORDER — SODIUM CHLORIDE 0.9 % (FLUSH) 0.9 %
10 SYRINGE (ML) INJECTION EVERY 12 HOURS SCHEDULED
Status: DISCONTINUED | OUTPATIENT
Start: 2022-06-14 | End: 2022-06-15 | Stop reason: HOSPADM

## 2022-06-14 RX ORDER — SODIUM CHLORIDE, SODIUM LACTATE, POTASSIUM CHLORIDE, CALCIUM CHLORIDE 600; 310; 30; 20 MG/100ML; MG/100ML; MG/100ML; MG/100ML
INJECTION, SOLUTION INTRAVENOUS CONTINUOUS PRN
Status: DISCONTINUED | OUTPATIENT
Start: 2022-06-14 | End: 2022-06-14 | Stop reason: SURG

## 2022-06-14 RX ORDER — SODIUM CHLORIDE 0.9 % (FLUSH) 0.9 %
10 SYRINGE (ML) INJECTION AS NEEDED
Status: DISCONTINUED | OUTPATIENT
Start: 2022-06-14 | End: 2022-06-15 | Stop reason: HOSPADM

## 2022-06-14 RX ADMIN — PROPOFOL 60 MG: 10 INJECTION, EMULSION INTRAVENOUS at 09:32

## 2022-06-14 RX ADMIN — SODIUM CHLORIDE, POTASSIUM CHLORIDE, SODIUM LACTATE AND CALCIUM CHLORIDE: 600; 310; 30; 20 INJECTION, SOLUTION INTRAVENOUS at 09:35

## 2022-06-14 NOTE — ANESTHESIA POSTPROCEDURE EVALUATION
Patient: Claudette Keeling    Procedure Summary     Date: 06/14/22 Room / Location: [facility] CATH LAB    Anesthesia Start: 0932 Anesthesia Stop: 0938    Procedure: CARDIOVERSION EXTERNAL IN CARDIOLOGY DEPARTMENT Diagnosis:       Persistent atrial fibrillation (HCC)      (atrial fibrillation)    Scheduled Providers: Nando Bond MD Provider: Alf Cavazos CRNA    Anesthesia Type: MAC ASA Status: 3          Anesthesia Type: MAC    Vitals  No vitals data found for the desired time range.          Post Anesthesia Care and Evaluation    Patient location during evaluation: PACU  Patient participation: complete - patient participated  Level of consciousness: awake and awake and alert  Pain score: 0  Pain management: adequate    Airway patency: patent  Anesthetic complications: No anesthetic complications    Cardiovascular status: acceptable and stable  Respiratory status: acceptable and unassisted  Hydration status: acceptable

## 2022-06-14 NOTE — H&P
Chief Complaint: atrial fibrillation    HPI: Ms. Iverson is a 61-year-old female with significant past medical history of atrial fibrillation, chronic diastolic CHF, COPD/emphysema, tobacco abuse, diabetes mellitus type 2, polycythemia, and morbid obesity.  She was admitted in May with atrial fibrillation with RVR.      Patient Active Problem List   Diagnosis   • Dizziness   • Emphysema lung (HCC)   • Atrial fibrillation with RVR (HCC)   • Tobacco abuse   • Type 2 diabetes mellitus with hyperglycemia, without long-term current use of insulin (HCC)   • Polycythemia   • Atrial fibrillation with rapid ventricular response (HCC)   • Chronic diastolic CHF (congestive heart failure) (HCC)   • Chronic anticoagulation   • Class 3 drug-induced obesity with serious comorbidity and body mass index (BMI) of 45.0 to 49.9 in adult ([facility])       Past Medical History:   Diagnosis Date   • Arthritis    • Atrial fibrillation (HCC)    • Diabetes mellitus (HCC)    • Hypertension    • Neuropathy    • Sleep apnea      Past Surgical History:   Procedure Laterality Date   • APPENDECTOMY     • HYSTERECTOMY     • JOINT REPLACEMENT Left     knee   • TOE OSTEOPHYTE REMOVAL         The following portions of the patient's history were reviewed and updated as appropriate: allergies, current medications, past family history, past medical history, past social history, past surgical history and problem list.    Social History     Socioeconomic History   • Marital status:    Tobacco Use   • Smoking status: Current Every Day Smoker     Packs/day: 0.50     Years: 25.00     Pack years: 12.50     Types: Cigarettes   • Smokeless tobacco: Never Used   Vaping Use   • Vaping Use: Never used   Substance and Sexual Activity   • Alcohol use: Never   • Drug use: Never       Family History   Problem Relation Age of Onset   • Heart failure Mother    • Hypertension Mother    • Diabetes Mother    • Hypertension Father    • Diabetes Father    • No Known  "Problems Sister    • Diabetes Brother    • Hypertension Brother    • Heart disease Brother    • Heart attack Brother        Review of Systems: A 12 system review of systems was completed and is negative except stated in HPI.     Objective   /83 (BP Location: Right arm, Patient Position: Lying)   Pulse 99   Temp 96.9 °F (36.1 °C) (Tympanic)   Resp 22   Ht 172.7 cm (68\")   Wt 123 kg (270 lb 12.8 oz)   SpO2 90%   BMI 41.17 kg/m²     Physical Exam:  Constitutional:       Appearance: Well-developed. Morbidly obese.   HENT:      Head: Normocephalic and atraumatic.   Pulmonary:      Effort: Pulmonary effort is normal.      Breath sounds: Normal breath sounds.   Cardiovascular:      Normal rate. Irregularly irregular rhythm.   Edema:     Peripheral edema absent.   Skin:     General: Skin is warm and dry.   Neurological:      Mental Status: Alert and oriented to person, place, and time.         Lab Results   Component Value Date    TROPONINT <0.010 05/31/2022     Lab Results   Component Value Date    GLUCOSE 183 (H) 06/02/2022    CALCIUM 9.8 06/02/2022     06/02/2022    K 5.1 06/02/2022    CO2 32.0 (H) 06/02/2022    CL 97 (L) 06/02/2022    BUN 19 06/02/2022    CREATININE 0.63 06/02/2022    EGFRIFNONA 111 08/26/2018    BCR 30.2 (H) 06/02/2022    ANIONGAP 8.0 06/02/2022     Lab Results   Component Value Date    WBC 6.55 06/01/2022    HGB 15.5 06/01/2022    HCT 50.7 (H) 06/01/2022    .6 (H) 06/01/2022     (L) 06/01/2022     Lab Results   Component Value Date    CHOL 149 05/10/2022     Lab Results   Component Value Date    TRIG 124 05/10/2022     Lab Results   Component Value Date    HDL 37 (L) 05/10/2022     No components found for: LDLCALC  Lab Results   Component Value Date    LDL 90 05/10/2022     -Echo (5/10/2022): LVEF 61-65%, moderate LVH, diastolic dysfunction, no significant valvular disease  -Hgb A1C (5/10/2022): 7.4  -Lipid panel (5/10/2022): total cholesterol 149, hdl 37, LDL 90 and " triglycerides 124    Assessment:  1.  Persistent atrial fibrillation  2.  Chronic diastolic CHF  3.  Chronic anticoagulation  4.  Diabetes mellitus type 2  5.  Tobacco abuse  6.  COPD/emphysema  7.  Morbid obesity: Body mass index is 41.17 kg/m².     Plan:  -Cardioversion today

## 2022-06-14 NOTE — ANESTHESIA PREPROCEDURE EVALUATION
Anesthesia Evaluation     no history of anesthetic complications:    NPO Liquid Status: > 8 hours           Airway   Mallampati: I  TM distance: >3 FB  Neck ROM: full  No difficulty expected  Dental          Pulmonary    (+) COPD moderate, sleep apnea,   Cardiovascular   Exercise tolerance: poor (<4 METS)    (+) hypertension poorly controlled less than 2 medications, dysrhythmias Atrial Fib, CHF ,       Neuro/Psych  (+) dizziness/light headedness,    GI/Hepatic/Renal/Endo    (+) obesity, morbid obesity,  diabetes mellitus type 2 well controlled,     Musculoskeletal     Abdominal    Substance History - negative use     OB/GYN          Other   arthritis,                      Anesthesia Plan    ASA 3     MAC       Anesthetic plan, risks, benefits, and alternatives have been provided, discussed and informed consent has been obtained with: patient.        CODE STATUS:

## 2022-07-15 ENCOUNTER — OFFICE VISIT (OUTPATIENT)
Dept: CARDIOLOGY | Facility: CLINIC | Age: 62
End: 2022-07-15

## 2022-07-15 VITALS
HEIGHT: 68 IN | DIASTOLIC BLOOD PRESSURE: 64 MMHG | WEIGHT: 270 LBS | OXYGEN SATURATION: 97 % | BODY MASS INDEX: 40.92 KG/M2 | SYSTOLIC BLOOD PRESSURE: 106 MMHG | HEART RATE: 118 BPM

## 2022-07-15 DIAGNOSIS — Z72.0 TOBACCO ABUSE: ICD-10-CM

## 2022-07-15 DIAGNOSIS — I48.19 ATRIAL FIBRILLATION, PERSISTENT: Primary | ICD-10-CM

## 2022-07-15 DIAGNOSIS — Z79.01 CHRONIC ANTICOAGULATION: ICD-10-CM

## 2022-07-15 DIAGNOSIS — E66.1 CLASS 3 DRUG-INDUCED OBESITY WITH SERIOUS COMORBIDITY AND BODY MASS INDEX (BMI) OF 40.0 TO 44.9 IN ADULT: ICD-10-CM

## 2022-07-15 DIAGNOSIS — I50.32 CHRONIC DIASTOLIC CHF (CONGESTIVE HEART FAILURE): ICD-10-CM

## 2022-07-15 PROBLEM — I48.91 ATRIAL FIBRILLATION WITH RAPID VENTRICULAR RESPONSE: Status: RESOLVED | Noted: 2022-05-31 | Resolved: 2022-07-15

## 2022-07-15 PROCEDURE — 93000 ELECTROCARDIOGRAM COMPLETE: CPT | Performed by: INTERNAL MEDICINE

## 2022-07-15 PROCEDURE — 99214 OFFICE O/P EST MOD 30 MIN: CPT | Performed by: INTERNAL MEDICINE

## 2022-07-15 NOTE — PROGRESS NOTES
Reason for Visit: cardiovascular follow up.    HPI:  Claudette Keeling is a 61 y.o. female is here today for follow-up.  She was previously seen in May for atrial fibrillation with RVR during hospitalization.  She is ultimately set up for outpatient cardioversion on 6/14/2022, which successfully cardioverted her from atrial fibrillation to sinus rhythm.  She is back out of rhythm today and is mildly tachycardic.  Family reports that her typical resting heart rate is in the 60's but it increases significantly with any activity.  She has started back to work in the [employer] and is on her feet.  Overall, she reports feeling relatively well and is back to her baseline.      Previous Cardiac Testing and Procedures:  -Echo (5/10/2022): LVEF 61-65%, moderate LVH, diastolic dysfunction, no significant valvular disease  -Hgb A1C (5/10/2022): 7.4  -Lipid panel (5/10/2022): total cholesterol 149, hdl 37, LDL 90 and triglycerides 124  -Cardioversion (6/14/2022) successful cardioversion from atrial fibrillation to sinus rhythm.    Patient Active Problem List   Diagnosis   • Dizziness   • Emphysema lung ([facility])   • Atrial fibrillation, persistent ([facility])   • Tobacco abuse   • Type 2 diabetes mellitus with hyperglycemia, without long-term current use of insulin ([facility])   • Polycythemia   • Chronic diastolic CHF (congestive heart failure) ([facility])   • Chronic anticoagulation   • Class 3 drug-induced obesity with serious comorbidity and body mass index (BMI) of 40.0 to 44.9 in adult ([facility])       Social History     Tobacco Use   • Smoking status: Current Every Day Smoker     Packs/day: 0.50     Years: 25.00     Pack years: 12.50     Types: Cigarettes   • Smokeless tobacco: Never Used   Vaping Use   • Vaping Use: Never used   Substance Use Topics   • Alcohol use: Never   • Drug use: Never       Family History   Problem Relation Age of Onset   • Heart failure Mother    • Hypertension Mother    • Diabetes Mother    • Hypertension Father    •  Diabetes Father    • No Known Problems Sister    • Diabetes Brother    • Hypertension Brother    • Heart disease Brother    • Heart attack Brother        The following portions of the patient's history were reviewed and updated as appropriate: allergies, current medications, past family history, past medical history, past social history, past surgical history and problem list.      Current Outpatient Medications:   •  apixaban (ELIQUIS) 5 MG tablet tablet, Take 1 tablet by mouth Every 12 (Twelve) Hours. Indications: Atrial Fibrillation, Disp: 180 tablet, Rfl: 1  •  dilTIAZem CD (CARDIZEM CD) 120 MG 24 hr capsule, Take 1 capsule by mouth Daily., Disp: 30 capsule, Rfl: 0  •  empagliflozin (JARDIANCE) 25 MG tablet tablet, Take 25 mg by mouth Daily., Disp: , Rfl:   •  famotidine (Pepcid) 20 MG tablet, Take 1 tablet by mouth 2 (Two) Times a Day As Needed for Heartburn or Indigestion., Disp: 180 tablet, Rfl: 0  •  furosemide (LASIX) 20 MG tablet, Take 1 tablet by mouth Daily. If gain 2 lbs in 24 hours or an accumulative 5 lbs, take an additional dose in the afternoon., Disp: 45 tablet, Rfl: 0  •  gabapentin (NEURONTIN) 300 MG capsule, Take 300 mg by mouth 3 (Three) Times a Day., Disp: , Rfl:   •  Glycopyrrolate-Formoterol 9-4.8 MCG/ACT aerosol, Inhale 1 puff 2 (Two) Times a Day., Disp: , Rfl:   •  lisinopril (PRINIVIL,ZESTRIL) 10 MG tablet, Take 1 tablet by mouth Daily., Disp: 90 tablet, Rfl: 0  •  metFORMIN ER (GLUCOPHAGE-XR) 500 MG 24 hr tablet, Take 2 tablets by mouth Daily With Breakfast., Disp: 180 tablet, Rfl: 1  •  metoprolol tartrate (LOPRESSOR) 100 MG tablet, Take 0.5 tablets by mouth Every 12 (Twelve) Hours. States takes half a 100mg tablet., Disp: , Rfl:   •  multivitamin with minerals tablet tablet, Take 1 tablet by mouth Daily., Disp: , Rfl:   •  O2 (OXYGEN), Inhale 2 L/min Every Night., Disp: , Rfl:   •  rosuvastatin (Crestor) 5 MG tablet, Take 1 tablet by mouth Daily., Disp: 90 tablet, Rfl: 1  •   "varenicline (CHANTIX) 1 MG tablet, Take 1 tablet by mouth twice daily. (Patient taking differently: Take 1 tablet by mouth twice daily.), Disp: 60 tablet, Rfl: 2    Review of Systems   Constitutional: Positive for malaise/fatigue. Negative for chills and fever.   Cardiovascular: Positive for irregular heartbeat and leg swelling. Negative for chest pain and paroxysmal nocturnal dyspnea.   Respiratory: Negative for cough and shortness of breath.    Skin: Negative for rash.   Gastrointestinal: Negative for abdominal pain and heartburn.   Neurological: Negative for dizziness and numbness.       Objective   /64 (BP Location: Left arm, Patient Position: Sitting, Cuff Size: Adult)   Pulse 118   Ht 172.7 cm (67.99\")   Wt 122 kg (270 lb)   SpO2 97%   BMI 41.06 kg/m²   Constitutional:       Appearance: Well-developed.   HENT:      Head: Normocephalic and atraumatic.   Pulmonary:      Effort: Pulmonary effort is normal.      Breath sounds: Normal breath sounds.   Cardiovascular:      Tachycardia present. Irregularly irregular rhythm.      Murmurs: There is no murmur.      No gallop.   Edema:     Peripheral edema present.     Pretibial: bilateral 1+ edema of the pretibial area.     Ankle: bilateral 1+ edema of the ankle.  Skin:     General: Skin is warm and dry.   Neurological:      Mental Status: Alert and oriented to person, place, and time.         ECG 12 Lead    Date/Time: 7/15/2022 11:28 AM  Performed by: Nando Bond MD  Authorized by: Nando Bond MD   Comparison: compared with previous ECG from 6/6/2022  Similar to previous ECG  Rhythm: atrial fibrillation  Rate: tachycardic              ICD-10-CM ICD-9-CM   1. Atrial fibrillation, persistent (HCC)  I48.19 427.31   2. Chronic diastolic CHF (congestive heart failure) (HCC)  I50.32 428.32     428.0   3. Chronic anticoagulation  Z79.01 V58.61   4. Tobacco abuse  Z72.0 305.1   5. Class 3 drug-induced obesity with serious comorbidity and body mass index " (BMI) of 40.0 to 44.9 in adult ([facility])  E66.1 278.00    Z68.41 V85.41         Assessment/Plan:  1.  Persistent atrial fibrillation: Cardioversion performed on 6/14/2022.  Family reports that it lasted about a day.  Patient is back in atrial fibrillation with mild tachycardia today; however, family reports that her heart rate appears well controlled at home and only typically gets elevated with activity.  She is interested in considering further rhythm control options and elects for referral to EP.  Continue metoprolol, diltiazem, and Eliquis.    2.  Chronic diastolic CHF: Echo from 5/10/2022 showed EF of 61 to 65% with moderate LVH and diastolic dysfunction.  She appears euvolemic and stable today.  Continue furosemide.    3.  Chronic anticoagulation: Continue Eliquis.    4.  Diabetes mellitus type 2: Acceptable control with hemoglobin A1c of 7.4% on 5/10/2022.    5.  Tobacco abuse: Claudette Keeling  reports that she has been smoking cigarettes. She has a 12.50 pack-year smoking history. She has never used smokeless tobacco.. I have educated her on the risk of diseases from using tobacco products such as cancer, COPD and heart disease.  I advised her to quit and she is not willing to quit.  I spent 3  minutes counseling the patient.

## 2022-07-25 ENCOUNTER — TELEPHONE (OUTPATIENT)
Dept: CARDIOLOGY | Facility: CLINIC | Age: 62
End: 2022-07-25

## 2022-07-25 DIAGNOSIS — R60.0 BILATERAL LOWER EXTREMITY EDEMA: Primary | ICD-10-CM

## 2022-07-25 DIAGNOSIS — I50.32 CHRONIC DIASTOLIC CHF (CONGESTIVE HEART FAILURE): Primary | ICD-10-CM

## 2022-07-25 RX ORDER — FUROSEMIDE 40 MG/1
40 TABLET ORAL DAILY
Qty: 30 TABLET | Refills: 11 | Status: ON HOLD | OUTPATIENT
Start: 2022-07-25 | End: 2022-08-16

## 2022-07-25 NOTE — TELEPHONE ENCOUNTER
Pt daughter Cynthia notified. Will you please write a prescription for compression stocking pleas to send to [facility]

## 2022-07-25 NOTE — TELEPHONE ENCOUNTER
I will increase her Lasix up to 40 mg.  She can take an extra dose for several days if necessary.  Please make sure she continues to use her compression stockings daily.  This should be put on every morning and taken off before bed.  She should elevate her legs when not on her feet.  She needs to follow a low-sodium diet and minimize/avoid processed foods.

## 2022-07-25 NOTE — TELEPHONE ENCOUNTER
Caller: ROSENDO    Relationship: DAUGHTER    Best call back number: [phone]    What is the best time to reach you: ANYTIME    Who are you requesting to speak with (clinical staff, provider,  specific staff member): CLINICAL STAFF      What was the call regarding: PATIENTS LEFT FOOT HAS BEEN SWELLING FOR 8 OR 9 DAYS. COMPRESSIONS SOCKS, ELEVATION AND LASIX 20MG 2X A DAY AND ITS NOT HELPING. STILL SWOLLEN AND TOES NOW TURNING COLORS. PLEASE ADVISE ON WHAT TO DO    Do you require a callback: YES

## 2022-08-12 ENCOUNTER — OFFICE VISIT (OUTPATIENT)
Dept: CARDIOLOGY | Facility: CLINIC | Age: 62
End: 2022-08-12

## 2022-08-12 ENCOUNTER — PREP FOR SURGERY (OUTPATIENT)
Dept: OTHER | Facility: HOSPITAL | Age: 62
End: 2022-08-12

## 2022-08-12 VITALS
DIASTOLIC BLOOD PRESSURE: 68 MMHG | BODY MASS INDEX: 43.07 KG/M2 | HEIGHT: 66 IN | RESPIRATION RATE: 18 BRPM | WEIGHT: 268 LBS | HEART RATE: 163 BPM | SYSTOLIC BLOOD PRESSURE: 114 MMHG | OXYGEN SATURATION: 98 %

## 2022-08-12 DIAGNOSIS — I50.32 CHRONIC DIASTOLIC CHF (CONGESTIVE HEART FAILURE): ICD-10-CM

## 2022-08-12 DIAGNOSIS — E66.1 CLASS 3 DRUG-INDUCED OBESITY WITH SERIOUS COMORBIDITY AND BODY MASS INDEX (BMI) OF 40.0 TO 44.9 IN ADULT: ICD-10-CM

## 2022-08-12 DIAGNOSIS — I48.19 ATRIAL FIBRILLATION, PERSISTENT: Primary | ICD-10-CM

## 2022-08-12 PROCEDURE — 93000 ELECTROCARDIOGRAM COMPLETE: CPT | Performed by: STUDENT IN AN ORGANIZED HEALTH CARE EDUCATION/TRAINING PROGRAM

## 2022-08-12 PROCEDURE — 99215 OFFICE O/P EST HI 40 MIN: CPT | Performed by: STUDENT IN AN ORGANIZED HEALTH CARE EDUCATION/TRAINING PROGRAM

## 2022-08-12 RX ORDER — SODIUM CHLORIDE 0.9 % (FLUSH) 0.9 %
10 SYRINGE (ML) INJECTION AS NEEDED
Status: CANCELLED | OUTPATIENT
Start: 2022-08-12

## 2022-08-12 RX ORDER — SODIUM CHLORIDE 0.9 % (FLUSH) 0.9 %
10 SYRINGE (ML) INJECTION EVERY 12 HOURS SCHEDULED
Status: CANCELLED | OUTPATIENT
Start: 2022-08-12

## 2022-08-12 NOTE — PATIENT INSTRUCTIONS
Schedule for a cardioversion on Aug 18th and start flecainide on the same day  Work on weight loss and smoking cessation  Use the apps [employer] or [employer] to help with calorie counting  Contact PCP about smoking cessation meds  Follow up with me in 3 months time

## 2022-08-12 NOTE — PROGRESS NOTES
"Chief Complaint  Atrial Fibrillation (NP - Echo 5/2022, CV 6/2022/)    Subjective        History of Present Illness    EP History:  1.  Persistent atrial fibrillation    Cardiology history:  1.  Diastolic heart failure    Medical History:   1.  Morbid obesity, BMI 43  2.  Type 2 diabetes  3.  COPD  4.  Tobacco use disorder    Claudette Keeling is a 61 y.o. female with past medical history as above who presents to the clinic for evaluation of atrial fibrillation.  She was first diagnosed with atrial fibrillation in May of this year when she presented to the hospital with shortness of breath, dizziness in association with newly diagnosed atrial fibrillation with rapid heart rates.  She is started on diltiazem at that time for rate control which was uptitrated fairly aggressively but still had only limited effect at achieving sufficient rate control.  She then underwent direct-current cardioversion in early June however had early recurrence of atrial fibrillation.  She has never started on antiarrhythmic drug.  She states that since that time she continues to have significant shortness of breath and fatigue with even minimal exertion.  She is quite limited in activities and whenever she does almost any exertion her heart rate increases further to cause tachycardia.  She continues to smoke and is aware that she needs to lose weight.    Objective   Vital Signs:  /68 (BP Location: Right arm, Patient Position: Sitting)   Pulse (!) 163   Resp 18   Ht 167.6 cm (66\")   Wt 122 kg (268 lb)   SpO2 98%   BMI 43.26 kg/m²   Estimated body mass index is 43.26 kg/m² as calculated from the following:    Height as of this encounter: 167.6 cm (66\").    Weight as of this encounter: 122 kg (268 lb).      Physical Exam  Vitals reviewed.   Constitutional:       Appearance: She is obese. She is not toxic-appearing.   HENT:      Head: Normocephalic and atraumatic.   Cardiovascular:      Rate and Rhythm: Tachycardia present. Rhythm " irregular.      Heart sounds: Murmur heard.      Comments: Distant heart sounds  Pulmonary:      Effort: No respiratory distress.      Breath sounds: No stridor. Wheezes: Bilateral.   Abdominal:      General: There is no distension.   Musculoskeletal:         General: Swelling ( 1+ edema with hyper pigmentation suggestive of chronic venous stasis) present.   Neurological:      General: No focal deficit present.      Mental Status: She is alert. Mental status is at baseline.   Psychiatric:         Mood and Affect: Mood normal.         Judgment: Judgment normal.        Result Review :  The following data was reviewed by: Antonino Barriga MD on 08/12/2022:  CMP    CMP 5/31/22 6/1/22 6/2/22   Glucose 173 (A) 133 (A) 183 (A)   BUN 17 14 19   Creatinine 0.56 (A) 0.60 0.63   Sodium 138 138 137   Potassium 4.6 4.4 5.1   Chloride 102 99 97 (A)   Calcium 9.0 9.5 9.8   Albumin 4.10 4.00    Total Bilirubin 0.5 0.8    Alkaline Phosphatase 56 52    AST (SGOT) 14 14    ALT (SGPT) 16 17    (A) Abnormal value       Comments are available for some flowsheets but are not being displayed.           CBC    CBC 5/11/22 5/31/22 6/1/22   WBC 6.25 7.02 6.55   RBC 5.12 5.08 5.04   Hemoglobin 15.7 15.8 15.5   Hematocrit 51.3 (A) 49.8 (A) 50.7 (A)   .2 (A) 98.0 (A) 100.6 (A)   MCH 30.7 31.1 30.8   MCHC 30.6 (A) 31.7 30.6 (A)   RDW 13.8 13.7 13.8   Platelets 125 (A) 122 (A) 108 (A)   (A) Abnormal value            TSH    TSH 5/9/22 5/10/22 6/1/22   TSH 2.020 1.340 1.820           Prior ECG previously performed by Dr. Bond on 7/15/2022 was independently interpreted with the following findings:  Atrial fibrillation with RVR      ECG 12 Lead    Date/Time: 8/12/2022 5:08 PM  Performed by: Antonino Barriga MD  Authorized by: Antonino Barriga MD   Comparison: compared with previous ECG from 7/15/2022  Comparison to previous ECG: The ventricular rate is increased to 163 bpm  Rhythm: atrial fibrillation  QRS axis: right  Other findings: non-specific ST-T  wave changes    Clinical impression: abnormal EKG                Assessment and Plan   Diagnoses and all orders for this visit:    1. Atrial fibrillation, persistent (HCC) (Primary)    2. Chronic diastolic CHF (congestive heart failure) (HCC)    3. Class 3 drug-induced obesity with serious comorbidity and body mass index (BMI) of 40.0 to 44.9 in adult (HCC)    Other orders  -     ECG 12 Lead        Claudette Keeling is a 61 y.o. female with past medical history as above who presents to the clinic for evaluation of atrial fibrillation.  Despite use of rate control medications, she remains symptomatic with frequent episodes of RVR with minimal exertion.  Ultimately, this in combination with her underlying diastolic heart failure predisposes her to poor outcomes from atrial fibrillation including hospitalization, emergency department visits, volume overload, and significant symptoms.  She has previously attempted rhythm control cardioversion without an antiarrhythmic drug which was unsuccessful.  While I believe that an ablation could be a reasonable option for her to try to better maintain normal sinus rhythm, I am also concerned that with her wheezing on exam and morbid obesity, that she does represent significant risk of complications with anesthesia for this procedure.  As such, the patient and I had a long discussion about the possible treatment options for her atrial fibrillation at this time.  From a rhythm control standpoint, repeating a cardioversion with an antiarrhythmic drug will be quite reasonable to try to better maintain normal sinus rhythm.  During this time, a focus on weight loss and smoking sensation can be targeted which would make her a substantially better candidate for an ablation in the intermediate future.  Alternatively, I did discuss the risks and benefits of an ablation with her and I believe that if this arrhythmia causes more significant issues including progressive heart failure, her  frequent hospitalizations for her atrial fibrillation, then consideration of an ablation for atrial fibrillation would be reasonable with the understanding that she is at elevated risk for the procedure with uncertain overall benefit given her comorbidities.  Additionally, an ablation for atrial fibrillation is an inherently high risk procedure.  Though I do not believe that a rate control strategy is working well for her, the discussion of continued rate control was offered.      Of all these offered plans, the patient wishes to try a new antiarrhythmic drug therapy and repeat a cardioversion while she works on decreasing her overall risk from a surgical standpoint through the process of weight loss and smoking cessation.  I believe that this is quite reasonable.  For an AAD, treatment options are somewhat limited in light of her diastolic heart failure.  I discussed the potential use of dronedarone, dofetilide, and flecainide with the patient including the risks and benefits to all of these medications.  She would prefer to try flecainide for maintenance of sinus rhythm.  I did discuss with her that very little is known regarding flecainide use and diastolic heart failure, and the use of this medication would be for off label purposes.  She understands this but would still like to proceed.  I will schedule her for a cardioversion for next week and start flecainide at the time of the cardioversion.    I have discussed risks, benefits, and alternatives of a cardioversion with the patient.  Alternatives discussed include continued observation and medical management.  A cardioversion is generally not considered a high risk procedure but still has possible complications including but not limited to skin irritation, skin burns, stroke, or onset of either tachy or bradyarrhythmias at the time of the procedure.  Possibilities of recurrent arrhythmias and the possible need for additional procedures and/or medical therapy  was discussed. Questions asked were appropriately answered.  No guarantees were made or implied.   Despite this, they would still like to proceed.    Plan:  -Schedule for cardioversion  -Start flecainide at the time of the cardioversion  -Continue oral anticoagulation without interruption  -No medication changes otherwise  -Should symptoms worsen or result in hospitalization or frequent emergency room visits, a more aggressive approach with ablation may be considered  -Patient was given instruction regarding a low calorie diet   -She was additionally advised to stop smoking to further decrease her risk of respiratory complications at the time of an ablation  - Will discuss the treatment plan with Dr. Ronda LATHAM Risk: High (high risk procedure decision as above)       Follow Up   Return in about 3 months (around 11/12/2022).  Patient was given instructions and counseling regarding her condition or for health maintenance advice. Please see specific information pulled into the AVS if appropriate.     EMR Dragon/Transcription disclaimer: Much of this encounter note is an electronic transcription/translation of spoken language to printed text. The electronic translation of spoken language may permit erroneous, or at times, nonsensical words or phrases to be inadvertently transcribed; although I have reviewed the note for such errors, some may still exist.

## 2022-08-15 ENCOUNTER — HOSPITAL ENCOUNTER (INPATIENT)
Facility: HOSPITAL | Age: 62
LOS: 2 days | Discharge: HOME | End: 2022-08-17
Attending: EMERGENCY MEDICINE | Admitting: FAMILY MEDICINE

## 2022-08-15 ENCOUNTER — APPOINTMENT (OUTPATIENT)
Dept: GENERAL RADIOLOGY | Facility: HOSPITAL | Age: 62
End: 2022-08-15

## 2022-08-15 ENCOUNTER — APPOINTMENT (OUTPATIENT)
Dept: CT IMAGING | Facility: HOSPITAL | Age: 62
End: 2022-08-15

## 2022-08-15 DIAGNOSIS — I48.91 ATRIAL FIBRILLATION WITH RVR: Primary | ICD-10-CM

## 2022-08-15 PROBLEM — J18.9 LEFT LOWER LOBE PNEUMONIA: Status: ACTIVE | Noted: 2022-08-15

## 2022-08-15 PROBLEM — E66.01 OBESITY, CLASS III, BMI 40-49.9 (MORBID OBESITY): Status: ACTIVE | Noted: 2022-08-15

## 2022-08-15 PROBLEM — E66.813 OBESITY, CLASS III, BMI 40-49.9 (MORBID OBESITY): Status: ACTIVE | Noted: 2022-08-15

## 2022-08-15 PROBLEM — J96.11 CHRONIC RESPIRATORY FAILURE WITH HYPOXIA: Status: ACTIVE | Noted: 2022-08-15

## 2022-08-15 LAB
ALBUMIN SERPL-MCNC: 4.2 G/DL (ref 3.5–5.2)
ALBUMIN/GLOB SERPL: 1.5 G/DL
ALP SERPL-CCNC: 78 U/L (ref 39–117)
ALT SERPL W P-5'-P-CCNC: 44 U/L (ref 1–33)
ANION GAP SERPL CALCULATED.3IONS-SCNC: 12 MMOL/L (ref 5–15)
AST SERPL-CCNC: 18 U/L (ref 1–32)
BASOPHILS # BLD AUTO: 0.02 10*3/MM3 (ref 0–0.2)
BASOPHILS NFR BLD AUTO: 0.2 % (ref 0–1.5)
BILIRUB SERPL-MCNC: 0.5 MG/DL (ref 0–1.2)
BUN SERPL-MCNC: 25 MG/DL (ref 8–23)
BUN/CREAT SERPL: 26.3 (ref 7–25)
CALCIUM SPEC-SCNC: 9.4 MG/DL (ref 8.6–10.5)
CHLORIDE SERPL-SCNC: 97 MMOL/L (ref 98–107)
CO2 SERPL-SCNC: 28 MMOL/L (ref 22–29)
CREAT SERPL-MCNC: 0.95 MG/DL (ref 0.57–1)
D DIMER PPP FEU-MCNC: 1.01 MCGFEU/ML (ref 0–0.5)
DEPRECATED RDW RBC AUTO: 47.6 FL (ref 37–54)
EGFRCR SERPLBLD CKD-EPI 2021: 67.5 ML/MIN/1.73
EOSINOPHIL # BLD AUTO: 0.1 10*3/MM3 (ref 0–0.4)
EOSINOPHIL NFR BLD AUTO: 0.9 % (ref 0.3–6.2)
ERYTHROCYTE [DISTWIDTH] IN BLOOD BY AUTOMATED COUNT: 13.8 % (ref 12.3–15.4)
GLOBULIN UR ELPH-MCNC: 2.8 GM/DL
GLUCOSE BLDC GLUCOMTR-MCNC: 320 MG/DL (ref 70–130)
GLUCOSE SERPL-MCNC: 201 MG/DL (ref 65–99)
HCT VFR BLD AUTO: 53.6 % (ref 34–46.6)
HGB BLD-MCNC: 16.8 G/DL (ref 12–15.9)
IMM GRANULOCYTES # BLD AUTO: 0.05 10*3/MM3 (ref 0–0.05)
IMM GRANULOCYTES NFR BLD AUTO: 0.4 % (ref 0–0.5)
L PNEUMO1 AG UR QL IA: NEGATIVE
LYMPHOCYTES # BLD AUTO: 2.26 10*3/MM3 (ref 0.7–3.1)
LYMPHOCYTES NFR BLD AUTO: 19.6 % (ref 19.6–45.3)
MAGNESIUM SERPL-MCNC: 2.1 MG/DL (ref 1.6–2.4)
MCH RBC QN AUTO: 29.5 PG (ref 26.6–33)
MCHC RBC AUTO-ENTMCNC: 31.3 G/DL (ref 31.5–35.7)
MCV RBC AUTO: 94 FL (ref 79–97)
MONOCYTES # BLD AUTO: 0.72 10*3/MM3 (ref 0.1–0.9)
MONOCYTES NFR BLD AUTO: 6.2 % (ref 5–12)
MRSA DNA SPEC QL NAA+PROBE: ABNORMAL
NEUTROPHILS NFR BLD AUTO: 72.7 % (ref 42.7–76)
NEUTROPHILS NFR BLD AUTO: 8.41 10*3/MM3 (ref 1.7–7)
NRBC BLD AUTO-RTO: 0 /100 WBC (ref 0–0.2)
NT-PROBNP SERPL-MCNC: 5364 PG/ML (ref 0–900)
PLATELET # BLD AUTO: 123 10*3/MM3 (ref 140–450)
PMV BLD AUTO: 11 FL (ref 6–12)
POTASSIUM SERPL-SCNC: 4.3 MMOL/L (ref 3.5–5.2)
PROT SERPL-MCNC: 7 G/DL (ref 6–8.5)
RBC # BLD AUTO: 5.7 10*6/MM3 (ref 3.77–5.28)
S PNEUM AG SPEC QL LA: NEGATIVE
SARS-COV-2 RNA PNL SPEC NAA+PROBE: NOT DETECTED
SODIUM SERPL-SCNC: 137 MMOL/L (ref 136–145)
TROPONIN T SERPL-MCNC: <0.01 NG/ML (ref 0–0.03)
TROPONIN T SERPL-MCNC: <0.01 NG/ML (ref 0–0.03)
WBC NRBC COR # BLD: 11.56 10*3/MM3 (ref 3.4–10.8)

## 2022-08-15 PROCEDURE — 85025 COMPLETE CBC W/AUTO DIFF WBC: CPT | Performed by: EMERGENCY MEDICINE

## 2022-08-15 PROCEDURE — 36415 COLL VENOUS BLD VENIPUNCTURE: CPT | Performed by: NURSE PRACTITIONER

## 2022-08-15 PROCEDURE — 93010 ELECTROCARDIOGRAM REPORT: CPT | Performed by: INTERNAL MEDICINE

## 2022-08-15 PROCEDURE — 94799 UNLISTED PULMONARY SVC/PX: CPT

## 2022-08-15 PROCEDURE — 85379 FIBRIN DEGRADATION QUANT: CPT | Performed by: EMERGENCY MEDICINE

## 2022-08-15 PROCEDURE — 80053 COMPREHEN METABOLIC PANEL: CPT | Performed by: EMERGENCY MEDICINE

## 2022-08-15 PROCEDURE — 84484 ASSAY OF TROPONIN QUANT: CPT | Performed by: NURSE PRACTITIONER

## 2022-08-15 PROCEDURE — 71045 X-RAY EXAM CHEST 1 VIEW: CPT

## 2022-08-15 PROCEDURE — 84484 ASSAY OF TROPONIN QUANT: CPT | Performed by: EMERGENCY MEDICINE

## 2022-08-15 PROCEDURE — 94640 AIRWAY INHALATION TREATMENT: CPT

## 2022-08-15 PROCEDURE — 87641 MR-STAPH DNA AMP PROBE: CPT | Performed by: NURSE PRACTITIONER

## 2022-08-15 PROCEDURE — 87635 SARS-COV-2 COVID-19 AMP PRB: CPT | Performed by: EMERGENCY MEDICINE

## 2022-08-15 PROCEDURE — 83735 ASSAY OF MAGNESIUM: CPT | Performed by: EMERGENCY MEDICINE

## 2022-08-15 PROCEDURE — 87040 BLOOD CULTURE FOR BACTERIA: CPT | Performed by: NURSE PRACTITIONER

## 2022-08-15 PROCEDURE — 71275 CT ANGIOGRAPHY CHEST: CPT

## 2022-08-15 PROCEDURE — 25010000002 AZITHROMYCIN PER 500 MG: Performed by: NURSE PRACTITIONER

## 2022-08-15 PROCEDURE — 25010000002 MAGNESIUM SULFATE 2 GM/50ML SOLUTION: Performed by: EMERGENCY MEDICINE

## 2022-08-15 PROCEDURE — 93005 ELECTROCARDIOGRAM TRACING: CPT | Performed by: NURSE PRACTITIONER

## 2022-08-15 PROCEDURE — 25010000002 CEFTRIAXONE PER 250 MG: Performed by: NURSE PRACTITIONER

## 2022-08-15 PROCEDURE — 83880 ASSAY OF NATRIURETIC PEPTIDE: CPT | Performed by: EMERGENCY MEDICINE

## 2022-08-15 PROCEDURE — 82962 GLUCOSE BLOOD TEST: CPT

## 2022-08-15 PROCEDURE — 99285 EMERGENCY DEPT VISIT HI MDM: CPT

## 2022-08-15 PROCEDURE — 0 IOPAMIDOL PER 1 ML: Performed by: EMERGENCY MEDICINE

## 2022-08-15 PROCEDURE — 87899 AGENT NOS ASSAY W/OPTIC: CPT | Performed by: NURSE PRACTITIONER

## 2022-08-15 PROCEDURE — 87449 NOS EACH ORGANISM AG IA: CPT | Performed by: NURSE PRACTITIONER

## 2022-08-15 PROCEDURE — 63710000001 INSULIN LISPRO (HUMAN) PER 5 UNITS: Performed by: NURSE PRACTITIONER

## 2022-08-15 PROCEDURE — 25010000002 METHYLPREDNISOLONE PER 125 MG: Performed by: NURSE PRACTITIONER

## 2022-08-15 PROCEDURE — 94664 DEMO&/EVAL PT USE INHALER: CPT

## 2022-08-15 PROCEDURE — 93005 ELECTROCARDIOGRAM TRACING: CPT

## 2022-08-15 PROCEDURE — 93005 ELECTROCARDIOGRAM TRACING: CPT | Performed by: EMERGENCY MEDICINE

## 2022-08-15 RX ORDER — FAMOTIDINE 20 MG/1
20 TABLET, FILM COATED ORAL 2 TIMES DAILY PRN
Status: DISCONTINUED | OUTPATIENT
Start: 2022-08-15 | End: 2022-08-17 | Stop reason: HOSPADM

## 2022-08-15 RX ORDER — ACETAMINOPHEN 160 MG/5ML
650 SOLUTION ORAL EVERY 4 HOURS PRN
Status: DISCONTINUED | OUTPATIENT
Start: 2022-08-15 | End: 2022-08-17 | Stop reason: HOSPADM

## 2022-08-15 RX ORDER — SODIUM CHLORIDE 0.9 % (FLUSH) 0.9 %
10 SYRINGE (ML) INJECTION EVERY 12 HOURS SCHEDULED
Status: DISCONTINUED | OUTPATIENT
Start: 2022-08-15 | End: 2022-08-17 | Stop reason: HOSPADM

## 2022-08-15 RX ORDER — DILTIAZEM HYDROCHLORIDE 120 MG/1
120 CAPSULE, COATED, EXTENDED RELEASE ORAL
Status: DISCONTINUED | OUTPATIENT
Start: 2022-08-16 | End: 2022-08-17 | Stop reason: HOSPADM

## 2022-08-15 RX ORDER — INSULIN LISPRO 100 [IU]/ML
0-9 INJECTION, SOLUTION INTRAVENOUS; SUBCUTANEOUS
Status: DISCONTINUED | OUTPATIENT
Start: 2022-08-15 | End: 2022-08-17 | Stop reason: HOSPADM

## 2022-08-15 RX ORDER — DILTIAZEM HCL IN NACL,ISO-OSM 125 MG/125
5-15 PLASTIC BAG, INJECTION (ML) INTRAVENOUS
Status: DISCONTINUED | OUTPATIENT
Start: 2022-08-15 | End: 2022-08-17 | Stop reason: HOSPADM

## 2022-08-15 RX ORDER — FUROSEMIDE 40 MG/1
40 TABLET ORAL DAILY
Status: DISCONTINUED | OUTPATIENT
Start: 2022-08-16 | End: 2022-08-16

## 2022-08-15 RX ORDER — ONDANSETRON 4 MG/1
4 TABLET, FILM COATED ORAL EVERY 6 HOURS PRN
Status: DISCONTINUED | OUTPATIENT
Start: 2022-08-15 | End: 2022-08-17 | Stop reason: HOSPADM

## 2022-08-15 RX ORDER — DILTIAZEM HYDROCHLORIDE 5 MG/ML
10 INJECTION INTRAVENOUS ONCE
Status: COMPLETED | OUTPATIENT
Start: 2022-08-15 | End: 2022-08-15

## 2022-08-15 RX ORDER — ACETAMINOPHEN 650 MG/1
650 SUPPOSITORY RECTAL EVERY 4 HOURS PRN
Status: DISCONTINUED | OUTPATIENT
Start: 2022-08-15 | End: 2022-08-17 | Stop reason: HOSPADM

## 2022-08-15 RX ORDER — LISINOPRIL 10 MG/1
10 TABLET ORAL DAILY
Status: DISCONTINUED | OUTPATIENT
Start: 2022-08-16 | End: 2022-08-17 | Stop reason: HOSPADM

## 2022-08-15 RX ORDER — DEXTROSE MONOHYDRATE 25 G/50ML
25 INJECTION, SOLUTION INTRAVENOUS
Status: DISCONTINUED | OUTPATIENT
Start: 2022-08-15 | End: 2022-08-17 | Stop reason: HOSPADM

## 2022-08-15 RX ORDER — ONDANSETRON 2 MG/ML
4 INJECTION INTRAMUSCULAR; INTRAVENOUS EVERY 6 HOURS PRN
Status: DISCONTINUED | OUTPATIENT
Start: 2022-08-15 | End: 2022-08-17 | Stop reason: HOSPADM

## 2022-08-15 RX ORDER — METOPROLOL TARTRATE 100 MG/1
100 TABLET ORAL EVERY 12 HOURS SCHEDULED
Status: DISCONTINUED | OUTPATIENT
Start: 2022-08-15 | End: 2022-08-17

## 2022-08-15 RX ORDER — NYSTATIN 100000 [USP'U]/G
POWDER TOPICAL EVERY 12 HOURS SCHEDULED
Status: DISCONTINUED | OUTPATIENT
Start: 2022-08-16 | End: 2022-08-17 | Stop reason: HOSPADM

## 2022-08-15 RX ORDER — GUAIFENESIN 600 MG/1
600 TABLET, EXTENDED RELEASE ORAL EVERY 12 HOURS SCHEDULED
Status: DISCONTINUED | OUTPATIENT
Start: 2022-08-15 | End: 2022-08-17 | Stop reason: HOSPADM

## 2022-08-15 RX ORDER — SODIUM CHLORIDE 0.9 % (FLUSH) 0.9 %
10 SYRINGE (ML) INJECTION AS NEEDED
Status: DISCONTINUED | OUTPATIENT
Start: 2022-08-15 | End: 2022-08-17 | Stop reason: HOSPADM

## 2022-08-15 RX ORDER — GABAPENTIN 300 MG/1
300 CAPSULE ORAL 3 TIMES DAILY
Status: DISCONTINUED | OUTPATIENT
Start: 2022-08-15 | End: 2022-08-17 | Stop reason: HOSPADM

## 2022-08-15 RX ORDER — ACETAMINOPHEN 325 MG/1
650 TABLET ORAL EVERY 4 HOURS PRN
Status: DISCONTINUED | OUTPATIENT
Start: 2022-08-15 | End: 2022-08-17 | Stop reason: HOSPADM

## 2022-08-15 RX ORDER — ROSUVASTATIN CALCIUM 10 MG/1
5 TABLET, COATED ORAL NIGHTLY
Status: DISCONTINUED | OUTPATIENT
Start: 2022-08-16 | End: 2022-08-17 | Stop reason: HOSPADM

## 2022-08-15 RX ORDER — NICOTINE POLACRILEX 4 MG
15 LOZENGE BUCCAL
Status: DISCONTINUED | OUTPATIENT
Start: 2022-08-15 | End: 2022-08-17 | Stop reason: HOSPADM

## 2022-08-15 RX ORDER — LEVALBUTEROL INHALATION SOLUTION 1.25 MG/3ML
1.25 SOLUTION RESPIRATORY (INHALATION)
Status: DISCONTINUED | OUTPATIENT
Start: 2022-08-15 | End: 2022-08-17 | Stop reason: HOSPADM

## 2022-08-15 RX ORDER — METHYLPREDNISOLONE SODIUM SUCCINATE 125 MG/2ML
60 INJECTION, POWDER, LYOPHILIZED, FOR SOLUTION INTRAMUSCULAR; INTRAVENOUS EVERY 8 HOURS
Status: DISCONTINUED | OUTPATIENT
Start: 2022-08-15 | End: 2022-08-16

## 2022-08-15 RX ORDER — MAGNESIUM SULFATE HEPTAHYDRATE 40 MG/ML
2 INJECTION, SOLUTION INTRAVENOUS ONCE
Status: COMPLETED | OUTPATIENT
Start: 2022-08-15 | End: 2022-08-15

## 2022-08-15 RX ORDER — METOPROLOL TARTRATE 50 MG/1
50 TABLET, FILM COATED ORAL EVERY 12 HOURS SCHEDULED
Status: DISCONTINUED | OUTPATIENT
Start: 2022-08-15 | End: 2022-08-15

## 2022-08-15 RX ORDER — NICOTINE 21 MG/24HR
1 PATCH, TRANSDERMAL 24 HOURS TRANSDERMAL
Status: DISCONTINUED | OUTPATIENT
Start: 2022-08-15 | End: 2022-08-17 | Stop reason: HOSPADM

## 2022-08-15 RX ADMIN — MAGNESIUM SULFATE HEPTAHYDRATE 2 G: 2 INJECTION, SOLUTION INTRAVENOUS at 14:00

## 2022-08-15 RX ADMIN — GABAPENTIN 300 MG: 300 CAPSULE ORAL at 21:16

## 2022-08-15 RX ADMIN — METOPROLOL TARTRATE 100 MG: 100 TABLET ORAL at 18:30

## 2022-08-15 RX ADMIN — LEVALBUTEROL 1.25 MG: 1.25 SOLUTION RESPIRATORY (INHALATION) at 16:21

## 2022-08-15 RX ADMIN — METHYLPREDNISOLONE SODIUM SUCCINATE 60 MG: 125 INJECTION, POWDER, FOR SOLUTION INTRAMUSCULAR; INTRAVENOUS at 23:10

## 2022-08-15 RX ADMIN — DILTIAZEM HYDROCHLORIDE 10 MG: 5 INJECTION, SOLUTION INTRAVENOUS at 12:46

## 2022-08-15 RX ADMIN — GUAIFENESIN 600 MG: 600 TABLET, EXTENDED RELEASE ORAL at 21:16

## 2022-08-15 RX ADMIN — APIXABAN 5 MG: 5 TABLET, FILM COATED ORAL at 21:54

## 2022-08-15 RX ADMIN — Medication 5 MG/HR: at 12:32

## 2022-08-15 RX ADMIN — Medication 15 MG/HR: at 19:44

## 2022-08-15 RX ADMIN — Medication 10 ML: at 21:17

## 2022-08-15 RX ADMIN — INSULIN LISPRO 7 UNITS: 100 INJECTION, SOLUTION INTRAVENOUS; SUBCUTANEOUS at 21:16

## 2022-08-15 RX ADMIN — CEFTRIAXONE 1 G: 1 INJECTION, POWDER, FOR SOLUTION INTRAMUSCULAR; INTRAVENOUS at 16:05

## 2022-08-15 RX ADMIN — AZITHROMYCIN MONOHYDRATE 500 MG: 500 INJECTION, POWDER, LYOPHILIZED, FOR SOLUTION INTRAVENOUS at 16:37

## 2022-08-15 RX ADMIN — LEVALBUTEROL 1.25 MG: 1.25 SOLUTION RESPIRATORY (INHALATION) at 19:18

## 2022-08-15 RX ADMIN — METHYLPREDNISOLONE SODIUM SUCCINATE 60 MG: 125 INJECTION, POWDER, FOR SOLUTION INTRAMUSCULAR; INTRAVENOUS at 16:05

## 2022-08-15 RX ADMIN — NICOTINE 1 PATCH: 14 PATCH, EXTENDED RELEASE TRANSDERMAL at 16:19

## 2022-08-15 RX ADMIN — IOPAMIDOL 100 ML: 755 INJECTION, SOLUTION INTRAVENOUS at 14:19

## 2022-08-15 RX ADMIN — GABAPENTIN 300 MG: 300 CAPSULE ORAL at 16:21

## 2022-08-15 RX ADMIN — DILTIAZEM HYDROCHLORIDE 10 MG: 5 INJECTION, SOLUTION INTRAVENOUS at 12:24

## 2022-08-15 NOTE — H&P
"    [facility]  HISTORY AND PHYSICAL    Date of Admission: 8/15/2022  Primary Care Physician: Reymundo Torrez MD    Subjective     Chief Complaint: Shortness of breathing    History of Present Illness  Claudette Keeling is a 61-year-old female with a past medical history of persistent atrial fibrillation-followed by [facility], [place], operated by [facility] cardiology with plans for cardioversion 8/18/2022, chronic anticoagulation, CHF diastolic, hypertension, polycythemia, ongoing tobacco dependence, type 2 diabetes, sleep apnea currently not wearing CPAP due to shortness of breathing, continuous oxygen therapy 3 L for chronic respiratory failure however patient only will wear at night.  She does continue to work daily.  Patient states she developed symptoms of shortness of breathing on Friday, 8/12/2022, symptoms continued to worsen throughout the weekend.  She started having \"cold sweats\".  She is unsure if she had a fever however due to severe shortness of breathing worse with exertion she did present to [facility] emergency department for evaluation.  Patient was found to be in A. fib RVR rate 172.  Elevated BNP, white count 11.5 with left lower lobe pneumonia noted on CT angiogram of the chest.  During assessment when having patient set up she did drop from 96% on 3 L to 94.  Wheezing noted throughout.  She does continue to smoke approximately half a pack of cigarettes per day.  She has no chest pain.  She does report painful cough worse on the left.  She has no sputum production.  She has no orthopnea or lower extremity edema.  She is admitted for further evaluation treatment.    Review of Systems   A 10 point review of systems was completed, all negative except for those discussed in HPI    Past Medical History:   Past Medical History:   Diagnosis Date   • Arthritis    • Atrial fibrillation (HCC)    • Diabetes mellitus (HCC)    • Hypertension    • Neuropathy    • Sleep apnea  "       Past Surgical History:   Past Surgical History:   Procedure Laterality Date   • APPENDECTOMY     • HYSTERECTOMY     • JOINT REPLACEMENT Left     knee   • TOE OSTEOPHYTE REMOVAL         Family History: family history includes Diabetes in her brother, father, and mother; Heart attack in her brother; Heart disease in her brother; Heart failure in her mother; Hypertension in her brother, father, and mother; No Known Problems in her sister.    Social History:  reports that she has been smoking cigarettes. She has a 12.50 pack-year smoking history. She has never used smokeless tobacco. She reports that she does not drink alcohol and does not use drugs.    Code Status: Full, if unable speak for self her daughters will speak for her      Allergies:  Allergies   Allergen Reactions   • Codeine GI Intolerance       Medications:  Prior to Admission medications    Medication Sig Start Date End Date Taking? Authorizing Provider   apixaban (ELIQUIS) 5 MG tablet tablet Take 1 tablet by mouth Every 12 (Twelve) Hours. Indications: Atrial Fibrillation 6/6/22  Yes Paul Coleman APRN   dilTIAZem CD (CARDIZEM CD) 120 MG 24 hr capsule Take 1 capsule by mouth Daily. 6/6/22  Yes Paul Coleman APRN   empagliflozin (JARDIANCE) 25 MG tablet tablet Take 25 mg by mouth Daily.   Yes Bibiana Noguera MD   famotidine (Pepcid) 20 MG tablet Take 1 tablet by mouth 2 (Two) Times a Day As Needed for Heartburn or Indigestion. 5/11/22  Yes Brad Moran MD   furosemide (LASIX) 40 MG tablet Take 1 tablet by mouth Daily. If gain 2 lbs in 24 hours or an accumulative 5 lbs, take an additional dose in the afternoon. 7/25/22  Yes Nando Bond MD   gabapentin (NEURONTIN) 300 MG capsule Take 300 mg by mouth 3 (Three) Times a Day.   Yes Bibiana Noguera MD   lisinopril (PRINIVIL,ZESTRIL) 10 MG tablet Take 1 tablet by mouth Daily. 5/12/22  Yes Brad Moran MD   metFORMIN ER (GLUCOPHAGE-XR) 500 MG 24 hr tablet Take 2 tablets by mouth  "Daily With Breakfast. 5/11/22  Yes Brad Moran MD   metoprolol tartrate (LOPRESSOR) 100 MG tablet Take 0.5 tablets by mouth Every 12 (Twelve) Hours. States takes half a 100mg tablet. 6/6/22  Yes Paul Coleman APRN   multivitamin with minerals tablet tablet Take 1 tablet by mouth Daily.   Yes ProviderBibiana MD   O2 (OXYGEN) Inhale 2 L/min Every Night.   Yes ProviderBibiana MD   rosuvastatin (Crestor) 5 MG tablet Take 1 tablet by mouth Daily. 5/11/22  Yes Brad Moran MD   Glycopyrrolate-Formoterol 9-4.8 MCG/ACT aerosol Inhale 1 puff 2 (Two) Times a Day.    Provider, MD Bibiana   SITagliptin (Januvia) 100 MG tablet Take 1 tablet by mouth Daily. 5/11/22 5/11/22  Brad Moran MD     I have utilized all available immediate resources to obtain, update, and review the patient's current medications.    Objective     BP (!) 152/124   Pulse (!) 129   Temp 98.1 °F (36.7 °C)   Resp 26   Ht 167.6 cm (66\")   Wt 121 kg (266 lb)   SpO2 96%   BMI 42.93 kg/m²   Physical Exam  Vitals reviewed.   Constitutional:       Appearance: She is ill-appearing.   HENT:      Head: Normocephalic and atraumatic.      Mouth/Throat:      Mouth: Mucous membranes are moist.      Pharynx: Oropharynx is clear.   Eyes:      Extraocular Movements: Extraocular movements intact.      Conjunctiva/sclera: Conjunctivae normal.   Neck:      Comments: Positive JVD  Cardiovascular:      Rate and Rhythm: Tachycardia present. Rhythm irregular.   Pulmonary:      Effort: Pulmonary effort is normal.      Breath sounds: Wheezing ( Scattered throughout, inspiratory and expiratory) present.   Abdominal:      Palpations: Abdomen is soft.      Comments: Protuberant   Musculoskeletal:         General: Normal range of motion.      Cervical back: Normal range of motion and neck supple.      Right lower leg: No edema.      Left lower leg: No edema.   Skin:     General: Skin is warm and dry.   Neurological:      General: No focal deficit " present.      Mental Status: She is alert and oriented to person, place, and time.   Psychiatric:         Mood and Affect: Mood normal.         Behavior: Behavior normal.       Pertinent Data:   Lab Results (last 72 hours)     Procedure Component Value Units Date/Time    Comprehensive Metabolic Panel [[id number]]  (Abnormal) Collected: 08/15/22 1213    Specimen: Blood Updated: 08/15/22 1307     Glucose 201 mg/dL      BUN 25 mg/dL      Creatinine 0.95 mg/dL      Sodium 137 mmol/L      Potassium 4.3 mmol/L      Comment: Slight hemolysis detected by analyzer. Results may be affected.        Chloride 97 mmol/L      CO2 28.0 mmol/L      Calcium 9.4 mg/dL      Total Protein 7.0 g/dL      Albumin 4.20 g/dL      ALT (SGPT) 44 U/L      AST (SGOT) 18 U/L      Comment: Slight hemolysis detected by analyzer. Results may be affected.        Alkaline Phosphatase 78 U/L      Total Bilirubin 0.5 mg/dL      Globulin 2.8 gm/dL      A/G Ratio 1.5 g/dL      BUN/Creatinine Ratio 26.3     Anion Gap 12.0 mmol/L      eGFR 67.5 mL/min/1.73     COVID-19,[facility] IN-HOUSE,Nasal Swab No Transport Media 3-4 HR TAT - Swab, Nasal Cavity [[id number]]  (Normal) Collected: 08/15/22 1216    Specimen: Swab from Nasal Cavity Updated: 08/15/22 1305     COVID19 Not Detected    BNP [[id number]]  (Abnormal) Collected: 08/15/22 1213    Specimen: Blood Updated: 08/15/22 1257     proBNP 5,364.0 pg/mL     Troponin [[id number]]  (Normal) Collected: 08/15/22 1213    Specimen: Blood Updated: 08/15/22 1256     Troponin T <0.010 ng/mL     Magnesium [[id number]]  (Normal) Collected: 08/15/22 1213    Specimen: Blood Updated: 08/15/22 1256     Magnesium 2.1 mg/dL     D-dimer, Quantitative [[id number]]  (Abnormal) Collected: 08/15/22 1213    Specimen: Blood Updated: 08/15/22 1239     D-Dimer, Quantitative 1.01 MCGFEU/mL     CBC Auto Differential [[id number]]  (Abnormal) Collected: 08/15/22 1213    Specimen: Blood Updated: 08/15/22 1236     WBC 11.56 10*3/mm3      RBC 5.70  10*6/mm3      Hemoglobin 16.8 g/dL      Hematocrit 53.6 %      MCV 94.0 fL      MCH 29.5 pg      MCHC 31.3 g/dL      RDW 13.8 %      RDW-SD 47.6 fl      MPV 11.0 fL      Platelets 123 10*3/mm3      Neutrophil % 72.7 %      Lymphocyte % 19.6 %      Monocyte % 6.2 %      Eosinophil % 0.9 %      Basophil % 0.2 %      Immature Grans % 0.4 %      Neutrophils, Absolute 8.41 10*3/mm3      Lymphocytes, Absolute 2.26 10*3/mm3      Monocytes, Absolute 0.72 10*3/mm3      Eosinophils, Absolute 0.10 10*3/mm3      Basophils, Absolute 0.02 10*3/mm3      Immature Grans, Absolute 0.05 10*3/mm3      nRBC 0.0 /100 WBC         Imaging Results (Last 24 Hours)     Procedure Component Value Units Date/Time    CT Angiogram Chest [[id number]] Collected: 08/15/22 1428     Updated: 08/15/22 1436    Narrative:      CT ANGIOGRAM CHEST- 8/15/2022 2:14 PM CDT     HISTORY: Pulmonary embolism (PE) suspected, unknown D-dimer      COMPARISON: 5/31/2022     DOSE LENGTH PRODUCT: 613 mGy cm. Automated exposure control was also  utilized to decrease patient radiation dose.     TECHNIQUE: Axial images the chest are obtained following IV contrast.  2-D and maximal intensity projection images reconstructed and reviewed     FINDINGS:  No pulmonary emboli identified. Thoracic aorta normal in  caliber. Cardiomegaly. No pericardial or pleural effusions. Calcified  subcarinal and right hilar lymph nodes with no pathologic intrathoracic  or axillary lymphadenopathy visualized.     No acute pathology identified within the visible upper abdomen.     Left infrahilar/lower lobe opacities suspicious for pneumonia. Probable  right basilar atelectasis. Mild peripheral emphysematous change of the  upper lobes. Stable linear density anterior right upper lobe image 49.  No pneumothorax. Stable small focus of air along the right  posterolateral trachea at 10 just below the level of the thyroid is  similar to prior studies and may represent tracheal or  esophageal  diverticulum, though no direct connection localized.     Degenerative change of the regional skeleton chronic mild compression  superior endplate of T4.       Impression:      1. No pulmonary emboli.  2. Left infrahilar and left lower lobe opacities suspicious for  pneumonia. No pathologic lymphadenopathy. Right basilar atelectasis.  3. Paraseptal apical emphysema. No pneumothorax. Stable collection of  air along the right posterolateral aspect of the superior trachea and  esophagus favoring diverticulum.  This report was finalized on 08/15/2022 14:33 by Dr. Irene Baptiste MD.    XR Chest 1 View [[id number]] Collected: 08/15/22 1230     Updated: 08/15/22 1237    Narrative:      EXAMINATION:  XR CHEST 1 VW-  8/15/2022 12:17 PM CDT     HISTORY: Tachycardia. Atrial fibrillation. Hypertension. Emphysema.     COMPARISON: 5/31/2022.     TECHNIQUE: Single view AP image.     FINDINGS:  There is poor inspiration. There is an external pacing leads  overlying the chest. There is cardiomegaly. There is probable blunting  of the left costophrenic angle. The left lung is difficult to evaluate.  There is minimal atelectasis in the right lung base.       Impression:      1. Poor inspiration with vascular crowding.  2. Left chest partially obscured by external pacing leads.  3. Cardiomegaly.  4. Probable small pleural effusion on the left. Atelectasis in the right  lung base.        This report was finalized on 08/15/2022 12:34 by Dr. Omega Macias MD.      5/10/2022 ECHO  Interpretation Summary    Left ventricular ejection fraction appears to be 61 - 65%. Left ventricular systolic function is normal.  Left ventricular wall thickness is consistent with moderate concentric hypertrophy.  Left ventricular diastolic dysfunction is noted.  Normal right ventricular cavity size and systolic function noted.  There is mild biatrial enlargement.  There is no significant (greater than mild) valvular dysfunction.  Estimated right  ventricular systolic pressure from tricuspid regurgitation is normal (<35 mmHg).      I have personally reviewed the ECG obtained at time of admission.     Assessment / Plan     Assessment:   Active Hospital Problems    Diagnosis    • Atrial fibrillation with RVR ([facility])    • Left lower lobe pneumonia    • Chronic respiratory failure with hypoxia ([facility])    • Obesity, Class III, BMI 40-49.9 (morbid obesity) ([facility])    • Chronic anticoagulation    • Chronic diastolic CHF (congestive heart failure) ([facility])    • Emphysema lung ([facility])    • Polycythemia    • Tobacco abuse    • Type 2 diabetes mellitus with hyperglycemia, without long-term current use of insulin ([facility])        Plan:   1.  Admit as inpatient  2.  Continue Cardizem drip, consult cardiology  3.  DVT prophylaxis with ongoing Eliquis use  4.  Home medications reviewed and restarted as appropriate  5.  Monitor glucose AC with regular insulin sliding scale coverage  6.  Xopenex nebulizer 3 times daily, supplemental oxygen-Home dose  3 L continuously however patient only wears at night, incentive  spirometry, Solu-Medrol, Mucinex, daily weights, cardiac monitoring  7.  MRSA PCR, urine studies for Legionella and strep pneumoniae  8.  Labs in a.m.  9.  Rocephin and azithromycin, obtain blood cultures prior to starting  10.  Hold IV fluids due to history of CHF  11.  Smoking cessation education at discharge, NicoDerm patch      I discussed the patient's findings and my recommendations with: Brad Moran MD    Time spent: 40 minutes    Patient seen and examined by me on 8/15/2022 at 3:13 PM.    Electronically signed by FLORENCIA Mclean, 08/15/22, 15:51 CDT.

## 2022-08-15 NOTE — ED NOTES
Pt in bed. No s/s distress noted. Denies any needs/pain/discomforts. Tele 130s-150s. POC dicussed. Will cont monitoring. Advised to call any needs. Call light in reach.

## 2022-08-15 NOTE — ED NOTES
"Pt in bed. No s/s distress noted. Denies needs. Asmt completed. Pt on continuous monitoring. Tele- 160s-170s. Pt states since Friday she has had increased SOB and chest pain only when she coughs. States\"i feel like I have fluid around my heart again.\" denies feeling fast HR admits to hx Afib. States scheduled for cardioversion on Thursday. Wears 2L/NC at all times- now at 3L/NC. States cough is non-productive. Speaks full sentences without diff. POC discussed. Verbalized understanding. Will cont monitoring. Call light in reach. Advised to call any needs.   "

## 2022-08-15 NOTE — ED PROVIDER NOTES
Subjective   Patient with a history of A. fib came into the ER with increasing shortness of breath.  She is on chronic anticoagulation and has been cardioverted in the past.      Shortness of Breath  Severity:  Moderate  Onset quality:  Gradual  Timing:  Constant  Progression:  Worsening  Chronicity:  New  Context: not activity, not animal exposure, not emotional upset, not pollens and not URI    Relieved by:  Nothing  Worsened by:  Nothing  Ineffective treatments:  None tried  Associated symptoms: no abdominal pain, no chest pain, no claudication, no cough, no diaphoresis, no ear pain, no fever, no headaches, no hemoptysis, no PND, no rash, no sore throat, no vomiting and no wheezing    Risk factors: obesity    Risk factors: no family hx of DVT and no recent surgery        Review of Systems   Constitutional: Negative.  Negative for activity change, appetite change, chills, diaphoresis, fatigue and fever.   HENT: Negative for congestion, drooling, ear pain, facial swelling, hearing loss, sinus pressure and sore throat.    Eyes: Negative.  Negative for discharge.   Respiratory: Positive for shortness of breath. Negative for cough, hemoptysis and wheezing.    Cardiovascular: Negative for chest pain, claudication and PND.   Gastrointestinal: Negative for abdominal distention, abdominal pain, blood in stool, diarrhea, nausea and vomiting.   Endocrine: Negative.  Negative for cold intolerance, heat intolerance, polydipsia, polyphagia and polyuria.   Genitourinary: Negative.  Negative for dysuria, flank pain and urgency.   Musculoskeletal: Negative.  Negative for arthralgias, back pain, myalgias and neck stiffness.   Skin: Negative.  Negative for color change, pallor and rash.   Allergic/Immunologic: Negative.    Neurological: Negative.  Negative for dizziness, seizures, speech difficulty, weakness, numbness and headaches.   Hematological: Negative.  Negative for adenopathy.   All other systems reviewed and are  negative.      Past Medical History:   Diagnosis Date   • Arthritis    • Atrial fibrillation (HCC)    • Diabetes mellitus (HCC)    • Hypertension    • Neuropathy    • Sleep apnea        Allergies   Allergen Reactions   • Codeine GI Intolerance       Past Surgical History:   Procedure Laterality Date   • APPENDECTOMY     • HYSTERECTOMY     • JOINT REPLACEMENT Left     knee   • TOE OSTEOPHYTE REMOVAL         Family History   Problem Relation Age of Onset   • Heart failure Mother    • Hypertension Mother    • Diabetes Mother    • Hypertension Father    • Diabetes Father    • No Known Problems Sister    • Diabetes Brother    • Hypertension Brother    • Heart disease Brother    • Heart attack Brother        Social History     Socioeconomic History   • Marital status:    Tobacco Use   • Smoking status: Current Every Day Smoker     Packs/day: 0.50     Years: 25.00     Pack years: 12.50     Types: Cigarettes   • Smokeless tobacco: Never Used   Vaping Use   • Vaping Use: Never used   Substance and Sexual Activity   • Alcohol use: Never   • Drug use: Never           Objective   Physical Exam  Vitals and nursing note reviewed. Exam conducted with a chaperone present.   Constitutional:       General: She is awake. She is not in acute distress.     Appearance: Normal appearance. She is well-developed. She is not toxic-appearing or diaphoretic.   HENT:      Head: Normocephalic and atraumatic.      Mouth/Throat:      Mouth: Mucous membranes are moist.      Pharynx: Oropharynx is clear.   Eyes:      General: Lids are normal. Lids are everted, no foreign bodies appreciated.      Pupils: Pupils are equal, round, and reactive to light.   Neck:      Thyroid: No thyromegaly.      Vascular: Normal carotid pulses. No carotid bruit or JVD.      Trachea: Trachea and phonation normal. No tracheal tenderness or tracheal deviation.      Meningeal: Brudzinski's sign and Kernig's sign absent.   Cardiovascular:      Rate and Rhythm:  Tachycardia present. Rhythm irregular.      Pulses: Normal pulses.      Heart sounds: Normal heart sounds. No murmur heard.  Pulmonary:      Effort: Pulmonary effort is normal. No tachypnea or respiratory distress.      Breath sounds: Normal breath sounds. No stridor.   Abdominal:      General: Abdomen is flat. Bowel sounds are normal. There is no distension.      Palpations: Abdomen is soft. There is no mass.      Tenderness: There is no abdominal tenderness. There is no guarding.   Musculoskeletal:         General: Normal range of motion.      Cervical back: Full passive range of motion without pain, normal range of motion and neck supple. No rigidity.      Right lower leg: Edema present.      Left lower leg: Edema present.   Skin:     General: Skin is warm and dry.      Capillary Refill: Capillary refill takes less than 2 seconds.      Coloration: Skin is not pale.      Nails: There is no clubbing.   Neurological:      General: No focal deficit present.      Mental Status: She is alert and oriented to person, place, and time. Mental status is at baseline. She is not disoriented.      GCS: GCS eye subscore is 4. GCS verbal subscore is 5. GCS motor subscore is 6.      Cranial Nerves: Cranial nerves are intact. No cranial nerve deficit.      Sensory: Sensation is intact. No sensory deficit.      Motor: Motor function is intact. No abnormal muscle tone.      Coordination: Coordination is intact. Coordination normal.      Deep Tendon Reflexes: Reflexes are normal and symmetric. Reflexes normal. Babinski sign absent on the right side. Babinski sign absent on the left side.      Reflex Scores:       Bicep reflexes are 2+ on the right side and 2+ on the left side.       Patellar reflexes are 2+ on the right side and 2+ on the left side.  Psychiatric:         Behavior: Behavior is cooperative.         Procedures           ED Course  ED Course as of 08/15/22 1502   Mon Aug 15, 2022   1438 1. No pulmonary emboli.  2. Left  infrahilar and left lower lobe opacities suspicious for  pneumonia. No pathologic lymphadenopathy. Right basilar atelectasis.  3. Paraseptal apical emphysema. No pneumothorax. Stable collection of  air along the right posterolateral aspect of the superior trachea and  esophagus favoring diverticulum.  This was discussed with the patient [TS]   1450 Patient clinically does not any evidence or is supportive of pneumonia but admit to the hospitalist service they were informed.  Blood pressure is still elevated getting Cardizem at this time.  We may have to readjust her medications little bit. [TS]   1457 Her last heart rate is 115 with a lot of intrinsic fluctuation on the monitor. [TS]      ED Course User Index  [TS] Minh Valenzuela MD                                           [facility]      Final diagnoses:   Atrial fibrillation with RVR (HCC)       ED Disposition  ED Disposition     ED Disposition   Decision to Admit    Condition   --    Comment   Level of Care: Telemetry [5]   Diagnosis: Atrial fibrillation with RVR (HCC) [070724]   Admitting Physician: CHLOE RODRIGUEZ [[id number]]   Attending Physician: CHLOE RODRIGUEZ [[id number]]   Certification: I Certify That Inpatient Hospital Services Are Medically Necessary For Greater Than 2 Midnights               No follow-up provider specified.       Medication List      No changes were made to your prescriptions during this visit.          Minh Valenzuela MD  08/15/22 6857

## 2022-08-15 NOTE — ED NOTES
Pt in bed. No s/s distress noted. Denies any needs/pain/discomforts. Admitting NP at bedside. Tele 130s-140s. POC dicussed. Will cont monitoring. Advised to call any needs. Call light in reach.

## 2022-08-15 NOTE — ED NOTES
Pt in bed. No s/s distress noted. Denies any needs/pain/discomforts. HR 150s-160s. Med education completed. POC dicussed. Will cont monitoring. Advised to call any needs. Call light in reach.

## 2022-08-15 NOTE — ED NOTES
Dr. Moran notified of pt HR going up to 150s. Lopressor increased. No other orders received. Okay to go to floor.

## 2022-08-15 NOTE — ED NOTES
Pt in bed. No s/s distress noted. Denies any needs/pain/discomforts. Tele- 150s-130s- rate increased to 15mg/hr as ordered. Pt has no c/o. POC dicussed. Will cont monitoring. Advised to call any needs. Call light in reach.

## 2022-08-16 LAB
ANION GAP SERPL CALCULATED.3IONS-SCNC: 12 MMOL/L (ref 5–15)
BUN SERPL-MCNC: 27 MG/DL (ref 8–23)
BUN/CREAT SERPL: 30.3 (ref 7–25)
CALCIUM SPEC-SCNC: 9.4 MG/DL (ref 8.6–10.5)
CHLORIDE SERPL-SCNC: 97 MMOL/L (ref 98–107)
CO2 SERPL-SCNC: 26 MMOL/L (ref 22–29)
CREAT SERPL-MCNC: 0.89 MG/DL (ref 0.57–1)
DEPRECATED RDW RBC AUTO: 47.6 FL (ref 37–54)
EGFRCR SERPLBLD CKD-EPI 2021: 73 ML/MIN/1.73
ERYTHROCYTE [DISTWIDTH] IN BLOOD BY AUTOMATED COUNT: 14.1 % (ref 12.3–15.4)
GLUCOSE BLDC GLUCOMTR-MCNC: 262 MG/DL (ref 70–130)
GLUCOSE BLDC GLUCOMTR-MCNC: 267 MG/DL (ref 70–130)
GLUCOSE BLDC GLUCOMTR-MCNC: 275 MG/DL (ref 70–130)
GLUCOSE SERPL-MCNC: 275 MG/DL (ref 65–99)
HCT VFR BLD AUTO: 50.6 % (ref 34–46.6)
HGB BLD-MCNC: 16.1 G/DL (ref 12–15.9)
MCH RBC QN AUTO: 29.8 PG (ref 26.6–33)
MCHC RBC AUTO-ENTMCNC: 31.8 G/DL (ref 31.5–35.7)
MCV RBC AUTO: 93.7 FL (ref 79–97)
PLATELET # BLD AUTO: 116 10*3/MM3 (ref 140–450)
PMV BLD AUTO: 11.3 FL (ref 6–12)
POTASSIUM SERPL-SCNC: 5.1 MMOL/L (ref 3.5–5.2)
QT INTERVAL: 268 MS
QT INTERVAL: 306 MS
QTC INTERVAL: 453 MS
QTC INTERVAL: 453 MS
RBC # BLD AUTO: 5.4 10*6/MM3 (ref 3.77–5.28)
SODIUM SERPL-SCNC: 135 MMOL/L (ref 136–145)
WBC NRBC COR # BLD: 9.05 10*3/MM3 (ref 3.4–10.8)

## 2022-08-16 PROCEDURE — 85027 COMPLETE CBC AUTOMATED: CPT | Performed by: NURSE PRACTITIONER

## 2022-08-16 PROCEDURE — 25010000002 METHYLPREDNISOLONE PER 40 MG: Performed by: FAMILY MEDICINE

## 2022-08-16 PROCEDURE — 87205 SMEAR GRAM STAIN: CPT | Performed by: FAMILY MEDICINE

## 2022-08-16 PROCEDURE — 97165 OT EVAL LOW COMPLEX 30 MIN: CPT | Performed by: OCCUPATIONAL THERAPIST

## 2022-08-16 PROCEDURE — 80048 BASIC METABOLIC PNL TOTAL CA: CPT | Performed by: NURSE PRACTITIONER

## 2022-08-16 PROCEDURE — 94664 DEMO&/EVAL PT USE INHALER: CPT

## 2022-08-16 PROCEDURE — 94799 UNLISTED PULMONARY SVC/PX: CPT

## 2022-08-16 PROCEDURE — 3E0333Z INTRODUCTION OF ANTI-INFLAMMATORY INTO PERIPHERAL VEIN, PERCUTANEOUS APPROACH: ICD-10-PCS | Performed by: FAMILY MEDICINE

## 2022-08-16 PROCEDURE — 82962 GLUCOSE BLOOD TEST: CPT

## 2022-08-16 PROCEDURE — 87070 CULTURE OTHR SPECIMN AEROBIC: CPT | Performed by: FAMILY MEDICINE

## 2022-08-16 PROCEDURE — 99223 1ST HOSP IP/OBS HIGH 75: CPT | Performed by: STUDENT IN AN ORGANIZED HEALTH CARE EDUCATION/TRAINING PROGRAM

## 2022-08-16 PROCEDURE — 94761 N-INVAS EAR/PLS OXIMETRY MLT: CPT

## 2022-08-16 PROCEDURE — 99222 1ST HOSP IP/OBS MODERATE 55: CPT | Performed by: INTERNAL MEDICINE

## 2022-08-16 PROCEDURE — 63710000001 INSULIN LISPRO (HUMAN) PER 5 UNITS: Performed by: NURSE PRACTITIONER

## 2022-08-16 PROCEDURE — 25010000002 CEFTRIAXONE PER 250 MG: Performed by: NURSE PRACTITIONER

## 2022-08-16 RX ORDER — NYSTATIN 100000 [USP'U]/G
1 POWDER TOPICAL 2 TIMES DAILY PRN
COMMUNITY

## 2022-08-16 RX ORDER — FLECAINIDE ACETATE 50 MG/1
50 TABLET ORAL EVERY 12 HOURS SCHEDULED
Status: DISCONTINUED | OUTPATIENT
Start: 2022-08-16 | End: 2022-08-17 | Stop reason: HOSPADM

## 2022-08-16 RX ORDER — BUDESONIDE, GLYCOPYRROLATE, AND FORMOTEROL FUMARATE 160; 9; 4.8 UG/1; UG/1; UG/1
2 AEROSOL, METERED RESPIRATORY (INHALATION) 2 TIMES DAILY
COMMUNITY

## 2022-08-16 RX ORDER — FLECAINIDE ACETATE 50 MG/1
50 TABLET ORAL ONCE
Status: COMPLETED | OUTPATIENT
Start: 2022-08-16 | End: 2022-08-16

## 2022-08-16 RX ORDER — FUROSEMIDE 20 MG/1
20 TABLET ORAL DAILY
COMMUNITY
End: 2022-08-17 | Stop reason: HOSPADM

## 2022-08-16 RX ORDER — METOPROLOL TARTRATE 50 MG/1
50 TABLET, FILM COATED ORAL 2 TIMES DAILY
COMMUNITY

## 2022-08-16 RX ORDER — METHYLPREDNISOLONE SODIUM SUCCINATE 40 MG/ML
40 INJECTION, POWDER, LYOPHILIZED, FOR SOLUTION INTRAMUSCULAR; INTRAVENOUS EVERY 8 HOURS
Status: DISCONTINUED | OUTPATIENT
Start: 2022-08-16 | End: 2022-08-17

## 2022-08-16 RX ORDER — SODIUM CHLORIDE 9 MG/ML
75 INJECTION, SOLUTION INTRAVENOUS CONTINUOUS
Status: DISCONTINUED | OUTPATIENT
Start: 2022-08-16 | End: 2022-08-16

## 2022-08-16 RX ADMIN — METOPROLOL TARTRATE 100 MG: 100 TABLET ORAL at 21:05

## 2022-08-16 RX ADMIN — LEVALBUTEROL 1.25 MG: 1.25 SOLUTION RESPIRATORY (INHALATION) at 21:55

## 2022-08-16 RX ADMIN — Medication 1 APPLICATION: at 21:08

## 2022-08-16 RX ADMIN — ROSUVASTATIN CALCIUM 5 MG: 10 TABLET, FILM COATED ORAL at 21:05

## 2022-08-16 RX ADMIN — Medication 10 ML: at 21:07

## 2022-08-16 RX ADMIN — FLECAINIDE ACETATE 50 MG: 50 TABLET ORAL at 22:07

## 2022-08-16 RX ADMIN — CEFTRIAXONE 1 G: 1 INJECTION, POWDER, FOR SOLUTION INTRAMUSCULAR; INTRAVENOUS at 21:07

## 2022-08-16 RX ADMIN — LEVALBUTEROL 1.25 MG: 1.25 SOLUTION RESPIRATORY (INHALATION) at 05:56

## 2022-08-16 RX ADMIN — INSULIN LISPRO 7 UNITS: 100 INJECTION, SOLUTION INTRAVENOUS; SUBCUTANEOUS at 12:21

## 2022-08-16 RX ADMIN — FLECAINIDE ACETATE 50 MG: 50 TABLET ORAL at 12:17

## 2022-08-16 RX ADMIN — APIXABAN 5 MG: 5 TABLET, FILM COATED ORAL at 08:38

## 2022-08-16 RX ADMIN — Medication 10 ML: at 08:36

## 2022-08-16 RX ADMIN — Medication 15 MG/HR: at 03:58

## 2022-08-16 RX ADMIN — LISINOPRIL 10 MG: 10 TABLET ORAL at 08:38

## 2022-08-16 RX ADMIN — DOXYCYCLINE 100 MG: 100 INJECTION, POWDER, LYOPHILIZED, FOR SOLUTION INTRAVENOUS at 12:18

## 2022-08-16 RX ADMIN — INSULIN LISPRO 0.4 UNITS: 100 INJECTION, SOLUTION INTRAVENOUS; SUBCUTANEOUS at 08:33

## 2022-08-16 RX ADMIN — METOPROLOL TARTRATE 100 MG: 100 TABLET ORAL at 08:38

## 2022-08-16 RX ADMIN — GABAPENTIN 300 MG: 300 CAPSULE ORAL at 05:36

## 2022-08-16 RX ADMIN — METHYLPREDNISOLONE SODIUM SUCCINATE 40 MG: 40 INJECTION, POWDER, LYOPHILIZED, FOR SOLUTION INTRAMUSCULAR; INTRAVENOUS at 08:39

## 2022-08-16 RX ADMIN — FUROSEMIDE 40 MG: 40 TABLET ORAL at 08:38

## 2022-08-16 RX ADMIN — GABAPENTIN 300 MG: 300 CAPSULE ORAL at 18:19

## 2022-08-16 RX ADMIN — GABAPENTIN 300 MG: 300 CAPSULE ORAL at 22:07

## 2022-08-16 RX ADMIN — NYSTATIN: 100000 POWDER TOPICAL at 21:06

## 2022-08-16 RX ADMIN — NICOTINE 1 PATCH: 14 PATCH, EXTENDED RELEASE TRANSDERMAL at 08:34

## 2022-08-16 RX ADMIN — GUAIFENESIN 600 MG: 600 TABLET, EXTENDED RELEASE ORAL at 21:06

## 2022-08-16 RX ADMIN — METHYLPREDNISOLONE SODIUM SUCCINATE 40 MG: 40 INJECTION, POWDER, LYOPHILIZED, FOR SOLUTION INTRAMUSCULAR; INTRAVENOUS at 18:22

## 2022-08-16 RX ADMIN — Medication 15 MG/HR: at 22:58

## 2022-08-16 RX ADMIN — GUAIFENESIN 600 MG: 600 TABLET, EXTENDED RELEASE ORAL at 08:38

## 2022-08-16 RX ADMIN — Medication 15 MG/HR: at 18:29

## 2022-08-16 RX ADMIN — DILTIAZEM HYDROCHLORIDE 120 MG: 120 CAPSULE, COATED, EXTENDED RELEASE ORAL at 08:38

## 2022-08-16 RX ADMIN — APIXABAN 5 MG: 5 TABLET, FILM COATED ORAL at 21:06

## 2022-08-16 RX ADMIN — LEVALBUTEROL 1.25 MG: 1.25 SOLUTION RESPIRATORY (INHALATION) at 12:47

## 2022-08-16 RX ADMIN — INSULIN LISPRO 7 UNITS: 100 INJECTION, SOLUTION INTRAVENOUS; SUBCUTANEOUS at 18:20

## 2022-08-16 NOTE — PROGRESS NOTES
[facility] Medicine Services  INPATIENT PROGRESS NOTE    Length of Stay: 1  Date of Admission: 8/15/2022  Primary Care Physician: Reymundo Torrez MD    Subjective   Chief Complaint: Atrial for with RVR.  Chronic respiratory failure.    HPI   Heart rate still in the 118, on Lopressor and Cardizem p.o.  Plan for cardioversion today by cardiology.  Patient is afebrile.  Long discussion with daughter who is a nurse about smoking habit and dietary habit.    Review of Systems   Constitutional: Positive for activity change, appetite change and fatigue. Negative for chills and fever.   HENT: Negative for hearing loss, nosebleeds, tinnitus and trouble swallowing.    Eyes: Negative for visual disturbance.   Respiratory: Positive for shortness of breath. Negative for cough, chest tightness and wheezing.    Cardiovascular: Negative for chest pain, palpitations and leg swelling.   Gastrointestinal: Negative for abdominal distention, abdominal pain, blood in stool, constipation, diarrhea, nausea and vomiting.   Endocrine: Negative for cold intolerance, heat intolerance, polydipsia, polyphagia and polyuria.   Genitourinary: Negative for decreased urine volume, difficulty urinating, dysuria, flank pain, frequency and hematuria.   Musculoskeletal: Positive for arthralgias, gait problem and myalgias. Negative for joint swelling.   Skin: Negative for rash.   Allergic/Immunologic: Negative for immunocompromised state.   Neurological: Positive for weakness. Negative for dizziness, syncope, light-headedness and headaches.   Hematological: Negative for adenopathy. Does not bruise/bleed easily.   Psychiatric/Behavioral: Negative for confusion and sleep disturbance. The patient is not nervous/anxious.           All pertinent negatives and positives are as above. All other systems have been reviewed and are negative unless otherwise stated.     Objective    Temp:  [95.6 °F (35.3 °C)-98.2 °F (36.8 °C)]  97.5 °F (36.4 °C)  Heart Rate:  [108-182] 118  Resp:  [18-26] 18  BP: ()/() 138/84    Intake/Output Summary (Last 24 hours) at 8/16/2022 0757  Last data filed at 8/16/2022 0501  Gross per 24 hour   Intake 50 ml   Output 1400 ml   Net -1350 ml     Physical Exam  Vitals and nursing note reviewed.   HENT:      Head: Normocephalic.   Eyes:      Conjunctiva/sclera: Conjunctivae normal.      Pupils: Pupils are equal, round, and reactive to light.   Neck:      Vascular: No JVD.   Cardiovascular:      Rate and Rhythm: Tachycardia present. Rhythm irregular.      Heart sounds: Normal heart sounds.   Pulmonary:      Effort: No respiratory distress.      Breath sounds: No wheezing or rales.      Comments: Patient is on 3 L of oxygen.  Diminished breath sound bilateral, clear .  Chest:      Chest wall: No tenderness.   Abdominal:      General: Bowel sounds are normal. There is no distension.      Palpations: Abdomen is soft.      Tenderness: There is no abdominal tenderness.      Comments: Morbid obesity   Musculoskeletal:         General: No tenderness or deformity.      Cervical back: Neck supple.   Skin:     General: Skin is warm and dry.      Capillary Refill: Capillary refill takes 2 to 3 seconds.      Findings: No rash.   Neurological:      General: No focal deficit present.      Mental Status: She is alert and oriented to person, place, and time.      Cranial Nerves: No cranial nerve deficit.      Motor: Weakness present. No abnormal muscle tone.      Deep Tendon Reflexes: Reflexes normal.   Psychiatric:         Mood and Affect: Mood normal.         Behavior: Behavior normal.         Thought Content: Thought content normal.         Results Review:  Lab Results (last 24 hours)     Procedure Component Value Units Date/Time    Basic Metabolic Panel [[id number]]  (Abnormal) Collected: 08/16/22 0330    Specimen: Blood Updated: 08/16/22 0412     Glucose 275 mg/dL      BUN 27 mg/dL      Creatinine 0.89 mg/dL       Sodium 135 mmol/L      Potassium 5.1 mmol/L      Comment: Slight hemolysis detected by analyzer. Results may be affected.        Chloride 97 mmol/L      CO2 26.0 mmol/L      Calcium 9.4 mg/dL      BUN/Creatinine Ratio 30.3     Anion Gap 12.0 mmol/L      eGFR 73.0 mL/min/1.73      Comment: National Kidney Foundation and American Society of Nephrology (ASN) Task Force recommended calculation based on the Chronic Kidney Disease Epidemiology Collaboration (CKD-EPI) equation refit without adjustment for race.       Narrative:      GFR Normal >60  Chronic Kidney Disease <60  Kidney Failure <15      CBC (No Diff) [[id number]]  (Abnormal) Collected: 08/16/22 0330    Specimen: Blood Updated: 08/16/22 0353     WBC 9.05 10*3/mm3      RBC 5.40 10*6/mm3      Hemoglobin 16.1 g/dL      Hematocrit 50.6 %      MCV 93.7 fL      MCH 29.8 pg      MCHC 31.8 g/dL      RDW 14.1 %      RDW-SD 47.6 fl      MPV 11.3 fL      Platelets 116 10*3/mm3     MRSA Screen, PCR (Inpatient) - Swab, Nares [[id number]]  (Abnormal) Collected: 08/15/22 2124    Specimen: Swab from Nares Updated: 08/15/22 2321     MRSA PCR MRSA Detected    Narrative:      The negative predictive value of this diagnostic test is high and should only be used to consider de-escalating anti-MRSA therapy. A positive result may indicate colonization with MRSA and must be correlated clinically.    Troponin [[id number]]  (Normal) Collected: 08/15/22 2031    Specimen: Blood Updated: 08/15/22 2059     Troponin T <0.010 ng/mL     Narrative:      Troponin T Reference Range:  <= 0.03 ng/mL-   Negative for AMI  >0.03 ng/mL-     Abnormal for myocardial necrosis.  Clinicians would have to utilize clinical acumen, EKG, Troponin and serial changes to determine if it is an Acute Myocardial Infarction or myocardial injury due to an underlying chronic condition.       Results may be falsely decreased if patient taking Biotin.      S. Pneumo Ag Urine or CSF - Urine, Urine, Clean Catch [[id number]]   (Normal) Collected: 08/15/22 1954    Specimen: Urine, Clean Catch Updated: 08/15/22 2046     Strep Pneumo Ag Negative    Legionella Antigen, Urine - Urine, Urine, Clean Catch [[id number]]  (Normal) Collected: 08/15/22 1954    Specimen: Urine, Clean Catch Updated: 08/15/22 2046     LEGIONELLA ANTIGEN, URINE Negative    Blood Culture - Blood, Arm, Left [[id number]] Collected: 08/15/22 2031    Specimen: Blood from Arm, Left Updated: 08/15/22 2041    Blood Culture - Blood, Wrist, Right [[id number]] Collected: 08/15/22 2031    Specimen: Blood from Wrist, Right Updated: 08/15/22 2041    POC Glucose Once [[id number]]  (Abnormal) Collected: 08/15/22 2026    Specimen: Blood Updated: 08/15/22 2039     Glucose 320 mg/dL      Comment: : [id number] Crisp RebeccaMeter ID: [id number]       Comprehensive Metabolic Panel [[id number]]  (Abnormal) Collected: 08/15/22 1213    Specimen: Blood Updated: 08/15/22 1307     Glucose 201 mg/dL      BUN 25 mg/dL      Creatinine 0.95 mg/dL      Sodium 137 mmol/L      Potassium 4.3 mmol/L      Comment: Slight hemolysis detected by analyzer. Results may be affected.        Chloride 97 mmol/L      CO2 28.0 mmol/L      Calcium 9.4 mg/dL      Total Protein 7.0 g/dL      Albumin 4.20 g/dL      ALT (SGPT) 44 U/L      AST (SGOT) 18 U/L      Comment: Slight hemolysis detected by analyzer. Results may be affected.        Alkaline Phosphatase 78 U/L      Total Bilirubin 0.5 mg/dL      Globulin 2.8 gm/dL      A/G Ratio 1.5 g/dL      BUN/Creatinine Ratio 26.3     Anion Gap 12.0 mmol/L      eGFR 67.5 mL/min/1.73      Comment: National Kidney Foundation and American Society of Nephrology (ASN) Task Force recommended calculation based on the Chronic Kidney Disease Epidemiology Collaboration (CKD-EPI) equation refit without adjustment for race.       Narrative:      GFR Normal >60  Chronic Kidney Disease <60  Kidney Failure <15      COVID PRE-OP / PRE-PROCEDURE SCREENING ORDER (NO ISOLATION) - Swab, Nasal Cavity  [[id number]]  (Normal) Collected: 08/15/22 1216    Specimen: Swab from Nasal Cavity Updated: 08/15/22 1305    Narrative:      The following orders were created for panel order COVID PRE-OP / PRE-PROCEDURE SCREENING ORDER (NO ISOLATION) - Swab, Nasal Cavity.  Procedure                               Abnormality         Status                     ---------                               -----------         ------                     COVID-19,[facility] IN-SHAUNA...[[id number]]  Normal              Final result                 Please view results for these tests on the individual orders.    COVID-19,[facility] IN-HOUSE,Nasal Swab No Transport Media 3-4 HR TAT - Swab, Nasal Cavity [[id number]]  (Normal) Collected: 08/15/22 1216    Specimen: Swab from Nasal Cavity Updated: 08/15/22 1305     COVID19 Not Detected    Narrative:      Fact sheet for providers: https://www.fda.gov/media/343449/download     Fact sheet for patients: https://www.fda.gov/media/210067/download    Test performed by PCR.    Consider negative results in combination with clinical observations, patient history, and epidemiological information.    BNP [[id number]]  (Abnormal) Collected: 08/15/22 1213    Specimen: Blood Updated: 08/15/22 1257     proBNP 5,364.0 pg/mL     Narrative:      Among patients with dyspnea, NT-proBNP is highly sensitive for the detection of acute congestive heart failure. In addition NT-proBNP of <300 pg/ml effectively rules out acute congestive heart failure with 99% negative predictive value.    Results may be falsely decreased if patient taking Biotin.      Troponin [[id number]]  (Normal) Collected: 08/15/22 1213    Specimen: Blood Updated: 08/15/22 1256     Troponin T <0.010 ng/mL     Narrative:      Troponin T Reference Range:  <= 0.03 ng/mL-   Negative for AMI  >0.03 ng/mL-     Abnormal for myocardial necrosis.  Clinicians would have to utilize clinical acumen, EKG, Troponin and serial changes to determine if it is an Acute Myocardial  Infarction or myocardial injury due to an underlying chronic condition.       Results may be falsely decreased if patient taking Biotin.      Magnesium [[id number]]  (Normal) Collected: 08/15/22 1213    Specimen: Blood Updated: 08/15/22 1256     Magnesium 2.1 mg/dL     D-dimer, Quantitative [[id number]]  (Abnormal) Collected: 08/15/22 1213    Specimen: Blood Updated: 08/15/22 1239     D-Dimer, Quantitative 1.01 MCGFEU/mL     Narrative:      Reference Range is 0-0.50 MCGFEU/mL. However, results <0.50 MCGFEU/mL tends to rule out DVT or PE. Results >0.50 MCGFEU/mL are not useful in predicting absence or presence of DVT or PE.      CBC & Differential [[id number]]  (Abnormal) Collected: 08/15/22 1213    Specimen: Blood Updated: 08/15/22 1236    Narrative:      The following orders were created for panel order CBC & Differential.  Procedure                               Abnormality         Status                     ---------                               -----------         ------                     CBC Auto Differential[[id number]]        Abnormal            Final result                 Please view results for these tests on the individual orders.    CBC Auto Differential [[id number]]  (Abnormal) Collected: 08/15/22 1213    Specimen: Blood Updated: 08/15/22 1236     WBC 11.56 10*3/mm3      RBC 5.70 10*6/mm3      Hemoglobin 16.8 g/dL      Hematocrit 53.6 %      MCV 94.0 fL      MCH 29.5 pg      MCHC 31.3 g/dL      RDW 13.8 %      RDW-SD 47.6 fl      MPV 11.0 fL      Platelets 123 10*3/mm3      Neutrophil % 72.7 %      Lymphocyte % 19.6 %      Monocyte % 6.2 %      Eosinophil % 0.9 %      Basophil % 0.2 %      Immature Grans % 0.4 %      Neutrophils, Absolute 8.41 10*3/mm3      Lymphocytes, Absolute 2.26 10*3/mm3      Monocytes, Absolute 0.72 10*3/mm3      Eosinophils, Absolute 0.10 10*3/mm3      Basophils, Absolute 0.02 10*3/mm3      Immature Grans, Absolute 0.05 10*3/mm3      nRBC 0.0 /100 WBC            Cultures:  No  results found for: BLOODCX, URINECX, WOUNDCX, MRSACX, RESPCX, STOOLCX    Radiology Data:    Imaging Results (Last 24 Hours)     Procedure Component Value Units Date/Time    CT Angiogram Chest [[id number]] Collected: 08/15/22 1428     Updated: 08/15/22 1436    Narrative:      CT ANGIOGRAM CHEST- 8/15/2022 2:14 PM CDT     HISTORY: Pulmonary embolism (PE) suspected, unknown D-dimer      COMPARISON: 5/31/2022     DOSE LENGTH PRODUCT: 613 mGy cm. Automated exposure control was also  utilized to decrease patient radiation dose.     TECHNIQUE: Axial images the chest are obtained following IV contrast.  2-D and maximal intensity projection images reconstructed and reviewed     FINDINGS:  No pulmonary emboli identified. Thoracic aorta normal in  caliber. Cardiomegaly. No pericardial or pleural effusions. Calcified  subcarinal and right hilar lymph nodes with no pathologic intrathoracic  or axillary lymphadenopathy visualized.     No acute pathology identified within the visible upper abdomen.     Left infrahilar/lower lobe opacities suspicious for pneumonia. Probable  right basilar atelectasis. Mild peripheral emphysematous change of the  upper lobes. Stable linear density anterior right upper lobe image 49.  No pneumothorax. Stable small focus of air along the right  posterolateral trachea at 10 just below the level of the thyroid is  similar to prior studies and may represent tracheal or esophageal  diverticulum, though no direct connection localized.     Degenerative change of the regional skeleton chronic mild compression  superior endplate of T4.       Impression:      1. No pulmonary emboli.  2. Left infrahilar and left lower lobe opacities suspicious for  pneumonia. No pathologic lymphadenopathy. Right basilar atelectasis.  3. Paraseptal apical emphysema. No pneumothorax. Stable collection of  air along the right posterolateral aspect of the superior trachea and  esophagus favoring diverticulum.  This report was  finalized on 08/15/2022 14:33 by Dr. Irene Baptiste MD.    XR Chest 1 View [[id number]] Collected: 08/15/22 1230     Updated: 08/15/22 1237    Narrative:      EXAMINATION:  XR CHEST 1 VW-  8/15/2022 12:17 PM CDT     HISTORY: Tachycardia. Atrial fibrillation. Hypertension. Emphysema.     COMPARISON: 5/31/2022.     TECHNIQUE: Single view AP image.     FINDINGS:  There is poor inspiration. There is an external pacing leads  overlying the chest. There is cardiomegaly. There is probable blunting  of the left costophrenic angle. The left lung is difficult to evaluate.  There is minimal atelectasis in the right lung base.       Impression:      1. Poor inspiration with vascular crowding.  2. Left chest partially obscured by external pacing leads.  3. Cardiomegaly.  4. Probable small pleural effusion on the left. Atelectasis in the right  lung base.        This report was finalized on 08/15/2022 12:34 by Dr. Omega Macias MD.          Allergies   Allergen Reactions   • Codeine GI Intolerance       Scheduled meds:   apixaban, 5 mg, Oral, Q12H  cefTRIAXone, 1 g, Intravenous, Q24H   And  azithromycin, 500 mg, Intravenous, Q24H  dilTIAZem CD, 120 mg, Oral, Q24H  furosemide, 40 mg, Oral, Daily  gabapentin, 300 mg, Oral, TID  guaiFENesin, 600 mg, Oral, Q12H  insulin lispro, 0-9 Units, Subcutaneous, TID AC  levalbuterol, 1.25 mg, Nebulization, TID - RT  lisinopril, 10 mg, Oral, Daily  methylPREDNISolone sodium succinate, 60 mg, Intravenous, Q8H  metoprolol tartrate, 100 mg, Oral, Q12H  nicotine, 1 patch, Transdermal, Q24H  nystatin, , Topical, Q12H  rosuvastatin, 5 mg, Oral, Nightly  sodium chloride, 10 mL, Intravenous, Q12H        PRN meds:  •  acetaminophen **OR** acetaminophen **OR** acetaminophen  •  dextrose  •  dextrose  •  famotidine  •  glucagon (human recombinant)  •  ondansetron **OR** ondansetron  •  sodium chloride    Assessment/Plan       Emphysema lung (HCC)    Tobacco abuse    Type 2 diabetes mellitus with  hyperglycemia, without long-term current use of insulin ([facility])    Polycythemia    Chronic diastolic CHF (congestive heart failure) ([facility])    Chronic anticoagulation    Atrial fibrillation with RVR ([facility])    Left lower lobe pneumonia    Chronic respiratory failure with hypoxia ([facility])    Obesity, Class III, BMI 40-49.9 (morbid obesity) ([facility])      Plan:  Atrial fibrillation with RVR/diastolic CHF/hypertension/hyperlipidemia.  Plan for outpatient cardioversion on 8/18/2022 by cardiology at [facility].  Patient is on chronic Eliquis.  Cardizem drip.  Consult cardiology.  Daily weight.  Cardizem CD.  DC Lasix for now due to polycythemia.  Lisinopril.  Lopressor.  Crestor.  Echocardiogram 5/10/2022- ejection fraction 61 to 65%, moderate concentric hypertrophy, diastolic dysfunction, mild biatrial enlargement, tricuspid regurgitation.  Plan for cardiac conversion today by cardiology.    COPD/pneumonia/chronic respiratory failure hypoxia/sleep apnea.  Patient is on chronic 3 L of oxygen at home.  Xopenex neb.  Incentive spirometer.  Solu-Medrol.  Mucinex.  Rocephin.  Change azithromycin to doxycycline today due to positive MRSA screen.  Bactroban to nasal nares.  Chest x-ray-Cardiomegaly, Probable small pleural effusion on the left,  Atelectasis in the right  lung base.  CTA of the chest-No pulmonary emboli, Left infrahilar and left lower lobe opacities suspicious for Pneumonia, No pathologic lymphadenopathy, Right basilar atelectasis, Paraseptal apical emphysema,  No pneumothorax,  Stable collection of air along the right posterolateral aspect of the superior trachea and esophagus favoring diverticulum.  Patient is currently on 3 L of oxygen.    Diabetes . sliding scale.  Hemoglobin A1c pending.    Polycythemia.  Stop Lasix for now.    Neuropathy.  Neurontin.    Chronic smoker.  Discussed with patient cutting back and stop smoking.  Nicotine patch.    Morbid obesity.  BMI is 42.    Nutrition . regular/cardiac/consistent  carb.    Deconditioning.  PT and OT consult.    MRSA screen positive.  Blood cultures pending.  Legionella-negative.  Strep pneumo-negative.  COVID-19-negative.    Discharge Plannin to 5 days.  Long discussion with daughter today who is a nurse.  Daughter need to cut her back on her smoking habit and help with her dietary habit.    Electronically signed by Brad Moran MD, 22, 7:57 AM CDT.

## 2022-08-16 NOTE — PLAN OF CARE
Goal Outcome Evaluation:  Plan of Care Reviewed With: patient        Progress: no change  Outcome Evaluation: VSS. -141 on tele. Sats WNL and no c/o SOA on 3L NC. No c/o pain. Cardizem gtt infusing at 15ml/hr. MRSA swab positive. Contact precautions initiated. Excoriation from yeast on upper left thing/groin area. Home nystatin restarted. Up ad izaiah to bathroom. Safety maintained.

## 2022-08-16 NOTE — THERAPY DISCHARGE NOTE
Acute Care - Occupational Therapy Discharge  [facility]    Patient Name: Claudette Keeling  : 1960    MRN: [id number]                              Today's Date: 2022       Admit Date: 8/15/2022    Visit Dx:     ICD-10-CM ICD-9-CM   1. Atrial fibrillation with RVR (HCC)  I48.91 427.31     Patient Active Problem List   Diagnosis   • Dizziness   • Emphysema lung (HCC)   • Atrial fibrillation, persistent (HCC)   • Tobacco abuse   • Type 2 diabetes mellitus with hyperglycemia, without long-term current use of insulin (HCC)   • Polycythemia   • Chronic diastolic CHF (congestive heart failure) (HCC)   • Chronic anticoagulation   • Class 3 drug-induced obesity with serious comorbidity and body mass index (BMI) of 40.0 to 44.9 in adult (HCC)   • Atrial fibrillation with RVR (HCC)   • Left lower lobe pneumonia   • Chronic respiratory failure with hypoxia (HCC)   • Obesity, Class III, BMI 40-49.9 (morbid obesity) ([facility])     Past Medical History:   Diagnosis Date   • Arthritis    • Atrial fibrillation (HCC)    • Diabetes mellitus (HCC)    • Hypertension    • Neuropathy    • Sleep apnea      Past Surgical History:   Procedure Laterality Date   • APPENDECTOMY     • HYSTERECTOMY     • JOINT REPLACEMENT Left     knee   • TOE OSTEOPHYTE REMOVAL        General Information     Row Name 22 1512          OT Time and Intention    Document Type evaluation  Pt presented to ED with SOA. Dx: a-fib with RVR, L LL pneumonia.  -JJ     Mode of Treatment occupational therapy  -JJ     Row Name 22 151          General Information    Patient Profile Reviewed yes  -JJ     Prior Level of Function independent:;all household mobility;transfer;bed mobility;ADL's  -JJ     Existing Precautions/Restrictions oxygen therapy device and L/min  -JJ     Row Name 22 151          Occupational Profile    Environmental Supports and Barriers (Occupational Profile) tub shower and walk-in shower  -JJ     Row Name 22 151           Living Environment    People in Home child(martina), adult  -     Row Name 08/16/22 1512          Home Main Entrance    Number of Stairs, Main Entrance none  -[facility] Name 08/16/22 1512          Stairs Within Home, Primary    Number of Stairs, Within Home, Primary none  -[facility] Name 08/16/22 1512          Cognition    Orientation Status (Cognition) oriented x 4  -     Row Name 08/16/22 1512          Safety Issues, Functional Mobility    Impairments Affecting Function (Mobility) shortness of breath  -           User Key  (r) = Recorded By, (t) = Taken By, (c) = Cosigned By    Initials Name Provider Type     Irene Altamirano, OTR/L, CSRS Occupational Therapist               Mobility/ADL's     Row Name 08/16/22 1312          Bed Mobility    Comment, (Bed Mobility) up in chair  -[facility] Name 08/16/22 1312          Transfers    Transfers sit-stand transfer;stand-sit transfer  -     Sit-Stand Rogers (Transfers) independent  -     Stand-Sit Rogers (Transfers) independent  -[facility] Name 08/16/22 1312          Functional Mobility    Functional Mobility- Ind. Level supervision required  -     Functional Mobility- Safety Issues supplemental O2  -     Functional Mobility- Comment amb in hallway, approx 200 ft with one standing rest break. O2 maintained in low 90s with all activity.  -[facility] Name 08/16/22 1312          Activities of Daily Living    BADL Assessment/Intervention lower body dressing  -     Row Name 08/16/22 1312          Lower Body Dressing Assessment/Training    Rogers Level (Lower Body Dressing) don;socks;independent  -     Position (Lower Body Dressing) unsupported sitting  -           User Key  (r) = Recorded By, (t) = Taken By, (c) = Cosigned By    Initials Name Provider Type     Irene Altamirano, OTR/L, CSRS Occupational Therapist               Obj/Interventions     Row Name 08/16/22 1512          Range of Motion Comprehensive    General Range of  Motion bilateral upper extremity ROM WFL  -     Row Name 08/16/22 1512          Strength Comprehensive (MMT)    Comment, General Manual Muscle Testing (MMT) Assessment B UE strength 5/5  -     Row Name 08/16/22 1512          Balance    Balance Assessment sitting static balance;sitting dynamic balance;standing dynamic balance;standing static balance  -JJ     Static Sitting Balance independent  -JJ     Dynamic Sitting Balance independent  -JJ     Position, Sitting Balance sitting in chair  -JJ     Static Standing Balance supervision  -J     Dynamic Standing Balance supervision  -     Position/Device Used, Standing Balance unsupported  -           User Key  (r) = Recorded By, (t) = Taken By, (c) = Cosigned By    Initials Name Provider Type    Irene Rader, OTR/L, CSRS Occupational Therapist               Goals/Plan    No documentation.                Clinical Impression     Row Name 08/16/22 1512          Pain Assessment    Pretreatment Pain Rating 0/10 - no pain  -     Posttreatment Pain Rating 0/10 - no pain  -     Row Name 08/16/22 1512          Plan of Care Review    Plan of Care Reviewed With patient;daughter  -     Outcome Evaluation OT eval completed. Pt presents alert and oriented, sitting up in chair on 3L O2/NC. She reports at baseline being independent with all adls and mobility. Today she was able to independently don socks while seated at EOB and complete sit <> stand t/fs. Amb approx 200ft in hallway unsupported, with one standing rest break required. O2 sats maintained in low 90s with all activity. Pt reports feeling that she is at her baseline function and does not required skilled OT services. OT to sign off at this time. Anticipate d/c home with family.  -     Row Name 08/16/22 1512          Therapy Assessment/Plan (OT)    Criteria for Skilled Therapeutic Interventions Met (OT) no;skilled treatment is necessary  -     Therapy Frequency (OT) evaluation only  -     Row Name  08/16/22 1512          Therapy Plan Review/Discharge Plan (OT)    Anticipated Discharge Disposition (OT) home with assist  -     Row Name 08/16/22 1512          Vital Signs    Pre SpO2 (%) 95  -JJ     O2 Delivery Pre Treatment supplemental O2  -JJ     Post SpO2 (%) 93  -JJ     O2 Delivery Post Treatment supplemental O2  -JJ     Pre Patient Position Sitting  -JJ     Intra Patient Position Standing  -JJ     Post Patient Position Sitting  -JJ     Row Name 08/16/22 1512          Positioning and Restraints    Pre-Treatment Position sitting in chair/recliner  -JJ     Post Treatment Position chair  -JJ     In Chair notified nsg;sitting;call light within reach;encouraged to call for assist;with family/caregiver  -           User Key  (r) = Recorded By, (t) = Taken By, (c) = Cosigned By    Initials Name Provider Type    Irene Rader OTR/L, CSRS Occupational Therapist               Outcome Measures     Row Name 08/16/22 1512          How much help from another is currently needed...    Putting on and taking off regular lower body clothing? 4  -JJ     Bathing (including washing, rinsing, and drying) 3  -JJ     Toileting (which includes using toilet bed pan or urinal) 4  -JJ     Putting on and taking off regular upper body clothing 4  -JJ     Taking care of personal grooming (such as brushing teeth) 4  -JJ     Eating meals 4  -JJ     AM-PAC 6 Clicks Score (OT) 23  -JJ     Row Name 08/16/22 1512          Functional Assessment    Outcome Measure Options AM-PAC 6 Clicks Daily Activity (OT)  -           User Key  (r) = Recorded By, (t) = Taken By, (c) = Cosigned By    Initials Name Provider Type    Irene Rader OTR/L, CSRS Occupational Therapist              Occupational Therapy Education                 Title: PT OT SLP Therapies (In Progress)     Topic: Occupational Therapy (In Progress)     Point: ADL training (Done)     Description:   Instruct learner(s) on proper safety adaptation and remediation  techniques during self care or transfers.   Instruct in proper use of assistive devices.              Learning Progress Summary           Patient Acceptance, E, VU by  at 8/16/2022 1543                   Point: Home exercise program (Not Started)     Description:   Instruct learner(s) on appropriate technique for monitoring, assisting and/or progressing therapeutic exercises/activities.              Learner Progress:  Not documented in this visit.          Point: Precautions (Done)     Description:   Instruct learner(s) on prescribed precautions during self-care and functional transfers.              Learning Progress Summary           Patient Acceptance, E, VU by  at 8/16/2022 1543                   Point: Body mechanics (Not Started)     Description:   Instruct learner(s) on proper positioning and spine alignment during self-care, functional mobility activities and/or exercises.              Learner Progress:  Not documented in this visit.                      User Key     Initials Effective Dates Name Provider Type Discipline     11/10/21 -  Irene Altamirano, OTR/L, CSRS Occupational Therapist OT              OT Recommendation and Plan  Therapy Frequency (OT): evaluation only  Plan of Care Review  Plan of Care Reviewed With: patient, daughter  Outcome Evaluation: OT eval completed. Pt presents alert and oriented, sitting up in chair on 3L O2/NC. She reports at baseline being independent with all adls and mobility. Today she was able to independently don socks while seated at EOB and complete sit <> stand t/fs. Amb approx 200ft in hallway unsupported, with one standing rest break required. O2 sats maintained in low 90s with all activity. Pt reports feeling that she is at her baseline function and does not required skilled OT services. OT to sign off at this time. Anticipate d/c home with family.  Plan of Care Reviewed With: patient, daughter  Outcome Evaluation: OT eval completed. Pt presents alert and  oriented, sitting up in chair on 3L O2/NC. She reports at baseline being independent with all adls and mobility. Today she was able to independently don socks while seated at EOB and complete sit <> stand t/fs. Amb approx 200ft in hallway unsupported, with one standing rest break required. O2 sats maintained in low 90s with all activity. Pt reports feeling that she is at her baseline function and does not required skilled OT services. OT to sign off at this time. Anticipate d/c home with family.     Time Calculation:    Time Calculation- OT     Row Name 08/16/22 1512             Time Calculation- OT    OT Start Time 1512  -      OT Stop Time 1540  -      OT Time Calculation (min) 28 min  -      OT Received On 08/16/22  -            User Key  (r) = Recorded By, (t) = Taken By, (c) = Cosigned By    Initials Name Provider Type    Irene Rader, YI/L, JENNIFFER Occupational Therapist              Therapy Charges for Today     Code Description Service Date Service Provider Modifiers Qty    [id number]  OT EVAL LOW COMPLEXITY 2 8/16/2022 Irene Altamirano OTR/L, JENNIFFER GO 1             OT Discharge Summary  Anticipated Discharge Disposition (OT): home with assist  Reason for Discharge: At baseline function  Outcomes Achieved: Other (eval x1)  Discharge Destination: Home with assist    YI Galvan/L, JENNIFFER  8/16/2022

## 2022-08-16 NOTE — CONSULTS
Chief Complaint  Shortness of Breath    Subjective        History of Present Illness    EP History:  1.  Persistent atrial fibrillation     Cardiology history:  1.  Diastolic heart failure     Medical History:   1.  Morbid obesity, BMI 43  2.  Type 2 diabetes  3.  COPD  4.  Tobacco use disorder      Claudette Keeling is a 61 y.o. female with past medical history as above for whom EP is consulted regarding atrial fibrillation.  She was recently seen in clinic for her atrial fibrillation.  She has had prior failed cardioversion and remains in atrial fibrillation.  She has been attempted to be controlled with rate control medications including diltiazem and metoprolol which have been of limited efficacy.  The course of her symptoms is constant.  Symptoms are associated with episodes of rapid ventricular rate which prompted increased shortness of breath and difficulty breathing.  She has been otherwise taking her medications as prescribed.  There is no clear triggers for her episodes of RVR and no alleviating factors either.  She remains anticoagulated with her apixaban.  Given persistent episodes of RVR and shortness of breath she presented to the hospital for evaluation.  She was admitted to the hospital service and started on diuretics as well as a diltiazem drip for rate control.  This has resulted in improvement in her ventricular rates though she remains significantly dyspneic.  She was originally scheduled for a cardioversion on August 18 prior to this admission.    Review of Systems   Constitutional: Positive for activity change and fatigue. Negative for chills and fever.   HENT: Negative for congestion and sore throat.    Eyes: Negative for blurred vision and double vision.   Respiratory: Positive for cough and shortness of breath.    Cardiovascular: Positive for chest pain. Negative for leg swelling.   Gastrointestinal: Negative for abdominal pain and diarrhea.   Endocrine: Negative for cold intolerance and  "heat intolerance.   Genitourinary: Negative for dysuria and hematuria.   Musculoskeletal: Negative for arthralgias and myalgias.   Skin: Negative for rash and bruise.   Allergic/Immunologic: Negative for food allergies and immunocompromised state.   Neurological: Negative for dizziness and confusion.   Hematological: Negative for adenopathy. Bruises/bleeds easily.   Psychiatric/Behavioral: Negative for agitation and hallucinations.       Family History:  family history includes Diabetes in her brother, father, and mother; Heart attack in her brother; Heart disease in her brother; Heart failure in her mother; Hypertension in her brother, father, and mother; No Known Problems in her sister.    Social History:  Social History     Tobacco Use   • Smoking status: Current Every Day Smoker     Packs/day: 0.50     Years: 25.00     Pack years: 12.50     Types: Cigarettes   • Smokeless tobacco: Never Used   Vaping Use   • Vaping Use: Never used   Substance Use Topics   • Alcohol use: Never   • Drug use: Never       Allergies:  Codeine      Objective   Vital Signs:  /82 (BP Location: Right arm, Patient Position: Lying)   Pulse 103   Temp 98.7 °F (37.1 °C) (Oral)   Resp 20   Ht 167.6 cm (66\")   Wt 120 kg (265 lb 1.6 oz)   SpO2 94%   BMI 42.79 kg/m²   Estimated body mass index is 42.79 kg/m² as calculated from the following:    Height as of this encounter: 167.6 cm (66\").    Weight as of this encounter: 120 kg (265 lb 1.6 oz).      Physical Exam  Vitals reviewed.   Constitutional:       General: She is not in acute distress.     Appearance: She is obese.      Comments: Chronically ill appearing   HENT:      Head: Normocephalic and atraumatic.   Eyes:      Extraocular Movements: Extraocular movements intact.      Conjunctiva/sclera: Conjunctivae normal.   Cardiovascular:      Rate and Rhythm: Tachycardia present. Rhythm irregular.      Heart sounds: Murmur heard.    Systolic murmur is present.  Pulmonary:      " Effort: Pulmonary effort is normal. Prolonged expiration present.      Breath sounds: No wheezing.   Abdominal:      General: There is no distension.      Tenderness: There is no abdominal tenderness.   Musculoskeletal:         General: No swelling or tenderness.      Cervical back: Normal range of motion and neck supple.      Right lower le+ Pitting Edema present.      Left lower le+ Pitting Edema present.   Skin:     General: Skin is warm and dry.   Neurological:      General: No focal deficit present.      Mental Status: She is alert and oriented to person, place, and time.   Psychiatric:         Mood and Affect: Mood normal.         Judgment: Judgment normal.          Result Review :  The following data was reviewed by: Antonino Barriga MD on 08/15/2022:  CMP    CMP 6/2/22 8/15/22 8/16/22   Glucose 183 (A) 201 (A) 275 (A)   BUN 19 25 (A) 27 (A)   Creatinine 0.63 0.95 0.89   Sodium 137 137 135 (A)   Potassium 5.1 4.3 5.1   Chloride 97 (A) 97 (A) 97 (A)   Calcium 9.8 9.4 9.4   Albumin  4.20    Total Bilirubin  0.5    Alkaline Phosphatase  78    AST (SGOT)  18    ALT (SGPT)  44 (A)    (A) Abnormal value       Comments are available for some flowsheets but are not being displayed.           CBC    CBC 6/1/22 8/15/22 8/16/22   WBC 6.55 11.56 (A) 9.05   RBC 5.04 5.70 (A) 5.40 (A)   Hemoglobin 15.5 16.8 (A) 16.1 (A)   Hematocrit 50.7 (A) 53.6 (A) 50.6 (A)   .6 (A) 94.0 93.7   MCH 30.8 29.5 29.8   MCHC 30.6 (A) 31.3 (A) 31.8   RDW 13.8 13.8 14.1   Platelets 108 (A) 123 (A) 116 (A)   (A) Abnormal value            TSH    TSH 5/9/22 5/10/22 6/1/22   TSH 2.020 1.340 1.820             Prior ECG previously performed by the ER provider on 8/15/22 was independently interpreted with the following findings:  Atrial fibrillation with RVR     CT chest and CxR findings from 8/15/22 were reviewed with findings of emphysema.    [id number] Score: 4        Inpatient Cardiac Electrophysiology Consult  Consult performed by:  Antonino Barriga MD  Consult ordered by: Zohreh Soriano PA              Assessment and Plan     #1 Persistent atrial fibrillation  #2 Chronic oral anticoagulation use  #3 Morbid obesity  #4 COPD with tobacco use disorder  #5 High risk medication use    Claudette Keeling is a 61 y.o. female with past medical history as above who presents to the hospital for evaluation of atrial fibrillation.  She has evidence of symptomatic atrial fibrillation with RVR despite use of 2 AV latosha blocking agents.  As such, she is appropriate for attempted rhythm control to reduce symptoms.  As previously discussed in clinic, she is at high risk for ablation given her comorbidities COPD and morbid obesity.  For now, the most appropriate intervention remains trying to repeat a cardioversion with the use of flecainide to augment rhythm control.  Flecainide does have some risk involved including symptomatic bradycardias and as such she will need to be monitored on telemetry while inpatient with a 1 week follow-up ECG to ensure no significant QRS widening.  If she continues to have episodes of atrial fibrillation with RVR that are causing further hospitalizations or ER visits in the future, then a more aggressive approach with earlier ablation can be considered at that time.    I have discussed risks, benefits, and alternatives of a cardioversion with the patient.  Alternatives discussed include continued observation and medical management.  A cardioversion is generally not considered a high risk procedure but still has possible complications including but not limited to skin irritation, skin burns, stroke, or onset of either tachy or bradyarrhythmias at the time of the procedure.  Possibilities of recurrent arrhythmias and the possible need for additional procedures and/or medical therapy was discussed. Questions asked were appropriately answered.  No guarantees were made or implied.   Despite this, they would still like to  proceed.    Plan:  - Flecainide 100mg now followed by 50mg BID  - NPO at midnight for cardioversion tomorrow  - Continue anticoagulation with apixaban  - Again encouraged weight loss and smoking cessation  - HF management per the general cardiology team    MDM Risk: High (drug therapy requiring intensive monitoring as above)         EMR Dragon/Transcription disclaimer: Much of this encounter note is an electronic transcription/translation of spoken language to printed text. The electronic translation of spoken language may permit erroneous, or at times, nonsensical words or phrases to be inadvertently transcribed; although I have reviewed the note for such errors, some may still exist.

## 2022-08-16 NOTE — PAYOR COMM NOTE
"8/16/22 [facility] [phone]    [phone]    FAXING CLINICAL. INPATIENT ADMISSION APPROVED.              Keeling, Claudette (61 y.o. Female)             Date of Birth   1960    Social Security Number       Address   [address] [address]    Home Phone   [phone]    MRN   [id number]       Mor[employer]   B[employer]ism    Marital Status                               Admission Date   8/15/22    Admission Type   Emergency    Admitting Provider   Brad Moran MD    Attending Provider   Brad Moran MD    Department, Room/Bed   [facility] 4B, 432/1       Discharge Date       Discharge Disposition       Discharge Destination                               Attending Provider: Brad Moran MD    Allergies: Codeine    Isolation: Contact   Infection: MRSA (08/15/22)   Code Status: CPR   Advance Care Planning Activity    Ht: 167.6 cm (66\")   Wt: 120 kg (265 lb 1.6 oz)    Admission Cmt: None   Principal Problem: Atrial fibrillation with RVR (HCC) [I48.91]                 Active Insurance as of 8/15/2022     Primary Coverage     Payor Plan Insurance Group Employer/Plan Group    [facility] [id number]     Payor Plan Address Payor Plan Phone Number Payor Plan Fax Number Effective Dates    [address]   1/1/2022 - None Entered    [facility] [id number]       Subscriber Name Subscriber Birth Date Member ID       KEELING,CLAUDETTE 1960 [id number]                 Emergency Contacts      (Rel.) Home Phone Work Phone Mobile Phone    Bickerstaff,Robyn (Daughter) [phone]    Bickerstaff,Rachael (Daughter) [phone] -- --                                  [facility] Encounter Date/Time: 8/15/2022 [facility]   Hospital Account: [id number]    MRN: [id number]   Patient:  Claudette Keeling   Contact Serial #: [id number]   SSN:          ENCOUNTER             Patient " Class: Inpatient   Unit: [address] Service: Medicine     Bed: 432/1   Admitting Provider: Brad Moran MD   Referring Physician: Provider, No Known   Attending Provider: Brad Moran MD   Adm Diagnosis: Atrial fibrillation with*               PATIENT          Name: Claudette Keeling : 1960 (61 yrs)   Address: [address] [place] [address] Sex: Female   City: [place] [address]   County: [facility]   Marital Status:  Ethnicity: NOT                                                                              Race: WHITE   Primary Care Provider: Reymundo Torrez MD Patients Phone: Home Phone: [phone]     Mobile Phone: [phone]   EMERGENCY CONTACT   Contact Name Legal Guardian? Relationship to Patient Home Phone Work Phone   1. Bickerstaff,Robyn  2. Bickerstaff,Rachael No  No Daughter  Daughter [phone]         GUARANTOR  Guarantor: Claudette Keeling     : 1960   Address: [address] [place] [address] Sex: [place]     [place] [address]     Relation to Patient: Self       Home Phone: [phone]   Guarantor ID: [id number]       Work Phone:     GUARANTOR EMPLOYER   Employer: [employer]         Status: FULL TIME   COVERAGE          PRIMARY INSURANCE   Payor: [employer] Plan: [employer]   Group Number: [id number] Insurance Type: INDEMNITY   Subscriber Name: KEELING,CLAUDETTE Subscriber : 1960   Subscriber ID: [id number] Coverage Address: [address] [address]   Pat. Rel. to Subscriber: Self Coverage Phone:     SECONDARY INSURANCE   Payor: N/A Plan: N/A   Group Number:   Insurance Type:     Subscriber Name:   Subscriber :     Subscriber ID:   Coverage Address:     Pat. Rel. to Subscriber:   Coverage Phone:        Contact Serial # ([id number])         2022    Chart ID ([id number])                   H&P      Attested   Date of Service:  08/15/22 1513   Creation Time:  08/15/22 1513             "  Attested            Attestation signed by Brad Moran MD at 08/15/22 1703     I have reviewed this documentation and agree.     .This visit was performed by both a physician and an APC. I personally evaluated and examined the patient. I performed all aspects of the MDM as documented.  I have reviewed and agree with the plans.  KT .        Electronically signed by Brad WELLINGTON. MD Dean, 8/15/2022, 17:03 CDT.                Expand AllCollapse All            Show:Clear all  [x]Manual[x]Template[x]Copied    Added by:  [x]Wilma Patino APRN      []Macarena for details         [facility] Medicine Services  HISTORY AND PHYSICAL     Date of Admission: 8/15/2022  Primary Care Physician: Reymundo Torrez MD     Subjective      Chief Complaint: Shortness of breathing     History of Present Illness  Claudette Keeling is a 61-year-old female with a past medical history of persistent atrial fibrillation-followed by [facility] cardiology with plans for cardioversion 8/18/2022, chronic anticoagulation, CHF diastolic, hypertension, polycythemia, ongoing tobacco dependence, type 2 diabetes, sleep apnea currently not wearing CPAP due to shortness of breathing, continuous oxygen therapy 3 L for chronic respiratory failure however patient only will wear at night.  She does continue to work daily.  Patient states she developed symptoms of shortness of breathing on Friday, 8/12/2022, symptoms continued to worsen throughout the weekend.  She started having \"cold sweats\".  She is unsure if she had a fever however due to severe shortness of breathing worse with exertion she did present to [facility] emergency department for evaluation.  Patient was found to be in A. fib RVR rate 172.  Elevated BNP, white count 11.5 with left lower lobe pneumonia noted on CT angiogram of the chest.  During assessment when having patient set up she did drop from 96% on 3 L to 94.  Wheezing noted throughout.  She " "does continue to smoke approximately half a pack of cigarettes per day.  She has no chest pain.  She does report painful cough worse on the left.  She has no sputum production.  She has no orthopnea or lower extremity edema.  She is admitted for further evaluation treatment.     Review of Systems   A 10 point review of systems was completed, all negative except for those discussed in HPI     Past Medical History:   Medical History        Past Medical History:   Diagnosis Date   • Arthritis     • Atrial fibrillation (HCC)     • Diabetes mellitus (HCC)     • Hypertension     • Neuropathy     • Sleep apnea              Past Surgical History:   Surgical History         Past Surgical History:   Procedure Laterality Date   • APPENDECTOMY       • HYSTERECTOMY       • JOINT REPLACEMENT Left       knee   • TOE OSTEOPHYTE REMOVAL                Family History: family history includes Diabetes in her brother, father, and mother; Heart attack in her brother; Heart disease in her brother; Heart failure in her mother; Hypertension in her brother, father, and mother; No Known Problems in her sister.     Social History:  reports that she has been smoking cigarettes. She has a 12.50 pack-year smoking history. She has never used smokeless tobacco. She reports that she does not drink alcohol and does not use drugs.     Code Status: Full, if unable speak for self her daughters will speak for her        Allergies:       Allergies   Allergen Reactions   • Codeine GI Intolerance      BP (!) 152/124   Pulse (!) 129   Temp 98.1 °F (36.7 °C)   Resp 26   Ht 167.6 cm (66\")   Wt 121 kg (266 lb)   SpO2 96%   BMI 42.93 kg/m²   Physical Exam  Vitals reviewed.   Constitutional:       Appearance: She is ill-appearing.   HENT:      Head: Normocephalic and atraumatic.      Mouth/Throat:      Mouth: Mucous membranes are moist.      Pharynx: Oropharynx is clear.   Eyes:      Extraocular Movements: Extraocular movements intact.      " Conjunctiva/sclera: Conjunctivae normal.   Neck:      Comments: Positive JVD  Cardiovascular:      Rate and Rhythm: Tachycardia present. Rhythm irregular.   Pulmonary:      Effort: Pulmonary effort is normal.      Breath sounds: Wheezing ( Scattered throughout, inspiratory and expiratory) present.   Abdominal:      Palpations: Abdomen is soft.      Comments: Protuberant   Musculoskeletal:         General: Normal range of motion.      Cervical back: Normal range of motion and neck supple.      Right lower leg: No edema.      Left lower leg: No edema.   Skin:     General: Skin is warm and dry.   Neurological:      General: No focal deficit present.      Mental Status: She is alert and oriented to person, place, and time.   Psychiatric:         Mood and Affect: Mood normal.         Behavior: Behavior normal.         Pertinent Data:            Lab Results (last 72 hours)      Procedure Component Value Units Date/Time     Comprehensive Metabolic Panel [[id number]]  (Abnormal) Collected: 08/15/22 1213     Specimen: Blood Updated: 08/15/22 1307       Glucose 201 mg/dL         BUN 25 mg/dL         Creatinine 0.95 mg/dL         Sodium 137 mmol/L         Potassium 4.3 mmol/L         Comment: Slight hemolysis detected by analyzer. Results may be affected.          Chloride 97 mmol/L         CO2 28.0 mmol/L         Calcium 9.4 mg/dL         Total Protein 7.0 g/dL         Albumin 4.20 g/dL         ALT (SGPT) 44 U/L         AST (SGOT) 18 U/L         Comment: Slight hemolysis detected by analyzer. Results may be affected.          Alkaline Phosphatase 78 U/L         Total Bilirubin 0.5 mg/dL         Globulin 2.8 gm/dL         A/G Ratio 1.5 g/dL         BUN/Creatinine Ratio 26.3       Anion Gap 12.0 mmol/L         eGFR 67.5 mL/min/1.73       COVID-19,Lr Bio IN-HOUSE,Nasal Swab No Transport Media 3-4 HR TAT - Swab, Nasal Cavity [[id number]]  (Normal) Collected: 08/15/22 1216     Specimen: Swab from Nasal Cavity Updated: 08/15/22  1305       COVID19 Not Detected     BNP [[id number]]  (Abnormal) Collected: 08/15/22 1213     Specimen: Blood Updated: 08/15/22 1257       proBNP 5,364.0 pg/mL       Troponin [[id number]]  (Normal) Collected: 08/15/22 1213     Specimen: Blood Updated: 08/15/22 1256       Troponin T <0.010 ng/mL       Magnesium [[id number]]  (Normal) Collected: 08/15/22 1213     Specimen: Blood Updated: 08/15/22 1256       Magnesium 2.1 mg/dL       D-dimer, Quantitative [[id number]]  (Abnormal) Collected: 08/15/22 1213     Specimen: Blood Updated: 08/15/22 1239       D-Dimer, Quantitative 1.01 MCGFEU/mL       CBC Auto Differential [[id number]]  (Abnormal) Collected: 08/15/22 1213     Specimen: Blood Updated: 08/15/22 1236       WBC 11.56 10*3/mm3         RBC 5.70 10*6/mm3         Hemoglobin 16.8 g/dL         Hematocrit 53.6 %         MCV 94.0 fL         MCH 29.5 pg         MCHC 31.3 g/dL         RDW 13.8 %         RDW-SD 47.6 fl         MPV 11.0 fL         Platelets 123 10*3/mm3         Neutrophil % 72.7 %         Lymphocyte % 19.6 %         Monocyte % 6.2 %         Eosinophil % 0.9 %         Basophil % 0.2 %         Immature Grans % 0.4 %         Neutrophils, Absolute 8.41 10*3/mm3         Lymphocytes, Absolute 2.26 10*3/mm3         Monocytes, Absolute 0.72 10*3/mm3         Eosinophils, Absolute 0.10 10*3/mm3         Basophils, Absolute 0.02 10*3/mm3         Immature Grans, Absolute 0.05 10*3/mm3         nRBC 0.0 /100 WBC                     Imaging Results (Last 24 Hours)      Procedure Component Value Units Date/Time     CT Angiogram Chest [[id number]] Collected: 08/15/22 1428       Updated: 08/15/22 1436     Narrative:       CT ANGIOGRAM CHEST- 8/15/2022 2:14 PM CDT     HISTORY: Pulmonary embolism (PE) suspected, unknown D-dimer      COMPARISON: 5/31/2022     DOSE LENGTH PRODUCT: 613 mGy cm. Automated exposure control was also  utilized to decrease patient radiation dose.     TECHNIQUE: Axial images the chest are obtained following IV  contrast.  2-D and maximal intensity projection images reconstructed and reviewed     FINDINGS:  No pulmonary emboli identified. Thoracic aorta normal in  caliber. Cardiomegaly. No pericardial or pleural effusions. Calcified  subcarinal and right hilar lymph nodes with no pathologic intrathoracic  or axillary lymphadenopathy visualized.     No acute pathology identified within the visible upper abdomen.     Left infrahilar/lower lobe opacities suspicious for pneumonia. Probable  right basilar atelectasis. Mild peripheral emphysematous change of the  upper lobes. Stable linear density anterior right upper lobe image 49.  No pneumothorax. Stable small focus of air along the right  posterolateral trachea at 10 just below the level of the thyroid is  similar to prior studies and may represent tracheal or esophageal  diverticulum, though no direct connection localized.     Degenerative change of the regional skeleton chronic mild compression  superior endplate of T4.        Impression:       1. No pulmonary emboli.  2. Left infrahilar and left lower lobe opacities suspicious for  pneumonia. No pathologic lymphadenopathy. Right basilar atelectasis.  3. Paraseptal apical emphysema. No pneumothorax. Stable collection of  air along the right posterolateral aspect of the superior trachea and  esophagus favoring diverticulum.  This report was finalized on 08/15/2022 14:33 by Dr. Irene Baptiste MD.     XR Chest 1 View [[id number]] Collected: 08/15/22 1230       Updated: 08/15/22 1237     Narrative:       EXAMINATION:  XR CHEST 1 VW-  8/15/2022 12:17 PM CDT     HISTORY: Tachycardia. Atrial fibrillation. Hypertension. Emphysema.          TECHNIQUE: Single view AP image.     FINDINGS:  There is poor inspiration. There is an external pacing leads  overlying the chest. There is cardiomegaly. There is probable blunting  of the left costophrenic angle. The left lung is difficult to evaluate.  There is minimal atelectasis in the right  lung base.         Impression:       1. Poor inspiration with vascular crowding.  2. Left chest partially obscured by external pacing leads.  3. Cardiomegaly.  4. Probable small pleural effusion on the left. Atelectasis in the right  lung base.        This report was finalized on 08/15/2022 12:34 by Dr. Omega Macias MD.       5/10/2022 ECHO  Interpretation Summary     · Left ventricular ejection fraction appears to be 61 - 65%. Left ventricular systolic function is normal.  · Left ventricular wall thickness is consistent with moderate concentric hypertrophy.  · Left ventricular diastolic dysfunction is noted.  · Normal right ventricular cavity size and systolic function noted.  · There is mild biatrial enlargement.  · There is no significant (greater than mild) valvular dysfunction.  · Estimated right ventricular systolic pressure from tricuspid regurgitation is normal (<35 mmHg).        I have personally reviewed the ECG obtained at time of admission.      Assessment / Plan      Assessment:        Active Hospital Problems     Diagnosis     • Atrial fibrillation with RVR ([facility])     • Left lower lobe pneumonia     • Chronic respiratory failure with hypoxia ([facility])     • Obesity, Class III, BMI 40-49.9 (morbid obesity) ([facility])     • Chronic anticoagulation     • Chronic diastolic CHF (congestive heart failure) ([facility])     • Emphysema lung ([facility])     • Polycythemia     • Tobacco abuse     • Type 2 diabetes mellitus with hyperglycemia, without long-term current use of insulin ([facility])          1.  Admit as inpatient  2.  Continue Cardizem drip, consult cardiology  3.  DVT prophylaxis with ongoing Eliquis use  4.  Home medications reviewed and restarted as appropriate  5.  Monitor glucose AC with regular insulin sliding scale coverage  6.  Xopenex nebulizer 3 times daily, supplemental oxygen-Home dose      3 L continuously however patient only wears at night, incentive            spirometry, Solu-Medrol, Mucinex, daily weights,  cardiac monitoring  7.  MRSA PCR, urine studies for Legionella and strep pneumoniae  8.  Labs in a.m.  9.  Rocephin and azithromycin, obtain blood cultures prior to starting  10.  Hold IV fluids due to history of CHF  11.  Smoking cessation education at discharge, Ruiz ontiveros        I discussed the patient's findings and my recommendations with: Brad Moran MD     Time spent: 40 minutes     Patient seen and examined by me on 8/15/2022 at 3:13 PM.     Electronically signed by FLORENCIA Mclean, 08/15/22, 15:51 CDT.            Cosigned by: Brad Moran MD at 08/15/22 1703       /22 1123 98.7 (37.1) 103 20 122/82 Lying nasal cannula 3 94   08/16/22 0830 98.1 (36.7) 112 18 108/83 Sitting nasal cannula 3 95   08/16/22 0556 -- 118 18 -- -- nasal cannula 3 94   08/16/22 0501 97.5 (36.4) 126 Abnormal  18 138/84 -- nasal cannula 3 93   08/15/22 2311 98.2 (36.8) 124 Abnormal  18 119/96 Lying nasal cannula 3 91   08/15/22 2128 -- -- -- 115/70 Lying -- -- --   08/15/22 2125 -- -- -- -- -- nasal cannula 2 --   08/15/22 1930 95.6 (35.3) 126 Abnormal  18 91/77 Lying nasal cannula 2 89 Abnormal    08/15/22 1924 -- 115 18 -- -- -- -- 97   08/15/22 1918 -- 108 18 -- -- nasal cannula 2 97   08/15/22 1746 -- 127 Abnormal  18 117/104 Abnormal  -- nasal cannula 2 97   08/15/22 1628 -- 139 Abnormal  19 -- -- nasal cannula 3 93   08/15/22 1621 -- 124 Abnormal  19 -- -- nasal cannula 3 96   08/15/22 16:07:35                   Zohreh Soriano PA    Physician Assistant   Cardiology   Consults      Signed   Date of Service:  08/16/22 0929   Creation Time:  08/16/22 0929           Consult Orders   Inpatient Cardiology Consult [[id number]] ordered by Wilma Patino APRN at 08/15/22 1542          Signed        Expand AllCollapse All            Show:Clear all  [x]Manual[x]Template[x]Copied    Added by:  [x]Zohreh Soriano PA      []Macarena for details      [facility] CONSULT NOTE     Referring Provider:  Brad Moran No Known Provider     Reason for Consultation: Afib with RVR     Chief complaint:       Chief Complaint   Patient presents with   • Shortness of Breath            Subjective    .      History of present illness:  Claudette Keeling is a 61 y.o. female with history of persistent atrial fibrillation, CHADsVASC score 4 (HTN, DM, female, CHF) diastolic heart failure,  sleep apnea currently not wearing CPAP due to shortness of breathing, continuous oxygen therapy 3 L for chronic respiratory failure (however patient only will wear at night) morbid obesity, Type 2 DM, COPD/tobacco abuse,  who presented to ER yesterday for persistent SOB and tachycardia/afib with RVR.      She was first diagnosed with atrial fibrillation in May of this year when she presented to the hospital with shortness of breath, dizziness in association with newly diagnosed atrial fibrillation with rapid heart rates.  She was started on diltiazem at that time for rate control which was uptitrated fairly aggressively but still had only limited effect at achieving sufficient rate control.  She then underwent direct-current cardioversion in early June however had early recurrence of atrial fibrillation.  She has never started on antiarrhythmic drug.  She states that since that time she continues to have significant shortness of breath and fatigue with even minimal exertion.       She was evaluated by our EP colleague, Dr. Barriga, last week who recommended pursuing cardioversion with Flecainide therapy for rhythm control. Currently, she was not felt to be an ideal candidate for PVI ablation due to risk of complications with anesthesia given her obesity and respiratory disease (wheezing on exam). However, after their consultation, she continued to have progressive SOB/KRAFT with subjective tachycardia and palpitations. She stated if she did anything exertional, her HR would increase to 170s. She subsequently presented to the ER yesterday for  persistent symptoms and uncontrolled rate.      She currently is on Diltiazem 15mg /hr IV, heart rates are generally in the 120s. Currently, she appears comfortable laying in bed eating breakfast.         History  Medical History        Past Medical History:   Diagnosis Date   • Arthritis     • Atrial fibrillation (HCC)     • Diabetes mellitus (HCC)     • Hypertension     • Neuropathy     • Sleep apnea        ,   Surgical History         Past Surgical History:   Procedure Laterality Date   • APPENDECTOMY       • HYSTERECTOMY       • JOINT REPLACEMENT Left       knee   • TOE OSTEOPHYTE REMOVAL          ,         Family History   Problem Relation Age of Onset   • Heart failure Mother     • Hypertension Mother     • Diabetes Mother     • Hypertension                     of Systems  Review of Systems   Constitutional: Positive for diaphoresis and malaise/fatigue.        Cold sweats   HENT: Negative.    Eyes: Negative.    Cardiovascular: Positive for irregular heartbeat and palpitations.   Respiratory: Positive for shortness of breath and wheezing.    Endocrine: Negative.    Skin: Negative.    Musculoskeletal: Negative.    Gastrointestinal: Negative.    Genitourinary: Negative.    Neurological: Negative.    Psychiatric/Behavioral: Negative.                   Atrial fibrillation with RVR ([facility])    Tobacco abuse    Type 2 diabetes mellitus with hyperglycemia, without long-term current use of insulin (HCC)    Chronic diastolic CHF (congestive heart failure) ([facility])    Left lower lobe pneumonia    Chronic respiratory failure with hypoxia ([facility])    Obesity, Class III, BMI 40-49.9 (morbid obesity) (HCC)        Persistent atrial fibrillation RVR: Improved rate control with Diltiazem 15mg/hr. Discussed with patient about performing cardioversion while inpatient to restore sinus rhythm and she is agreeable. Dr. Barriga is aware of patient admission and we will make her NPO now, (ate breakfast) and plan for cardioversion this afternoon.  Will begin Flecainide 50mg BID.      Chronic Diastolic CHF: Elevated proBNP of 5300 on admission, likely exacerbated by afib with RVR. Flow sheets indicate 1350ml deficit. Continue Diltiazem, Lisinopril, statin, Lopressor      Further orders per Dr. Gregorio and Dr. Barriga.      Thank you for asking us to follow this patient with you.         Electronically signed by BRIGIDA Bello, 08/16/22, 9:30 AM CDT.                                                 Current Facility-Administered Medications   Medication Dose Route Frequency Provider Last Rate Last Admin   • acetaminophen (TYLENOL) tablet 650 mg  650 mg Oral Q4H PRN Wilma Patino APRN        Or   • acetaminophen (TYLENOL) 160 MG/5ML solution 650 mg  650 mg Oral Q4H PRN Wilma Patino APRN        Or   • acetaminophen (TYLENOL) suppository 650 mg  650 mg Rectal Q4H PRN Wilma Patino, APRVONDA       • apixaban (ELIQUIS) tablet 5 mg  5 mg Oral Q12H Wilma Patino APRN   5 mg at 08/16/22 0838   • cefTRIAXone (ROCEPHIN) 1 g in sodium chloride 0.9 % 100 mL IVPB-VTB  1 g Intravenous Q24H Wilma Patino APRN 200 mL/hr at 08/15/22 1605 1 g at 08/15/22 1605   • dextrose (D50W) (25 g/50 mL) IV injection 25 g  25 g Intravenous Q15 Min PRN Wilma Patino, APRVONDA       • dextrose (GLUTOSE) oral gel 15 g  15 g Oral Q15 Min PRN Wilma Patino, APRVONDA       • dilTIAZem (CARDIZEM) 125 mg in 125 mL 0.7% sodium chloride  infusion  5-15 mg/hr Intravenous Titrated Minh Valenzuela MD 15 mL/hr at 08/16/22 0358 15 mg/hr at 08/16/22 0358   • dilTIAZem CD (CARDIZEM CD) 24 hr capsule 120 mg  120 mg Oral Q24H Wilma Patino APRN   120 mg at 08/16/22 0838   • doxycycline (VIBRAMYCIN) 100 mg/100 mL 0.9% NS MBP  100 mg Intravenous Q12H Brad Moran MD       • famotidine (PEPCID) tablet 20 mg  20 mg Oral BID PRN Wilma Patino, APRN       • flecainide (TAMBOCOR) tablet 50 mg  50 mg Oral Q12H Zohreh Soriano PA       • gabapentin (NEURONTIN) capsule 300 mg  300 mg  Oral TID Wilma Patino APRN   300 mg at 08/16/22 0536   • glucagon (human recombinant) (GLUCAGEN DIAGNOSTIC) injection 1 mg  1 mg Intramuscular Q15 Min PRN Wilma Patino APRVONDA       • guaiFENesin (MUCINEX) 12 hr tablet 600 mg  600 mg Oral Q12H Wilma Patino APRN   600 mg at 08/16/22 0838   • Insulin Lispro (humaLOG) injection 0-9 Units  0-9 Units Subcutaneous TID AC Wilma Patino APRN   0.4 Units at 08/16/22 0833   • levalbuterol (XOPENEX) nebulizer solution 1.25 mg  1.25 mg Nebulization TID - RT Wilma Patino APRN   1.25 mg at 08/16/22 0556   • lisinopril (PRINIVIL,ZESTRIL) tablet 10 mg  10 mg Oral Daily Wilma Patino APRN   10 mg at 08/16/22 0838   • methylPREDNISolone sodium succinate (SOLU-Medrol) injection 40 mg  40 mg Intravenous Q8H Brad Moran MD   40 mg at 08/16/22 0839   • metoprolol tartrate (LOPRESSOR) tablet 100 mg  100 mg Oral Q12H Brad Moran MD   100 mg at 08/16/22 0838   • mupirocin (BACTROBAN) 2 % nasal ointment   Each Nare BID Brad Moran MD       • nicotine (NICODERM CQ) 14 MG/24HR patch 1 patch  1 patch Transdermal Q24H Wilma Patino APRN   1 patch at 08/16/22 0834   • nystatin (MYCOSTATIN) powder   Topical Q12H Alf Workman, DO       • ondansetron (ZOFRAN) tablet 4 mg  4 mg Oral Q6H PRN Wilma Patino APRN        Or   • ondansetron (ZOFRAN) injection 4 mg  4 mg Intravenous Q6H PRN Wilma Patino, APRN       • rosuvastatin (CRESTOR) tablet 5 mg  5 mg Oral Nightly Wilam Patino, APRN       • sodium chloride 0.9 % flush 10 mL  10 mL Intravenous Q12H Wilma Patino APRN   10 mL at 08/16/22 0836   • sodium chloride 0.9 % flush 10 mL  10 mL Intravenous PRN Wilma Patino, APRN

## 2022-08-16 NOTE — PLAN OF CARE
Goal Outcome Evaluation:  Plan of Care Reviewed With: patient, daughter           Outcome Evaluation: OT eval completed. Pt presents alert and oriented, sitting up in chair on 3L O2/NC. She reports at baseline being independent with all adls and mobility. Today she was able to independently don socks while seated at EOB and complete sit <> stand t/fs. Amb approx 200ft in hallway unsupported, with one standing rest break required. O2 sats maintained in low 90s with all activity. Pt reports feeling that she is at her baseline function and does not required skilled OT services. OT to sign off at this time. Anticipate d/c home with family.

## 2022-08-16 NOTE — CONSULTS
[facility] CONSULT NOTE    Referring Provider:Dr. Brad Moran No Known Provider    Reason for Consultation: Afib with RVR    Chief complaint:   Chief Complaint   Patient presents with   • Shortness of Breath       Subjective .     History of present illness:  Claudette Keeling is a 61 y.o. female with history of persistent atrial fibrillation, CHADsVASC score 4 (HTN, DM, female, CHF) diastolic heart failure,  sleep apnea currently not wearing CPAP due to shortness of breathing, continuous oxygen therapy 3 L for chronic respiratory failure (however patient only will wear at night) morbid obesity, Type 2 DM, COPD/tobacco abuse,  who presented to ER yesterday for persistent SOB and tachycardia/afib with RVR.     She was first diagnosed with atrial fibrillation in May of this year when she presented to the hospital with shortness of breath, dizziness in association with newly diagnosed atrial fibrillation with rapid heart rates.  She was started on diltiazem at that time for rate control which was uptitrated fairly aggressively but still had only limited effect at achieving sufficient rate control.  She then underwent direct-current cardioversion in early June however had early recurrence of atrial fibrillation.  She has never started on antiarrhythmic drug.  She states that since that time she continues to have significant shortness of breath and fatigue with even minimal exertion.      She was evaluated by our EP colleague, Dr. Barriga, last week who recommended pursuing cardioversion with Flecainide therapy for rhythm control. Currently, she was not felt to be an ideal candidate for PVI ablation due to risk of complications with anesthesia given her obesity and respiratory disease (wheezing on exam). However, after their consultation, she continued to have progressive SOB/KRAFT with subjective tachycardia and palpitations. She stated if she did anything exertional, her HR would increase to 170s.  She subsequently presented to the ER yesterday for persistent symptoms and uncontrolled rate.     She currently is on Diltiazem 15mg /hr IV, heart rates are generally in the 120s. Currently, she appears comfortable laying in bed eating breakfast.       History  Past Medical History:   Diagnosis Date   • Arthritis    • Atrial fibrillation (HCC)    • Diabetes mellitus (HCC)    • Hypertension    • Neuropathy    • Sleep apnea    ,   Past Surgical History:   Procedure Laterality Date   • APPENDECTOMY     • HYSTERECTOMY     • JOINT REPLACEMENT Left     knee   • TOE OSTEOPHYTE REMOVAL     ,   Family History   Problem Relation Age of Onset   • Heart failure Mother    • Hypertension Mother    • Diabetes Mother    • Hypertension Father    • Diabetes Father    • No Known Problems Sister    • Diabetes Brother    • Hypertension Brother    • Heart disease Brother    • Heart attack Brother    ,   Social History     Tobacco Use   • Smoking status: Current Every Day Smoker     Packs/day: 0.50     Years: 25.00     Pack years: 12.50     Types: Cigarettes   • Smokeless tobacco: Never Used   Vaping Use   • Vaping Use: Never used   Substance Use Topics   • Alcohol use: Never   • Drug use: Never   ,     Medications      Current Facility-Administered Medications:   •  acetaminophen (TYLENOL) tablet 650 mg, 650 mg, Oral, Q4H PRN **OR** acetaminophen (TYLENOL) 160 MG/5ML solution 650 mg, 650 mg, Oral, Q4H PRN **OR** acetaminophen (TYLENOL) suppository 650 mg, 650 mg, Rectal, Q4H PRN, Wilma Patino, APRN  •  apixaban (ELIQUIS) tablet 5 mg, 5 mg, Oral, Q12H, Wilma Patino, APRN, 5 mg at 08/16/22 0838  •  cefTRIAXone (ROCEPHIN) 1 g in sodium chloride 0.9 % 100 mL IVPB-VTB, 1 g, Intravenous, Q24H, Last Rate: 200 mL/hr at 08/15/22 1605, 1 g at 08/15/22 1605 **AND** azithromycin (ZITHROMAX) 500 mg in sodium chloride 0.9 % 250 mL IVPB-VTB, 500 mg, Intravenous, Q24H, Wilma Patino, APRN, 500 mg at 08/15/22 1637  •  dextrose (D50W) (25  g/50 mL) IV injection 25 g, 25 g, Intravenous, Q15 Min PRN, Wilma Patino APRN  •  dextrose (GLUTOSE) oral gel 15 g, 15 g, Oral, Q15 Min PRN, Wilma Patino APRN  •  dilTIAZem (CARDIZEM) 125 mg in 125 mL 0.7% sodium chloride  infusion, 5-15 mg/hr, Intravenous, Titrated, Minh Valenzuela MD, Last Rate: 15 mL/hr at 08/16/22 0358, 15 mg/hr at 08/16/22 0358  •  dilTIAZem CD (CARDIZEM CD) 24 hr capsule 120 mg, 120 mg, Oral, Q24H, Wilma Patino APRN, 120 mg at 08/16/22 0838  •  famotidine (PEPCID) tablet 20 mg, 20 mg, Oral, BID PRN, Wilma Patino APRN  •  furosemide (LASIX) tablet 40 mg, 40 mg, Oral, Daily, Wilma Patino APRN, 40 mg at 08/16/22 0838  •  gabapentin (NEURONTIN) capsule 300 mg, 300 mg, Oral, TID, Wilma Patino APRN, 300 mg at 08/16/22 0536  •  glucagon (human recombinant) (GLUCAGEN DIAGNOSTIC) injection 1 mg, 1 mg, Intramuscular, Q15 Min PRN, Wilma Patino APRN  •  guaiFENesin (MUCINEX) 12 hr tablet 600 mg, 600 mg, Oral, Q12H, Wilma Patino APRN, 600 mg at 08/16/22 0838  •  Insulin Lispro (humaLOG) injection 0-9 Units, 0-9 Units, Subcutaneous, TID AC, Wilma Patino APRN, 0.4 Units at 08/16/22 0833  •  levalbuterol (XOPENEX) nebulizer solution 1.25 mg, 1.25 mg, Nebulization, TID - RT, Wilma Patino APRN, 1.25 mg at 08/16/22 0556  •  lisinopril (PRINIVIL,ZESTRIL) tablet 10 mg, 10 mg, Oral, Daily, Wilma Patino APRN, 10 mg at 08/16/22 0838  •  methylPREDNISolone sodium succinate (SOLU-Medrol) injection 40 mg, 40 mg, Intravenous, Q8H, Brad Moran MD, 40 mg at 08/16/22 0839  •  metoprolol tartrate (LOPRESSOR) tablet 100 mg, 100 mg, Oral, Q12H, Brad Moran MD, 100 mg at 08/16/22 0838  •  nicotine (NICODERM CQ) 14 MG/24HR patch 1 patch, 1 patch, Transdermal, Q24H, Wilma Patino APRN, 1 patch at 08/16/22 0834  •  nystatin (MYCOSTATIN) powder, , Topical, Q12H, Alf Workman,   •  ondansetron (ZOFRAN) tablet 4 mg, 4 mg, Oral, Q6H PRN **OR**  "ondansetron (ZOFRAN) injection 4 mg, 4 mg, Intravenous, Q6H PRN, Wilma Patino, APRN  •  rosuvastatin (CRESTOR) tablet 5 mg, 5 mg, Oral, Nightly, Wilma Patino, APRN  •  sodium chloride 0.9 % flush 10 mL, 10 mL, Intravenous, Q12H, Wilma Patino APRN, 10 mL at 08/16/22 0836  •  sodium chloride 0.9 % flush 10 mL, 10 mL, Intravenous, PRN, Wilma Patino, APRN    Allergies:  Codeine    Review of Systems  Review of Systems   Constitutional: Positive for diaphoresis and malaise/fatigue.        Cold sweats   HENT: Negative.    Eyes: Negative.    Cardiovascular: Positive for irregular heartbeat and palpitations.   Respiratory: Positive for shortness of breath and wheezing.    Endocrine: Negative.    Skin: Negative.    Musculoskeletal: Negative.    Gastrointestinal: Negative.    Genitourinary: Negative.    Neurological: Negative.    Psychiatric/Behavioral: Negative.        Objective     Physical Exam:  Visit Vitals  /83 (BP Location: Right arm, Patient Position: Sitting)   Pulse 112   Temp 98.1 °F (36.7 °C) (Oral)   Resp 18   Ht 167.6 cm (66\")   Wt 120 kg (265 lb 1.6 oz)   SpO2 95%   BMI 42.79 kg/m²       Vitals reviewed.   Constitutional:       Appearance: Healthy appearance. Not in distress.   Eyes:      Extraocular Movements: Extraocular movements intact.      Conjunctiva/sclera: Conjunctivae normal.      Pupils: Pupils are equal, round, and reactive to light.   HENT:      Head: Normocephalic and atraumatic.      Nose: Nose normal.    Mouth/Throat:      Lips: Pink.      Mouth: Mucous membranes are moist.      Pharynx: Oropharynx is clear.   Neck:      Vascular: No carotid bruit or JVD. JVD normal.   Pulmonary:      Effort: Pulmonary effort is normal.      Breath sounds: Normal breath sounds.   Chest:      Chest wall: Not tender to palpatation.   Cardiovascular:      PMI at left midclavicular line. Normal rate. Irregularly irregular rhythm. Normal S1. Normal S2.      Murmurs: There is no murmur.      " No gallop. No rub.   Pulses:     Radial: 2+ bilaterally.  Edema:     Peripheral edema present.     Pretibial: bilateral 1+ pitting edema of the pretibial area.  Abdominal:      General: Bowel sounds are normal.      Palpations: Abdomen is soft.   Musculoskeletal: Normal range of motion.      Extremities: No clubbing present.     Cervical back: Normal range of motion. Skin:     General: Skin is warm and dry.   Neurological:      General: No focal deficit present.      Mental Status: Alert and oriented to person, place, and time.      Cranial Nerves: Cranial nerves are intact.   Psychiatric:         Attention and Perception: Attention normal.         Mood and Affect: Affect normal.         Speech: Speech normal.         Behavior: Behavior normal.         Cognition and Memory: Cognition normal.            Results Review:   I reviewed the patient's new clinical results.    CBC:@LABRCNTOP(wbc:1,hgb:1,hct:1,plt:1,ironserum:1,iron:1:ferritin:1)@   BMP/CMP:@LABRCNTOP(na:1,k:1,cl:1,co2:1,glucose:1,BUN:1,creatinine:1,EGFRIFNONA:1,EGFRIFAFRI:1,EGFRRESULT:1,calcium:1)@  BNP: @LABRCNTOP(probnp:1)@   THYROID:@LABRCNTOP(TSH:1,FREET3:1,FREET4:1,T3:1,T4:1,TeUP:1,FTI:1, TOTALT4:1)@          Imaging Results (Last 72 Hours)     Procedure Component Value Units Date/Time    CT Angiogram Chest [[id number]] Collected: 08/15/22 1428     Updated: 08/15/22 1436    Narrative:      CT ANGIOGRAM CHEST- 8/15/2022 2:14 PM CDT     HISTORY: Pulmonary embolism (PE) suspected, unknown D-dimer      COMPARISON: 5/31/2022     DOSE LENGTH PRODUCT: 613 mGy cm. Automated exposure control was also  utilized to decrease patient radiation dose.     TECHNIQUE: Axial images the chest are obtained following IV contrast.  2-D and maximal intensity projection images reconstructed and reviewed     FINDINGS:  No pulmonary emboli identified. Thoracic aorta normal in  caliber. Cardiomegaly. No pericardial or pleural effusions. Calcified  subcarinal and right hilar lymph  nodes with no pathologic intrathoracic  or axillary lymphadenopathy visualized.     No acute pathology identified within the visible upper abdomen.     Left infrahilar/lower lobe opacities suspicious for pneumonia. Probable  right basilar atelectasis. Mild peripheral emphysematous change of the  upper lobes. Stable linear density anterior right upper lobe image 49.  No pneumothorax. Stable small focus of air along the right  posterolateral trachea at 10 just below the level of the thyroid is  similar to prior studies and may represent tracheal or esophageal  diverticulum, though no direct connection localized.     Degenerative change of the regional skeleton chronic mild compression  superior endplate of T4.       Impression:      1. No pulmonary emboli.  2. Left infrahilar and left lower lobe opacities suspicious for  pneumonia. No pathologic lymphadenopathy. Right basilar atelectasis.  3. Paraseptal apical emphysema. No pneumothorax. Stable collection of  air along the right posterolateral aspect of the superior trachea and  esophagus favoring diverticulum.  This report was finalized on 08/15/2022 14:33 by Dr. Irene Baptiste MD.    XR Chest 1 View [[id number]] Collected: 08/15/22 1230     Updated: 08/15/22 1237    Narrative:      EXAMINATION:  XR CHEST 1 VW-  8/15/2022 12:17 PM CDT     HISTORY: Tachycardia. Atrial fibrillation. Hypertension. Emphysema.     COMPARISON: 5/31/2022.     TECHNIQUE: Single view AP image.     FINDINGS:  There is poor inspiration. There is an external pacing leads  overlying the chest. There is cardiomegaly. There is probable blunting  of the left costophrenic angle. The left lung is difficult to evaluate.  There is minimal atelectasis in the right lung base.       Impression:      1. Poor inspiration with vascular crowding.  2. Left chest partially obscured by external pacing leads.  3. Cardiomegaly.  4. Probable small pleural effusion on the left. Atelectasis in the right  lung base.         This report was finalized on 08/15/2022 12:34 by Dr. Omega Macias MD.                  Assessment & Plan       Atrial fibrillation with RVR (HCC)    Tobacco abuse    Type 2 diabetes mellitus with hyperglycemia, without long-term current use of insulin (HCC)    Chronic diastolic CHF (congestive heart failure) (HCC)    Left lower lobe pneumonia    Chronic respiratory failure with hypoxia (HCC)    Obesity, Class III, BMI 40-49.9 (morbid obesity) (HCC)      Persistent atrial fibrillation RVR: Improved rate control with Diltiazem 15mg/hr. Discussed with patient about performing cardioversion while inpatient to restore sinus rhythm and she is agreeable. Dr. Barriga is aware of patient admission and we will make her NPO now, (ate breakfast) and plan for cardioversion this afternoon. Will begin Flecainide 50mg BID.     Chronic Diastolic CHF: Elevated proBNP of 5300 on admission, likely exacerbated by afib with RVR. Flow sheets indicate 1350ml deficit. Continue Diltiazem, Lisinopril, statin, Lopressor     Further orders per Dr. Gregorio and Dr. Barriga.     Thank you for asking us to follow this patient with you.       Electronically signed by BRIGIDA Bello, 08/16/22, 9:30 AM CDT.

## 2022-08-16 NOTE — CASE MANAGEMENT/SOCIAL WORK
Discharge Planning Assessment  [facility]     Patient Name: Claudette Keeling  MRN: [id number]  Today's Date: 8/16/2022    Admit Date: 8/15/2022     Discharge Needs Assessment     Row Name 08/16/22 1400       Living Environment    People in Home child(martina), adult    Current Living Arrangements home    [facility] Provided by [facility]    Quality of Family Relationships supportive    Able to Return to Prior Arrangements yes       Resource/Environmental Concerns    Transportation Concerns none       Transition Planning    Patient/Family Anticipates Transition to home with family       Discharge Needs Assessment    Equipment Currently Used at Home rollator;cpap;shower chair;commode;oxygen               Discharge Plan     Row Name 08/16/22 1401       Plan    Plan Spoke with patient in room for dc planning. Patient lives with daughter and provides care for self. Still works a few hours each week. Has, pcp, Rx drug plan and all DME needed including home oxygen with [employer]. Plans to dc home and denies needs.              Continued Care and Services - Admitted Since 8/15/2022    Coordination has not been started for this encounter.       Expected Discharge Date and Time     Expected Discharge Date Expected Discharge Time    Aug 17, 2022          Demographic Summary    No documentation.                Functional Status    No documentation.                Psychosocial    No documentation.                Abuse/Neglect    No documentation.                Legal    No documentation.                Substance Abuse    No documentation.                Patient Forms    No documentation.                   Merlina A Fletcher, RN

## 2022-08-17 ENCOUNTER — ANESTHESIA EVENT (OUTPATIENT)
Dept: CARDIOLOGY | Facility: HOSPITAL | Age: 62
End: 2022-08-17

## 2022-08-17 ENCOUNTER — ANESTHESIA (OUTPATIENT)
Dept: CARDIOLOGY | Facility: HOSPITAL | Age: 62
End: 2022-08-17

## 2022-08-17 ENCOUNTER — APPOINTMENT (OUTPATIENT)
Dept: CARDIOLOGY | Facility: HOSPITAL | Age: 62
End: 2022-08-17

## 2022-08-17 ENCOUNTER — READMISSION MANAGEMENT (OUTPATIENT)
Dept: CALL CENTER | Facility: HOSPITAL | Age: 62
End: 2022-08-17

## 2022-08-17 VITALS
TEMPERATURE: 98.6 F | WEIGHT: 270 LBS | SYSTOLIC BLOOD PRESSURE: 118 MMHG | DIASTOLIC BLOOD PRESSURE: 88 MMHG | HEIGHT: 66 IN | BODY MASS INDEX: 43.39 KG/M2 | OXYGEN SATURATION: 97 % | RESPIRATION RATE: 18 BRPM | HEART RATE: 60 BPM

## 2022-08-17 LAB
ALBUMIN SERPL-MCNC: 4.2 G/DL (ref 3.5–5.2)
ALBUMIN/GLOB SERPL: 1.9 G/DL
ALP SERPL-CCNC: 65 U/L (ref 39–117)
ALT SERPL W P-5'-P-CCNC: 43 U/L (ref 1–33)
ANION GAP SERPL CALCULATED.3IONS-SCNC: 10 MMOL/L (ref 5–15)
AST SERPL-CCNC: 13 U/L (ref 1–32)
BASOPHILS # BLD AUTO: 0.02 10*3/MM3 (ref 0–0.2)
BASOPHILS NFR BLD AUTO: 0.2 % (ref 0–1.5)
BILIRUB SERPL-MCNC: 0.5 MG/DL (ref 0–1.2)
BUN SERPL-MCNC: 25 MG/DL (ref 8–23)
BUN/CREAT SERPL: 38.5 (ref 7–25)
CALCIUM SPEC-SCNC: 9.5 MG/DL (ref 8.6–10.5)
CHLORIDE SERPL-SCNC: 100 MMOL/L (ref 98–107)
CHOLEST SERPL-MCNC: 143 MG/DL (ref 0–200)
CO2 SERPL-SCNC: 23 MMOL/L (ref 22–29)
CREAT SERPL-MCNC: 0.65 MG/DL (ref 0.57–1)
DEPRECATED RDW RBC AUTO: 49.5 FL (ref 37–54)
EGFRCR SERPLBLD CKD-EPI 2021: 99.1 ML/MIN/1.73
EOSINOPHIL # BLD AUTO: 0 10*3/MM3 (ref 0–0.4)
EOSINOPHIL NFR BLD AUTO: 0 % (ref 0.3–6.2)
ERYTHROCYTE [DISTWIDTH] IN BLOOD BY AUTOMATED COUNT: 14.1 % (ref 12.3–15.4)
GLOBULIN UR ELPH-MCNC: 2.2 GM/DL
GLUCOSE BLDC GLUCOMTR-MCNC: 385 MG/DL (ref 70–130)
GLUCOSE SERPL-MCNC: 275 MG/DL (ref 65–99)
HBA1C MFR BLD: 8.5 % (ref 4.8–5.6)
HCT VFR BLD AUTO: 52.6 % (ref 34–46.6)
HDLC SERPL-MCNC: 37 MG/DL (ref 40–60)
HGB BLD-MCNC: 16 G/DL (ref 12–15.9)
LDLC SERPL CALC-MCNC: 85 MG/DL (ref 0–100)
LDLC/HDLC SERPL: 2.24 {RATIO}
LYMPHOCYTES # BLD AUTO: 0.67 10*3/MM3 (ref 0.7–3.1)
LYMPHOCYTES NFR BLD AUTO: 5 % (ref 19.6–45.3)
MAXIMAL PREDICTED HEART RATE: 157 BPM
MCH RBC QN AUTO: 29.3 PG (ref 26.6–33)
MCHC RBC AUTO-ENTMCNC: 30.4 G/DL (ref 31.5–35.7)
MCV RBC AUTO: 96.3 FL (ref 79–97)
MONOCYTES # BLD AUTO: 0.26 10*3/MM3 (ref 0.1–0.9)
MONOCYTES NFR BLD AUTO: 2 % (ref 5–12)
NEUTROPHILS NFR BLD AUTO: 12.26 10*3/MM3 (ref 1.7–7)
NEUTROPHILS NFR BLD AUTO: 92 % (ref 42.7–76)
PLATELET # BLD AUTO: 108 10*3/MM3 (ref 140–450)
PMV BLD AUTO: 11.4 FL (ref 6–12)
POTASSIUM SERPL-SCNC: 5.5 MMOL/L (ref 3.5–5.2)
PROT SERPL-MCNC: 6.4 G/DL (ref 6–8.5)
RBC # BLD AUTO: 5.46 10*6/MM3 (ref 3.77–5.28)
SODIUM SERPL-SCNC: 133 MMOL/L (ref 136–145)
STRESS TARGET HR: 133 BPM
T4 FREE SERPL-MCNC: 1.5 NG/DL (ref 0.93–1.7)
TRIGL SERPL-MCNC: 116 MG/DL (ref 0–150)
TSH SERPL DL<=0.05 MIU/L-ACNC: 0.24 UIU/ML (ref 0.27–4.2)
VLDLC SERPL-MCNC: 21 MG/DL (ref 5–40)
WBC NRBC COR # BLD: 13.31 10*3/MM3 (ref 3.4–10.8)

## 2022-08-17 PROCEDURE — 63710000001 INSULIN LISPRO (HUMAN) PER 5 UNITS: Performed by: NURSE PRACTITIONER

## 2022-08-17 PROCEDURE — 80053 COMPREHEN METABOLIC PANEL: CPT | Performed by: FAMILY MEDICINE

## 2022-08-17 PROCEDURE — 93005 ELECTROCARDIOGRAM TRACING: CPT | Performed by: STUDENT IN AN ORGANIZED HEALTH CARE EDUCATION/TRAINING PROGRAM

## 2022-08-17 PROCEDURE — 94799 UNLISTED PULMONARY SVC/PX: CPT

## 2022-08-17 PROCEDURE — 83036 HEMOGLOBIN GLYCOSYLATED A1C: CPT | Performed by: FAMILY MEDICINE

## 2022-08-17 PROCEDURE — 99232 SBSQ HOSP IP/OBS MODERATE 35: CPT | Performed by: STUDENT IN AN ORGANIZED HEALTH CARE EDUCATION/TRAINING PROGRAM

## 2022-08-17 PROCEDURE — 80061 LIPID PANEL: CPT | Performed by: FAMILY MEDICINE

## 2022-08-17 PROCEDURE — 85025 COMPLETE CBC W/AUTO DIFF WBC: CPT | Performed by: FAMILY MEDICINE

## 2022-08-17 PROCEDURE — 84443 ASSAY THYROID STIM HORMONE: CPT | Performed by: FAMILY MEDICINE

## 2022-08-17 PROCEDURE — 99232 SBSQ HOSP IP/OBS MODERATE 35: CPT | Performed by: INTERNAL MEDICINE

## 2022-08-17 PROCEDURE — 25010000002 PROPOFOL 10 MG/ML EMULSION: Performed by: NURSE ANESTHETIST, CERTIFIED REGISTERED

## 2022-08-17 PROCEDURE — 84439 ASSAY OF FREE THYROXINE: CPT | Performed by: FAMILY MEDICINE

## 2022-08-17 PROCEDURE — 93010 ELECTROCARDIOGRAM REPORT: CPT | Performed by: INTERNAL MEDICINE

## 2022-08-17 PROCEDURE — 82962 GLUCOSE BLOOD TEST: CPT

## 2022-08-17 PROCEDURE — 5A2204Z RESTORATION OF CARDIAC RHYTHM, SINGLE: ICD-10-PCS | Performed by: STUDENT IN AN ORGANIZED HEALTH CARE EDUCATION/TRAINING PROGRAM

## 2022-08-17 PROCEDURE — 92960 CARDIOVERSION ELECTRIC EXT: CPT | Performed by: STUDENT IN AN ORGANIZED HEALTH CARE EDUCATION/TRAINING PROGRAM

## 2022-08-17 PROCEDURE — 92960 CARDIOVERSION ELECTRIC EXT: CPT

## 2022-08-17 PROCEDURE — 25010000002 METHYLPREDNISOLONE PER 40 MG: Performed by: FAMILY MEDICINE

## 2022-08-17 RX ORDER — FLECAINIDE ACETATE 50 MG/1
50 TABLET ORAL EVERY 12 HOURS SCHEDULED
Qty: 180 TABLET | Refills: 0 | Status: SHIPPED | OUTPATIENT
Start: 2022-08-17 | End: 2022-11-21 | Stop reason: SDUPTHER

## 2022-08-17 RX ORDER — METHYLPREDNISOLONE SODIUM SUCCINATE 40 MG/ML
40 INJECTION, POWDER, LYOPHILIZED, FOR SOLUTION INTRAMUSCULAR; INTRAVENOUS EVERY 12 HOURS
Status: DISCONTINUED | OUTPATIENT
Start: 2022-08-17 | End: 2022-08-17 | Stop reason: HOSPADM

## 2022-08-17 RX ORDER — DOXYCYCLINE HYCLATE 100 MG/1
100 CAPSULE ORAL 2 TIMES DAILY
Qty: 10 CAPSULE | Refills: 0 | Status: SHIPPED | OUTPATIENT
Start: 2022-08-17 | End: 2022-08-22

## 2022-08-17 RX ORDER — VARENICLINE TARTRATE 1 MG/1
1 TABLET, FILM COATED ORAL 2 TIMES DAILY
Qty: 56 TABLET | Refills: 1 | Status: SHIPPED | OUTPATIENT
Start: 2022-09-14 | End: 2022-10-17 | Stop reason: SDUPTHER

## 2022-08-17 RX ORDER — PROPOFOL 10 MG/ML
VIAL (ML) INTRAVENOUS AS NEEDED
Status: DISCONTINUED | OUTPATIENT
Start: 2022-08-17 | End: 2022-08-17 | Stop reason: SURG

## 2022-08-17 RX ORDER — METHYLPREDNISOLONE 4 MG/1
1-5 TABLET ORAL DAILY
Qty: 21 TABLET | Refills: 1 | Status: SHIPPED | OUTPATIENT
Start: 2022-08-17 | End: 2022-10-17

## 2022-08-17 RX ORDER — VARENICLINE TARTRATE 0.5 MG/1
TABLET, FILM COATED ORAL
Qty: 11 TABLET | Refills: 0 | Status: SHIPPED | OUTPATIENT
Start: 2022-08-17 | End: 2022-08-24

## 2022-08-17 RX ADMIN — DOXYCYCLINE 100 MG: 100 INJECTION, POWDER, LYOPHILIZED, FOR SOLUTION INTRAVENOUS at 00:12

## 2022-08-17 RX ADMIN — METHYLPREDNISOLONE SODIUM SUCCINATE 40 MG: 40 INJECTION, POWDER, LYOPHILIZED, FOR SOLUTION INTRAMUSCULAR; INTRAVENOUS at 00:12

## 2022-08-17 RX ADMIN — NICOTINE 1 PATCH: 14 PATCH, EXTENDED RELEASE TRANSDERMAL at 10:30

## 2022-08-17 RX ADMIN — METOPROLOL TARTRATE 100 MG: 100 TABLET ORAL at 10:29

## 2022-08-17 RX ADMIN — METHYLPREDNISOLONE SODIUM SUCCINATE 40 MG: 40 INJECTION, POWDER, LYOPHILIZED, FOR SOLUTION INTRAMUSCULAR; INTRAVENOUS at 10:29

## 2022-08-17 RX ADMIN — FLECAINIDE ACETATE 50 MG: 50 TABLET ORAL at 10:28

## 2022-08-17 RX ADMIN — GABAPENTIN 300 MG: 300 CAPSULE ORAL at 05:51

## 2022-08-17 RX ADMIN — NYSTATIN: 100000 POWDER TOPICAL at 10:30

## 2022-08-17 RX ADMIN — Medication 1 APPLICATION: at 10:29

## 2022-08-17 RX ADMIN — LEVALBUTEROL 1.25 MG: 1.25 SOLUTION RESPIRATORY (INHALATION) at 06:56

## 2022-08-17 RX ADMIN — LEVALBUTEROL 1.25 MG: 1.25 SOLUTION RESPIRATORY (INHALATION) at 14:30

## 2022-08-17 RX ADMIN — SODIUM ZIRCONIUM CYCLOSILICATE 10 G: 10 POWDER, FOR SUSPENSION ORAL at 12:25

## 2022-08-17 RX ADMIN — DILTIAZEM HYDROCHLORIDE 120 MG: 120 CAPSULE, COATED, EXTENDED RELEASE ORAL at 10:29

## 2022-08-17 RX ADMIN — GUAIFENESIN 600 MG: 600 TABLET, EXTENDED RELEASE ORAL at 10:29

## 2022-08-17 RX ADMIN — APIXABAN 5 MG: 5 TABLET, FILM COATED ORAL at 10:29

## 2022-08-17 RX ADMIN — INSULIN LISPRO 8 UNITS: 100 INJECTION, SOLUTION INTRAVENOUS; SUBCUTANEOUS at 12:25

## 2022-08-17 RX ADMIN — Medication 10 ML: at 10:30

## 2022-08-17 RX ADMIN — LISINOPRIL 10 MG: 10 TABLET ORAL at 10:29

## 2022-08-17 RX ADMIN — PROPOFOL 120 MG: 10 INJECTION, EMULSION INTRAVENOUS at 08:20

## 2022-08-17 NOTE — ANESTHESIA PREPROCEDURE EVALUATION
Anesthesia Evaluation     Patient summary reviewed   no history of anesthetic complications:    NPO Liquid Status: > 8 hours           Airway   Mallampati: I  TM distance: >3 FB  Neck ROM: full  No difficulty expected  Dental    (+) poor dentition        Pulmonary - normal exam   (+) COPD moderate, sleep apnea,   Cardiovascular   Exercise tolerance: poor (<4 METS)    PT is on anticoagulation therapy  Rhythm: irregular  Rate: abnormal    (+) hypertension poorly controlled less than 2 medications, dysrhythmias Atrial Fib, CHF , hyperlipidemia,       Neuro/Psych  (+) dizziness/light headedness,    GI/Hepatic/Renal/Endo    (+) obesity, morbid obesity,  diabetes mellitus type 2 well controlled,     Musculoskeletal     Abdominal   (+) obese,    Substance History - negative use     OB/GYN          Other   arthritis,                      Anesthesia Plan    ASA 3     MAC     intravenous induction     Anesthetic plan, risks, benefits, and alternatives have been provided, discussed and informed consent has been obtained with: patient.        CODE STATUS:

## 2022-08-17 NOTE — PROGRESS NOTES
"Chief Complaint  Atrial fibrillation    Subjective        History of Present Illness  EP History:  1.  Persistent atrial fibrillation     Cardiology history:  1.  Diastolic heart failure     Medical History:   1.  Morbid obesity, BMI 43  2.  Type 2 diabetes  3.  COPD  4.  Tobacco use disorder    Patient ID:  Claudette Keeling is a 61 y.o. female with past medical history as above who EP is following for atrial fibrillation with RVR.    Interval history:  Continues to remain in atrial fibrillation requiring diltiazem drip.  Remains symptomatic with shortness of breath.    Objective   Vital Signs:  /95 (BP Location: Right arm, Patient Position: Lying)   Pulse (!) 122   Temp 96.4 °F (35.8 °C) (Tympanic)   Resp 21   Ht 167.6 cm (66\")   Wt 122 kg (270 lb)   SpO2 95%   BMI 43.58 kg/m²   Estimated body mass index is 43.58 kg/m² as calculated from the following:    Height as of this encounter: 167.6 cm (66\").    Weight as of this encounter: 122 kg (270 lb).      Physical Exam  Vitals reviewed.   Constitutional:       General: She is not in acute distress.     Appearance: She is obese.      Comments: Chronically ill-appearing   HENT:      Head: Normocephalic and atraumatic.   Eyes:      Extraocular Movements: Extraocular movements intact.      Conjunctiva/sclera: Conjunctivae normal.   Cardiovascular:      Rate and Rhythm: Tachycardia present. Rhythm irregular.      Heart sounds: Murmur ( Apical systolic) heard.   Pulmonary:      Effort: Pulmonary effort is normal. Prolonged expiration present.      Breath sounds: Wheezing present.   Abdominal:      General: There is no distension.      Tenderness: There is no abdominal tenderness.   Musculoskeletal:         General: No swelling or tenderness.      Cervical back: Normal range of motion and neck supple.   Skin:     General: Skin is warm and dry.   Neurological:      General: No focal deficit present.      Mental Status: She is alert and oriented to person, place, " and time.   Psychiatric:         Mood and Affect: Mood normal.         Judgment: Judgment normal.          Result Review :  The following data was reviewed by: Antonino Barriga MD on 08/15/2022:  CMP    CMP 8/15/22 8/16/22 8/17/22   Glucose 201 (A) 275 (A) 275 (A)   BUN 25 (A) 27 (A) 25 (A)   Creatinine 0.95 0.89 0.65   Sodium 137 135 (A) 133 (A)   Potassium 4.3 5.1 5.5 (A)   Chloride 97 (A) 97 (A) 100   Calcium 9.4 9.4 9.5   Albumin 4.20  4.20   Total Bilirubin 0.5  0.5   Alkaline Phosphatase 78  65   AST (SGOT) 18  13   ALT (SGPT) 44 (A)  43 (A)   (A) Abnormal value       Comments are available for some flowsheets but are not being displayed.           CBC    CBC 8/15/22 8/16/22 8/17/22   WBC 11.56 (A) 9.05 13.31 (A)   RBC 5.70 (A) 5.40 (A) 5.46 (A)   Hemoglobin 16.8 (A) 16.1 (A) 16.0 (A)   Hematocrit 53.6 (A) 50.6 (A) 52.6 (A)   MCV 94.0 93.7 96.3   MCH 29.5 29.8 29.3   MCHC 31.3 (A) 31.8 30.4 (A)   RDW 13.8 14.1 14.1   Platelets 123 (A) 116 (A) 108 (A)   (A) Abnormal value            Hospitalist notes reviewed.  No additional relevant history except as above.             Assessment and Plan       Claudette Keeling is a 61 y.o. female with past medical history as above who EP is following for atrial fibrillation with RVR.   She continues to remain in atrial fibrillation which is symptomatic and requiring diltiazem drip for rate control.  I have again discussed the risks and benefits of cardioversion with the patient.  She would like to proceed.    Plan:  -Proceed with direct-current cardioversion  -Continue flecainide for rhythm control  -Continue anticoagulation             EMR Dragon/Transcription disclaimer: Much of this encounter note is an electronic transcription/translation of spoken language to printed text. The electronic translation of spoken language may permit erroneous, or at times, nonsensical words or phrases to be inadvertently transcribed; although I have reviewed the note for such errors, some may  still exist.

## 2022-08-17 NOTE — PLAN OF CARE
Goal Outcome Evaluation:  Plan of Care Reviewed With: patient        Progress: no change  Outcome Evaluation: PT screen completed per chart review and OT. Pt at baseline function with no need for skilled PT eval at this time. PT to sign off. Please reconsult if status changes.

## 2022-08-17 NOTE — PROGRESS NOTES
[facility] -  Progress Note     LOS: 2 days   Patient Care Team:  Reymundo Torrez MD as PCP - General (Family Medicine)    Chief Complaint: s/p cardioversion      Interval History: She is a 61 year old female with a history of symptomatic atrial fibrillation with RVR despite use of 2 AV latosha blocking agents.  She was initially on outpatient schedule with Dr. Barriga for cardioversion, but became increasingly symptomatic with subjective tachycardia and progressive SOB and elected to proceed to ER which she was found to be in afib with RVR. She was started on Flecainide yesterday and underwent cardioversion today successfully with Dr. Barriga to Sinus noa at 57 bpm with notched P wave/LAE.      Patient Complaints: She states her symptoms of jitteriness and SOB are much improved after cardioversion.         Review of Systems:     Review of Systems   Constitutional: Negative.    HENT: Negative.    Eyes: Negative.    Respiratory: Negative.    Cardiovascular: Negative.    Gastrointestinal: Negative.    Endocrine: Negative.    Genitourinary: Negative.    Musculoskeletal: Negative.    Skin: Negative.    Allergic/Immunologic: Negative.    Neurological: Negative.    Hematological: Negative.    Psychiatric/Behavioral: Negative.      Objective     Vital Sign Min/Max for last 24 hours  Temp  Min: 96.4 °F (35.8 °C)  Max: 98.7 °F (37.1 °C)   BP  Min: 98/74  Max: 122/82   Pulse  Min: 55  Max: 122   Resp  Min: 18  Max: 24   SpO2  Min: 92 %  Max: 97 %   Flow (L/min)  Min: 3  Max: 3   Weight  Min: 122 kg (270 lb)  Max: 122 kg (270 lb)         08/17/22  0738   Weight: 122 kg (270 lb)       Vitals reviewed.   Constitutional:       Appearance: Not in distress. Morbidly obese.   Eyes:      Extraocular Movements: Extraocular movements intact.      Conjunctiva/sclera: Conjunctivae normal.      Pupils: Pupils are equal, round, and reactive to light.   HENT:      Head: Normocephalic and atraumatic.      Nose: Nose  normal.    Mouth/Throat:      Lips: Pink.      Mouth: Mucous membranes are moist.      Pharynx: Oropharynx is clear.   Neck:      Vascular: No carotid bruit or JVD. JVD normal.   Pulmonary:      Effort: Pulmonary effort is normal.      Breath sounds: Normal breath sounds.   Chest:      Chest wall: Not tender to palpatation.   Cardiovascular:      PMI at left midclavicular line. Bradycardia present. Regular rhythm. Normal S1. Normal S2.      Murmurs: There is no murmur.      No gallop. No rub.   Pulses:     Radial: 2+ bilaterally.  Edema:     Peripheral edema absent.   Abdominal:      General: Bowel sounds are normal.      Palpations: Abdomen is soft.   Musculoskeletal: Normal range of motion.      Extremities: No clubbing present.     Cervical back: Normal range of motion. Skin:     General: Skin is warm and dry.   Neurological:      General: No focal deficit present.      Mental Status: Alert and oriented to person, place, and time.      Cranial Nerves: Cranial nerves are intact.   Psychiatric:         Attention and Perception: Attention normal.         Mood and Affect: Affect normal.         Speech: Speech normal.         Behavior: Behavior normal.         Cognition and Memory: Cognition normal.       Results Review:   Lab Results (last 72 hours)     Procedure Component Value Units Date/Time    T4, Free [[id number]]  (Normal) Collected: 08/17/22 0514    Specimen: Blood Updated: 08/17/22 1019     Free T4 1.50 ng/dL     Narrative:      Results may be falsely increased if patient taking Biotin.      Respiratory Culture - Sputum, Cough [[id number]] Collected: 08/16/22 0850    Specimen: Sputum from Cough Updated: 08/17/22 0907     Respiratory Culture Light growth (2+) The culture consists of normal respiratory kirit. This is a preliminary report; final report to follow.     Gram Stain Moderate (3+) WBCs per low power field      Few (2+) Epithelial cells per low power field      Moderate (3+) Mixed gram positive kirit     Comprehensive Metabolic Panel [[id number]]  (Abnormal) Collected: 08/17/22 0514    Specimen: Blood Updated: 08/17/22 0616     Glucose 275 mg/dL      BUN 25 mg/dL      Creatinine 0.65 mg/dL      Sodium 133 mmol/L      Potassium 5.5 mmol/L      Comment: Slight hemolysis detected by analyzer. Results may be affected.        Chloride 100 mmol/L      CO2 23.0 mmol/L      Calcium 9.5 mg/dL      Total Protein 6.4 g/dL      Albumin 4.20 g/dL      ALT (SGPT) 43 U/L      AST (SGOT) 13 U/L      Alkaline Phosphatase 65 U/L      Total Bilirubin 0.5 mg/dL      Globulin 2.2 gm/dL      A/G Ratio 1.9 g/dL      BUN/Creatinine Ratio 38.5     Anion Gap 10.0 mmol/L      eGFR 99.1 mL/min/1.73      Comment: National Kidney Foundation and American Society of Nephrology (ASN) Task Force recommended calculation based on the Chronic Kidney Disease Epidemiology Collaboration (CKD-EPI) equation refit without adjustment for race.       Narrative:      GFR Normal >60  Chronic Kidney Disease <60  Kidney Failure <15      Lipid Panel [[id number]]  (Abnormal) Collected: 08/17/22 0514    Specimen: Blood Updated: 08/17/22 0616     Total Cholesterol 143 mg/dL      Triglycerides 116 mg/dL      HDL Cholesterol 37 mg/dL      LDL Cholesterol  85 mg/dL      VLDL Cholesterol 21 mg/dL      LDL/HDL Ratio 2.24    Narrative:      Cholesterol Reference Ranges  (U.S. Department of Health and Human Services ATP III Classifications)    Desirable          <200 mg/dL  Borderline High    200-239 mg/dL  High Risk          >240 mg/dL      Triglyceride Reference Ranges  (U.S. Department of Health and Human Services ATP III Classifications)    Normal           <150 mg/dL  Borderline High  150-199 mg/dL  High             200-499 mg/dL  Very High        >500 mg/dL    HDL Reference Ranges  (U.S. Department of Health and Human Services ATP III Classifications)    Low     <40 mg/dl (major risk factor for CHD)  High    >60 mg/dl ('negative' risk factor for CHD)        LDL Reference  Ranges  (U.S. Department of Health and Human Services ATP III Classifications)    Optimal          <100 mg/dL  Near Optimal     100-129 mg/dL  Borderline High  130-159 mg/dL  High             160-189 mg/dL  Very High        >189 mg/dL    TSH [[id number]]  (Abnormal) Collected: 08/17/22 0514    Specimen: Blood Updated: 08/17/22 0616     TSH 0.236 uIU/mL     CBC & Differential [[id number]]  (Abnormal) Collected: 08/17/22 0514    Specimen: Blood Updated: 08/17/22 0605    Narrative:      The following orders were created for panel order CBC & Differential.  Procedure                               Abnormality         Status                     ---------                               -----------         ------                     CBC Auto Differential[[id number]]        Abnormal            Final result                 Please view results for these tests on the individual orders.    CBC Auto Differential [[id number]]  (Abnormal) Collected: 08/17/22 0514    Specimen: Blood Updated: 08/17/22 0605     WBC 13.31 10*3/mm3      RBC 5.46 10*6/mm3      Hemoglobin 16.0 g/dL      Hematocrit 52.6 %      MCV 96.3 fL      MCH 29.3 pg      MCHC 30.4 g/dL      RDW 14.1 %      RDW-SD 49.5 fl      MPV 11.4 fL      Platelets 108 10*3/mm3      Neutrophil % 92.0 %      Lymphocyte % 5.0 %      Monocyte % 2.0 %      Eosinophil % 0.0 %      Basophil % 0.2 %      Neutrophils, Absolute 12.26 10*3/mm3      Lymphocytes, Absolute 0.67 10*3/mm3      Monocytes, Absolute 0.26 10*3/mm3      Eosinophils, Absolute 0.00 10*3/mm3      Basophils, Absolute 0.02 10*3/mm3     Hemoglobin A1c [[id number]]  (Abnormal) Collected: 08/17/22 0514    Specimen: Blood Updated: 08/17/22 0601     Hemoglobin A1C 8.50 %     Narrative:      Hemoglobin A1C Ranges:    Increased Risk for Diabetes  5.7% to 6.4%  Diabetes                     >= 6.5%  Diabetic Goal                < 7.0%    Blood Culture - Blood, Arm, Left [[id number]]  (Normal) Collected: 08/15/22 2031    Specimen: Blood  from Arm, Left Updated: 08/16/22 2049     Blood Culture No growth at 24 hours    Blood Culture - Blood, Wrist, Right [[id number]]  (Normal) Collected: 08/15/22 2031    Specimen: Blood from Wrist, Right Updated: 08/16/22 2049     Blood Culture No growth at 24 hours    POC Glucose Once [[id number]]  (Abnormal) Collected: 08/16/22 1703    Specimen: Blood Updated: 08/16/22 1716     Glucose 275 mg/dL      Comment: : [id number] Alexys EuraisaMeter ID: [id number]       POC Glucose Once [[id number]]  (Abnormal) Collected: 08/16/22 1111    Specimen: Blood Updated: 08/16/22 1141     Glucose 267 mg/dL      Comment: : [id number] Pentress LisaMeter ID: [id number]       POC Glucose Once [[id number]]  (Abnormal) Collected: 08/16/22 0821    Specimen: Blood Updated: 08/16/22 0844     Glucose 262 mg/dL      Comment: : [id number] Pentress LisaMeter ID: [id number]       Basic Metabolic Panel [[id number]]  (Abnormal) Collected: 08/16/22 0330    Specimen: Blood Updated: 08/16/22 0412     Glucose 275 mg/dL      BUN 27 mg/dL      Creatinine 0.89 mg/dL      Sodium 135 mmol/L      Potassium 5.1 mmol/L      Comment: Slight hemolysis detected by analyzer. Results may be affected.        Chloride 97 mmol/L      CO2 26.0 mmol/L      Calcium 9.4 mg/dL      BUN/Creatinine Ratio 30.3     Anion Gap 12.0 mmol/L      eGFR 73.0 mL/min/1.73      Comment: National Kidney Foundation and American Society of Nephrology (ASN) Task Force recommended calculation based on the Chronic Kidney Disease Epidemiology Collaboration (CKD-EPI) equation refit without adjustment for race.       Narrative:      GFR Normal >60  Chronic Kidney Disease <60  Kidney Failure <15      CBC (No Diff) [[id number]]  (Abnormal) Collected: 08/16/22 0330    Specimen: Blood Updated: 08/16/22 0353     WBC 9.05 10*3/mm3      RBC 5.40 10*6/mm3      Hemoglobin 16.1 g/dL      Hematocrit 50.6 %      MCV 93.7 fL      MCH 29.8 pg      MCHC 31.8 g/dL      RDW 14.1 %      RDW-SD 47.6 fl      MPV  11.3 fL      Platelets 116 10*3/mm3     MRSA Screen, PCR (Inpatient) - Swab, Nares [[id number]]  (Abnormal) Collected: 08/15/22 2124    Specimen: Swab from Nares Updated: 08/15/22 2321     MRSA PCR MRSA Detected    Narrative:      The negative predictive value of this diagnostic test is high and should only be used to consider de-escalating anti-MRSA therapy. A positive result may indicate colonization with MRSA and must be correlated clinically.    Troponin [[id number]]  (Normal) Collected: 08/15/22 2031    Specimen: Blood Updated: 08/15/22 2059     Troponin T <0.010 ng/mL     Narrative:      Troponin T Reference Range:  <= 0.03 ng/mL-   Negative for AMI  >0.03 ng/mL-     Abnormal for myocardial necrosis.  Clinicians would have to utilize clinical acumen, EKG, Troponin and serial changes to determine if it is an Acute Myocardial Infarction or myocardial injury due to an underlying chronic condition.       Results may be falsely decreased if patient taking Biotin.      S. Pneumo Ag Urine or CSF - Urine, Urine, Clean Catch [[id number]]  (Normal) Collected: 08/15/22 1954    Specimen: Urine, Clean Catch Updated: 08/15/22 2046     Strep Pneumo Ag Negative    Legionella Antigen, Urine - Urine, Urine, Clean Catch [[id number]]  (Normal) Collected: 08/15/22 1954    Specimen: Urine, Clean Catch Updated: 08/15/22 2046     LEGIONELLA ANTIGEN, URINE Negative    POC Glucose Once [[id number]]  (Abnormal) Collected: 08/15/22 2026    Specimen: Blood Updated: 08/15/22 2039     Glucose 320 mg/dL      Comment: : [id number] Crisp RebeccaMeter ID: [id number]       Comprehensive Metabolic Panel [[id number]]  (Abnormal) Collected: 08/15/22 1213    Specimen: Blood Updated: 08/15/22 1307     Glucose 201 mg/dL      BUN 25 mg/dL      Creatinine 0.95 mg/dL      Sodium 137 mmol/L      Potassium 4.3 mmol/L      Comment: Slight hemolysis detected by analyzer. Results may be affected.        Chloride 97 mmol/L      CO2 28.0 mmol/L      Calcium 9.4  mg/dL      Total Protein 7.0 g/dL      Albumin 4.20 g/dL      ALT (SGPT) 44 U/L      AST (SGOT) 18 U/L      Comment: Slight hemolysis detected by analyzer. Results may be affected.        Alkaline Phosphatase 78 U/L      Total Bilirubin 0.5 mg/dL      Globulin 2.8 gm/dL      A/G Ratio 1.5 g/dL      BUN/Creatinine Ratio 26.3     Anion Gap 12.0 mmol/L      eGFR 67.5 mL/min/1.73      Comment: National Kidney Foundation and American Society of Nephrology (ASN) Task Force recommended calculation based on the Chronic Kidney Disease Epidemiology Collaboration (CKD-EPI) equation refit without adjustment for race.       Narrative:      GFR Normal >60  Chronic Kidney Disease <60  Kidney Failure <15      COVID PRE-OP / PRE-PROCEDURE SCREENING ORDER (NO ISOLATION) - Swab, Nasal Cavity [[id number]]  (Normal) Collected: 08/15/22 1216    Specimen: Swab from Nasal Cavity Updated: 08/15/22 1305    Narrative:      The following orders were created for panel order COVID PRE-OP / PRE-PROCEDURE SCREENING ORDER (NO ISOLATION) - Swab, Nasal Cavity.  Procedure                               Abnormality         Status                     ---------                               -----------         ------                     COVID-19,[facility] IN-SHAUNA...[[id number]]  Normal              Final result                 Please view results for these tests on the individual orders.    COVID-19,[facility] IN-HOUSE,Nasal Swab No Transport Media 3-4 HR TAT - Swab, Nasal Cavity [[id number]]  (Normal) Collected: 08/15/22 1216    Specimen: Swab from Nasal Cavity Updated: 08/15/22 1305     COVID19 Not Detected    Narrative:      Fact sheet for providers: https://www.fda.gov/media/633919/download     Fact sheet for patients: https://www.fda.gov/media/765933/download    Test performed by PCR.    Consider negative results in combination with clinical observations, patient history, and epidemiological information.    BNP [[id number]]  (Abnormal) Collected: 08/15/22  1213    Specimen: Blood Updated: 08/15/22 1257     proBNP 5,364.0 pg/mL     Narrative:      Among patients with dyspnea, NT-proBNP is highly sensitive for the detection of acute congestive heart failure. In addition NT-proBNP of <300 pg/ml effectively rules out acute congestive heart failure with 99% negative predictive value.    Results may be falsely decreased if patient taking Biotin.      Troponin [[id number]]  (Normal) Collected: 08/15/22 1213    Specimen: Blood Updated: 08/15/22 1256     Troponin T <0.010 ng/mL     Narrative:      Troponin T Reference Range:  <= 0.03 ng/mL-   Negative for AMI  >0.03 ng/mL-     Abnormal for myocardial necrosis.  Clinicians would have to utilize clinical acumen, EKG, Troponin and serial changes to determine if it is an Acute Myocardial Infarction or myocardial injury due to an underlying chronic condition.       Results may be falsely decreased if patient taking Biotin.      Magnesium [[id number]]  (Normal) Collected: 08/15/22 1213    Specimen: Blood Updated: 08/15/22 1256     Magnesium 2.1 mg/dL     D-dimer, Quantitative [[id number]]  (Abnormal) Collected: 08/15/22 1213    Specimen: Blood Updated: 08/15/22 1239     D-Dimer, Quantitative 1.01 MCGFEU/mL     Narrative:      Reference Range is 0-0.50 MCGFEU/mL. However, results <0.50 MCGFEU/mL tends to rule out DVT or PE. Results >0.50 MCGFEU/mL are not useful in predicting absence or presence of DVT or PE.      CBC & Differential [[id number]]  (Abnormal) Collected: 08/15/22 1213    Specimen: Blood Updated: 08/15/22 1236    Narrative:      The following orders were created for panel order CBC & Differential.  Procedure                               Abnormality         Status                     ---------                               -----------         ------                     CBC Auto Differential[[id number]]        Abnormal            Final result                 Please view results for these tests on the individual orders.    CBC  Auto Differential [[id number]]  (Abnormal) Collected: 08/15/22 1213    Specimen: Blood Updated: 08/15/22 1236     WBC 11.56 10*3/mm3      RBC 5.70 10*6/mm3      Hemoglobin 16.8 g/dL      Hematocrit 53.6 %      MCV 94.0 fL      MCH 29.5 pg      MCHC 31.3 g/dL      RDW 13.8 %      RDW-SD 47.6 fl      MPV 11.0 fL      Platelets 123 10*3/mm3      Neutrophil % 72.7 %      Lymphocyte % 19.6 %      Monocyte % 6.2 %      Eosinophil % 0.9 %      Basophil % 0.2 %      Immature Grans % 0.4 %      Neutrophils, Absolute 8.41 10*3/mm3      Lymphocytes, Absolute 2.26 10*3/mm3      Monocytes, Absolute 0.72 10*3/mm3      Eosinophils, Absolute 0.10 10*3/mm3      Basophils, Absolute 0.02 10*3/mm3      Immature Grans, Absolute 0.05 10*3/mm3      nRBC 0.0 /100 WBC               Echo EF Estimated 5-10-22  Interpretation Summary    · Left ventricular ejection fraction appears to be 61 - 65%. Left ventricular systolic function is normal.  · Left ventricular wall thickness is consistent with moderate concentric hypertrophy.  · Left ventricular diastolic dysfunction is noted.  · Normal right ventricular cavity size and systolic function noted.  · There is mild biatrial enlargement.  · There is no significant (greater than mild) valvular dysfunction.  · Estimated right ventricular systolic pressure from tricuspid regurgitation is normal (<35 mmHg).           Cath Ejection Fraction Quantitative  No results found for: [facility]        Medication Review: yes  Current Facility-Administered Medications   Medication Dose Route Frequency Provider Last Rate Last Admin   • acetaminophen (TYLENOL) tablet 650 mg  650 mg Oral Q4H PRN Wilma Patino APRN        Or   • acetaminophen (TYLENOL) 160 MG/5ML solution 650 mg  650 mg Oral Q4H PRN Wilma Patino APRN        Or   • acetaminophen (TYLENOL) suppository 650 mg  650 mg Rectal Q4H PRN Wilma Patino APRN       • apixaban (ELIQUIS) tablet 5 mg  5 mg Oral Q12H Wilma Patino APRN   5 mg at  08/17/22 1029   • cefTRIAXone (ROCEPHIN) 1 g in sodium chloride 0.9 % 100 mL IVPB-VTB  1 g Intravenous Q24H Wilma Patino APRN 200 mL/hr at 08/16/22 2107 1 g at 08/16/22 2107   • dextrose (D50W) (25 g/50 mL) IV injection 25 g  25 g Intravenous Q15 Min PRN Wilma Patino, APRN       • dextrose (GLUTOSE) oral gel 15 g  15 g Oral Q15 Min PRN Wilma Patino, APRN       • dilTIAZem (CARDIZEM) 125 mg in 125 mL 0.7% sodium chloride  infusion  5-15 mg/hr Intravenous Titrated Minh Valenzuela MD   Stopped at 08/17/22 0815   • dilTIAZem CD (CARDIZEM CD) 24 hr capsule 120 mg  120 mg Oral Q24H Wilma Patino APRN   120 mg at 08/17/22 1029   • doxycycline (VIBRAMYCIN) 100 mg/100 mL 0.9% NS MBP  100 mg Intravenous Q12H Brad Moran MD   100 mg at 08/17/22 0012   • famotidine (PEPCID) tablet 20 mg  20 mg Oral BID PRN Wilma Patino, APRN       • flecainide (TAMBOCOR) tablet 50 mg  50 mg Oral Q12H Zohreh Soriano PA   50 mg at 08/17/22 1028   • gabapentin (NEURONTIN) capsule 300 mg  300 mg Oral TID Wilma Patino APRN   300 mg at 08/17/22 0551   • glucagon (human recombinant) (GLUCAGEN DIAGNOSTIC) injection 1 mg  1 mg Intramuscular Q15 Min PRN Wilma Patino, APRN       • guaiFENesin (MUCINEX) 12 hr tablet 600 mg  600 mg Oral Q12H Wilma Patino APRN   600 mg at 08/17/22 1029   • Insulin Lispro (humaLOG) injection 0-9 Units  0-9 Units Subcutaneous TID AC Wilma Patino APRN   7 Units at 08/16/22 1820   • levalbuterol (XOPENEX) nebulizer solution 1.25 mg  1.25 mg Nebulization TID - RT Wilma Patino APRN   1.25 mg at 08/17/22 0656   • lisinopril (PRINIVIL,ZESTRIL) tablet 10 mg  10 mg Oral Daily Wilma Patino APRN   10 mg at 08/17/22 1029   • methylPREDNISolone sodium succinate (SOLU-Medrol) injection 40 mg  40 mg Intravenous Q12H Brad Moran MD       • metoprolol tartrate (LOPRESSOR) tablet 100 mg  100 mg Oral Q12H Brad Moran MD   100 mg at 08/17/22 1029   • mupirocin (BACTROBAN)  2 % nasal ointment   Each Nare BID Brad Moran MD   1 application at 08/17/22 1029   • nicotine (NICODERM CQ) 14 MG/24HR patch 1 patch  1 patch Transdermal Q24H Wilma Patino APRN   1 patch at 08/17/22 1030   • nystatin (MYCOSTATIN) powder   Topical Q12H Alf Workman, DO   Given at 08/17/22 1030   • ondansetron (ZOFRAN) tablet 4 mg  4 mg Oral Q6H PRN Wilma Patino APRVONDA        Or   • ondansetron (ZOFRAN) injection 4 mg  4 mg Intravenous Q6H PRN Wilma Patino APRVONDA       • rosuvastatin (CRESTOR) tablet 5 mg  5 mg Oral Nightly Wilma Patino APRN   5 mg at 08/16/22 2105   • sodium chloride 0.9 % flush 10 mL  10 mL Intravenous Q12H Wilma Patino APRN   10 mL at 08/17/22 1030   • sodium chloride 0.9 % flush 10 mL  10 mL Intravenous PRN Wilma Patino APRVONDA       • sodium zirconium cyclosilicate (LOKELMA) pack 10 g  10 g Oral BID Brad Moran MD             Assessment & Plan   1. Persistent Afib with RVR: s/p cardioversion successfully with Flecainide 50mg BID. Will continue diltiazem, likely stop Lopressor due to bradycardia. She will need EKG next week to assess her QRS duration and rate.     2. Chronic diastolic heart failure: Continue Lisinopril, restart Furosemide. Consider addition of Jardiance and spironolactone as outpatient with Dr. Bond.     3. Obesity: Discussed patient's lifestyle modifications and diet improvement.     4. ANTHONY: She states she is compliant with her CPAP. Discussed correlation of sleep apnea control and difficulty controlling afib.       Atrial fibrillation with RVR ([facility])    Tobacco abuse    Type 2 diabetes mellitus with hyperglycemia, without long-term current use of insulin ([facility])    Chronic diastolic CHF (congestive heart failure) ([facility])    Left lower lobe pneumonia    Chronic respiratory failure with hypoxia ([facility])    Obesity, Class III, BMI 40-49.9 (morbid obesity) ([facility])          Electronically signed by BRIGIDA Bello, 08/17/22, 11:06 AM  CDT.

## 2022-08-17 NOTE — PLAN OF CARE
Problem: Adult Inpatient Plan of Care  Goal: Plan of Care Review  Outcome: Ongoing, Progressing  Flowsheets (Taken 8/17/2022 8377)  Progress: no change  Plan of Care Reviewed With: patient  Outcome Evaluation: Pt had no complaints of pain this shift. VSS on 3L and Afib () on tele. Cardizem drip continued at 15. IV abx given. NPO at midnight for cardioversion later today. Ambulated to bathroom with stand-by assist. Rested comfortably most of night. Safety maintained.

## 2022-08-17 NOTE — ANESTHESIA POSTPROCEDURE EVALUATION
"Patient: Claudette Keeling    Procedure Summary     Date: 08/17/22 Room / Location: [facility] CATH LAB    Anesthesia Start: 0814 Anesthesia Stop: 0827    Procedure: CARDIOVERSION EXTERNAL IN CARDIOLOGY DEPARTMENT Diagnosis: (afib with rvr)    Scheduled Providers: Antonino Barriga MD Provider: Erik Marques CRNA    Anesthesia Type: MAC ASA Status: 3          Anesthesia Type: MAC    Vitals  Vitals Value Taken Time   /70 08/17/22 0900   Temp     Pulse 57 08/17/22 0900   Resp 24 08/17/22 0900   SpO2 97 % 08/17/22 0900           Post Anesthesia Care and Evaluation    Patient location during evaluation: PACU  Patient participation: complete - patient participated  Level of consciousness: awake and alert  Pain management: adequate    Airway patency: patent  Anesthetic complications: No anesthetic complications    Cardiovascular status: acceptable  Respiratory status: acceptable  Hydration status: acceptable    Comments: Blood pressure 116/70, pulse 57, temperature 96.4 °F (35.8 °C), temperature source Tympanic, resp. rate 24, height 167.6 cm (66\"), weight 122 kg (270 lb), SpO2 97 %.    Pt discharged from PACU based on gerald score >8      "

## 2022-08-17 NOTE — PROGRESS NOTES
[facility] Medicine Services  INPATIENT PROGRESS NOTE    Length of Stay: 2  Date of Admission: 8/15/2022  Primary Care Physician: Reymundo Torrez MD    Subjective   Chief Complaint: Atrial for with RVR.  Chronic respiratory failure.    HPI   Status post cardiac conversion today.  Heart rate trending 57 in sinus rhythm.  Patient tolerated procedure well.  Patient denies any chest pain.  Patient is off oxygen.  Patient has no symptoms of pneumonia at this point possible go home without antibiotics discussed with patient.  I think chest x-ray shows early stage of pneumonia, this is probably chronic changes.  Decrease Solu-Medrol for now.    Review of Systems   Constitutional: Positive for activity change, appetite change and fatigue. Negative for chills and fever.   HENT: Negative for hearing loss, nosebleeds, tinnitus and trouble swallowing.    Eyes: Negative for visual disturbance.   Respiratory: Positive for shortness of breath. Negative for cough, chest tightness and wheezing.    Cardiovascular: Negative for chest pain, palpitations and leg swelling.   Gastrointestinal: Negative for abdominal distention, abdominal pain, blood in stool, constipation, diarrhea, nausea and vomiting.   Endocrine: Negative for cold intolerance, heat intolerance, polydipsia, polyphagia and polyuria.   Genitourinary: Negative for decreased urine volume, difficulty urinating, dysuria, flank pain, frequency and hematuria.   Musculoskeletal: Positive for arthralgias, gait problem and myalgias. Negative for joint swelling.   Skin: Negative for rash.   Allergic/Immunologic: Negative for immunocompromised state.   Neurological: Positive for weakness. Negative for dizziness, syncope, light-headedness and headaches.   Hematological: Negative for adenopathy. Does not bruise/bleed easily.   Psychiatric/Behavioral: Negative for confusion and sleep disturbance. The patient is not nervous/anxious.      All  pertinent negatives and positives are as above. All other systems have been reviewed and are negative unless otherwise stated.     Objective    Temp:  [96.4 °F (35.8 °C)-98.7 °F (37.1 °C)] 96.4 °F (35.8 °C)  Heart Rate:  [] 57  Resp:  [18-24] 24  BP: ()/(66-95) 116/70    Intake/Output Summary (Last 24 hours) at 8/17/2022 0951  Last data filed at 8/16/2022 1123  Gross per 24 hour   Intake --   Output 400 ml   Net -400 ml     Physical Exam  Vitals and nursing note reviewed.   HENT:      Head: Normocephalic.   Eyes:      Conjunctiva/sclera: Conjunctivae normal.      Pupils: Pupils are equal, round, and reactive to light.   Neck:      Vascular: No JVD.   Cardiovascular:      Rate and Rhythm: Heart rate is 57 present.  Sinus rhythm.     Heart sounds: Normal heart sounds.   Pulmonary:      Effort: No respiratory distress.      Breath sounds: No wheezing or rales.      Comments: Patient is currently on room air.  Diminished breath sound bilateral, clear .  Chest:      Chest wall: No tenderness.   Abdominal:      General: Bowel sounds are normal. There is no distension.      Palpations: Abdomen is soft.      Tenderness: There is no abdominal tenderness.      Comments: Morbid obesity   Musculoskeletal:         General: No tenderness or deformity.      Cervical back: Neck supple.   Skin:     General: Skin is warm and dry.      Capillary Refill: Capillary refill takes 2 to 3 seconds.      Findings: No rash.   Neurological:      General: No focal deficit present.      Mental Status: She is alert and oriented to person, place, and time.      Cranial Nerves: No cranial nerve deficit.      Motor: Weakness present. No abnormal muscle tone.      Deep Tendon Reflexes: Reflexes normal.   Psychiatric:         Mood and Affect: Mood normal.         Behavior: Behavior normal.         Thought Content: Thought content normal.   Results Review:  Lab Results (last 24 hours)     Procedure Component Value Units Date/Time     Respiratory Culture - Sputum, Cough [[id number]] Collected: 08/16/22 0850    Specimen: Sputum from Cough Updated: 08/17/22 0907     Respiratory Culture Light growth (2+) The culture consists of normal respiratory kirit. This is a preliminary report; final report to follow.     Gram Stain Moderate (3+) WBCs per low power field      Few (2+) Epithelial cells per low power field      Moderate (3+) Mixed gram positive kirit    Comprehensive Metabolic Panel [[id number]]  (Abnormal) Collected: 08/17/22 0514    Specimen: Blood Updated: 08/17/22 0616     Glucose 275 mg/dL      BUN 25 mg/dL      Creatinine 0.65 mg/dL      Sodium 133 mmol/L      Potassium 5.5 mmol/L      Comment: Slight hemolysis detected by analyzer. Results may be affected.        Chloride 100 mmol/L      CO2 23.0 mmol/L      Calcium 9.5 mg/dL      Total Protein 6.4 g/dL      Albumin 4.20 g/dL      ALT (SGPT) 43 U/L      AST (SGOT) 13 U/L      Alkaline Phosphatase 65 U/L      Total Bilirubin 0.5 mg/dL      Globulin 2.2 gm/dL      A/G Ratio 1.9 g/dL      BUN/Creatinine Ratio 38.5     Anion Gap 10.0 mmol/L      eGFR 99.1 mL/min/1.73      Comment: National Kidney Foundation and American Society of Nephrology (ASN) Task Force recommended calculation based on the Chronic Kidney Disease Epidemiology Collaboration (CKD-EPI) equation refit without adjustment for race.       Narrative:      GFR Normal >60  Chronic Kidney Disease <60  Kidney Failure <15      Lipid Panel [[id number]]  (Abnormal) Collected: 08/17/22 0514    Specimen: Blood Updated: 08/17/22 0616     Total Cholesterol 143 mg/dL      Triglycerides 116 mg/dL      HDL Cholesterol 37 mg/dL      LDL Cholesterol  85 mg/dL      VLDL Cholesterol 21 mg/dL      LDL/HDL Ratio 2.24    Narrative:      Cholesterol Reference Ranges  (U.S. Department of Health and Human Services ATP III Classifications)    Desirable          <200 mg/dL  Borderline High    200-239 mg/dL  High Risk          >240 mg/dL      Triglyceride  Reference Ranges  (U.S. Department of Health and Human Services ATP III Classifications)    Normal           <150 mg/dL  Borderline High  150-199 mg/dL  High             200-499 mg/dL  Very High        >500 mg/dL    HDL Reference Ranges  (U.S. Department of Health and Human Services ATP III Classifications)    Low     <40 mg/dl (major risk factor for CHD)  High    >60 mg/dl ('negative' risk factor for CHD)        LDL Reference Ranges  (U.S. Department of Health and Human Services ATP III Classifications)    Optimal          <100 mg/dL  Near Optimal     100-129 mg/dL  Borderline High  130-159 mg/dL  High             160-189 mg/dL  Very High        >189 mg/dL    TSH [[id number]]  (Abnormal) Collected: 08/17/22 0514    Specimen: Blood Updated: 08/17/22 0616     TSH 0.236 uIU/mL     CBC & Differential [[id number]]  (Abnormal) Collected: 08/17/22 0514    Specimen: Blood Updated: 08/17/22 0605    Narrative:      The following orders were created for panel order CBC & Differential.  Procedure                               Abnormality         Status                     ---------                               -----------         ------                     CBC Auto Differential[[id number]]        Abnormal            Final result                 Please view results for these tests on the individual orders.    CBC Auto Differential [[id number]]  (Abnormal) Collected: 08/17/22 0514    Specimen: Blood Updated: 08/17/22 0605     WBC 13.31 10*3/mm3      RBC 5.46 10*6/mm3      Hemoglobin 16.0 g/dL      Hematocrit 52.6 %      MCV 96.3 fL      MCH 29.3 pg      MCHC 30.4 g/dL      RDW 14.1 %      RDW-SD 49.5 fl      MPV 11.4 fL      Platelets 108 10*3/mm3      Neutrophil % 92.0 %      Lymphocyte % 5.0 %      Monocyte % 2.0 %      Eosinophil % 0.0 %      Basophil % 0.2 %      Neutrophils, Absolute 12.26 10*3/mm3      Lymphocytes, Absolute 0.67 10*3/mm3      Monocytes, Absolute 0.26 10*3/mm3      Eosinophils, Absolute 0.00 10*3/mm3       Basophils, Absolute 0.02 10*3/mm3     Hemoglobin A1c [[id number]]  (Abnormal) Collected: 08/17/22 0514    Specimen: Blood Updated: 08/17/22 0601     Hemoglobin A1C 8.50 %     Narrative:      Hemoglobin A1C Ranges:    Increased Risk for Diabetes  5.7% to 6.4%  Diabetes                     >= 6.5%  Diabetic Goal                < 7.0%    Blood Culture - Blood, Arm, Left [[id number]]  (Normal) Collected: 08/15/22 2031    Specimen: Blood from Arm, Left Updated: 08/16/22 2049     Blood Culture No growth at 24 hours    Blood Culture - Blood, Wrist, Right [[id number]]  (Normal) Collected: 08/15/22 2031    Specimen: Blood from Wrist, Right Updated: 08/16/22 2049     Blood Culture No growth at 24 hours    POC Glucose Once [[id number]]  (Abnormal) Collected: 08/16/22 1703    Specimen: Blood Updated: 08/16/22 1716     Glucose 275 mg/dL      Comment: : [id number] Alexys EuraisaMeter ID: [id number]       POC Glucose Once [[id number]]  (Abnormal) Collected: 08/16/22 1111    Specimen: Blood Updated: 08/16/22 1141     Glucose 267 mg/dL      Comment: : [id number] Alexandr LisaMeter ID: [id number]              Cultures:  Blood Culture   Date Value Ref Range Status   08/15/2022 No growth at 24 hours  Preliminary   08/15/2022 No growth at 24 hours  Preliminary     Respiratory Culture   Date Value Ref Range Status   08/16/2022   Preliminary    Light growth (2+) The culture consists of normal respiratory kirit. This is a preliminary report; final report to follow.       Radiology Data:    Imaging Results (Last 24 Hours)     ** No results found for the last 24 hours. **          Allergies   Allergen Reactions   • Codeine GI Intolerance       Scheduled meds:   apixaban, 5 mg, Oral, Q12H  cefTRIAXone, 1 g, Intravenous, Q24H  dilTIAZem CD, 120 mg, Oral, Q24H  doxycycline, 100 mg, Intravenous, Q12H  flecainide, 50 mg, Oral, Q12H  gabapentin, 300 mg, Oral, TID  guaiFENesin, 600 mg, Oral, Q12H  insulin lispro, 0-9 Units, Subcutaneous, TID  AC  levalbuterol, 1.25 mg, Nebulization, TID - RT  lisinopril, 10 mg, Oral, Daily  methylPREDNISolone sodium succinate, 40 mg, Intravenous, Q8H  metoprolol tartrate, 100 mg, Oral, Q12H  mupirocin, , Each Nare, BID  nicotine, 1 patch, Transdermal, Q24H  nystatin, , Topical, Q12H  rosuvastatin, 5 mg, Oral, Nightly  sodium chloride, 10 mL, Intravenous, Q12H        PRN meds:  •  acetaminophen **OR** acetaminophen **OR** acetaminophen  •  dextrose  •  dextrose  •  famotidine  •  glucagon (human recombinant)  •  ondansetron **OR** ondansetron  •  sodium chloride    Assessment/Plan       Atrial fibrillation with RVR ([facility])    Tobacco abuse    Type 2 diabetes mellitus with hyperglycemia, without long-term current use of insulin ([facility])    Chronic diastolic CHF (congestive heart failure) ([facility])    Left lower lobe pneumonia    Chronic respiratory failure with hypoxia ([facility])    Obesity, Class III, BMI 40-49.9 (morbid obesity) ([facility])      Plan:  Atrial fibrillation with RVR/diastolic CHF/hypertension/hyperlipidemia.  Plan for outpatient cardioversion on 8/18/2022 by cardiology at [facility].  Patient is on chronic Eliquis.  Cardizem drip.  Consult cardiology.  Daily weight.  Cardizem CD.  DC Lasix for now due to polycythemia.  Lisinopril.  Lopressor.  Crestor.  Echocardiogram 5/10/2022- ejection fraction 61 to 65%, moderate concentric hypertrophy, diastolic dysfunction, mild biatrial enlargement, tricuspid regurgitation.  Dispose cardioversion by cardiology 8/17/2022.  Patient is currently in sinus rhythm with a rate of 57.     COPD/pneumonia/chronic respiratory failure hypoxia/sleep apnea.  Patient is on chronic 3 L of oxygen at home.  Xopenex neb.  Incentive spirometer.  Wean down Solu-Medrol.  Mucinex.  Rocephin.  Change azithromycin to doxycycline 8/16/2020 due to positive MRSA screen.  Bactroban to nasal nares.  Leukocytosis-secondary to steroid.  Chest x-ray-Cardiomegaly, Probable small pleural effusion on the left,  Atelectasis  in the right  lung base.  CTA of the chest-No pulmonary emboli, Left infrahilar and left lower lobe opacities suspicious for Pneumonia, No pathologic lymphadenopathy, Right basilar atelectasis, Paraseptal apical emphysema,  No pneumothorax,  Stable collection of air along the right posterolateral aspect of the superior trachea and esophagus favoring diverticulum.  Patient is currently on room air.    Hyperkalemia.  Lokelma.     Diabetes . sliding scale.  Hemoglobin A1c 8.5.     Polycythemia.  Stop Lasix for now.     Neuropathy.  Neurontin.     Chronic smoker.  Discussed with patient cutting back and stop smoking.  Nicotine patch.  Patient wants to go home with Chantix.    TSH elevated . Free T4 is pending.     Morbid obesity.  BMI is 42.     Nutrition . regular/cardiac/consistent carb.     Deconditioning.  PT and OT consult.     MRSA screen positive.  Blood cultures-no growth for 24.  Legionella-negative.  Strep pneumo-negative.  COVID-19-negative.     Discharge Plannin to 5 days.  Long discussion with daughter today who is a nurse.  Daughter need to cut her back on her smoking habit and help with her dietary habit.    Electronically signed by Brad Moran MD, 22, 9:51 AM CDT.

## 2022-08-17 NOTE — DISCHARGE SUMMARY
[facility] Medicine Services  DISCHARGE SUMMARY       Date of Admission: 8/15/2022  Date of Discharge:  8/17/2022  Primary Care Physician: Reymundo Torrez MD    Presenting Problem/Chief Complaint:    Final Discharge Diagnoses:  Active Hospital Problems    Diagnosis    • **Atrial fibrillation with RVR (HCC)    • Left lower lobe pneumonia    • Chronic respiratory failure with hypoxia (HCC)    • Obesity, Class III, BMI 40-49.9 (morbid obesity) (HCC)    • Chronic diastolic CHF (congestive heart failure) ([facility])    • Tobacco abuse    • Type 2 diabetes mellitus with hyperglycemia, without long-term current use of insulin ([facility])        Consults: Cardiology    Procedures Performed:   Status post cardiac conversion.    Pertinent Test Results:   Results for orders placed during the hospital encounter of 05/09/22    Adult Transthoracic Echo Complete With Contrast if Necessary Per Protocol    Interpretation Summary  · Left ventricular ejection fraction appears to be 61 - 65%. Left ventricular systolic function is normal.  · Left ventricular wall thickness is consistent with moderate concentric hypertrophy.  · Left ventricular diastolic dysfunction is noted.  · Normal right ventricular cavity size and systolic function noted.  · There is mild biatrial enlargement.  · There is no significant (greater than mild) valvular dysfunction.  · Estimated right ventricular systolic pressure from tricuspid regurgitation is normal (<35 mmHg).      Imaging Results (All)     Procedure Component Value Units Date/Time    CT Angiogram Chest [[id number]] Collected: 08/15/22 1428     Updated: 08/15/22 1436    Narrative:      CT ANGIOGRAM CHEST- 8/15/2022 2:14 PM CDT     HISTORY: Pulmonary embolism (PE) suspected, unknown D-dimer      COMPARISON: 5/31/2022     DOSE LENGTH PRODUCT: 613 mGy cm. Automated exposure control was also  utilized to decrease patient radiation dose.     TECHNIQUE: Axial images the chest are  obtained following IV contrast.  2-D and maximal intensity projection images reconstructed and reviewed     FINDINGS:  No pulmonary emboli identified. Thoracic aorta normal in  caliber. Cardiomegaly. No pericardial or pleural effusions. Calcified  subcarinal and right hilar lymph nodes with no pathologic intrathoracic  or axillary lymphadenopathy visualized.     No acute pathology identified within the visible upper abdomen.     Left infrahilar/lower lobe opacities suspicious for pneumonia. Probable  right basilar atelectasis. Mild peripheral emphysematous change of the  upper lobes. Stable linear density anterior right upper lobe image 49.  No pneumothorax. Stable small focus of air along the right  posterolateral trachea at 10 just below the level of the thyroid is  similar to prior studies and may represent tracheal or esophageal  diverticulum, though no direct connection localized.     Degenerative change of the regional skeleton chronic mild compression  superior endplate of T4.       Impression:      1. No pulmonary emboli.  2. Left infrahilar and left lower lobe opacities suspicious for  pneumonia. No pathologic lymphadenopathy. Right basilar atelectasis.  3. Paraseptal apical emphysema. No pneumothorax. Stable collection of  air along the right posterolateral aspect of the superior trachea and  esophagus favoring diverticulum.  This report was finalized on 08/15/2022 14:33 by Dr. Irene Baptiste MD.    XR Chest 1 View [[id number]] Collected: 08/15/22 1230     Updated: 08/15/22 1237    Narrative:      EXAMINATION:  XR CHEST 1 VW-  8/15/2022 12:17 PM CDT     HISTORY: Tachycardia. Atrial fibrillation. Hypertension. Emphysema.     COMPARISON: 5/31/2022.     TECHNIQUE: Single view AP image.     FINDINGS:  There is poor inspiration. There is an external pacing leads  overlying the chest. There is cardiomegaly. There is probable blunting  of the left costophrenic angle. The left lung is difficult to evaluate.  There  is minimal atelectasis in the right lung base.       Impression:      1. Poor inspiration with vascular crowding.  2. Left chest partially obscured by external pacing leads.  3. Cardiomegaly.  4. Probable small pleural effusion on the left. Atelectasis in the right  lung base.        This report was finalized on 08/15/2022 12:34 by Dr. Omega Macias MD.          LAB RESULTS:      Lab 08/17/22  0514 08/16/22  0330 08/15/22  1213   WBC 13.31* 9.05 11.56*   HEMOGLOBIN 16.0* 16.1* 16.8*   HEMATOCRIT 52.6* 50.6* 53.6*   PLATELETS 108* 116* 123*   NEUTROS ABS 12.26*  --  8.41*   IMMATURE GRANS (ABS)  --   --  0.05   LYMPHS ABS 0.67*  --  2.26   MONOS ABS 0.26  --  0.72   EOS ABS 0.00  --  0.10   MCV 96.3 93.7 94.0   D DIMER QUANT  --   --  1.01*         Lab 08/17/22  0514 08/16/22  0330 08/15/22  1213   SODIUM 133* 135* 137   POTASSIUM 5.5* 5.1 4.3   CHLORIDE 100 97* 97*   CO2 23.0 26.0 28.0   ANION GAP 10.0 12.0 12.0   BUN 25* 27* 25*   CREATININE 0.65 0.89 0.95   EGFR 99.1 73.0 67.5   GLUCOSE 275* 275* 201*   CALCIUM 9.5 9.4 9.4   MAGNESIUM  --   --  2.1   HEMOGLOBIN A1C 8.50*  --   --    TSH 0.236*  --   --          Lab 08/17/22  0514 08/15/22  1213   TOTAL PROTEIN 6.4 7.0   ALBUMIN 4.20 4.20   GLOBULIN 2.2 2.8   ALT (SGPT) 43* 44*   AST (SGOT) 13 18   BILIRUBIN 0.5 0.5   ALK PHOS 65 78         Lab 08/15/22  2031 08/15/22  1213   PROBNP  --  5,364.0*   TROPONIN T <0.010 <0.010         Lab 08/17/22 0514   CHOLESTEROL 143   LDL CHOL 85   HDL CHOL 37*   TRIGLYCERIDES 116             Brief Urine Lab Results     None        Microbiology Results (last 10 days)     Procedure Component Value - Date/Time    Respiratory Culture - Sputum, Cough [[id number]] Collected: 08/16/22 0850    Lab Status: Preliminary result Specimen: Sputum from Cough Updated: 08/17/22 0907     Respiratory Culture Light growth (2+) The culture consists of normal respiratory kirit. This is a preliminary report; final report to follow.     Gram Stain  Moderate (3+) WBCs per low power field      Few (2+) Epithelial cells per low power field      Moderate (3+) Mixed gram positive kirit    MRSA Screen, PCR (Inpatient) - Swab, Nares [[id number]]  (Abnormal) Collected: 08/15/22 2124    Lab Status: Final result Specimen: Swab from Nares Updated: 08/15/22 2321     MRSA PCR MRSA Detected    Narrative:      The negative predictive value of this diagnostic test is high and should only be used to consider de-escalating anti-MRSA therapy. A positive result may indicate colonization with MRSA and must be correlated clinically.    Blood Culture - Blood, Arm, Left [[id number]]  (Normal) Collected: 08/15/22 2031    Lab Status: Preliminary result Specimen: Blood from Arm, Left Updated: 08/16/22 2049     Blood Culture No growth at 24 hours    Blood Culture - Blood, Wrist, Right [[id number]]  (Normal) Collected: 08/15/22 2031    Lab Status: Preliminary result Specimen: Blood from Wrist, Right Updated: 08/16/22 2049     Blood Culture No growth at 24 hours    Legionella Antigen, Urine - Urine, Urine, Clean Catch [[id number]]  (Normal) Collected: 08/15/22 1954    Lab Status: Final result Specimen: Urine, Clean Catch Updated: 08/15/22 2046     LEGIONELLA ANTIGEN, URINE Negative    S. Pneumo Ag Urine or CSF - Urine, Urine, Clean Catch [[id number]]  (Normal) Collected: 08/15/22 1954    Lab Status: Final result Specimen: Urine, Clean Catch Updated: 08/15/22 2046     Strep Pneumo Ag Negative    COVID PRE-OP / PRE-PROCEDURE SCREENING ORDER (NO ISOLATION) - Swab, Nasal Cavity [[id number]]  (Normal) Collected: 08/15/22 1216    Lab Status: Final result Specimen: Swab from Nasal Cavity Updated: 08/15/22 1305    Narrative:      The following orders were created for panel order COVID PRE-OP / PRE-PROCEDURE SCREENING ORDER (NO ISOLATION) - Swab, Nasal Cavity.  Procedure                               Abnormality         Status                     ---------                               -----------         " ------                     COVID-19,[facility] IN-SHAUNA...[[id number]]  Normal              Final result                 Please view results for these tests on the individual orders.    COVID-19,[facility] IN-HOUSE,Nasal Swab No Transport Media 3-4 HR TAT - Swab, Nasal Cavity [[id number]]  (Normal) Collected: 08/15/22 1216    Lab Status: Final result Specimen: Swab from Nasal Cavity Updated: 08/15/22 1305     COVID19 Not Detected    Narrative:      Fact sheet for providers: https://www.fda.gov/media/683058/download     Fact sheet for patients: https://www.fda.gov/media/182084/download    Test performed by PCR.    Consider negative results in combination with clinical observations, patient history, and epidemiological information.          Chief Complaint on Day of Discharge: none    History of Present Illness on Day of Discharge:   Claudette Keeling is a 61-year-old female with a past medical history of persistent atrial fibrillation-followed by [facility] cardiology with plans for cardioversion 8/18/2022, chronic anticoagulation, CHF diastolic, hypertension, polycythemia, ongoing tobacco dependence, type 2 diabetes, sleep apnea currently not wearing CPAP due to shortness of breathing, continuous oxygen therapy 3 L for chronic respiratory failure however patient only will wear at night.  She does continue to work daily.  Patient states she developed symptoms of shortness of breathing on Friday, 8/12/2022, symptoms continued to worsen throughout the weekend.  She started having \"cold sweats\".  She is unsure if she had a fever however due to severe shortness of breathing worse with exertion she did present to [facility] emergency department for evaluation.  Patient was found to be in A. fib RVR rate 172.  Elevated BNP, white count 11.5 with left lower lobe pneumonia noted on CT angiogram of the chest.  During assessment when having patient set up she did drop from 96% on 3 L to 94.  Wheezing noted throughout.  She " does continue to smoke approximately half a pack of cigarettes per day.  She has no chest pain.  She does report painful cough worse on the left.  She has no sputum production.  She has no orthopnea or lower extremity edema.  She is admitted for further evaluation treatment.    Hospital Course:  The patient is a 61 y.o. female who presented to [facility] with atrial fibrillation RVR/diastolic heart failure/hypertension/COPD/smoker.      Atrial fibrillation with RVR/diastolic CHF/hypertension/hyperlipidemia.  Plan for outpatient cardioversion on 8/18/2022 by cardiology at [facility].  Patient is on chronic Eliquis.  Cardizem drip.  Consult cardiology.  Daily weight.  Cardizem CD.  DC Lasix for now due to polycythemia.  Lisinopril.  Lopressor.  Crestor.  Echocardiogram 5/10/2022- ejection fraction 61 to 65%, moderate concentric hypertrophy, diastolic dysfunction, mild biatrial enlargement, tricuspid regurgitation.  Dispose cardioversion by cardiology 8/17/2022.  Patient is currently in sinus rhythm with a rate of 57.     COPD/pneumonia/chronic respiratory failure hypoxia/sleep apnea.  Patient is on chronic 3 L of oxygen at home.  Xopenex neb.  Incentive spirometer.  Wean down Solu-Medrol.  Mucinex.  Rocephin.  Change azithromycin to doxycycline 8/16/2020 due to positive MRSA screen.  Bactroban to nasal nares.  Leukocytosis-secondary to steroid.  Chest x-ray-Cardiomegaly, Probable small pleural effusion on the left,  Atelectasis in the right  lung base.  CTA of the chest-No pulmonary emboli, Left infrahilar and left lower lobe opacities suspicious for Pneumonia, No pathologic lymphadenopathy, Right basilar atelectasis, Paraseptal apical emphysema,  No pneumothorax,  Stable collection of air along the right posterolateral aspect of the superior trachea and esophagus favoring diverticulum.  Patient is currently on room air.     Hyperkalemia.  Lokelma.     Diabetes . sliding scale.  Hemoglobin A1c  "8.5.     Polycythemia.  Stop Lasix for now.     Neuropathy.  Neurontin.     Chronic smoker.  Discussed with patient cutting back and stop smoking.  Nicotine patch.  Patient wants to go home with Chantix.     TSH elevated . Free T4 is pending.     Morbid obesity.  BMI is 42.     Nutrition . regular/cardiac/consistent carb.     Deconditioning.  PT and OT consult.     MRSA screen positive.  Blood cultures-no growth for 24.  Legionella-negative.  Strep pneumo-negative.  COVID-19-negative.     Vital signs stable, afebrile.  Long discussion with daughter today who is a nurse.  Daughter need to cut her back on her smoking habit and help with her dietary habit.  Patient wants to go home with Chantix, patient use it in the past.  Follow with PCP 1 week.  Follow-up with cardiology outpatient.    Condition on Discharge: Stable.    Physical Exam on Discharge:  /70   Pulse 57   Temp 96.4 °F (35.8 °C) (Tympanic)   Resp 24   Ht 167.6 cm (66\")   Wt 122 kg (270 lb)   SpO2 97%   BMI 43.58 kg/m²   Physical Exam  Vitals and nursing note reviewed.   HENT:      Head: Normocephalic.   Eyes:      Conjunctiva/sclera: Conjunctivae normal.      Pupils: Pupils are equal, round, and reactive to light.   Neck:      Vascular: No JVD.   Cardiovascular:      Rate and Rhythm: Heart rate is 57 present.  Sinus rhythm.     Heart sounds: Normal heart sounds.   Pulmonary:      Effort: No respiratory distress.      Breath sounds: No wheezing or rales.      Comments: Patient is currently on room air.  Diminished breath sound bilateral, clear .  Chest:      Chest wall: No tenderness.   Abdominal:      General: Bowel sounds are normal. There is no distension.      Palpations: Abdomen is soft.      Tenderness: There is no abdominal tenderness.      Comments: Morbid obesity   Musculoskeletal:         General: No tenderness or deformity.      Cervical back: Neck supple.   Skin:     General: Skin is warm and dry.      Capillary Refill: Capillary " refill takes 2 to 3 seconds.      Findings: No rash.   Neurological:      General: No focal deficit present.      Mental Status: She is alert and oriented to person, place, and time.      Cranial Nerves: No cranial nerve deficit.      Motor: Weakness present. No abnormal muscle tone.      Deep Tendon Reflexes: Reflexes normal.   Psychiatric:         Mood and Affect: Mood normal.         Behavior: Behavior normal.         Thought Content: Thought content normal.     Discharge Disposition: Discharge home with family.      Discharge Medications:     Discharge Medications      New Medications      Instructions Start Date   doxycycline  Commonly known as: VIBRAMYCIN   100 mg, Intravenous, Every 12 Hours      flecainide 50 MG tablet  Commonly known as: TAMBOCOR   50 mg, Oral, Every 12 Hours Scheduled      methylPREDNISolone 4 MG dose pack  Commonly known as: MEDROL   4-20 mg, Oral, Daily, Take as directed on package instructions.      varenicline 0.5 MG X 11 & 1 MG X 42 tablet  Commonly known as: CHANTIX PERLA   Take 0.5 mg po daily x 3 days, then 0.5 mg po bid x 4 days, then 1 mg po bid      varenicline 1 MG tablet  Commonly known as: Chantix Continuing Month Perla   1 mg, Oral, 2 Times Daily   Start Date: September 14, 2022        Continue These Medications      Instructions Start Date   apixaban 5 MG tablet tablet  Commonly known as: ELIQUIS   5 mg, Oral, Every 12 Hours Scheduled      Breztri Aerosphere 160-9-4.8 MCG/ACT aerosol inhaler  Generic drug: Budeson-Glycopyrrol-Formoterol   2 puffs, Inhalation, 2 Times Daily      dilTIAZem  MG 24 hr capsule  Commonly known as: CARDIZEM CD   120 mg, Oral, Every 24 Hours Scheduled      empagliflozin 25 MG tablet tablet  Commonly known as: JARDIANCE   25 mg, Oral, Daily      famotidine 20 MG tablet  Commonly known as: Pepcid   20 mg, Oral, 2 Times Daily PRN      gabapentin 300 MG capsule  Commonly known as: NEURONTIN   300 mg, Oral, 3 Times Daily      lisinopril 10 MG  tablet  Commonly known as: PRINIVIL,ZESTRIL   10 mg, Oral, Daily      metFORMIN  MG 24 hr tablet  Commonly known as: GLUCOPHAGE-XR   1,000 mg, Oral, Daily With Breakfast      metoprolol tartrate 50 MG tablet  Commonly known as: LOPRESSOR   50 mg, Oral, 2 Times Daily      multivitamin with minerals tablet tablet   1 tablet, Oral, Daily      nystatin 160760 UNIT/GM powder  Commonly known as: MYCOSTATIN   1 application, Topical, 2 Times Daily PRN      rosuvastatin 5 MG tablet  Commonly known as: Crestor   5 mg, Oral, Daily         Stop These Medications    furosemide 20 MG tablet  Commonly known as: LASIX            Discharge Diet:   Diet Instructions     Advance Diet As Tolerated            Activity at Discharge:   Activity Instructions     Activity as Tolerated            Discharge Care Plan/Instructions: Discharge home with family.    Follow-up Appointments:   Future Appointments   Date Time Provider Department Center   8/18/2022 11:30 AM [facility] CATH LAB 3  [facility] CATH PAD   10/17/2022  1:45 PM Nando Bond MD [facility] CD [facility]   11/18/2022  2:30 PM Antonino Barriga MD [facility] CD [facility] PAD   Follow-up with PCP 1 week.  Follow-up with cardiology outpatient.    Electronically signed by Brad Moran MD, 08/17/22, 12:20 CDT.    Time: Greater than 30 minutes.

## 2022-08-18 ENCOUNTER — APPOINTMENT (OUTPATIENT)
Dept: CARDIOLOGY | Facility: HOSPITAL | Age: 62
End: 2022-08-18

## 2022-08-18 LAB
BACTERIA SPEC RESP CULT: NORMAL
GRAM STN SPEC: NORMAL
QT INTERVAL: 454 MS
QTC INTERVAL: 422 MS

## 2022-08-18 NOTE — OUTREACH NOTE
Prep Survey    Flowsheet Row Responses   Jew facility patient discharged from? [facility]   Is LACE score < 7 ? No   Emergency Room discharge w/ pulse ox? No   Eligibility Readm Mgmt   Discharge diagnosis Atrial fibrillation with RVR, Left lower lobe pneumonia   Does the patient have one of the following disease processes/diagnoses(primary or secondary)? COPD/Pneumonia   Does the patient have Home health ordered? No   Is there a DME ordered? No   Prep survey completed? Yes          JOHN PAUL RICARDO - Registered Nurse

## 2022-08-20 LAB
BACTERIA SPEC AEROBE CULT: NORMAL
BACTERIA SPEC AEROBE CULT: NORMAL

## 2022-08-23 NOTE — PROGRESS NOTES
Subjective:     Encounter Date: 08/24/2022      Patient ID: Claudette Keeling is a 61 y.o. female with paroxysmal atrial fibrillation s/p cardioversion on 8/17/2022, chronic anticoagulation, chronic diastolic congestive heart failure, type 2 diabetes, COPD/tobacco abuse and obesity.    Chief Complaint: hospital follow up  Atrial Fibrillation  Presents for follow-up visit. Symptoms include palpitations. Symptoms are negative for bradycardia, chest pain, dizziness, hemodynamic instability, hypertension, hypotension, shortness of breath, syncope, tachycardia and weakness. The symptoms have been stable. Past medical history includes atrial fibrillation and CHF. There are no medication compliance problems.   Congestive Heart Failure  Presents for follow-up visit. Associated symptoms include palpitations. Pertinent negatives include no chest pain, chest pressure, claudication, edema, fatigue, muscle weakness, nocturia, orthopnea, paroxysmal nocturnal dyspnea, shortness of breath or unexpected weight change. The symptoms have been stable. Compliance with total regimen is 51-75%. Compliance with diet is 51-75%. Compliance with exercise is 51-75%. Compliance with medications is 51-75%.     Patient presents today for management of atrial fibrillation and diastoliccongestive heart failure. Patient was admitted to [facility] on 8/15/2022-8/17/2022. She presented with worsening shortness of breath that started on 8/12/2022. Patient was found to be in Atrial fibrillation RVR with rates 170's. She had elevated BNP, elevated WBC and on CTA chest left lower lobe pneumonia. She was placed on cardizem drip and transitioned to cardizem CD. Patient was started on Flecainide and underwent cardioversion on 8/17/2022 that was successful. Patient was discharged on metoprolol and cardizem. Today she reports she has been feeling well. Family reports that she has been monitoring her heart rate has been 60-80's. She denies any dizziness or near  syncope. She denies any dyspnea on exertion, leg swelling, orthopnea or PND. She reports that she has went back to work for a short time a couple days this week and has done well. She denies any chest pain. She is on eliquis and denies any bleeding issues. She follows with Dr Torrez as PCP.       Previous Cardiac Testing and Procedures:   -Echo (5/10/2022): LVEF 61-65%, moderate LVH, diastolic dysfunction, no significant valvular disease  -Hgb A1C (5/10/2022): 7.4  -Lipid panel (5/10/2022): total cholesterol 149, hdl 37, LDL 90 and triglycerides 124  -Cardioversion (6/14/2022): successful cardioversion   -Cardioversion (8/17/2022): Successful cardioversion   -Hgb A1C (8/17/2022): 8.5  -Lipid panel (8/17/2022): total cholesterol 143, hdl 37, LDL 85 and triglycerides 116    The following portions of the patient's history were reviewed and updated as appropriate: allergies, current medications, past family history, past medical history, past social history, past surgical history and problem list.    Allergies   Allergen Reactions   • Codeine GI Intolerance       Current Outpatient Medications:   •  apixaban (ELIQUIS) 5 MG tablet tablet, Take 1 tablet by mouth Every 12 (Twelve) Hours. Indications: Atrial Fibrillation, Disp: 180 tablet, Rfl: 1  •  Budeson-Glycopyrrol-Formoterol (Breztri Aerosphere) 160-9-4.8 MCG/ACT aerosol inhaler, Inhale 2 puffs 2 (Two) Times a Day., Disp: , Rfl:   •  dilTIAZem CD (CARDIZEM CD) 120 MG 24 hr capsule, Take 1 capsule by mouth Daily., Disp: 30 capsule, Rfl: 0  •  empagliflozin (JARDIANCE) 25 MG tablet tablet, Take 25 mg by mouth Daily., Disp: , Rfl:   •  famotidine (Pepcid) 20 MG tablet, Take 1 tablet by mouth 2 (Two) Times a Day As Needed for Heartburn or Indigestion., Disp: 180 tablet, Rfl: 0  •  flecainide (TAMBOCOR) 50 MG tablet, Take 1 tablet by mouth Every 12 (Twelve) Hours., Disp: 180 tablet, Rfl: 0  •  furosemide (LASIX) 40 MG tablet, Take 40 mg by mouth Daily As Needed., Disp: ,  Rfl:   •  gabapentin (NEURONTIN) 300 MG capsule, Take 300 mg by mouth 3 (Three) Times a Day., Disp: , Rfl:   •  lisinopril (PRINIVIL,ZESTRIL) 10 MG tablet, Take 1 tablet by mouth Daily., Disp: 90 tablet, Rfl: 0  •  metFORMIN ER (GLUCOPHAGE-XR) 500 MG 24 hr tablet, Take 2 tablets by mouth Daily With Breakfast., Disp: 180 tablet, Rfl: 1  •  methylPREDNISolone (MEDROL) 4 MG dose pack, Take as directed on package instructions., Disp: 21 tablet, Rfl: 1  •  metoprolol tartrate (LOPRESSOR) 50 MG tablet, Take 50 mg by mouth 2 (Two) Times a Day., Disp: , Rfl:   •  multivitamin with minerals tablet tablet, Take 1 tablet by mouth Daily., Disp: , Rfl:   •  mupirocin (BACTROBAN) 2 % ointment, Apply topically to each nare 2 times per day, Disp: 22 g, Rfl: 0  •  nystatin (MYCOSTATIN) 272022 UNIT/GM powder, Apply 1 application topically to the appropriate area as directed 2 (Two) Times a Day As Needed., Disp: , Rfl:   •  rosuvastatin (Crestor) 5 MG tablet, Take 1 tablet by mouth Daily., Disp: 90 tablet, Rfl: 1  •  varenicline (CHANTIX) 0.5 MG tablet, Take 1 tablet by mouth Daily for 3 days, THEN 1 tablet 2 (Two) Times a Day for 4 days., Disp: 11 tablet, Rfl: 0  •  [START ON 9/14/2022] varenicline (CHANTIX) 1 MG tablet, Take 1 tablet by mouth 2 (Two) Times a Day for 56 days., Disp: 56 tablet, Rfl: 1  Past Medical History:   Diagnosis Date   • Arthritis    • Atrial fibrillation (HCC)    • Diabetes mellitus (HCC)    • Hypertension    • Neuropathy    • Sleep apnea      Social History     Socioeconomic History   • Marital status:    Tobacco Use   • Smoking status: Current Every Day Smoker     Packs/day: 0.50     Years: 25.00     Pack years: 12.50     Types: Cigarettes   • Smokeless tobacco: Never Used   Vaping Use   • Vaping Use: Never used   Substance and Sexual Activity   • Alcohol use: Never   • Drug use: Never       Review of Systems   Constitutional: Negative for fatigue, malaise/fatigue, unexpected weight change, weight  "gain and weight loss.   HENT: Negative for nosebleeds.    Cardiovascular: Positive for dyspnea on exertion (improving) and palpitations. Negative for chest pain, claudication, irregular heartbeat, leg swelling, paroxysmal nocturnal dyspnea and syncope.   Respiratory: Negative for shortness of breath.    Hematologic/Lymphatic: Bruises/bleeds easily.   Musculoskeletal: Negative for muscle weakness.   Genitourinary: Negative for hematuria and nocturia.   Neurological: Negative for dizziness and weakness.   All other systems reviewed and are negative.         Objective:     Vitals reviewed.   Constitutional:       General: Not in acute distress.     Appearance: Normal appearance. Well-developed. Morbidly obese.   Eyes:      Pupils: Pupils are equal, round, and reactive to light.   HENT:      Head: Normocephalic and atraumatic.      Nose: Nose normal.   Neck:      Vascular: No carotid bruit.   Pulmonary:      Effort: Pulmonary effort is normal. No respiratory distress.      Breath sounds: Normal breath sounds. No wheezing. No rales.   Cardiovascular:      Normal rate. Regular rhythm.      Murmurs: There is no murmur.   Edema:     Peripheral edema absent.   Abdominal:      General: There is no distension.      Palpations: Abdomen is soft.   Musculoskeletal: Normal range of motion.      Cervical back: Normal range of motion and neck supple. Skin:     General: Skin is warm.      Findings: No erythema or rash.   Neurological:      General: No focal deficit present.      Mental Status: Alert and oriented to person, place, and time.   Psychiatric:         Attention and Perception: Attention normal.         Mood and Affect: Mood normal.         Speech: Speech normal.         Behavior: Behavior normal.         Thought Content: Thought content normal.         Judgment: Judgment normal.         /80 (BP Location: Right arm, Patient Position: Sitting, Cuff Size: Adult)   Pulse 60   Resp 18   Ht 167.6 cm (66\")   Wt 118 kg " (261 lb)   SpO2 98%   Breastfeeding No   BMI 42.13 kg/m²       ECG 12 Lead    Date/Time: 8/24/2022 1:43 PM  Performed by: Paul Coleman APRN  Authorized by: Paul Coleman APRN   Comparison: compared with previous ECG from 8/17/2022  Similar to previous ECG  Rhythm: sinus rhythm  Rate: normal  BPM: 60              Lab Review:     Results for orders placed during the hospital encounter of 05/09/22    Adult Transthoracic Echo Complete With Contrast if Necessary Per Protocol    Interpretation Summary  · Left ventricular ejection fraction appears to be 61 - 65%. Left ventricular systolic function is normal.  · Left ventricular wall thickness is consistent with moderate concentric hypertrophy.  · Left ventricular diastolic dysfunction is noted.  · Normal right ventricular cavity size and systolic function noted.  · There is mild biatrial enlargement.  · There is no significant (greater than mild) valvular dysfunction.  · Estimated right ventricular systolic pressure from tricuspid regurgitation is normal (<35 mmHg).    Lab Results   Component Value Date    CHOL 143 08/17/2022    TRIG 116 08/17/2022    HDL 37 (L) 08/17/2022    LDL 85 08/17/2022     Lab Results   Component Value Date    HGBA1C 8.50 (H) 08/17/2022         I have personally reviewed echo, hospitalization notes, labs and past office notes prior to patients visit  Assessment:          Diagnosis Plan   1. Paroxysmal atrial fibrillation ([facility])  ECG 12 Lead   2. Chronic anticoagulation     3. Chronic diastolic CHF (congestive heart failure) ([facility])     4. Other emphysema ([facility])     5. Tobacco abuse     6. Type 2 diabetes mellitus with hyperglycemia, without long-term current use of insulin ([facility])     7. Class 3 drug-induced obesity with serious comorbidity and body mass index (BMI) of 40.0 to 44.9 in adult ([facility])            Plan:       1. Paroxysmal Atrial Fibrillation: EKG today shows NSR rates of 60. Patient has been monitoring her heart rates and they have  remained controlled. Continue Flecainide, Cardizem and metoprolol. Patient is on Eliquis and denies any bleeding issues. Patient has scheduled follow up with [facility] in November.     2. Anticoagulation: Patient is on Eliquis and denies any bleeding issues.     3. Chronic diastolic congestive heart failure: Euvolemic in office today. Patient has been not required any lasix since her discharge. She has been keeping up with daily weights and they have remained stable. Reviewed signs and symptoms of CHF and what to report to office with patient. Discussed importance of daily weights. Recommended her weigh every morning after urinating and recording it. If she notices a weight gain of 2-3 lbs overnight/5 lbs in a week then she can take an additional Lasix 20 mg and call office. Recommended low salt diet. Discussed keeping legs elevated as much as possible when sitting throughout the day. Recommended compression stockings or ACE wraps to patient if needed for leg swelling. Continue lasix 40 mg on daily as needed basis.    4/5. COPD/tobacco abuse: Claudette Keeling  reports that she has been smoking cigarettes. She has a 12.50 pack-year smoking history. She has never used smokeless tobacco.. I have educated her on the risk of diseases from using tobacco products such as cancer, COPD and heart disease. I advised her to quit and she is not willing to quit.  I spent 3  minutes counseling the patient.     6. Type 2 diabetes: managed and followed by PCP. A1C 8.5    7. Obesity: Class 3 Severe Obesity (BMI >=40). Obesity-related health conditions include the following: hypertension and diabetes mellitus. Obesity is unchanged. BMI is is above average; BMI management plan is completed. We discussed portion control and increasing exercise.    Current outpatient and discharge medications have been reconciled for the patient.  Reviewed by: FLORENCIA Bautista    Patient is to keep previously scheduled appointment with Dr Bond on  10/17/2022 or call to be seen sooner if needed

## 2022-08-24 ENCOUNTER — OFFICE VISIT (OUTPATIENT)
Dept: CARDIOLOGY | Facility: CLINIC | Age: 62
End: 2022-08-24

## 2022-08-24 VITALS
RESPIRATION RATE: 18 BRPM | SYSTOLIC BLOOD PRESSURE: 128 MMHG | BODY MASS INDEX: 41.95 KG/M2 | HEIGHT: 66 IN | WEIGHT: 261 LBS | HEART RATE: 60 BPM | DIASTOLIC BLOOD PRESSURE: 80 MMHG | OXYGEN SATURATION: 98 %

## 2022-08-24 DIAGNOSIS — I48.0 PAROXYSMAL ATRIAL FIBRILLATION: Primary | ICD-10-CM

## 2022-08-24 DIAGNOSIS — E11.65 TYPE 2 DIABETES MELLITUS WITH HYPERGLYCEMIA, WITHOUT LONG-TERM CURRENT USE OF INSULIN: ICD-10-CM

## 2022-08-24 DIAGNOSIS — E66.1 CLASS 3 DRUG-INDUCED OBESITY WITH SERIOUS COMORBIDITY AND BODY MASS INDEX (BMI) OF 40.0 TO 44.9 IN ADULT: ICD-10-CM

## 2022-08-24 DIAGNOSIS — J43.8 OTHER EMPHYSEMA: ICD-10-CM

## 2022-08-24 DIAGNOSIS — Z72.0 TOBACCO ABUSE: ICD-10-CM

## 2022-08-24 DIAGNOSIS — Z79.01 CHRONIC ANTICOAGULATION: ICD-10-CM

## 2022-08-24 DIAGNOSIS — I50.32 CHRONIC DIASTOLIC CHF (CONGESTIVE HEART FAILURE): ICD-10-CM

## 2022-08-24 PROCEDURE — 99214 OFFICE O/P EST MOD 30 MIN: CPT | Performed by: NURSE PRACTITIONER

## 2022-08-24 PROCEDURE — 93000 ELECTROCARDIOGRAM COMPLETE: CPT | Performed by: NURSE PRACTITIONER

## 2022-08-24 RX ORDER — FUROSEMIDE 40 MG/1
40 TABLET ORAL DAILY PRN
COMMUNITY

## 2022-08-26 ENCOUNTER — READMISSION MANAGEMENT (OUTPATIENT)
Dept: CALL CENTER | Facility: HOSPITAL | Age: 62
End: 2022-08-26

## 2022-08-26 NOTE — OUTREACH NOTE
COPD/PN Week 2 Survey    Flowsheet Row Responses   [facility] patient discharged from? [place]   Does the patient have one of the following disease processes/diagnoses(primary or secondary)? COPD/Pneumonia   Was the primary reason for admission: Pneumonia   Week 2 attempt successful? Yes   Call start time 1125   Call end time 1130   Discharge diagnosis Atrial fibrillation with RVR, Left lower lobe pneumonia   Meds reviewed with patient/caregiver? Yes   Is the patient taking all medications as directed (includes completed medication regime)? Yes   Has the patient kept scheduled appointments due by today? Yes   DME comments has home O2 for sleep along with CPAP   Pulse Ox monitoring Intermittent   Pulse Ox device source Patient   O2 Sat comments 96%-RA   O2 Sat: education provided Sat levels, Monitoring frequency   Comments Pt has returned to work. Baseline COPD SOA/using chantix for smoking cessation.    What is the patient's perception of their health status since discharge? Returned to baseline/stable   Nursing Interventions Nurse provided patient education   If the patient is a current smoker, are they able to teach back resources for cessation? --  [smoking less than normal, approx 6cig/day, used to smoke 1 1/2pk/day.]   Is the patient/caregiver able to teach back the hierarchy of who to call/visit for symptoms/problems? PCP, Specialist, Home health nurse, Urgent Care, ED, 911 Yes   Additional teach back comments has lost 8# so far for wt loss.    Is the patient/caregiver able to teach back signs and symptoms of worsening condition: Fever/chills, Shortness of breath, Chest pain   Is the patient/caregiver able to teach back importance of completing antibiotic course of treatment? Yes   Week 2 call completed? Yes   Revoked No further contact(revokes)-requires comment   Is the patient interested in additional calls from an ambulatory ?  NOTE:  applies to high risk patients requiring additional  follow-up. No   Graduated/Revoked comments rosendo GARCIA - Registered Nurse

## 2022-09-15 ENCOUNTER — TRANSCRIBE ORDERS (OUTPATIENT)
Dept: ADMINISTRATIVE | Facility: HOSPITAL | Age: 62
End: 2022-09-15

## 2022-09-15 DIAGNOSIS — R05.3 CHRONIC COUGH: Primary | ICD-10-CM

## 2022-09-16 ENCOUNTER — HOSPITAL ENCOUNTER (OUTPATIENT)
Dept: GENERAL RADIOLOGY | Facility: HOSPITAL | Age: 62
Discharge: HOME | End: 2022-09-16
Admitting: NURSE PRACTITIONER

## 2022-09-16 PROCEDURE — 71046 X-RAY EXAM CHEST 2 VIEWS: CPT

## 2022-10-17 ENCOUNTER — OFFICE VISIT (OUTPATIENT)
Dept: CARDIOLOGY | Facility: CLINIC | Age: 62
End: 2022-10-17

## 2022-10-17 VITALS
HEIGHT: 66 IN | DIASTOLIC BLOOD PRESSURE: 66 MMHG | OXYGEN SATURATION: 96 % | HEART RATE: 83 BPM | WEIGHT: 267 LBS | SYSTOLIC BLOOD PRESSURE: 110 MMHG | BODY MASS INDEX: 42.91 KG/M2

## 2022-10-17 DIAGNOSIS — E11.65 TYPE 2 DIABETES MELLITUS WITH HYPERGLYCEMIA, WITHOUT LONG-TERM CURRENT USE OF INSULIN: ICD-10-CM

## 2022-10-17 DIAGNOSIS — Z72.0 TOBACCO ABUSE: ICD-10-CM

## 2022-10-17 DIAGNOSIS — Z79.01 CHRONIC ANTICOAGULATION: ICD-10-CM

## 2022-10-17 DIAGNOSIS — G47.33 OSA (OBSTRUCTIVE SLEEP APNEA): ICD-10-CM

## 2022-10-17 DIAGNOSIS — I48.0 PAROXYSMAL ATRIAL FIBRILLATION: Primary | ICD-10-CM

## 2022-10-17 DIAGNOSIS — I50.32 CHRONIC DIASTOLIC CHF (CONGESTIVE HEART FAILURE): ICD-10-CM

## 2022-10-17 PROBLEM — I48.91 ATRIAL FIBRILLATION WITH RVR: Status: RESOLVED | Noted: 2022-08-15 | Resolved: 2022-10-17

## 2022-10-17 PROBLEM — J18.9 LEFT LOWER LOBE PNEUMONIA: Status: RESOLVED | Noted: 2022-08-15 | Resolved: 2022-10-17

## 2022-10-17 PROCEDURE — 99214 OFFICE O/P EST MOD 30 MIN: CPT | Performed by: INTERNAL MEDICINE

## 2022-10-17 RX ORDER — VARENICLINE TARTRATE 1 MG/1
1 TABLET, FILM COATED ORAL 2 TIMES DAILY
Qty: 60 TABLET | Refills: 5 | Status: SHIPPED | OUTPATIENT
Start: 2022-10-17

## 2022-10-17 NOTE — PROGRESS NOTES
Reason for Visit: cardiovascular follow up.    HPI:  Claudette Keeling is a 61 y.o. female is here today for follow-up.  She has cut down dramatically on her smoking and is down to about 2 per day.  It is with her morning coffee that is the hardest not to smoke.  She has not had any recurrence of atrial fibrillation since being on flecainide.  She has sleep apnea and is going to try and get a new mask.  She reports her weight is up a little bit.  She is back to work and eating on the go a lot.      Previous Cardiac Testing and Procedures:  -Echo (5/10/2022): LVEF 61-65%, moderate LVH, diastolic dysfunction, no significant valvular disease  -Hgb A1C (5/10/2022): 7.4  -Lipid panel (5/10/2022): total cholesterol 149, hdl 37, LDL 90 and triglycerides 124  -Cardioversion (6/14/2022) successful cardioversion from atrial fibrillation to sinus rhythm.  -Cardioversion (8/17/2022) successful cardioversion  -Lipid panel (8/17/2022) total cholesterol 143, HDL 37, LDL 85, triglycerides 116  -Hemoglobin A1c (8/17/2022) 8.5%    Patient Active Problem List   Diagnosis   • Dizziness   • Emphysema lung ([facility])   • Paroxysmal atrial fibrillation ([facility])   • Tobacco abuse   • Type 2 diabetes mellitus with hyperglycemia, without long-term current use of insulin ([facility])   • Polycythemia   • Chronic diastolic CHF (congestive heart failure) ([facility])   • Chronic anticoagulation   • Class 3 drug-induced obesity with serious comorbidity and body mass index (BMI) of 40.0 to 44.9 in adult ([facility])   • Chronic respiratory failure with hypoxia ([facility])   • Obesity, Class III, BMI 40-49.9 (morbid obesity) ([facility])   • ANTHONY (obstructive sleep apnea)       Social History     Tobacco Use   • Smoking status: Every Day     Packs/day: 0.50     Years: 25.00     Pack years: 12.50     Types: Cigarettes   • Smokeless tobacco: Never   Vaping Use   • Vaping Use: Never used   Substance Use Topics   • Alcohol use: Never   • Drug use: Never       Family History   Problem  Relation Age of Onset   • Heart failure Mother    • Hypertension Mother    • Diabetes Mother    • Hypertension Father    • Diabetes Father    • No Known Problems Sister    • Diabetes Brother    • Hypertension Brother    • Heart disease Brother    • Heart attack Brother        The following portions of the patient's history were reviewed and updated as appropriate: allergies, current medications, past family history, past medical history, past social history, past surgical history and problem list.      Current Outpatient Medications:   •  apixaban (ELIQUIS) 5 MG tablet tablet, Take 1 tablet by mouth Every 12 (Twelve) Hours. Indications: Atrial Fibrillation, Disp: 180 tablet, Rfl: 1  •  Budeson-Glycopyrrol-Formoterol (Breztri Aerosphere) 160-9-4.8 MCG/ACT aerosol inhaler, Inhale 2 puffs 2 (Two) Times a Day., Disp: , Rfl:   •  dilTIAZem CD (CARDIZEM CD) 120 MG 24 hr capsule, Take 1 capsule by mouth Daily., Disp: 30 capsule, Rfl: 0  •  empagliflozin (JARDIANCE) 25 MG tablet tablet, Take 25 mg by mouth Daily., Disp: , Rfl:   •  famotidine (Pepcid) 20 MG tablet, Take 1 tablet by mouth 2 (Two) Times a Day As Needed for Heartburn or Indigestion., Disp: 180 tablet, Rfl: 0  •  flecainide (TAMBOCOR) 50 MG tablet, Take 1 tablet by mouth Every 12 (Twelve) Hours., Disp: 180 tablet, Rfl: 0  •  furosemide (LASIX) 40 MG tablet, Take 40 mg by mouth Daily As Needed., Disp: , Rfl:   •  gabapentin (NEURONTIN) 300 MG capsule, Take 300 mg by mouth 3 (Three) Times a Day., Disp: , Rfl:   •  lisinopril (PRINIVIL,ZESTRIL) 10 MG tablet, Take 1 tablet by mouth Daily., Disp: 90 tablet, Rfl: 0  •  metFORMIN ER (GLUCOPHAGE-XR) 500 MG 24 hr tablet, Take 2 tablets by mouth Daily With Breakfast., Disp: 180 tablet, Rfl: 1  •  metoprolol tartrate (LOPRESSOR) 50 MG tablet, Take 50 mg by mouth 2 (Two) Times a Day., Disp: , Rfl:   •  multivitamin with minerals tablet tablet, Take 1 tablet by mouth Daily., Disp: , Rfl:   •  nystatin (MYCOSTATIN) 381403  "UNIT/GM powder, Apply 1 application topically to the appropriate area as directed 2 (Two) Times a Day As Needed., Disp: , Rfl:   •  rosuvastatin (Crestor) 5 MG tablet, Take 1 tablet by mouth Daily., Disp: 90 tablet, Rfl: 1  •  varenicline (CHANTIX) 1 MG tablet, Take 1 tablet by mouth 2 (Two) Times a Day., Disp: 60 tablet, Rfl: 5  •  mupirocin (BACTROBAN) 2 % ointment, Apply topically to each nare 2 times per day, Disp: 22 g, Rfl: 0    Review of Systems   Constitutional: Positive for weight gain. Negative for chills and fever.   Cardiovascular: Negative for chest pain and paroxysmal nocturnal dyspnea.   Respiratory: Negative for cough and shortness of breath.    Skin: Negative for rash.   Gastrointestinal: Negative for abdominal pain and heartburn.   Neurological: Negative for dizziness and numbness.       Objective   /66 (BP Location: Left arm, Patient Position: Sitting, Cuff Size: Large Adult)   Pulse 83   Ht 167.6 cm (65.98\")   Wt 121 kg (267 lb)   SpO2 96%   BMI 43.12 kg/m²   Constitutional:       Appearance: Well-developed. Morbidly obese.   HENT:      Head: Normocephalic and atraumatic.   Pulmonary:      Effort: Pulmonary effort is normal.      Breath sounds: Normal breath sounds.   Cardiovascular:      Normal rate. Regular rhythm.      Murmurs: There is no murmur.      No gallop. No click.   Skin:     General: Skin is warm and dry.   Neurological:      Mental Status: Alert and oriented to person, place, and time.       Procedures      ICD-10-CM ICD-9-CM   1. Paroxysmal atrial fibrillation (HCC)  I48.0 427.31   2. Chronic diastolic CHF (congestive heart failure) (HCC)  I50.32 428.32     428.0   3. Chronic anticoagulation  Z79.01 V58.61   4. Tobacco abuse  Z72.0 305.1   5. Type 2 diabetes mellitus with hyperglycemia, without long-term current use of insulin (HCC)  E11.65 250.00     790.29   6. ANTHONY (obstructive sleep apnea)  G47.33 327.23         Assessment/Plan:  1.  Persistent atrial fibrillation: " Cardioversion on 6/14/2022 and 8/17/2022.  Follows with Dr Barriga of EP.  Maintaining sinus rhythm on antiarrhythmic therapy.  Continue metoprolol, flecainide, diltiazem, and Eliquis.     2.  Chronic diastolic CHF: EF 61-65% with diastolic dysfunction on echo from 5/10/2022.  She remains euvolemic and stable. Continue furosemide.     3.  Chronic anticoagulation: With Eliquis.     4.  Diabetes mellitus type 2: Worsening control with hemoglobin A1c of 8.5% on 8/17/2022.     5.  Tobacco abuse: Claudette Keeling  reports that she has been smoking cigarettes. She has a 12.50 pack-year smoking history. She has never used smokeless tobacco.. I have educated her on the risk of diseases from using tobacco products such as cancer, COPD and heart disease.  I advised her to quit and she is willing to quit. We have discussed the following method/s for tobacco cessation:  Prescription Medicaiton.  Together we have set a quit date for the near future.  She will follow up with me in 4 months or sooner to check on her progress.  I spent 5 minutes counseling the patient.    6.  Obstructive sleep apnea: Patient currently is set up with a new CPAP mask.

## 2022-11-01 NOTE — PROGRESS NOTES
OP HEMATOLOGY/ONCOLOGY CONSULTATION      Pt Name: Hardik Perez  YOB: 1960  MRN: [id number]  Referring provider: Johana Adams PA-C   PCP: Glenda Miller MD      Date of evaluation: 12/21/2022   History Obtained From:  patient, electronic medical record    CHIEF COMPLAINT:    Chief Complaint   Patient presents with    New Patient     Thrombocytopenia     HISTORY OF PRESENT ILLNESS:    Hardik Perez is a 59 y.o.  female referred by Dr. Loy Martinez for evaluation of thrombocytopenia. She denies any bleeding issues. She does bruise however she takes Eliquis 5 twice daily for PAF. She denies any family history of blood dyscrasias. She denies any history of liver dysfunction. She denies taking any antiplatelet therapy. She does have significant arthritis especially involving her knees, hands, reports she takes Tylenol only    She does smoke at least a half a pack per day and is on Chantix. She takes rosuvastatin 5 mg each night for her cholesterol. She is diabetic, taking Ozempic, Glucophage. She takes diltiazem 120 daily Tambocor 50 every 12 hours lisinopril 10 daily, Lopressor 50 twice daily for her blood pressure, PAF. Past Medical History:   Diagnosis Date    Arthritis     Diabetes mellitus ([address]     Hypertension     Osteomyelitis of toe of right foot ([address] 4/11/2018       Past Surgical History:   Procedure Laterality Date    APPENDECTOMY      HYSTERECTOMY (CERVIX STATUS UNKNOWN)      JOINT REPLACEMENT      AR ADJ TISS XFER HEAD,FAC,HAND <10SQCM Right 4/12/2018    AMPUTATION AT [facility] METATARSAL JOINT OF THE RIGHT THIRD TOE performed by Nitza Busby MD at [facility] Left 6/30/2017    KNEE TOTAL ARTHROPLASTY performed by Gary Sanches DO at [address] [place] [address]  04/12/2018    [facility]JR. Amputation right 3rd toe at MP joint         Current Outpatient Medications:     ELIQUIS 5 MG TABS tablet, 5 mg every 12 hours, Disp: , Rfl: Cholecalciferol (VITAMIN D3) 125 MCG (5000 UT) CAPS, 125 mcg daily, Disp: , Rfl:     metoprolol tartrate (LOPRESSOR) 50 MG tablet, Take 50 mg by mouth 2 times daily, Disp: , Rfl:     flecainide (TAMBOCOR) 50 MG tablet, Take 50 mg by mouth every 12 hours, Disp: , Rfl:     OZEMPIC, 0.25 OR 0.5 MG/DOSE, 2 MG/1.5ML SOPN, 0.5 mg once a week, Disp: , Rfl:     dilTIAZem (CARDIZEM CD) 120 MG extended release capsule, 120 mg daily, Disp: , Rfl:     empagliflozin (JARDIANCE) 25 MG tablet, 25 mg daily, Disp: , Rfl:     rosuvastatin (CRESTOR) 5 MG tablet, 5 mg at bedtime, Disp: , Rfl:     Insulin Pen Needle 29G X 10MM MISC, Inject into the skin, Disp: , Rfl:     metFORMIN (GLUCOPHAGE-XR) 500 MG extended release tablet, Take 1 tablet by mouth daily (with breakfast) (Patient taking differently: Take 500 mg by mouth daily Indications: DIABETES 2 tab in am), Disp: 30 tablet, Rfl: 3    glycopyrrolate-formoterol (BEVESPI) 9-4.8 MCG/ACT AERO, Inhale 1 actuation into the lungs 2 times daily, Disp: , Rfl:     gabapentin (NEURONTIN) 100 MG capsule, Take 300 mg by mouth 3 times daily. , Disp: , Rfl:     lisinopril (PRINIVIL;ZESTRIL) 10 MG tablet, Take by mouth daily , Disp: , Rfl:     varenicline (CHANTIX) 1 MG tablet, Take 1 mg by mouth daily Indications: SMOKING CESSATION, Disp: , Rfl:      Allergies   Allergen Reactions    Codeine Nausea And Vomiting       Social History     Tobacco Use    Smoking status: Every Day     Packs/day: 0.25     Years: 42.00     Pack years: 10.50     Types: Cigarettes    Smokeless tobacco: Never    Tobacco comments:     2 cigarettes daily   Vaping Use    Vaping Use: Never used   Substance Use Topics    Alcohol use: No    Drug use: No       Family History   Problem Relation Age of Onset    Heart Disease Mother         CAD WITH STENTS    High Blood Pressure Mother     Heart Attack Mother     Diabetes Mother     Arthritis Mother     High Blood Pressure Father        Subjective   REVIEW OF SYSTEMS:   Review of Systems   Constitutional:  Positive for fatigue. Negative for chills, diaphoresis, fever and unexpected weight change. HENT:  Negative for mouth sores, nosebleeds, sore throat, trouble swallowing and voice change. Eyes:  Negative for photophobia, discharge and itching. Respiratory:  Negative for cough, shortness of breath and wheezing. Cardiovascular:  Negative for chest pain, palpitations and leg swelling. Gastrointestinal:  Negative for abdominal distention, abdominal pain, blood in stool, constipation, diarrhea, nausea and vomiting. Endocrine: Positive for cold intolerance. Negative for heat intolerance, polydipsia and polyuria. Genitourinary:  Negative for difficulty urinating, dysuria, hematuria and urgency. Musculoskeletal:  Positive for arthralgias, back pain and myalgias. Negative for joint swelling. Skin:  Negative for color change and rash. Neurological:  Negative for dizziness, tremors, seizures, syncope and light-headedness. Hematological:  Negative for adenopathy. Does not bruise/bleed easily. Psychiatric/Behavioral:  Negative for behavioral problems and suicidal ideas. The patient is not nervous/anxious. All other systems reviewed and are negative. Objective   /84   Pulse 77   Ht 5' 5\" (1.651 m)   Wt 268 lb (121.6 kg)   SpO2 95%   BMI 44.60 kg/m²     PHYSICAL EXAM:  Physical Exam  Constitutional:       Appearance: She is well-developed. She is obese. She is ill-appearing (Chronically). HENT:      Head: Normocephalic and atraumatic. Eyes:      General: No scleral icterus. Conjunctiva/sclera: Conjunctivae normal.   Neck:      Trachea: No tracheal deviation. Cardiovascular:      Rate and Rhythm: Normal rate and regular rhythm. Heart sounds: Normal heart sounds. No murmur heard. Pulmonary:      Effort: Pulmonary effort is normal. No respiratory distress. Breath sounds: Normal breath sounds.    Abdominal:      General: Bowel sounds are normal. There is no distension. Palpations: Abdomen is soft. There is no fluid wave, hepatomegaly or splenomegaly. Tenderness: There is no abdominal tenderness. Musculoskeletal:         General: Deformity (Arthritic changes of the hands) present. No tenderness. Cervical back: Normal range of motion and neck supple. Skin:     General: Skin is warm and dry. Findings: No rash. Neurological:      Mental Status: She is alert and oriented to person, place, and time. Coordination: Coordination normal.   Psychiatric:         Behavior: Behavior normal.         Thought Content: Thought content normal.     CBC reviewed by me  Lab Results   Component Value Date    WBC 8.65 12/21/2022    HGB 15.6 12/21/2022    HCT 47.9 (H) 12/21/2022    MCV 97.0 (H) 12/21/2022     (L) 12/21/2022     Lab Results   Component Value Date    NEUTROABS 5.68 12/21/2022       VISIT DIAGNOSES  1. Thrombocytopenia ([address]        ASSESSMENT/PLAN:            She denies any bleeding issues. She does bruise however she takes Eliquis 5 twice daily for PAF. She denies any family history of blood dyscrasias. She denies any history of liver dysfunction. She denies taking any antiplatelet therapy. She does have significant arthritis especially involving her knees, hands, reports she takes Tylenol only    CBC today reveals a WBC of 8.65 with 65.8% PMN, 23.8% lymphocyte, 8.2% monocyte, Hgb 15.6, MCV 97, ,000. MPV is 10.7. I discussed the abnormality on her CBC-platelets mildly low at 131,000. She has mild macrocytosis otherwise her Hgb, WBC is normal.  Specific cause of her mild thrombocytopenia is not known. Possibilities include medication effect, potential bone marrow dysfunction, inflammatory disease. Baseline serology will be requested. Thank you Dr. Vanessa Garcia for referring Mrs. George Riggins for evaluation. HEALTH MAINTENANCE     Colon cancer screening.   She has never had a colonoscopy, she said that she received her Cologuard last week and has turned to them but does not know the results. Risk and screen breast cancer screening. She has never had a mammogram.  I encouraged her to get her screening study. Cervical/GYN cancer screening. She reports prior hysterectomy    Orders Placed This Encounter   Procedures    MONOCLONAL PROTEIN AND FLC, SERUM    Platelet antibodies, direct    CECILLE Screen with Reflex    Vitamin B12    Folate    Copper, Serum    Zinc    Comprehensive Metabolic Panel          Return in about 2 months (around 2/21/2023) for With [facility].       Jm Pryor PA-C  3:45 PM  12/21/2022

## 2022-11-09 ENCOUNTER — TRANSCRIBE ORDERS (OUTPATIENT)
Dept: ADMINISTRATIVE | Facility: HOSPITAL | Age: 62
End: 2022-11-09

## 2022-11-09 DIAGNOSIS — M85.80 OTHER SPECIFIED DISORDERS OF BONE DENSITY AND STRUCTURE, UNSPECIFIED SITE: Primary | ICD-10-CM

## 2022-11-17 ENCOUNTER — OFFICE VISIT (OUTPATIENT)
Dept: CARDIOLOGY | Facility: CLINIC | Age: 62
End: 2022-11-17

## 2022-11-17 VITALS
BODY MASS INDEX: 44.98 KG/M2 | HEIGHT: 65 IN | DIASTOLIC BLOOD PRESSURE: 62 MMHG | SYSTOLIC BLOOD PRESSURE: 110 MMHG | OXYGEN SATURATION: 99 % | HEART RATE: 73 BPM | WEIGHT: 270 LBS | RESPIRATION RATE: 18 BRPM

## 2022-11-17 DIAGNOSIS — I48.19 PERSISTENT ATRIAL FIBRILLATION: Primary | ICD-10-CM

## 2022-11-17 DIAGNOSIS — E66.1 CLASS 3 DRUG-INDUCED OBESITY WITH SERIOUS COMORBIDITY AND BODY MASS INDEX (BMI) OF 40.0 TO 44.9 IN ADULT: ICD-10-CM

## 2022-11-17 PROCEDURE — 99214 OFFICE O/P EST MOD 30 MIN: CPT | Performed by: STUDENT IN AN ORGANIZED HEALTH CARE EDUCATION/TRAINING PROGRAM

## 2022-11-17 PROCEDURE — 93000 ELECTROCARDIOGRAM COMPLETE: CPT | Performed by: STUDENT IN AN ORGANIZED HEALTH CARE EDUCATION/TRAINING PROGRAM

## 2022-11-17 NOTE — PATIENT INSTRUCTIONS
Clinic follow up in 3 months  Call me if you think you go back out of rhythm prior to this  No medication changes for now  Keep working on the weight loss  Referral to the weight loss clinic placed

## 2022-11-17 NOTE — PROGRESS NOTES
"Chief Complaint  Atrial Fibrillation (3 mo fu )    Subjective        History of Present Illness    EP History:  1.  Persistent atrial fibrillation   - 8/17/22:  DCCV, flecainide  Cardiology history:  1.  Diastolic heart failure     Medical History:   1.  Morbid obesity, BMI 43  2.  Type 2 diabetes  3.  COPD  4.  Tobacco use disorder      Claudette Keeling is a 61 y.o. female with problem list as above who presents to the clinic for follow up of persistent atrial fibrillation.  She states that she has been doing well with her smoking but has not made progress with her eating.  She initially had some weight loss though gained it  all back subsequently.  She has not been counting calories.  She has been eating out some.  She has had no known recurrence of atrial fibrillation.  She is tolerating oral anticoagulation well.    Objective   Vital Signs:  /62 (BP Location: Right arm, Patient Position: Sitting)   Pulse 73   Resp 18   Ht 165.1 cm (65\")   Wt 122 kg (270 lb)   SpO2 99%   BMI 44.93 kg/m²   Estimated body mass index is 44.93 kg/m² as calculated from the following:    Height as of this encounter: 165.1 cm (65\").    Weight as of this encounter: 122 kg (270 lb).      Physical Exam  Vitals reviewed.   Constitutional:       Appearance: She is obese.   Cardiovascular:      Rate and Rhythm: Normal rate and regular rhythm.      Pulses: Normal pulses.      Heart sounds: Normal heart sounds. No murmur heard.  Pulmonary:      Effort: Pulmonary effort is normal. No respiratory distress.      Breath sounds: Normal breath sounds.      Comments: Bibasilar crackles, diminished breath sounds bilateral  Skin:     General: Skin is warm and dry.   Neurological:      General: No focal deficit present.      Mental Status: She is alert and oriented to person, place, and time.   Psychiatric:         Mood and Affect: Mood normal.         Judgment: Judgment normal.        Result Review :  The following data was reviewed by: " Antonino Barriga MD on 11/17/2022:      ECG 12 Lead    Date/Time: 11/17/2022 10:45 AM  Performed by: Antonino Barriga MD  Authorized by: Antonino Barriga MD   Comparison: compared with previous ECG from 8/24/2022  Comparison to previous ECG: PACs are now present  Rhythm: sinus rhythm  Ectopy: atrial premature contractions  Rate: normal  Conduction: conduction normal  QRS axis: normal  Other findings: poor R wave progression    Clinical impression: non-specific ECG                Assessment and Plan   Diagnoses and all orders for this visit:    1. Persistent atrial fibrillation (HCC) (Primary)  -     Ambulatory Referral to Bariatric Surgery    2. Class 3 drug-induced obesity with serious comorbidity and body mass index (BMI) of 40.0 to 44.9 in adult (HCC)    Other orders  -     ECG 12 Lead        Claudette Keeling is a 61 y.o. female with problem list as above who presents to the clinic for follow up of persistent atrial fibrillation and morbid obesity.  She is doing well from a rhythm standpoint on flecainide and maintaining sinus rhythm.  Unfortunately, her risk of recurrence given her morbid obesity is significantly higher.  I discussed with her at length the need for further weight loss and at this point think that referral for ambulatory evaluation for gastric bypass surgery is appropriate.  We discussed calorie counting and weight loss at length today.    Plan:  -Continue to encourage weight loss  -Ambulatory referral for weight loss surgery  -Continue flecainide 50 mg twice daily  -Continue diltiazem  -Continue oral anticoagulation with apixaban  -Follow-up in clinic in 3 months for flecainide monitoring         Follow Up   Return in about 3 months (around 2/17/2023).  Patient was given instructions and counseling regarding her condition or for health maintenance advice. Please see specific information pulled into the AVS if appropriate.     Part of this note may be an electronic transcription/translation of spoken  language to printed text using the Dragon Dictation System.

## 2022-11-21 NOTE — TELEPHONE ENCOUNTER
Caller: Bickerstaff,Rachael    Relationship: Emergency Contact    Best call back number: [phone]    Requested Prescriptions:   Requested Prescriptions     Pending Prescriptions Disp Refills   • flecainide (TAMBOCOR) 50 MG tablet 180 tablet 0     Sig: Take 1 tablet by mouth Every 12 (Twelve) Hours.        Pharmacy where request should be sent: [facility]     Additional details provided by patient: PT HAS BEEN OUT FOR 2 DAYS    Does the patient have less than a 3 day supply:  [x] Yes  [] No    Anuel Burch Rep   11/21/22 15:15 CST

## 2022-11-22 RX ORDER — FLECAINIDE ACETATE 50 MG/1
50 TABLET ORAL EVERY 12 HOURS SCHEDULED
Qty: 180 TABLET | Refills: 3 | Status: SHIPPED | OUTPATIENT
Start: 2022-11-22

## 2022-11-30 ENCOUNTER — HOSPITAL ENCOUNTER (OUTPATIENT)
Dept: BONE DENSITY | Facility: HOSPITAL | Age: 62
Discharge: HOME | End: 2022-11-30
Admitting: NURSE PRACTITIONER

## 2022-11-30 DIAGNOSIS — M85.80 OTHER SPECIFIED DISORDERS OF BONE DENSITY AND STRUCTURE, UNSPECIFIED SITE: ICD-10-CM

## 2022-11-30 PROCEDURE — 77080 DXA BONE DENSITY AXIAL: CPT

## 2022-12-21 ENCOUNTER — HOSPITAL ENCOUNTER (OUTPATIENT)
Dept: INFUSION THERAPY | Age: 62
Discharge: HOME | End: 2022-12-21
Payer: COMMERCIAL

## 2022-12-21 ENCOUNTER — OFFICE VISIT (OUTPATIENT)
Dept: HEMATOLOGY | Age: 62
End: 2022-12-21
Payer: COMMERCIAL

## 2022-12-21 VITALS
HEIGHT: 65 IN | SYSTOLIC BLOOD PRESSURE: 130 MMHG | OXYGEN SATURATION: 95 % | BODY MASS INDEX: 44.65 KG/M2 | WEIGHT: 268 LBS | DIASTOLIC BLOOD PRESSURE: 84 MMHG | HEART RATE: 77 BPM

## 2022-12-21 DIAGNOSIS — D69.6 THROMBOCYTOPENIA (HCC): ICD-10-CM

## 2022-12-21 DIAGNOSIS — D69.6 THROMBOCYTOPENIA (HCC): Primary | ICD-10-CM

## 2022-12-21 LAB
ALBUMIN SERPL-MCNC: 4.5 G/DL (ref 3.5–5.2)
ALP BLD-CCNC: 66 U/L (ref 35–104)
ALT SERPL-CCNC: 22 U/L (ref 5–33)
ANION GAP SERPL CALCULATED.3IONS-SCNC: 13 MMOL/L (ref 7–19)
AST SERPL-CCNC: 20 U/L (ref 5–32)
BASOPHILS ABSOLUTE: 0.03 K/UL (ref 0.01–0.08)
BASOPHILS RELATIVE PERCENT: 0.3 % (ref 0.1–1.2)
BILIRUB SERPL-MCNC: 0.4 MG/DL (ref 0.2–1.2)
BUN BLDV-MCNC: 17 MG/DL (ref 8–23)
CALCIUM SERPL-MCNC: 9.8 MG/DL (ref 8.8–10.2)
CHLORIDE BLD-SCNC: 99 MMOL/L (ref 98–111)
CO2: 27 MMOL/L (ref 22–29)
CREAT SERPL-MCNC: 0.6 MG/DL (ref 0.5–0.9)
EOSINOPHILS ABSOLUTE: 0.16 K/UL (ref 0.04–0.54)
EOSINOPHILS RELATIVE PERCENT: 1.8 % (ref 0.7–7)
FOLATE: 13 NG/ML (ref 4.8–37.3)
GFR SERPL CREATININE-BSD FRML MDRD: >60 ML/MIN/{1.73_M2}
GLUCOSE BLD-MCNC: 150 MG/DL (ref 74–109)
HCT VFR BLD CALC: 47.9 % (ref 34.1–44.9)
HEMOGLOBIN: 15.6 G/DL (ref 11.2–15.7)
LYMPHOCYTES ABSOLUTE: 2.06 K/UL (ref 1.18–3.74)
LYMPHOCYTES RELATIVE PERCENT: 23.8 % (ref 19.3–53.1)
MCH RBC QN AUTO: 31.6 PG (ref 25.6–32.2)
MCHC RBC AUTO-ENTMCNC: 32.6 G/DL (ref 32.3–35.5)
MCV RBC AUTO: 97 FL (ref 79.4–94.8)
MONOCYTES ABSOLUTE: 0.71 K/UL (ref 0.24–0.82)
MONOCYTES RELATIVE PERCENT: 8.2 % (ref 4.7–12.5)
NEUTROPHILS ABSOLUTE: 5.68 K/UL (ref 1.56–6.13)
NEUTROPHILS RELATIVE PERCENT: 65.8 % (ref 34–71.1)
PDW BLD-RTO: 12.5 % (ref 11.7–14.4)
PLATELET # BLD: 131 K/UL (ref 182–369)
PMV BLD AUTO: 10.7 FL (ref 7.4–10.4)
POTASSIUM SERPL-SCNC: 4.6 MMOL/L (ref 3.5–5)
RBC # BLD: 4.94 M/UL (ref 3.93–5.22)
SODIUM BLD-SCNC: 139 MMOL/L (ref 136–145)
TOTAL PROTEIN: 6.6 G/DL (ref 6.6–8.7)
VITAMIN B-12: 579 PG/ML (ref 211–946)
WBC # BLD: 8.65 K/UL (ref 3.98–10.04)

## 2022-12-21 PROCEDURE — 85025 COMPLETE CBC W/AUTO DIFF WBC: CPT

## 2022-12-21 PROCEDURE — 36415 COLL VENOUS BLD VENIPUNCTURE: CPT

## 2022-12-21 PROCEDURE — 3078F DIAST BP <80 MM HG: CPT | Performed by: PHYSICIAN ASSISTANT

## 2022-12-21 PROCEDURE — 3074F SYST BP LT 130 MM HG: CPT | Performed by: PHYSICIAN ASSISTANT

## 2022-12-21 PROCEDURE — 99203 OFFICE O/P NEW LOW 30 MIN: CPT | Performed by: PHYSICIAN ASSISTANT

## 2022-12-21 PROCEDURE — G8484 FLU IMMUNIZE NO ADMIN: HCPCS | Performed by: PHYSICIAN ASSISTANT

## 2022-12-21 PROCEDURE — 3017F COLORECTAL CA SCREEN DOC REV: CPT | Performed by: PHYSICIAN ASSISTANT

## 2022-12-21 PROCEDURE — G8428 CUR MEDS NOT DOCUMENT: HCPCS | Performed by: PHYSICIAN ASSISTANT

## 2022-12-21 PROCEDURE — G8417 CALC BMI ABV UP PARAM F/U: HCPCS | Performed by: PHYSICIAN ASSISTANT

## 2022-12-21 PROCEDURE — 99212 OFFICE O/P EST SF 10 MIN: CPT

## 2022-12-21 PROCEDURE — 4004F PT TOBACCO SCREEN RCVD TLK: CPT | Performed by: PHYSICIAN ASSISTANT

## 2022-12-21 RX ORDER — DILTIAZEM HYDROCHLORIDE 120 MG/1
120 CAPSULE, COATED, EXTENDED RELEASE ORAL DAILY
COMMUNITY
Start: 2022-12-06

## 2022-12-21 RX ORDER — FLECAINIDE ACETATE 50 MG/1
50 TABLET ORAL EVERY 12 HOURS SCHEDULED
COMMUNITY
Start: 2022-11-22

## 2022-12-21 RX ORDER — SEMAGLUTIDE 1.34 MG/ML
0.5 INJECTION, SOLUTION SUBCUTANEOUS WEEKLY
COMMUNITY
Start: 2022-11-16

## 2022-12-21 RX ORDER — ROSUVASTATIN CALCIUM 5 MG/1
5 TABLET, COATED ORAL NIGHTLY
COMMUNITY
Start: 2022-05-11

## 2022-12-21 RX ORDER — METOPROLOL TARTRATE 50 MG/1
50 TABLET, FILM COATED ORAL 2 TIMES DAILY
COMMUNITY

## 2022-12-21 RX ORDER — APIXABAN 5 MG/1
5 TABLET, FILM COATED ORAL EVERY 12 HOURS
COMMUNITY
Start: 2022-11-29

## 2022-12-21 ASSESSMENT — ENCOUNTER SYMPTOMS
PHOTOPHOBIA: 0
COUGH: 0
EYE DISCHARGE: 0
WHEEZING: 0
VOICE CHANGE: 0
ABDOMINAL DISTENTION: 0
SORE THROAT: 0
BLOOD IN STOOL: 0
EYE ITCHING: 0
CONSTIPATION: 0
COLOR CHANGE: 0
ABDOMINAL PAIN: 0
TROUBLE SWALLOWING: 0
VOMITING: 0
BACK PAIN: 1
SHORTNESS OF BREATH: 0
NAUSEA: 0
DIARRHEA: 0

## 2022-12-22 ENCOUNTER — PATIENT ROUNDING (BHMG ONLY) (OUTPATIENT)
Dept: BARIATRICS/WEIGHT MGMT | Facility: CLINIC | Age: 62
End: 2022-12-22

## 2022-12-22 ENCOUNTER — OFFICE VISIT (OUTPATIENT)
Dept: BARIATRICS/WEIGHT MGMT | Facility: CLINIC | Age: 62
End: 2022-12-22

## 2022-12-22 ENCOUNTER — TELEPHONE (OUTPATIENT)
Dept: HEMATOLOGY | Age: 62
End: 2022-12-22

## 2022-12-22 VITALS
OXYGEN SATURATION: 95 % | HEART RATE: 82 BPM | BODY MASS INDEX: 45.22 KG/M2 | DIASTOLIC BLOOD PRESSURE: 86 MMHG | SYSTOLIC BLOOD PRESSURE: 126 MMHG | HEIGHT: 65 IN | TEMPERATURE: 98 F | WEIGHT: 271.4 LBS

## 2022-12-22 DIAGNOSIS — J43.8 OTHER EMPHYSEMA: ICD-10-CM

## 2022-12-22 DIAGNOSIS — E11.65 TYPE 2 DIABETES MELLITUS WITH HYPERGLYCEMIA, WITHOUT LONG-TERM CURRENT USE OF INSULIN: ICD-10-CM

## 2022-12-22 DIAGNOSIS — I10 HYPERTENSION, UNSPECIFIED TYPE: ICD-10-CM

## 2022-12-22 DIAGNOSIS — G47.33 OSA (OBSTRUCTIVE SLEEP APNEA): ICD-10-CM

## 2022-12-22 DIAGNOSIS — Z72.0 TOBACCO ABUSE: ICD-10-CM

## 2022-12-22 DIAGNOSIS — E66.1 CLASS 3 DRUG-INDUCED OBESITY WITH SERIOUS COMORBIDITY AND BODY MASS INDEX (BMI) OF 45.0 TO 49.9 IN ADULT: Primary | ICD-10-CM

## 2022-12-22 PROCEDURE — 99214 OFFICE O/P EST MOD 30 MIN: CPT | Performed by: NURSE PRACTITIONER

## 2022-12-22 NOTE — TELEPHONE ENCOUNTER
CARRIE Uribe was unable to complete the [employer] distress screening as the patient was at work. SW was able to assess patient needs via phone with the patient. Patient denies needs or concerns at this time. SW provided support and education regarding SW role and encouraged the patient to call with needs or concerns. Patient voiced acceptance and appreciation.

## 2022-12-22 NOTE — PROGRESS NOTES
Patient Care Team:  Reymundo Torrez MD as PCP - General (Family Medicine)      Subjective     Patient is a 62 y.o. female presents with morbid obesity and her Body mass index is 45.87 kg/m².     She is here for discussion of surgical weight loss options.  She stated she has been with the disease of obesity for year(s).  She stated she suffers from HTN,  and morbid obesity due to her weight gain.  She stated that weight loss helps alleviate these symptoms.   She stated that she has tried other diet regimens including KETO to help with weight loss.  She stated that she has attempted these conservative methods for weight loss without maintaining long term success.  Today she would like to discuss medical and  surgical weight loss options such as the Laparoscopic Sleeve Gastrectomy or the Laparoscopic R - Y Gastric Bypass.   She states she was referred by her cardiologist. She states she was unable to have an ablation recently due to smoking and having obesity.   She is unsure at this time if she wants to proceed with bariatric surgery.    Review of Systems   Constitutional: Positive for fatigue.   HENT: Positive for rhinorrhea.    Respiratory: Positive for shortness of breath.    Cardiovascular: Negative.    Gastrointestinal: Negative.    Endocrine: Negative.    Musculoskeletal: Positive for arthralgias and back pain.   Psychiatric/Behavioral: Positive for sleep disturbance.        History  Past Medical History:   Diagnosis Date   • Arthritis    • Asthma 5/1922   • Atrial fibrillation (HCC)    • COPD (chronic obstructive pulmonary disease) (HCC) 5/1922   • Diabetes mellitus (HCC)    • Hypertension    • Neuropathy    • Sleep apnea    • Stroke ([facility]) 5/1922      Past Surgical History:   Procedure Laterality Date   • APPENDECTOMY     • HYSTERECTOMY     • JOINT REPLACEMENT Left     knee   • TOE OSTEOPHYTE REMOVAL        Social History     Socioeconomic History   • Marital status:    Tobacco Use   • Smoking status:  Every Day     Packs/day: 1.00     Years: 25.00     Pack years: 25.00     Types: Cigarettes   • Smokeless tobacco: Never   • Tobacco comments:     Has started back and restarted Chantix   Vaping Use   • Vaping Use: Never used   Substance and Sexual Activity   • Alcohol use: Never   • Drug use: Never   • Sexual activity: Not Currently     Partners: Male     Birth control/protection: Hysterectomy      Family History   Problem Relation Age of Onset   • Heart failure Mother    • Hypertension Mother    • Diabetes Mother    • Hypertension Father    • Diabetes Father    • No Known Problems Sister    • Diabetes Brother    • Hypertension Brother    • Heart disease Brother         Mother   • Heart attack Brother       Allergies   Allergen Reactions   • Codeine GI Intolerance          Current Outpatient Medications:   •  apixaban (ELIQUIS) 5 MG tablet tablet, Take 1 tablet by mouth Every 12 (Twelve) Hours. Indications: Atrial Fibrillation, Disp: 180 tablet, Rfl: 1  •  Budeson-Glycopyrrol-Formoterol (Breztri Aerosphere) 160-9-4.8 MCG/ACT aerosol inhaler, Inhale 2 puffs 2 (Two) Times a Day., Disp: , Rfl:   •  dilTIAZem CD (CARDIZEM CD) 120 MG 24 hr capsule, Take 1 capsule by mouth Daily., Disp: 30 capsule, Rfl: 0  •  empagliflozin (JARDIANCE) 25 MG tablet tablet, Take 25 mg by mouth Daily., Disp: , Rfl:   •  famotidine (Pepcid) 20 MG tablet, Take 1 tablet by mouth 2 (Two) Times a Day As Needed for Heartburn or Indigestion., Disp: 180 tablet, Rfl: 0  •  flecainide (TAMBOCOR) 50 MG tablet, Take 1 tablet by mouth Every 12 (Twelve) Hours., Disp: 180 tablet, Rfl: 3  •  furosemide (LASIX) 40 MG tablet, Take 40 mg by mouth Daily As Needed., Disp: , Rfl:   •  gabapentin (NEURONTIN) 300 MG capsule, Take 300 mg by mouth 3 (Three) Times a Day., Disp: , Rfl:   •  lisinopril (PRINIVIL,ZESTRIL) 10 MG tablet, Take 1 tablet by mouth Daily., Disp: 90 tablet, Rfl: 0  •  metFORMIN ER (GLUCOPHAGE-XR) 500 MG 24 hr tablet, Take 2 tablets by mouth  Daily With Breakfast., Disp: 180 tablet, Rfl: 1  •  metoprolol tartrate (LOPRESSOR) 50 MG tablet, Take 50 mg by mouth 2 (Two) Times a Day., Disp: , Rfl:   •  multivitamin with minerals tablet tablet, Take 1 tablet by mouth Daily., Disp: , Rfl:   •  mupirocin (BACTROBAN) 2 % ointment, Apply topically to each nare 2 times per day, Disp: 22 g, Rfl: 0  •  nystatin (MYCOSTATIN) 017007 UNIT/GM powder, Apply 1 application topically to the appropriate area as directed 2 (Two) Times a Day As Needed., Disp: , Rfl:   •  rosuvastatin (Crestor) 5 MG tablet, Take 1 tablet by mouth Daily., Disp: 90 tablet, Rfl: 1  •  varenicline (CHANTIX) 1 MG tablet, Take 1 tablet by mouth 2 (Two) Times a Day., Disp: 60 tablet, Rfl: 5    Objective     Vital Signs  Temp:  [98 °F (36.7 °C)] 98 °F (36.7 °C)  Heart Rate:  [82] 82  BP: (126)/(86) 126/86  Body mass index is 45.87 kg/m².      12/22/22  0854   Weight: 123 kg (271 lb 6.4 oz)       Physical Exam  Vitals reviewed.   Constitutional:       Appearance: She is obese.   Cardiovascular:      Rate and Rhythm: Normal rate and regular rhythm.   Pulmonary:      Effort: Pulmonary effort is normal.   Abdominal:      General: Bowel sounds are normal.      Palpations: Abdomen is soft.   Musculoskeletal:         General: Normal range of motion.   Skin:     General: Skin is warm and dry.   Neurological:      Mental Status: She is alert and oriented to person, place, and time.   Psychiatric:         Mood and Affect: Mood normal.         Behavior: Behavior normal.            Results Review:   I reviewed the patient's new clinical results.      Class 3 Severe Obesity (BMI >=40). Obesity-related health conditions include the following: obstructive sleep apnea, hypertension and diabetes mellitus. Obesity is improving with treatment. BMI is is above average; BMI management plan is completed. We discussed portion control and increasing exercise.      Assessment & Plan   Diagnoses and all orders for this  visit:    1. Class 3 drug-induced obesity with serious comorbidity and body mass index (BMI) of 45.0 to 49.9 in adult (HCC) (Primary)  Comments:  Dietitan consultation obtained   Assessment & Plan:  Patient's (Body mass index is 45.87 kg/m².) indicates that they are morbidly obese (BMI > 40 or > 35 with obesity - related health condition) with health conditions that include obstructive sleep apnea, hypertension and dyslipidemias . Weight is improving with treatment. BMI is is above average; BMI management plan is completed. We discussed portion control and increasing exercise.       2. ANTHONY (obstructive sleep apnea)  Comments:  Continue CPAP     3. Tobacco abuse  Comments:  Smoking cessation counseling provided     4. Type 2 diabetes mellitus with hyperglycemia, without long-term current use of insulin ([facility])  Comments:  Monitor glucose levels and  f/u with PCP as needed for medication adjustments     5. Hypertension, unspecified type  Comments:  Continue current regimen; nutritional counseling provided. Cardiac risk assessment needed if she wishes to proceed with surgery     6. Other emphysema ([facility])  Comments:  Continue inhalers PRN, discussed PFT and surgical risk assessment will be needed from pulmonology if she proceeds with surgery         I discussed the patient's findings and my recommendations with patient.   She has been provided a structured dietary regimen based off of her behavior.  I discussed with the patient the etiology of the disease of obesity and the potential comorbid conditions associated with this disease.  She was instructed to follow the dietary regimen and follow-up with our program in 1 month's time with any additional questions as they may arise during this time.  We emphasized on focusing on proteins and meals high in fiber as well as adequate hydration that exceed 64 ounces of water daily.    I explained that I anticipate the patient to lose weight prior to her next monthly visit.  I  encouraged patient to have a reward day once a month.  1. Today the patient  received the 4 meals/day diet prescription, which was explained to patient.  Patient will see the dietitian today to further discuss goals for diet, exercise, and lifestyle. Patient has received intensive behavioral therapy for obesity today. I explained the pathophysiology of the disease and its storage component. We also discussed Dr. Marsh' pearls of the program. Nutrition counseling ordered today.     2. Current comorbid condition of HTN, ANTHONY, diabetes associated with her morbid obesity is reported to be stable on her current treatment regimen and medications. We anticipate the comorbid condition to improve as we address her morbid obesity.       I have also recommended that she obtain a cardiac risk asseeemnet prior to surgery consideration if she choses.She is unsure about surgery. She works in a [employer] and does not feel she can eat much at work. She works 7 am -12 pm daily so she often starts eating when she gets off work unless she has things to do she waits until later to eat. She will also meet with dietitian today.     Caprice Morton, FLORENCIA     12/22/22  09:33 CST

## 2022-12-22 NOTE — PROGRESS NOTES
December 22, 2022    Hello, may I speak with Claudette Keeling?    My name is Bekah    I am  with [facility]  [address], [address]  [place] [address] [phone].    Before we get started may I verify your date of birth? 1960    I am calling to officially welcome you to our practice and ask about your recent visit. Is this a good time to talk? yes    Tell me about your visit with us. What things went well?  went good       We're always looking for ways to make our patients' experiences even better. Do you have recommendations on ways we may improve?  no    Overall were you satisfied with your first visit to our practice? yes       I appreciate you taking the time to speak with me today. Is there anything else I can do for you? Yes      Thank you, and have a great day.

## 2022-12-22 NOTE — ASSESSMENT & PLAN NOTE
Patient's (Body mass index is 45.87 kg/m².) indicates that they are morbidly obese (BMI > 40 or > 35 with obesity - related health condition) with health conditions that include obstructive sleep apnea, hypertension and dyslipidemias . Weight is improving with treatment. BMI is is above average; BMI management plan is completed. We discussed portion control and increasing exercise.

## 2022-12-22 NOTE — PROGRESS NOTES
"Metabolic and Bariatric Surgery Adult Nutrition Assessment    Patient Name: Claudette Keeling   YOB: 1960   MRN: [id number]     Assessment Date:  12/22/2022     Reason for Visit: Initial Nutrition Assessment     Treatment Pathway: Preoperative Bariatric Surgery, Visit 1 vs Medical Weight Loss, Pt is undecided about surgery at this time.     Assessment    Anthropometrics   Wt Readings from Last 1 Encounters:   12/22/22 123 kg (271 lb 6.4 oz)     Ht Readings from Last 1 Encounters:   12/22/22 163.8 cm (64.5\")     BMI Readings from Last 1 Encounters:   12/22/22 45.87 kg/m²        Initial Weight/Date: 271. 4 lbs (12/22/22)  Weight Changes since last visit: n/a  Net Weight Change: n/a    Past Medical History:   Diagnosis Date   • Arthritis    • Asthma 5/1922   • Atrial fibrillation (HCC)    • COPD (chronic obstructive pulmonary disease) ([facility]) 5/1922   • Diabetes mellitus ([facility])    • Hypertension    • Neuropathy    • Sleep apnea    • Stroke ([facility]) 5/1922      Past Surgical History:   Procedure Laterality Date   • APPENDECTOMY     • HYSTERECTOMY     • JOINT REPLACEMENT Left     knee   • TOE OSTEOPHYTE REMOVAL        Current Outpatient Medications   Medication Sig Dispense Refill   • apixaban (ELIQUIS) 5 MG tablet tablet Take 1 tablet by mouth Every 12 (Twelve) Hours. Indications: Atrial Fibrillation 180 tablet 1   • Budeson-Glycopyrrol-Formoterol (Breztri Aerosphere) 160-9-4.8 MCG/ACT aerosol inhaler Inhale 2 puffs 2 (Two) Times a Day.     • dilTIAZem CD (CARDIZEM CD) 120 MG 24 hr capsule Take 1 capsule by mouth Daily. 30 capsule 0   • empagliflozin (JARDIANCE) 25 MG tablet tablet Take 25 mg by mouth Daily.     • famotidine (Pepcid) 20 MG tablet Take 1 tablet by mouth 2 (Two) Times a Day As Needed for Heartburn or Indigestion. 180 tablet 0   • flecainide (TAMBOCOR) 50 MG tablet Take 1 tablet by mouth Every 12 (Twelve) Hours. 180 tablet 3   • furosemide (LASIX) 40 MG tablet Take 40 mg by mouth Daily As " Needed.     • gabapentin (NEURONTIN) 300 MG capsule Take 300 mg by mouth 3 (Three) Times a Day.     • lisinopril (PRINIVIL,ZESTRIL) 10 MG tablet Take 1 tablet by mouth Daily. 90 tablet 0   • metFORMIN ER (GLUCOPHAGE-XR) 500 MG 24 hr tablet Take 2 tablets by mouth Daily With Breakfast. 180 tablet 1   • metoprolol tartrate (LOPRESSOR) 50 MG tablet Take 50 mg by mouth 2 (Two) Times a Day.     • multivitamin with minerals tablet tablet Take 1 tablet by mouth Daily.     • mupirocin (BACTROBAN) 2 % ointment Apply topically to each nare 2 times per day 22 g 0   • nystatin (MYCOSTATIN) 449689 UNIT/GM powder Apply 1 application topically to the appropriate area as directed 2 (Two) Times a Day As Needed.     • rosuvastatin (Crestor) 5 MG tablet Take 1 tablet by mouth Daily. 90 tablet 1   • varenicline (CHANTIX) 1 MG tablet Take 1 tablet by mouth 2 (Two) Times a Day. 60 tablet 5     No current facility-administered medications for this visit.      Allergies   Allergen Reactions   • Codeine GI Intolerance          Motivation for weight loss includes: Improve overall health.     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 Doesn't eat at work; doesn't eat till after work ~2pm in the afternoon. Lives with daughters.   (M1) chicken salad sandwich  (M2) evening meal, usually cooked by daughters   Snacking -vane covered cherries right before bed.   Drinking sugary/carbonated beverages- Regular soda (32-64 oz ~4days/wk)  Fluid Intake- water (32 oz cups several throughout the day)      Nutrition Intervention  Nutrition education and nutrition coaching for behavior change provided.  Strategies used included Comprehensive education, Motivational Interviewing , Problem Solving, Skill Development for meal planning and Provided sample menus  Review of medical weight loss prescription 4 meal/day plan and reviewed nutritional needs for Preoperative  Bariatric Surgery, Visit 1.  Self-monitoring strategies such as keeping a food journal (on paper or electronically) and calculating fluid/protein intake were discussed.    Recommended Diet Changes  Eat 4 meals per day with protein and vegetables at each meal, no carbs after meal 2., Protein goal: 65 gms., Eat vegetables first at each meal., Discussed protein guidelines for shakes and bars., Reduce snacking -use foods from free foods list only., Reduce fat, sugar, and/or salt in food choices., Choose more nutrient dense foods., Choose foods with increased fiber., Monitor portion sizes using a food scale and/or measuring cup., Eliminate soda and sugar-sweetened beverages and Increase fluid intake to 64 ounces per day      Goals  1. 4 meals/d; aim to have meal one prior to going into work  2. Measure all portions using measuring cups.   3. Record intake.     Monitoring/Evaluation Plan  Anticipate follow up per program protocol. Continue collaboration of care with physician and treatment team.     Electronically signed by  Tanvi Grijalva RDN, LD  12/22/2022 09:29 CST.

## 2022-12-23 LAB
PLATELET ANTIBODY, IGG: NEGATIVE
PLATELET ANTIBODY, IGM: ABNORMAL

## 2022-12-24 LAB
ANA IGG, ELISA: NORMAL
COPPER: 150.9 UG/DL (ref 80–155)
ZINC: 77 UG/DL (ref 60–120)

## 2022-12-25 LAB
+IMM: ABNORMAL
ALBUMIN SERPL-MCNC: 3.89 G/DL (ref 3.75–5.01)
ALPHA-1-GLOBULIN: 0.33 G/DL (ref 0.19–0.46)
ALPHA-2-GLOBULIN: 0.96 G/DL (ref 0.48–1.05)
BETA GLOBULIN: 0.81 G/DL (ref 0.48–1.1)
GAMMA GLOBULIN: 0.61 G/DL (ref 0.62–1.51)
IGA: 273 MG/DL (ref 68–408)
IGG: 635 MG/DL (ref 768–1632)
IGM: 84 MG/DL (ref 35–263)
KAPPA FREE LIGHT CHAINS QNT: 15.77 MG/L (ref 3.3–19.4)
KAPPA/LAMBDA FREE LIGHT CHAIN RATIO: 1.25 (ref 0.26–1.65)
LAMBDA FREE LIGHT CHAINS QNT: 12.62 MG/L (ref 5.71–26.3)
SPE/IFE INTERPRETATION: ABNORMAL
TOTAL PROTEIN: 6.6 G/DL (ref 6.3–8.2)

## 2023-02-02 ENCOUNTER — TELEPHONE (OUTPATIENT)
Dept: PODIATRY | Facility: CLINIC | Age: 63
End: 2023-02-02
Payer: COMMERCIAL

## 2023-02-02 NOTE — TELEPHONE ENCOUNTER
Called patient regarding appt on 02/03/2023. Left message for patient to return call if any questions or concerns arise.

## 2023-02-03 ENCOUNTER — OFFICE VISIT (OUTPATIENT)
Dept: PODIATRY | Facility: CLINIC | Age: 63
End: 2023-02-03
Payer: COMMERCIAL

## 2023-02-03 VITALS
WEIGHT: 272 LBS | HEIGHT: 65 IN | HEART RATE: 86 BPM | DIASTOLIC BLOOD PRESSURE: 80 MMHG | OXYGEN SATURATION: 99 % | BODY MASS INDEX: 45.32 KG/M2 | SYSTOLIC BLOOD PRESSURE: 126 MMHG

## 2023-02-03 DIAGNOSIS — E11.40 TYPE 2 DIABETES MELLITUS WITH DIABETIC NEUROPATHY, WITHOUT LONG-TERM CURRENT USE OF INSULIN: ICD-10-CM

## 2023-02-03 DIAGNOSIS — L84 FOOT CALLUS: ICD-10-CM

## 2023-02-03 DIAGNOSIS — M79.675 TOE PAIN, BILATERAL: ICD-10-CM

## 2023-02-03 DIAGNOSIS — Z89.429 TOE AMPUTEE: ICD-10-CM

## 2023-02-03 DIAGNOSIS — E11.9 ENCOUNTER FOR DIABETIC FOOT EXAM: ICD-10-CM

## 2023-02-03 DIAGNOSIS — Z72.0 TOBACCO ABUSE: ICD-10-CM

## 2023-02-03 DIAGNOSIS — Z79.01 ANTICOAGULANT LONG-TERM USE: ICD-10-CM

## 2023-02-03 DIAGNOSIS — M79.674 TOE PAIN, BILATERAL: ICD-10-CM

## 2023-02-03 DIAGNOSIS — B35.1 ONYCHOMYCOSIS: Primary | ICD-10-CM

## 2023-02-03 PROCEDURE — 99203 OFFICE O/P NEW LOW 30 MIN: CPT | Performed by: PODIATRIST

## 2023-02-03 PROCEDURE — 11055 PARING/CUTG B9 HYPRKER LES 1: CPT | Performed by: PODIATRIST

## 2023-02-03 PROCEDURE — 11721 DEBRIDE NAIL 6 OR MORE: CPT | Performed by: PODIATRIST

## 2023-02-03 RX ORDER — CHOLECALCIFEROL (VITAMIN D3) 1250 MCG
1 CAPSULE ORAL WEEKLY
COMMUNITY
Start: 2023-01-13

## 2023-02-28 NOTE — PROGRESS NOTES
Progress Note      Pt Name: All Rodrigues  YOB: 1960  MRN: [id number]    Date of evaluation: 3/29/2023  History Obtained From:  patient, daughter Edwin Bruce, electronic medical record    CHIEF COMPLAINT:    Chief Complaint   Patient presents with    Follow-up     Thrombocytopenia ([address]     Current active problems  Thrombocytopenia-probable chronic low-grade ITP  Polycythemia  Chronic nicotine dependence    HISTORY OF PRESENT ILLNESS:    All Rodrigues is a 59 y.o.  female followed in the office since 12/21/2022 for thrombocytopenia. She did have baseline serology obtained and is here to review the results. She does have significant bruising issues especially of her arms but she is on Eliquis 5 twice daily for atrial fibrillation. She apparently developed atrial fibrillation due to untreated sleep apnea. He has not been using her CPAP. She wears continuous oxygen at night however. She apparently has not had any issue with palpitations. She also continues to smoke at least a half a pack per day. She has experienced chronic polycythemia for several years. Her initial CBC in the office did not reveal any significant polycythemia. She denies any chest pain, headache, vision disturbances. She takes rosuvastatin 5 mg each night for her cholesterol. She is diabetic, taking Ozempic, Glucophage. She takes diltiazem 120 daily Tambocor 50 every 12 hours lisinopril 10 daily, Lopressor 50 twice daily for her blood pressure, PAF. HEMATOLOGY HISTORY:  Claudette was seen in initial hematology consultation on 12/21/2022 referred by Dr. Starlette Cranker for evaluation of thrombocytopenia. She denies any bleeding issues. She does bruise however she takes Eliquis 5 twice daily for PAF. She denies any family history of blood dyscrasias. She denies any history of liver dysfunction. She denies taking any antiplatelet therapy.   She does have significant arthritis especially involving her

## 2023-03-01 ENCOUNTER — OFFICE VISIT (OUTPATIENT)
Dept: CARDIOLOGY | Facility: CLINIC | Age: 63
End: 2023-03-01
Payer: COMMERCIAL

## 2023-03-01 VITALS
OXYGEN SATURATION: 98 % | HEIGHT: 65 IN | BODY MASS INDEX: 45.15 KG/M2 | HEART RATE: 69 BPM | SYSTOLIC BLOOD PRESSURE: 116 MMHG | DIASTOLIC BLOOD PRESSURE: 68 MMHG | WEIGHT: 271 LBS

## 2023-03-01 DIAGNOSIS — G47.33 OSA (OBSTRUCTIVE SLEEP APNEA): ICD-10-CM

## 2023-03-01 DIAGNOSIS — I48.0 PAROXYSMAL ATRIAL FIBRILLATION: Primary | ICD-10-CM

## 2023-03-01 DIAGNOSIS — Z79.01 CHRONIC ANTICOAGULATION: ICD-10-CM

## 2023-03-01 DIAGNOSIS — J43.8 OTHER EMPHYSEMA: ICD-10-CM

## 2023-03-01 DIAGNOSIS — E11.65 TYPE 2 DIABETES MELLITUS WITH HYPERGLYCEMIA, WITHOUT LONG-TERM CURRENT USE OF INSULIN: ICD-10-CM

## 2023-03-01 DIAGNOSIS — Z72.0 TOBACCO ABUSE: ICD-10-CM

## 2023-03-01 DIAGNOSIS — I50.32 CHRONIC DIASTOLIC CHF (CONGESTIVE HEART FAILURE): ICD-10-CM

## 2023-03-01 DIAGNOSIS — E66.1 CLASS 3 DRUG-INDUCED OBESITY WITH SERIOUS COMORBIDITY AND BODY MASS INDEX (BMI) OF 40.0 TO 44.9 IN ADULT: ICD-10-CM

## 2023-03-01 PROCEDURE — 99214 OFFICE O/P EST MOD 30 MIN: CPT | Performed by: NURSE PRACTITIONER

## 2023-03-01 PROCEDURE — 93000 ELECTROCARDIOGRAM COMPLETE: CPT | Performed by: NURSE PRACTITIONER

## 2023-03-01 NOTE — PROGRESS NOTES
Subjective:     Encounter Date: 03/01/2023      Patient ID: Claudette Keeling is a 62 y.o. female with paroxysmal atrial fibrillation s/p cardioversion on 8/17/2022, chronic anticoagulation, chronic diastolic congestive heart failure, type 2 diabetes, COPD/tobacco abuse, obstructive sleep apnea and obesity.    Chief Complaint: routine follow up  Atrial Fibrillation  Presents for follow-up visit. Symptoms are negative for bradycardia, chest pain, dizziness, hemodynamic instability, hypertension, hypotension, palpitations, shortness of breath, syncope, tachycardia and weakness. The symptoms have been stable. Past medical history includes atrial fibrillation and CHF. There are no medication compliance problems.   Congestive Heart Failure  Presents for follow-up visit. Pertinent negatives include no chest pain, chest pressure, claudication, edema, fatigue, muscle weakness, near-syncope, nocturia, orthopnea, palpitations, paroxysmal nocturnal dyspnea, shortness of breath or unexpected weight change. The symptoms have been stable. Compliance with total regimen is 51-75%. Compliance with diet is 51-75%. Compliance with exercise is 51-75%. Compliance with medications is 51-75%.     Patient presents today for management of atrial fibrillation and diastolic congestive heart failure. Patient was admitted to [facility] on 8/15/2022-8/17/2022. She presented with worsening shortness of breath that started on 8/12/2022. Patient was found to be in Atrial fibrillation RVR with rates 170's. She had elevated BNP, elevated WBC and on CTA chest left lower lobe pneumonia. She was placed on cardizem drip and transitioned to cardizem CD. Patient was started on Flecainide and underwent successful cardioversion on 8/17/2022. Patient was discharged on metoprolol and cardizem.   Today patient reports that she is doing well. She reports that she was referred to the bariatric clinic and she doesn't feel like surgical options is right for her so she  didn't return. She denies any heart racing or palpitations. She denies any known recurrence of atrial fibrillation. She denies any chest pain. She reports chronic dyspnea on exertion. She denies leg swelling, orthopnea or PND. She denies any dizziness or near syncope. She was able to get a new mask for her; she reports that she has been compliant with CPAP. She was started on Ozempic but is having difficulty with price and it being in stock at the pharmacy. She is on eliquis and denies any bleeding issues. She follows with Dr Torrez as PCP.       Previous Cardiac Testing and Procedures:   -Echo (5/10/2022): LVEF 61-65%, moderate LVH, diastolic dysfunction, no significant valvular disease  -Hgb A1C (5/10/2022): 7.4  -Lipid panel (5/10/2022): total cholesterol 149, hdl 37, LDL 90 and triglycerides 124  -Cardioversion (6/14/2022): successful cardioversion   -Cardioversion (8/17/2022): Successful cardioversion   -Hgb A1C (8/17/2022): 8.5  -Lipid panel (8/17/2022): total cholesterol 143, hdl 37, LDL 85 and triglycerides 116      The following portions of the patient's history were reviewed and updated as appropriate: allergies, current medications, past family history, past medical history, past social history, past surgical history and problem list.    Allergies   Allergen Reactions   • Codeine GI Intolerance       Current Outpatient Medications:   •  apixaban (ELIQUIS) 5 MG tablet tablet, Take 1 tablet by mouth Every 12 (Twelve) Hours. Indications: Atrial Fibrillation, Disp: 180 tablet, Rfl: 1  •  Budeson-Glycopyrrol-Formoterol (Breztri Aerosphere) 160-9-4.8 MCG/ACT aerosol inhaler, Inhale 2 puffs 2 (Two) Times a Day., Disp: , Rfl:   •  Cholecalciferol (Vitamin D3) 1.25 MG (37130 UT) capsule, Take 1 capsule by mouth 1 (One) Time Per Week., Disp: , Rfl:   •  dilTIAZem CD (CARDIZEM CD) 120 MG 24 hr capsule, Take 1 capsule by mouth Daily., Disp: 30 capsule, Rfl: 0  •  empagliflozin (JARDIANCE) 25 MG tablet tablet, Take 1  tablet by mouth Daily., Disp: , Rfl:   •  famotidine (Pepcid) 20 MG tablet, Take 1 tablet by mouth 2 (Two) Times a Day As Needed for Heartburn or Indigestion., Disp: 180 tablet, Rfl: 0  •  flecainide (TAMBOCOR) 50 MG tablet, Take 1 tablet by mouth Every 12 (Twelve) Hours., Disp: 180 tablet, Rfl: 3  •  furosemide (LASIX) 40 MG tablet, Take 1 tablet by mouth Daily As Needed., Disp: , Rfl:   •  gabapentin (NEURONTIN) 300 MG capsule, Take 1 capsule by mouth 3 (Three) Times a Day., Disp: , Rfl:   •  lisinopril (PRINIVIL,ZESTRIL) 10 MG tablet, Take 1 tablet by mouth Daily., Disp: 90 tablet, Rfl: 0  •  metFORMIN ER (GLUCOPHAGE-XR) 500 MG 24 hr tablet, Take 2 tablets by mouth Daily With Breakfast., Disp: 180 tablet, Rfl: 1  •  metoprolol tartrate (LOPRESSOR) 50 MG tablet, Take 1 tablet by mouth 2 (Two) Times a Day., Disp: , Rfl:   •  multivitamin with minerals tablet tablet, Take 1 tablet by mouth Daily., Disp: , Rfl:   •  mupirocin (BACTROBAN) 2 % ointment, Apply topically to each nare 2 times per day, Disp: 22 g, Rfl: 0  •  nystatin (MYCOSTATIN) 812568 UNIT/GM powder, Apply 1 application topically to the appropriate area as directed 2 (Two) Times a Day As Needed., Disp: , Rfl:   •  rosuvastatin (Crestor) 5 MG tablet, Take 1 tablet by mouth Daily., Disp: 90 tablet, Rfl: 1  •  varenicline (CHANTIX) 1 MG tablet, Take 1 tablet by mouth 2 (Two) Times a Day., Disp: 60 tablet, Rfl: 5  Past Medical History:   Diagnosis Date   • Arthritis    • Asthma 5/1922   • Atrial fibrillation (HCC)    • COPD (chronic obstructive pulmonary disease) (HCC) 5/1922   • Diabetes mellitus (HCC)    • Hypertension    • Neuropathy    • Sleep apnea    • Stroke (HCC) 5/1922     Social History     Socioeconomic History   • Marital status:    Tobacco Use   • Smoking status: Some Days     Packs/day: 1.00     Years: 25.00     Pack years: 25.00     Types: Cigarettes     Passive exposure: Past   • Smokeless tobacco: Never   • Tobacco comments:     Has  started back and restarted Chantix   Vaping Use   • Vaping Use: Never used   Substance and Sexual Activity   • Alcohol use: Never   • Drug use: Never   • Sexual activity: Not Currently     Partners: Male     Birth control/protection: Hysterectomy       Review of Systems   Constitutional: Negative for fatigue, malaise/fatigue, unexpected weight change, weight gain and weight loss.   HENT: Negative for nosebleeds.    Cardiovascular: Positive for dyspnea on exertion (unchanged). Negative for chest pain, claudication, irregular heartbeat, leg swelling, near-syncope, orthopnea, palpitations, paroxysmal nocturnal dyspnea and syncope.   Respiratory: Negative for shortness of breath.    Hematologic/Lymphatic: Bruises/bleeds easily.   Musculoskeletal: Negative for muscle weakness.   Genitourinary: Negative for hematuria and nocturia.   Neurological: Negative for dizziness and weakness.   All other systems reviewed and are negative.         Objective:     Vitals reviewed.   Constitutional:       General: Not in acute distress.     Appearance: Normal appearance. Well-developed. Morbidly obese.   Eyes:      Pupils: Pupils are equal, round, and reactive to light.   HENT:      Head: Normocephalic and atraumatic.      Nose: Nose normal.   Neck:      Vascular: No carotid bruit.   Pulmonary:      Effort: Pulmonary effort is normal. No respiratory distress.      Breath sounds: Normal breath sounds. No wheezing. No rales.   Cardiovascular:      Normal rate. Regular rhythm.      Murmurs: There is no murmur.   Edema:     Peripheral edema absent.   Abdominal:      General: There is no distension.      Palpations: Abdomen is soft.   Musculoskeletal: Normal range of motion.      Cervical back: Normal range of motion and neck supple. Skin:     General: Skin is warm.      Findings: No erythema or rash.   Neurological:      General: No focal deficit present.      Mental Status: Alert and oriented to person, place, and time.   Psychiatric:     "     Attention and Perception: Attention normal.         Mood and Affect: Mood normal.         Speech: Speech normal.         Behavior: Behavior normal.         Thought Content: Thought content normal.         Judgment: Judgment normal.         /68   Pulse 69   Ht 163.8 cm (64.5\")   Wt 123 kg (271 lb)   SpO2 98%   BMI 45.80 kg/m²       ECG 12 Lead    Date/Time: 3/1/2023 8:49 AM  Performed by: Paul Coleman APRN  Authorized by: Paul Coleman APRN   Comparison: compared with previous ECG from 11/17/2022  Similar to previous ECG  Rhythm: sinus rhythm  Rate: normal  BPM: 69              Lab Review:     Results for orders placed during the hospital encounter of 05/09/22    Adult Transthoracic Echo Complete With Contrast if Necessary Per Protocol    Interpretation Summary  · Left ventricular ejection fraction appears to be 61 - 65%. Left ventricular systolic function is normal.  · Left ventricular wall thickness is consistent with moderate concentric hypertrophy.  · Left ventricular diastolic dysfunction is noted.  · Normal right ventricular cavity size and systolic function noted.  · There is mild biatrial enlargement.  · There is no significant (greater than mild) valvular dysfunction.  · Estimated right ventricular systolic pressure from tricuspid regurgitation is normal (<35 mmHg).    Lab Results   Component Value Date    CHOL 143 08/17/2022    TRIG 116 08/17/2022    HDL 37 (L) 08/17/2022    LDL 85 08/17/2022     Lab Results   Component Value Date    HGBA1C 8.50 (H) 08/17/2022     I have personally reviewed echo, hospitalization notes, labs and past office notes prior to patients visit  Assessment:          Diagnosis Plan   1. Paroxysmal atrial fibrillation (HCC)        2. Chronic anticoagulation        3. Chronic diastolic CHF (congestive heart failure) (HCC)        4. Other emphysema (HCC)        5. Tobacco abuse        6. Type 2 diabetes mellitus with hyperglycemia, without long-term current use of " insulin ([facility])        7. ANTHONY (obstructive sleep apnea)        8. Class 3 drug-induced obesity with serious comorbidity and body mass index (BMI) of 40.0 to 44.9 in adult ([facility])               Plan:       1. Paroxysmal Atrial Fibrillation: EKG today shows NSR rates of 69. Patient denies any known recurrence of atrial fibrillation. Continue Flecainide, Cardizem and metoprolol. Patient is on Eliquis and denies any bleeding issues. Patient follows with [facility].     2. Anticoagulation: Patient is on Eliquis and denies any bleeding issues.     3. Chronic diastolic congestive heart failure: Patient remains euvolemic in office today. Patient has only used lasix infrequently. She has been keeping up with daily weights and they have remained stable. Reviewed signs and symptoms of CHF and what to report to office with patient. Discussed importance of daily weights. Recommended her weigh every morning after urinating and recording it. If she notices a weight gain of 2-3 lbs overnight/5 lbs in a week then she can take a Lasix 40 mg and call office. Recommended low salt diet. Discussed keeping legs elevated as much as possible when sitting throughout the day. Recommended compression stockings or ACE wraps to patient if needed for leg swelling. Continue lasix 40 mg on daily as needed basis.    4/5. COPD/tobacco abuse: Claudette Keeling  reports that she has been smoking cigarettes. She has a 25.00 pack-year smoking history. She has been exposed to tobacco smoke. She has never used smokeless tobacco.. I have educated her on the risk of diseases from using tobacco products such as cancer, COPD and heart disease. I advised her to quit and she is not willing to quit. She has drastically decreased her daily amount of cigarettes. I spent 3  minutes counseling the patient.     6. Type 2 diabetes: managed and followed by PCP. A1C 8.5 8/2022    7. Obstructive sleep apnea: compliant with CPAP    8. Obesity: Class 3 Severe Obesity (BMI  >=40). Obesity-related health conditions include the following: hypertension and diabetes mellitus. Obesity is unchanged. BMI is is above average; BMI management plan is completed. We discussed portion control and increasing exercise.    I attest that all portions of this note reviewed and all information has been updated by myself to reflect the patient's current status.      I spent 38 minutes caring for Claudette on this date of service. This time includes time spent by me in the following activities:preparing for the visit, reviewing tests, obtaining and/or reviewing a separately obtained history, performing a medically appropriate examination and/or evaluation , counseling and educating the patient/family/caregiver and documenting information in the medical record    I spent 3 minutes on the separately reported service of EKG. This time is not included in the time used to support the E/M service also reported today.    Patient is to return to office in 6 months or sooner if needed

## 2023-03-28 ENCOUNTER — TELEPHONE (OUTPATIENT)
Dept: HEMATOLOGY | Age: 63
End: 2023-03-28

## 2023-03-29 ENCOUNTER — HOSPITAL ENCOUNTER (OUTPATIENT)
Dept: INFUSION THERAPY | Age: 63
Discharge: HOME | End: 2023-03-29
Payer: COMMERCIAL

## 2023-03-29 ENCOUNTER — OFFICE VISIT (OUTPATIENT)
Dept: HEMATOLOGY | Age: 63
End: 2023-03-29
Payer: COMMERCIAL

## 2023-03-29 VITALS
OXYGEN SATURATION: 91 % | SYSTOLIC BLOOD PRESSURE: 138 MMHG | HEART RATE: 79 BPM | BODY MASS INDEX: 46.46 KG/M2 | DIASTOLIC BLOOD PRESSURE: 76 MMHG | WEIGHT: 279.2 LBS

## 2023-03-29 DIAGNOSIS — Z79.4 TYPE 2 DIABETES MELLITUS WITH DIABETIC POLYNEUROPATHY, WITH LONG-TERM CURRENT USE OF INSULIN (HCC): ICD-10-CM

## 2023-03-29 DIAGNOSIS — E11.42 TYPE 2 DIABETES MELLITUS WITH DIABETIC POLYNEUROPATHY, WITH LONG-TERM CURRENT USE OF INSULIN (HCC): ICD-10-CM

## 2023-03-29 DIAGNOSIS — D75.1 POLYCYTHEMIA: ICD-10-CM

## 2023-03-29 DIAGNOSIS — D69.6 THROMBOCYTOPENIA (HCC): ICD-10-CM

## 2023-03-29 DIAGNOSIS — F17.210 CIGARETTE NICOTINE DEPENDENCE WITHOUT COMPLICATION: ICD-10-CM

## 2023-03-29 DIAGNOSIS — D69.6 THROMBOCYTOPENIA (HCC): Primary | ICD-10-CM

## 2023-03-29 LAB
BASOPHILS # BLD: 0.04 K/UL (ref 0.01–0.08)
BASOPHILS NFR BLD: 0.5 % (ref 0.1–1.2)
EOSINOPHIL # BLD: 0.13 K/UL (ref 0.04–0.54)
EOSINOPHIL NFR BLD: 1.5 % (ref 0.7–7)
ERYTHROCYTE [DISTWIDTH] IN BLOOD BY AUTOMATED COUNT: 12.7 % (ref 11.7–14.4)
FERRITIN SERPL-MCNC: 180.4 NG/ML (ref 13–150)
HCT VFR BLD AUTO: 49.8 % (ref 34.1–44.9)
HGB BLD-MCNC: 15.8 G/DL (ref 11.2–15.7)
IRON SATN MFR SERPL: 28 % (ref 14–50)
IRON SERPL-MCNC: 84 UG/DL (ref 37–145)
LYMPHOCYTES # BLD: 1.81 K/UL (ref 1.18–3.74)
LYMPHOCYTES NFR BLD: 21.1 % (ref 19.3–53.1)
MCH RBC QN AUTO: 31.3 PG (ref 25.6–32.2)
MCHC RBC AUTO-ENTMCNC: 31.7 G/DL (ref 32.3–35.5)
MCV RBC AUTO: 98.6 FL (ref 79.4–94.8)
MONOCYTES # BLD: 0.51 K/UL (ref 0.24–0.82)
MONOCYTES NFR BLD: 6 % (ref 4.7–12.5)
NEUTROPHILS # BLD: 6.05 K/UL (ref 1.56–6.13)
NEUTS SEG NFR BLD: 70.5 % (ref 34–71.1)
PLATELET # BLD AUTO: 145 K/UL (ref 182–369)
PMV BLD AUTO: 10.8 FL (ref 7.4–10.4)
RBC # BLD AUTO: 5.05 M/UL (ref 3.93–5.22)
TIBC SERPL-MCNC: 302 UG/DL (ref 250–400)
WBC # BLD AUTO: 8.57 K/UL (ref 3.98–10.04)

## 2023-03-29 PROCEDURE — 85025 COMPLETE CBC W/AUTO DIFF WBC: CPT

## 2023-03-29 PROCEDURE — 3046F HEMOGLOBIN A1C LEVEL >9.0%: CPT | Performed by: PHYSICIAN ASSISTANT

## 2023-03-29 PROCEDURE — G8484 FLU IMMUNIZE NO ADMIN: HCPCS | Performed by: PHYSICIAN ASSISTANT

## 2023-03-29 PROCEDURE — 3078F DIAST BP <80 MM HG: CPT | Performed by: PHYSICIAN ASSISTANT

## 2023-03-29 PROCEDURE — G8417 CALC BMI ABV UP PARAM F/U: HCPCS | Performed by: PHYSICIAN ASSISTANT

## 2023-03-29 PROCEDURE — 99212 OFFICE O/P EST SF 10 MIN: CPT

## 2023-03-29 PROCEDURE — 3075F SYST BP GE 130 - 139MM HG: CPT | Performed by: PHYSICIAN ASSISTANT

## 2023-03-29 PROCEDURE — 3017F COLORECTAL CA SCREEN DOC REV: CPT | Performed by: PHYSICIAN ASSISTANT

## 2023-03-29 PROCEDURE — G8427 DOCREV CUR MEDS BY ELIG CLIN: HCPCS | Performed by: PHYSICIAN ASSISTANT

## 2023-03-29 PROCEDURE — 2022F DILAT RTA XM EVC RTNOPTHY: CPT | Performed by: PHYSICIAN ASSISTANT

## 2023-03-29 PROCEDURE — 4004F PT TOBACCO SCREEN RCVD TLK: CPT | Performed by: PHYSICIAN ASSISTANT

## 2023-03-29 PROCEDURE — 99214 OFFICE O/P EST MOD 30 MIN: CPT | Performed by: PHYSICIAN ASSISTANT

## 2023-03-29 PROCEDURE — 36415 COLL VENOUS BLD VENIPUNCTURE: CPT

## 2023-03-29 ASSESSMENT — ENCOUNTER SYMPTOMS
VOMITING: 0
WHEEZING: 0
ABDOMINAL DISTENTION: 0
BLOOD IN STOOL: 0
TROUBLE SWALLOWING: 0
DIARRHEA: 0
ABDOMINAL PAIN: 0
SORE THROAT: 0
COUGH: 0
PHOTOPHOBIA: 0
VOICE CHANGE: 0
NAUSEA: 0
COLOR CHANGE: 0
SHORTNESS OF BREATH: 0
EYE ITCHING: 0
BACK PAIN: 1
EYE DISCHARGE: 0
CONSTIPATION: 0

## 2023-03-30 LAB — EPO SERPL-ACNC: 17 MU/ML (ref 4–27)

## 2023-05-08 NOTE — PROGRESS NOTES
[facility] - PODIATRY    Today's Date: 05/17/2023     Patient Name: Claudette Keeling  MRN: [id number]  [facility]: [id number]  PCP: Reymundo Torrez MD  Referring Provider: No ref. provider found    SUBJECTIVE     Chief Complaint   Patient presents with    Follow-up     Reymundo Torrez MD 12/08/2022 Return in about 3 months for diabetic foot care-pt states she is here today for diabetic nail care-pt denies pain     Diabetes     Didn't check today     HPI: Claudette Keeling, a 62 y.o.female, comes to clinic as a(n) established w patient presenting for diabetic foot exam and complaining of fungal toenails and painful calluses . Patient has h/o arthritis, Asthma, AFib, COPD, DM2, HTN, Neuropathy, Sleep Apnea, Stroke, Tobacco use . Patient is NIDDM and unsure of last BG level.  Has not checked blood glucose recently.  Admits to numbness and tingling in feet. She has previously had her right 3rd toe amputated in the 90s. Denies any open wounds or sores. Notes that her toenails are long, thick and crumbly but feels that they are much improved from previous visit. She has trouble caring for them herself. Notes that she has a painful callus to her right 5th toe.  Denies pain. Relates previous treatment(s) including care by podiatry . Denies any constitutional symptoms. No other pedal complaints at this time.    Past Medical History:   Diagnosis Date    Arthritis     Asthma 5/1922    Atrial fibrillation     COPD (chronic obstructive pulmonary disease) 5/1922    Diabetes mellitus     Hypertension     Neuropathy     Sleep apnea     Stroke 5/1922     Past Surgical History:   Procedure Laterality Date    APPENDECTOMY      HYSTERECTOMY      JOINT REPLACEMENT Left     knee    TOE OSTEOPHYTE REMOVAL       Family History   Problem Relation Age of Onset    Heart failure Mother     Hypertension Mother     Diabetes Mother     Hypertension Father     Diabetes Father     No Known Problems Sister     Diabetes Brother      Hypertension Brother     Heart disease Brother         Mother    Heart attack Brother      Social History     Socioeconomic History    Marital status:    Tobacco Use    Smoking status: Every Day     Packs/day: 1.00     Years: 25.00     Pack years: 25.00     Types: Cigarettes     Passive exposure: Past    Smokeless tobacco: Never    Tobacco comments:     Has started back and restarted Chantix   Vaping Use    Vaping Use: Never used   Substance and Sexual Activity    Alcohol use: Never    Drug use: Never    Sexual activity: Not Currently     Partners: Male     Birth control/protection: Hysterectomy     Allergies   Allergen Reactions    Codeine GI Intolerance     Current Outpatient Medications   Medication Sig Dispense Refill    apixaban (ELIQUIS) 5 MG tablet tablet Take 1 tablet by mouth Every 12 (Twelve) Hours. Indications: Atrial Fibrillation 180 tablet 1    Budeson-Glycopyrrol-Formoterol (Breztri Aerosphere) 160-9-4.8 MCG/ACT aerosol inhaler Inhale 2 puffs 2 (Two) Times a Day.      Cholecalciferol (Vitamin D3) 1.25 MG (14546 UT) capsule Take 1 capsule by mouth 1 (One) Time Per Week.      dilTIAZem CD (CARDIZEM CD) 120 MG 24 hr capsule Take 1 capsule by mouth Daily. 30 capsule 0    empagliflozin (JARDIANCE) 25 MG tablet tablet Take 1 tablet by mouth Daily.      famotidine (Pepcid) 20 MG tablet Take 1 tablet by mouth 2 (Two) Times a Day As Needed for Heartburn or Indigestion. 180 tablet 0    flecainide (TAMBOCOR) 50 MG tablet Take 1 tablet by mouth Every 12 (Twelve) Hours. 180 tablet 3    furosemide (LASIX) 40 MG tablet Take 1 tablet by mouth Daily As Needed.      gabapentin (NEURONTIN) 300 MG capsule Take 1 capsule by mouth 3 (Three) Times a Day.      lisinopril (PRINIVIL,ZESTRIL) 10 MG tablet Take 1 tablet by mouth Daily. 90 tablet 0    metFORMIN ER (GLUCOPHAGE-XR) 500 MG 24 hr tablet Take 2 tablets by mouth Daily With Breakfast. 180 tablet 1    metoprolol tartrate (LOPRESSOR) 50 MG tablet Take 1 tablet by  mouth 2 (Two) Times a Day.      multivitamin with minerals tablet tablet Take 1 tablet by mouth Daily.      mupirocin (BACTROBAN) 2 % ointment Apply topically to each nare 2 times per day 22 g 0    nystatin (MYCOSTATIN) 704787 UNIT/GM powder Apply 1 application topically to the appropriate area as directed 2 (Two) Times a Day As Needed.      rosuvastatin (Crestor) 5 MG tablet Take 1 tablet by mouth Daily. 90 tablet 1    varenicline (CHANTIX) 1 MG tablet Take 1 tablet by mouth 2 (Two) Times a Day. 60 tablet 5     No current facility-administered medications for this visit.     Review of Systems   Constitutional:  Negative for chills and fever.   HENT:  Negative for congestion.    Respiratory:  Negative for shortness of breath.    Cardiovascular:  Negative for chest pain and leg swelling.   Gastrointestinal:  Negative for constipation, diarrhea, nausea and vomiting.   Musculoskeletal:  Positive for arthralgias and gait problem.        Foot pain   Skin:  Negative for wound.   Neurological:  Positive for numbness.     OBJECTIVE     Vitals:    05/17/23 1408   BP: 130/92   Pulse: 82   SpO2: 100%       PHYSICAL EXAM  GEN:   Accompanied by none.     Foot/Ankle Exam    GENERAL  Diabetic foot exam performed    Appearance:  obese  Orientation:  AAOx3  Affect:  appropriate  Gait:  unimpaired  Assistance:  walker  Right shoe gear: casual shoe  Left shoe gear: casual shoe    VASCULAR     Right Foot Vascularity   Dorsalis pedis:  2+  Posterior tibial:  2+  Skin temperature:  warm  Edema grading:  None  CFT:  3  Pedal hair growth:  Present  Varicosities:  moderate varicosities     Left Foot Vascularity   Dorsalis pedis:  2+  Posterior tibial:  2+  Skin temperature:  warm  Edema grading:  None  CFT:  3  Pedal hair growth:  Present  Varicosities:  moderate varicosities     NEUROLOGIC     Right Foot Neurologic   Light touch sensation: diminished  Vibratory sensation: diminished  Hot/Cold sensation: diminished  Protective Sensation  using Purdy-Lynn Monofilament:   Sites intact: 8  Sites tested: 10     Left Foot Neurologic   Light touch sensation: diminished  Vibratory sensation: diminished  Hot/Cold sensation:  diminished  Protective Sensation using Purdy-Lynn Monofilament:   Sites intact: 8  Sites tested: 10    MUSCULOSKELETAL     Right Foot Musculoskeletal    Amputation   Toes amputated: third toe  Tenderness:  callous right foot, toe 5 tenderness and toenail problem    Arch:  Normal  Hammertoe:  Second toe, fourth toe and fifth toe  Hallux valgus: Yes       Left Foot Musculoskeletal   Tenderness:  toenail problem  Arch:  Normal    MUSCLE STRENGTH     Right Foot Muscle Strength   Foot dorsiflexion:  5  Foot plantar flexion:  5  Foot inversion:  5  Foot eversion:  5     Left Foot Muscle Strength   Foot dorsiflexion:  5  Foot plantar flexion:  5  Foot inversion:  5  Foot eversion:  5    RANGE OF MOTION     Right Foot Range of Motion   Foot and ankle ROM within normal limits       Left Foot Range of Motion   Foot and ankle ROM within normal limits      DERMATOLOGIC      Right Foot Dermatologic   Skin  Positive for corn.   Nails  1.  Positive for onychomycosis, abnormal thickness and dystrophic nail.  2.  Positive for elongated, abnormal thickness and dystrophic nail.  3.  Positive for elongated, abnormal thickness and dystrophic nail.  4.  Positive for elongated, abnormal thickness and dystrophic nail.  5.  Positive for elongated, abnormal thickness and dystrophic nail.     Left Foot Dermatologic   Skin  Left foot skin is intact.   Nails  1.  Positive for elongated, onychomycosis, abnormal thickness and dystrophic nail.  2.  Positive for elongated, abnormal thickness and dystrophic nail.  3.  Positive for elongated, abnormal thickness and dystrophic nail.  4.  Positive for elongated, abnormally thick and dystrophic nail.  5.  Positive for elongated, abnormally thick and dystrophic nail.    Image:     RADIOLOGY/NUCLEAR:  No results  found.    LABORATORY/CULTURE RESULTS:      PATHOLOGY RESULTS:       ASSESSMENT/PLAN     Diagnoses and all orders for this visit:    1. Onychomycosis (Primary)    2. Toe pain, bilateral    3. Foot callus    4. Type 2 diabetes mellitus with diabetic neuropathy, without long-term current use of insulin    5. Toe amputee    6. Tobacco abuse      Comprehensive lower extremity examination and evaluation was performed.  Discussed findings and treatment plan including risks, benefits, and treatment options with patient in detail. Patient agreed with treatment plan.  DFE performed  After verbal consent obtained, nail(s) x9 debrided of length and thickness with nail nipper without incidence  After verbal consent obtained, calluses x1 pared utilizing dermal curette and/or scalpel without incidence  Patient may maintain nails and calluses at home utilizing emery board or pumice stone between visits as needed  Reviewed at home diabetic foot care including daily foot checks   Continue to monitor BG with assistance of PCP.  Discussed tobacco cessation  An After Visit Summary was printed and given to the patient at discharge, including (if requested) any available informative/educational handouts regarding diagnosis, treatment, or medications. All questions were answered to patient/family satisfaction. Should symptoms fail to improve or worsen they agree to call or return to clinic or to go to the Emergency Department. Discussed the importance of following up with any needed screening tests/labs/specialist appointments and any requested follow-up recommended by me today. Importance of maintaining follow-up discussed and patient accepts that missed appointments can delay diagnosis and potentially lead to worsening of conditions.  Return in about 3 months (around 8/17/2023)., or sooner if acute issues arise.    Lab Frequency Next Occurrence   Basic Metabolic Panel Once 08/08/2022   COVID PRE-OP / PRE-PROCEDURE SCREENING ORDER (NO  ISOLATION) - Swab, Nasopharynx Once 08/18/2022   Cardioversion External in Cardiology Department Once 08/18/2022       This document has been electronically signed by FLORENCIA Mojica on May 17, 2023 14:44 CDT

## 2023-05-16 ENCOUNTER — TELEPHONE (OUTPATIENT)
Dept: PODIATRY | Facility: CLINIC | Age: 63
End: 2023-05-16
Payer: COMMERCIAL

## 2023-05-16 NOTE — TELEPHONE ENCOUNTER
Called patient regarding appt on 05/17/2023. Left message for patient to return call if any questions or concerns arise.

## 2023-05-17 ENCOUNTER — OFFICE VISIT (OUTPATIENT)
Dept: PODIATRY | Facility: CLINIC | Age: 63
End: 2023-05-17
Payer: COMMERCIAL

## 2023-05-17 VITALS
OXYGEN SATURATION: 100 % | DIASTOLIC BLOOD PRESSURE: 92 MMHG | HEIGHT: 65 IN | BODY MASS INDEX: 45.15 KG/M2 | WEIGHT: 271 LBS | HEART RATE: 82 BPM | SYSTOLIC BLOOD PRESSURE: 130 MMHG

## 2023-05-17 DIAGNOSIS — B35.1 ONYCHOMYCOSIS: Primary | ICD-10-CM

## 2023-05-17 DIAGNOSIS — Z89.429 TOE AMPUTEE: ICD-10-CM

## 2023-05-17 DIAGNOSIS — Z72.0 TOBACCO ABUSE: ICD-10-CM

## 2023-05-17 DIAGNOSIS — M79.675 TOE PAIN, BILATERAL: ICD-10-CM

## 2023-05-17 DIAGNOSIS — M79.674 TOE PAIN, BILATERAL: ICD-10-CM

## 2023-05-17 DIAGNOSIS — L84 FOOT CALLUS: ICD-10-CM

## 2023-05-17 DIAGNOSIS — E11.40 TYPE 2 DIABETES MELLITUS WITH DIABETIC NEUROPATHY, WITHOUT LONG-TERM CURRENT USE OF INSULIN: ICD-10-CM

## 2023-05-19 ENCOUNTER — TELEPHONE (OUTPATIENT)
Dept: CARDIOLOGY | Facility: CLINIC | Age: 63
End: 2023-05-19
Payer: COMMERCIAL

## 2023-05-19 NOTE — TELEPHONE ENCOUNTER
Pt called asking med refill on citalopram. I called her back and told her that we do not prescribed this med nor have been ever prescribed by us. I advised her to call her pcp. She v/u

## 2023-06-27 ENCOUNTER — TELEPHONE (OUTPATIENT)
Dept: INFUSION THERAPY | Age: 63
End: 2023-06-27

## 2023-06-28 ENCOUNTER — HOSPITAL ENCOUNTER (OUTPATIENT)
Dept: INFUSION THERAPY | Age: 63
Discharge: HOME | End: 2023-06-28
Payer: COMMERCIAL

## 2023-06-28 ENCOUNTER — OFFICE VISIT (OUTPATIENT)
Dept: HEMATOLOGY | Age: 63
End: 2023-06-28
Payer: COMMERCIAL

## 2023-06-28 VITALS
WEIGHT: 268 LBS | DIASTOLIC BLOOD PRESSURE: 84 MMHG | SYSTOLIC BLOOD PRESSURE: 136 MMHG | OXYGEN SATURATION: 94 % | HEART RATE: 76 BPM | BODY MASS INDEX: 44.6 KG/M2

## 2023-06-28 DIAGNOSIS — Z53.20 LUNG CANCER SCREENING DECLINED BY PATIENT: ICD-10-CM

## 2023-06-28 DIAGNOSIS — D69.6 THROMBOCYTOPENIA (HCC): Primary | ICD-10-CM

## 2023-06-28 DIAGNOSIS — F17.210 CIGARETTE NICOTINE DEPENDENCE WITHOUT COMPLICATION: ICD-10-CM

## 2023-06-28 DIAGNOSIS — G47.33 OBSTRUCTIVE SLEEP APNEA SYNDROME: ICD-10-CM

## 2023-06-28 DIAGNOSIS — D69.6 THROMBOCYTOPENIA (HCC): ICD-10-CM

## 2023-06-28 DIAGNOSIS — D75.1 POLYCYTHEMIA: ICD-10-CM

## 2023-06-28 DIAGNOSIS — Z87.891 PERSONAL HISTORY OF TOBACCO USE: ICD-10-CM

## 2023-06-28 LAB
BASOPHILS # BLD: 0.04 K/UL (ref 0.01–0.08)
BASOPHILS NFR BLD: 0.6 % (ref 0.1–1.2)
EOSINOPHIL # BLD: 0.12 K/UL (ref 0.04–0.54)
EOSINOPHIL NFR BLD: 1.8 % (ref 0.7–7)
ERYTHROCYTE [DISTWIDTH] IN BLOOD BY AUTOMATED COUNT: 13.1 % (ref 11.7–14.4)
HCT VFR BLD AUTO: 50.8 % (ref 34.1–44.9)
HGB BLD-MCNC: 15.6 G/DL (ref 11.2–15.7)
LYMPHOCYTES # BLD: 1.75 K/UL (ref 1.18–3.74)
LYMPHOCYTES NFR BLD: 26.5 % (ref 19.3–53.1)
MCH RBC QN AUTO: 30.9 PG (ref 25.6–32.2)
MCHC RBC AUTO-ENTMCNC: 30.7 G/DL (ref 32.3–35.5)
MCV RBC AUTO: 100.6 FL (ref 79.4–94.8)
MONOCYTES # BLD: 0.45 K/UL (ref 0.24–0.82)
MONOCYTES NFR BLD: 6.8 % (ref 4.7–12.5)
NEUTROPHILS # BLD: 4.23 K/UL (ref 1.56–6.13)
NEUTS SEG NFR BLD: 64.1 % (ref 34–71.1)
PLATELET # BLD AUTO: 143 K/UL (ref 182–369)
PMV BLD AUTO: 10.7 FL (ref 7.4–10.4)
RBC # BLD AUTO: 5.05 M/UL (ref 3.93–5.22)
WBC # BLD AUTO: 6.6 K/UL (ref 3.98–10.04)

## 2023-06-28 PROCEDURE — G8427 DOCREV CUR MEDS BY ELIG CLIN: HCPCS | Performed by: PHYSICIAN ASSISTANT

## 2023-06-28 PROCEDURE — 3079F DIAST BP 80-89 MM HG: CPT | Performed by: PHYSICIAN ASSISTANT

## 2023-06-28 PROCEDURE — G0296 VISIT TO DETERM LDCT ELIG: HCPCS | Performed by: PHYSICIAN ASSISTANT

## 2023-06-28 PROCEDURE — 4004F PT TOBACCO SCREEN RCVD TLK: CPT | Performed by: PHYSICIAN ASSISTANT

## 2023-06-28 PROCEDURE — 3075F SYST BP GE 130 - 139MM HG: CPT | Performed by: PHYSICIAN ASSISTANT

## 2023-06-28 PROCEDURE — 99212 OFFICE O/P EST SF 10 MIN: CPT

## 2023-06-28 PROCEDURE — 85025 COMPLETE CBC W/AUTO DIFF WBC: CPT

## 2023-06-28 PROCEDURE — G8417 CALC BMI ABV UP PARAM F/U: HCPCS | Performed by: PHYSICIAN ASSISTANT

## 2023-06-28 PROCEDURE — 3017F COLORECTAL CA SCREEN DOC REV: CPT | Performed by: PHYSICIAN ASSISTANT

## 2023-06-28 PROCEDURE — 36415 COLL VENOUS BLD VENIPUNCTURE: CPT

## 2023-06-28 PROCEDURE — 99214 OFFICE O/P EST MOD 30 MIN: CPT | Performed by: PHYSICIAN ASSISTANT

## 2023-06-28 RX ORDER — DULAGLUTIDE 1.5 MG/.5ML
INJECTION, SOLUTION SUBCUTANEOUS
COMMUNITY
Start: 2023-06-13

## 2023-06-28 ASSESSMENT — ENCOUNTER SYMPTOMS
BLOOD IN STOOL: 0
SHORTNESS OF BREATH: 0
ABDOMINAL PAIN: 0
WHEEZING: 0
PHOTOPHOBIA: 0
TROUBLE SWALLOWING: 0
ABDOMINAL DISTENTION: 0
EYE DISCHARGE: 0
VOMITING: 0
BACK PAIN: 1
NAUSEA: 0
CONSTIPATION: 0
DIARRHEA: 0
COLOR CHANGE: 0
VOICE CHANGE: 0
SORE THROAT: 0
COUGH: 0
EYE ITCHING: 0

## 2023-08-11 ENCOUNTER — TELEPHONE (OUTPATIENT)
Dept: PODIATRY | Facility: CLINIC | Age: 63
End: 2023-08-11
Payer: COMMERCIAL

## 2023-09-05 ENCOUNTER — TELEPHONE (OUTPATIENT)
Dept: PODIATRY | Facility: CLINIC | Age: 63
End: 2023-09-05

## 2023-09-05 NOTE — TELEPHONE ENCOUNTER
Caller: CLAUDETTE KEELING    Relationship to Patient: the patient    Phone Number: [phone] (home)     Reason for Call:  PATIENT CALLED IN STATES SHE IS STILL WAITING FOR A CALL TO BE R.S FROM [facility].

## 2023-09-07 ENCOUNTER — OFFICE VISIT (OUTPATIENT)
Dept: CARDIOLOGY | Facility: CLINIC | Age: 63
End: 2023-09-07
Payer: COMMERCIAL

## 2023-09-07 VITALS
OXYGEN SATURATION: 92 % | DIASTOLIC BLOOD PRESSURE: 72 MMHG | WEIGHT: 277 LBS | HEART RATE: 83 BPM | SYSTOLIC BLOOD PRESSURE: 136 MMHG | HEIGHT: 66 IN | BODY MASS INDEX: 44.52 KG/M2

## 2023-09-07 DIAGNOSIS — Z79.01 CHRONIC ANTICOAGULATION: ICD-10-CM

## 2023-09-07 DIAGNOSIS — J43.8 OTHER EMPHYSEMA: ICD-10-CM

## 2023-09-07 DIAGNOSIS — G47.33 OSA (OBSTRUCTIVE SLEEP APNEA): ICD-10-CM

## 2023-09-07 DIAGNOSIS — Z72.0 TOBACCO ABUSE: ICD-10-CM

## 2023-09-07 DIAGNOSIS — E11.65 TYPE 2 DIABETES MELLITUS WITH HYPERGLYCEMIA, WITHOUT LONG-TERM CURRENT USE OF INSULIN: ICD-10-CM

## 2023-09-07 DIAGNOSIS — E66.1 CLASS 3 DRUG-INDUCED OBESITY WITH SERIOUS COMORBIDITY AND BODY MASS INDEX (BMI) OF 40.0 TO 44.9 IN ADULT: ICD-10-CM

## 2023-09-07 DIAGNOSIS — I48.0 PAROXYSMAL ATRIAL FIBRILLATION: Primary | ICD-10-CM

## 2023-09-07 DIAGNOSIS — I50.32 CHRONIC DIASTOLIC CHF (CONGESTIVE HEART FAILURE): ICD-10-CM

## 2023-09-07 RX ORDER — METOPROLOL TARTRATE 50 MG/1
50 TABLET, FILM COATED ORAL 2 TIMES DAILY
Qty: 60 TABLET | Refills: 11 | Status: SHIPPED | OUTPATIENT
Start: 2023-09-07

## 2023-09-07 RX ORDER — ONDANSETRON 4 MG/1
TABLET, ORALLY DISINTEGRATING ORAL
COMMUNITY
Start: 2023-08-08

## 2023-09-07 RX ORDER — DILTIAZEM HYDROCHLORIDE 120 MG/1
120 CAPSULE, COATED, EXTENDED RELEASE ORAL
Qty: 30 CAPSULE | Refills: 11 | Status: SHIPPED | OUTPATIENT
Start: 2023-09-07

## 2023-09-07 RX ORDER — FLECAINIDE ACETATE 50 MG/1
50 TABLET ORAL EVERY 12 HOURS SCHEDULED
Qty: 60 TABLET | Refills: 11 | Status: SHIPPED | OUTPATIENT
Start: 2023-09-07

## 2023-09-07 RX ORDER — DULAGLUTIDE 1.5 MG/.5ML
1 INJECTION, SOLUTION SUBCUTANEOUS WEEKLY
COMMUNITY
Start: 2023-06-13

## 2023-09-07 NOTE — PROGRESS NOTES
Subjective:     Encounter Date: 09/07/2023      Patient ID: Claudette Keeling is a 62 y.o. female with paroxysmal atrial fibrillation s/p cardioversion on 8/17/2022, chronic anticoagulation, chronic diastolic congestive heart failure, type 2 diabetes, COPD/tobacco abuse, obstructive sleep apnea and obesity.    Chief Complaint: routine follow up  Atrial Fibrillation  Presents for follow-up visit. Symptoms are negative for bradycardia, chest pain, dizziness, hemodynamic instability, hypertension, hypotension, palpitations, shortness of breath, syncope, tachycardia and weakness. The symptoms have been stable. Past medical history includes atrial fibrillation and CHF. There are no medication compliance problems.   Congestive Heart Failure  Presents for follow-up visit. Pertinent negatives include no chest pain, chest pressure, claudication, edema, fatigue, muscle weakness, near-syncope, nocturia, orthopnea, palpitations, paroxysmal nocturnal dyspnea, shortness of breath or unexpected weight change. The symptoms have been stable. Compliance with total regimen is 51-75%. Compliance with diet is 51-75%. Compliance with exercise is 51-75%. Compliance with medications is 51-75%.       Patient presents today for management of atrial fibrillation and diastolic congestive heart failure. Today she reports that she has been doing well. She reports that she has continued to have dyspnea on exertion related to COPD. She reports that she has plenty of energy. She reports that she has still been able to work. She denies any heart racing or palpitations. She denies any chest pain. She denies leg swelling, orthopnea or PND. She denies any dizziness or near syncope. She was able to get a new mask for her; she reports that she has been compliant with CPAP.  She reports that she doesn't check her BP routinely but it remains controlled at other dr visits. She was started on Ozempic but is having difficulty with price and it being in  stock at the pharmacy so it was changed to Trulicity. She is on eliquis and denies any bleeding issues. She follows with Dr Torrez as PCP.       Previous Cardiac Testing and Procedures:   -Echo (5/10/2022): LVEF 61-65%, moderate LVH, diastolic dysfunction, no significant valvular disease  -Hgb A1C (5/10/2022): 7.4  -Lipid panel (5/10/2022): total cholesterol 149, hdl 37, LDL 90 and triglycerides 124  -Cardioversion (6/14/2022): successful cardioversion   -Cardioversion (8/17/2022): Successful cardioversion   -Hgb A1C (8/17/2022): 8.5  -Lipid panel (8/17/2022): total cholesterol 143, hdl 37, LDL 85 and triglycerides 116      The following portions of the patient's history were reviewed and updated as appropriate: allergies, current medications, past family history, past medical history, past social history, past surgical history and problem list.    Allergies   Allergen Reactions    Codeine GI Intolerance       Current Outpatient Medications:     Budeson-Glycopyrrol-Formoterol (Breztri Aerosphere) 160-9-4.8 MCG/ACT aerosol inhaler, Inhale 2 puffs 2 (Two) Times a Day., Disp: , Rfl:     Cholecalciferol (Vitamin D3) 1.25 MG (41727 UT) capsule, Take 1 capsule by mouth 1 (One) Time Per Week., Disp: , Rfl:     empagliflozin (JARDIANCE) 25 MG tablet tablet, Take 1 tablet by mouth Daily., Disp: , Rfl:     famotidine (Pepcid) 20 MG tablet, Take 1 tablet by mouth 2 (Two) Times a Day As Needed for Heartburn or Indigestion., Disp: 180 tablet, Rfl: 0    furosemide (LASIX) 40 MG tablet, Take 1 tablet by mouth Daily As Needed., Disp: , Rfl:     gabapentin (NEURONTIN) 300 MG capsule, Take 1 capsule by mouth 3 (Three) Times a Day., Disp: , Rfl:     lisinopril (PRINIVIL,ZESTRIL) 10 MG tablet, Take 1 tablet by mouth Daily., Disp: 90 tablet, Rfl: 0    metFORMIN ER (GLUCOPHAGE-XR) 500 MG 24 hr tablet, Take 2 tablets by mouth Daily With Breakfast., Disp: 180 tablet, Rfl: 1    multivitamin with minerals tablet tablet, Take 1 tablet by mouth  Daily., Disp: , Rfl:     nystatin (MYCOSTATIN) 514437 UNIT/GM powder, Apply 1 application  topically to the appropriate area as directed 2 (Two) Times a Day As Needed., Disp: , Rfl:     ondansetron ODT (ZOFRAN-ODT) 4 MG disintegrating tablet, DISSOLVE 1 TABLET IN MOUTH EVERY 8 HOURS AS NEEDED, Disp: , Rfl:     rosuvastatin (Crestor) 5 MG tablet, Take 1 tablet by mouth Daily., Disp: 90 tablet, Rfl: 1    Trulicity 1.5 MG/0.5ML solution pen-injector, Inject 1 syringe under the skin into the appropriate area as directed 1 (One) Time Per Week., Disp: , Rfl:     apixaban (ELIQUIS) 5 MG tablet tablet, Take 1 tablet by mouth Every 12 (Twelve) Hours. Indications: Atrial Fibrillation, Disp: 60 tablet, Rfl: 11    dilTIAZem CD (CARDIZEM CD) 120 MG 24 hr capsule, Take 1 capsule by mouth Daily., Disp: 30 capsule, Rfl: 11    flecainide (TAMBOCOR) 50 MG tablet, Take 1 tablet by mouth Every 12 (Twelve) Hours., Disp: 60 tablet, Rfl: 11    metoprolol tartrate (LOPRESSOR) 50 MG tablet, Take 1 tablet by mouth 2 (Two) Times a Day., Disp: 60 tablet, Rfl: 11    mupirocin (BACTROBAN) 2 % ointment, Apply topically to each nare 2 times per day (Patient not taking: Reported on 9/7/2023), Disp: 22 g, Rfl: 0    varenicline (CHANTIX) 1 MG tablet, Take 1 tablet by mouth 2 (Two) Times a Day. (Patient not taking: Reported on 9/7/2023), Disp: 60 tablet, Rfl: 5    Past Medical History:   Diagnosis Date    Arthritis     Asthma 5/1922    Atrial fibrillation     COPD (chronic obstructive pulmonary disease) 5/1922    Diabetes mellitus     Hypertension     Neuropathy     Sleep apnea     Stroke 5/1922     Social History     Socioeconomic History    Marital status:    Tobacco Use    Smoking status: Every Day     Packs/day: 1.00     Years: 25.00     Pack years: 25.00     Types: Cigarettes     Passive exposure: Past    Smokeless tobacco: Never    Tobacco comments:     Has started back and restarted Chantix   Vaping Use    Vaping Use: Never used    Substance and Sexual Activity    Alcohol use: Never    Drug use: Never    Sexual activity: Not Currently     Partners: Male     Birth control/protection: Hysterectomy       Review of Systems   Constitutional: Negative for fatigue, malaise/fatigue, unexpected weight change, weight gain and weight loss.   HENT:  Negative for nosebleeds.    Cardiovascular:  Positive for dyspnea on exertion (unchanged). Negative for chest pain, claudication, irregular heartbeat, leg swelling, near-syncope, orthopnea, palpitations, paroxysmal nocturnal dyspnea and syncope.   Respiratory:  Negative for shortness of breath.    Hematologic/Lymphatic: Bruises/bleeds easily.   Musculoskeletal:  Negative for muscle weakness.   Genitourinary:  Negative for hematuria and nocturia.   Neurological:  Negative for dizziness and weakness.   All other systems reviewed and are negative.       Objective:     Vitals reviewed.   Constitutional:       General: Not in acute distress.     Appearance: Normal appearance. Well-developed. Morbidly obese.   Eyes:      Pupils: Pupils are equal, round, and reactive to light.   HENT:      Head: Normocephalic and atraumatic.      Nose: Nose normal.   Neck:      Vascular: No carotid bruit.   Pulmonary:      Effort: Pulmonary effort is normal. No respiratory distress.      Breath sounds: Normal breath sounds. No wheezing. No rales.   Cardiovascular:      Normal rate. Regular rhythm.      Murmurs: There is no murmur.   Edema:     Peripheral edema absent.   Abdominal:      General: There is no distension.      Palpations: Abdomen is soft.   Musculoskeletal: Normal range of motion.      Cervical back: Normal range of motion and neck supple. Skin:     General: Skin is warm.      Findings: No erythema or rash.   Neurological:      General: No focal deficit present.      Mental Status: Alert and oriented to person, place, and time.   Psychiatric:         Attention and Perception: Attention normal.         Mood and Affect:  "Mood normal.         Speech: Speech normal.         Behavior: Behavior normal.         Thought Content: Thought content normal.         Judgment: Judgment normal.       /72 (BP Location: Right arm, Patient Position: Sitting, Cuff Size: Large Adult)   Pulse 83   Ht 167.6 cm (66\")   Wt 126 kg (277 lb)   SpO2 92%   BMI 44.71 kg/m²       ECG 12 Lead    Date/Time: 9/7/2023 2:28 PM  Performed by: Paul Coleman APRN  Authorized by: Paul Coleman APRN   Comparison: compared with previous ECG from 3/1/2023  Similar to previous ECG  Rhythm: sinus rhythm  Rate: normal  BPM: 83        Lab Review:     Results for orders placed during the hospital encounter of 05/09/22    Adult Transthoracic Echo Complete With Contrast if Necessary Per Protocol    Interpretation Summary  · Left ventricular ejection fraction appears to be 61 - 65%. Left ventricular systolic function is normal.  · Left ventricular wall thickness is consistent with moderate concentric hypertrophy.  · Left ventricular diastolic dysfunction is noted.  · Normal right ventricular cavity size and systolic function noted.  · There is mild biatrial enlargement.  · There is no significant (greater than mild) valvular dysfunction.  · Estimated right ventricular systolic pressure from tricuspid regurgitation is normal (<35 mmHg).    Lab Results   Component Value Date    CHOL 143 08/17/2022    TRIG 116 08/17/2022    HDL 37 (L) 08/17/2022    LDL 85 08/17/2022     Lab Results   Component Value Date    HGBA1C 8.50 (H) 08/17/2022     I have personally reviewed echo, hospitalization notes, labs and past office notes prior to patients visit  Assessment:          Diagnosis Plan   1. Paroxysmal atrial fibrillation        2. Chronic anticoagulation        3. Chronic diastolic CHF (congestive heart failure)        4. Other emphysema        5. Tobacco abuse        6. Type 2 diabetes mellitus with hyperglycemia, without long-term current use of insulin        7. ANTHONY " (obstructive sleep apnea)        8. Class 3 drug-induced obesity with serious comorbidity and body mass index (BMI) of 40.0 to 44.9 in adult                 Plan:       1. Paroxysmal Atrial Fibrillation: EKG today shows NSR rates of 83. Patient denies any known recurrence of atrial fibrillation. Continue Flecainide, Cardizem and metoprolol. Patient is on Eliquis and denies any bleeding issues.      2. Anticoagulation: Patient is on Eliquis and denies any bleeding issues.     3. Chronic diastolic congestive heart failure: Patient remains euvolemic in office today. Continue lasix 40 mg on daily as needed basis. She has been keeping up with daily weights and they have remained stable. Reviewed signs and symptoms of CHF and what to report to office with patient. Discussed importance of daily weights. Recommended her weigh every morning after urinating and recording it. If she notices a weight gain of 2-3 lbs overnight/5 lbs in a week then she can take a Lasix 40 mg and call office. Recommended low salt diet. Discussed keeping legs elevated as much as possible when sitting throughout the day. Recommended compression stockings or ACE wraps to patient if needed for leg swelling.     4/5. COPD/tobacco abuse: Claudette Keeling  reports that she has been smoking cigarettes. She has a 25.00 pack-year smoking history. She has been exposed to tobacco smoke. She has never used smokeless tobacco.. I have educated her on the risk of diseases from using tobacco products such as cancer, COPD and heart disease. I advised her to quit and she is not willing to quit. She has drastically decreased her daily amount of cigarettes. I spent 3  minutes counseling the patient.     6. Type 2 diabetes: managed and followed by PCP. A1C 8.5 8/2022    7. Obstructive sleep apnea: compliant with CPAP    8. Obesity: Class 3 Severe Obesity (BMI >=40). Obesity-related health conditions include the following: hypertension and diabetes mellitus. Obesity is  unchanged. BMI is is above average; BMI management plan is completed. We discussed portion control and increasing exercise.    I attest that all portions of this note reviewed and all information has been updated by myself to reflect the patient's current status.      I spent 37 minutes caring for Claudette on this date of service. This time includes time spent by me in the following activities:preparing for the visit, reviewing tests, obtaining and/or reviewing a separately obtained history, performing a medically appropriate examination and/or evaluation , counseling and educating the patient/family/caregiver, ordering medications, tests, or procedures, and documenting information in the medical record    I spent 2 minutes on the separately reported service of EKG. This time is not included in the time used to support the E/M service also reported today.    Patient is to return to office in 6 months or sooner if needed

## 2023-09-08 ENCOUNTER — TELEPHONE (OUTPATIENT)
Dept: PODIATRY | Facility: CLINIC | Age: 63
End: 2023-09-08
Payer: COMMERCIAL

## 2023-09-08 NOTE — TELEPHONE ENCOUNTER
Called patient regarding appt on 09/11/2023. Left message for patient to return call if any questions or concerns arise.

## 2023-09-08 NOTE — PROGRESS NOTES
[facility] - PODIATRY    Today's Date: 09/11/2023     Patient Name: Claudette Keeling  MRN: [id number]  CSN: [id number]  PCP: Reymundo Torrez MD  Referring Provider: No ref. provider found    SUBJECTIVE     Chief Complaint   Patient presents with    Follow-up     Reymundo Torrez MD 07/2023 3 MTH FU DIABETIC- pt states feet doing ok other than the callus on right 5ht toe- pt denies pain     Diabetes     HPI: Claudette Keeling, a 62 y.o.female, comes to clinic as a(n) established w patient presenting for diabetic foot exam and complaining of fungal toenails and painful calluses . Patient has h/o arthritis, Asthma, AFib, COPD, DM2, HTN, Neuropathy, Sleep Apnea, Stroke, Tobacco use . Patient is NIDDM and unsure of last BG level.  Has not checked blood glucose recently.  Notes numbness and tingling in feet. She has previously had her right 3rd toe amputated in the 90s. Denies any open wounds or sores. Notes that her toenails are long, thick and crumbly but feels that they are much improved from previous visit. She has trouble caring for them herself. Notes that she has a painful callus to her right 5th toe. Using walker.  Denies pain. Relates previous treatment(s) including care by podiatry . Denies any constitutional symptoms. No other pedal complaints at this time.    Past Medical History:   Diagnosis Date    Arthritis     Asthma 5/1922    Atrial fibrillation     COPD (chronic obstructive pulmonary disease) 5/1922    Diabetes mellitus     Hypertension     Neuropathy     Sleep apnea     Stroke 5/1922     Past Surgical History:   Procedure Laterality Date    APPENDECTOMY      HYSTERECTOMY      JOINT REPLACEMENT Left     knee    TOE OSTEOPHYTE REMOVAL       Family History   Problem Relation Age of Onset    Heart failure Mother     Hypertension Mother     Diabetes Mother     Hypertension Father     Diabetes Father     No Known Problems Sister     Diabetes Brother     Hypertension Brother     Heart  disease Brother         Mother    Heart attack Brother      Social History     Socioeconomic History    Marital status:    Tobacco Use    Smoking status: Every Day     Packs/day: 1.00     Years: 25.00     Pack years: 25.00     Types: Cigarettes     Passive exposure: Past    Smokeless tobacco: Never    Tobacco comments:     Has started back and restarted Chantix   Vaping Use    Vaping Use: Never used   Substance and Sexual Activity    Alcohol use: Never    Drug use: Never    Sexual activity: Not Currently     Partners: Male     Birth control/protection: Hysterectomy     Allergies   Allergen Reactions    Codeine GI Intolerance     Current Outpatient Medications   Medication Sig Dispense Refill    apixaban (ELIQUIS) 5 MG tablet tablet Take 1 tablet by mouth Every 12 (Twelve) Hours. Indications: Atrial Fibrillation 60 tablet 11    Budeson-Glycopyrrol-Formoterol (Breztri Aerosphere) 160-9-4.8 MCG/ACT aerosol inhaler Inhale 2 puffs 2 (Two) Times a Day.      Cholecalciferol (Vitamin D3) 1.25 MG (76981 UT) capsule Take 1 capsule by mouth 1 (One) Time Per Week.      dilTIAZem CD (CARDIZEM CD) 120 MG 24 hr capsule Take 1 capsule by mouth Daily. 30 capsule 11    empagliflozin (JARDIANCE) 25 MG tablet tablet Take 1 tablet by mouth Daily.      famotidine (Pepcid) 20 MG tablet Take 1 tablet by mouth 2 (Two) Times a Day As Needed for Heartburn or Indigestion. 180 tablet 0    flecainide (TAMBOCOR) 50 MG tablet Take 1 tablet by mouth Every 12 (Twelve) Hours. 60 tablet 11    furosemide (LASIX) 40 MG tablet Take 1 tablet by mouth Daily As Needed.      gabapentin (NEURONTIN) 300 MG capsule Take 1 capsule by mouth 3 (Three) Times a Day.      lisinopril (PRINIVIL,ZESTRIL) 10 MG tablet Take 1 tablet by mouth Daily. 90 tablet 0    metFORMIN ER (GLUCOPHAGE-XR) 500 MG 24 hr tablet Take 2 tablets by mouth Daily With Breakfast. 180 tablet 1    metoprolol tartrate (LOPRESSOR) 50 MG tablet Take 1 tablet by mouth 2 (Two) Times a Day. 60  tablet 11    multivitamin with minerals tablet tablet Take 1 tablet by mouth Daily.      mupirocin (BACTROBAN) 2 % ointment Apply topically to each nare 2 times per day 22 g 0    nystatin (MYCOSTATIN) 636819 UNIT/GM powder Apply 1 application  topically to the appropriate area as directed 2 (Two) Times a Day As Needed.      ondansetron ODT (ZOFRAN-ODT) 4 MG disintegrating tablet DISSOLVE 1 TABLET IN MOUTH EVERY 8 HOURS AS NEEDED      rosuvastatin (Crestor) 5 MG tablet Take 1 tablet by mouth Daily. 90 tablet 1    Trulicity 1.5 MG/0.5ML solution pen-injector Inject 1 syringe under the skin into the appropriate area as directed 1 (One) Time Per Week.      varenicline (CHANTIX) 1 MG tablet Take 1 tablet by mouth 2 (Two) Times a Day. (Patient not taking: Reported on 9/11/2023) 60 tablet 5     No current facility-administered medications for this visit.     Review of Systems   Constitutional:  Negative for chills and fever.   HENT:  Negative for congestion.    Respiratory:  Negative for shortness of breath.    Cardiovascular:  Negative for chest pain and leg swelling.   Gastrointestinal:  Negative for constipation, diarrhea, nausea and vomiting.   Musculoskeletal:  Positive for arthralgias and gait problem.        Foot pain   Skin:  Negative for wound.   Neurological:  Positive for numbness.     OBJECTIVE     Vitals:    09/11/23 1042   BP: 124/80   Pulse: 96   SpO2: 94%       PHYSICAL EXAM  GEN:   Accompanied by none.     Foot/Ankle Exam    GENERAL  Diabetic foot exam performed    Appearance:  obese  Orientation:  AAOx3  Affect:  appropriate  Gait:  unimpaired  Assistance:  walker  Right shoe gear: casual shoe  Left shoe gear: casual shoe    VASCULAR     Right Foot Vascularity   Dorsalis pedis:  2+  Posterior tibial:  2+  Skin temperature:  warm  Edema grading:  None  CFT:  3  Pedal hair growth:  Present  Varicosities:  moderate varicosities     Left Foot Vascularity   Dorsalis pedis:  2+  Posterior tibial:  2+  Skin  temperature:  warm  Edema grading:  None  CFT:  3  Pedal hair growth:  Present  Varicosities:  moderate varicosities     NEUROLOGIC     Right Foot Neurologic   Light touch sensation: diminished  Vibratory sensation: diminished  Hot/Cold sensation: diminished  Protective Sensation using Greer-Lynn Monofilament:   Sites intact: 8  Sites tested: 10     Left Foot Neurologic   Light touch sensation: diminished  Vibratory sensation: diminished  Hot/Cold sensation:  diminished  Protective Sensation using Greer-Lynn Monofilament:   Sites intact: 8  Sites tested: 10    MUSCULOSKELETAL     Right Foot Musculoskeletal    Amputation   Toes amputated: third toe  Tenderness:  callous right foot, toe 5 tenderness and toenail problem    Arch:  Normal  Hammertoe:  Second toe, fourth toe and fifth toe  Hallux valgus: Yes       Left Foot Musculoskeletal   Tenderness:  toenail problem  Arch:  Normal    MUSCLE STRENGTH     Right Foot Muscle Strength   Foot dorsiflexion:  5  Foot plantar flexion:  5  Foot inversion:  5  Foot eversion:  5     Left Foot Muscle Strength   Foot dorsiflexion:  5  Foot plantar flexion:  5  Foot inversion:  5  Foot eversion:  5    RANGE OF MOTION     Right Foot Range of Motion   Foot and ankle ROM within normal limits       Left Foot Range of Motion   Foot and ankle ROM within normal limits      DERMATOLOGIC      Right Foot Dermatologic   Skin  Positive for corn.   Nails  1.  Positive for onychomycosis, abnormal thickness and dystrophic nail.  2.  Positive for elongated, abnormal thickness and dystrophic nail.  3.  Positive for elongated, abnormal thickness and dystrophic nail.  4.  Positive for elongated, abnormal thickness and dystrophic nail.  5.  Positive for elongated, abnormal thickness and dystrophic nail.     Left Foot Dermatologic   Skin  Left foot skin is intact.   Nails  1.  Positive for elongated, onychomycosis, abnormal thickness and dystrophic nail.  2.  Positive for elongated, abnormal  thickness and dystrophic nail.  3.  Positive for elongated, abnormal thickness and dystrophic nail.  4.  Positive for elongated, abnormally thick and dystrophic nail.  5.  Positive for elongated, abnormally thick and dystrophic nail.    Image:     RADIOLOGY/NUCLEAR:  No results found.    LABORATORY/CULTURE RESULTS:      PATHOLOGY RESULTS:       ASSESSMENT/PLAN     Diagnoses and all orders for this visit:    1. Onychomycosis (Primary)    2. Toe pain, bilateral    3. Foot callus    4. Type 2 diabetes mellitus with diabetic neuropathy, without long-term current use of insulin    5. Toe amputee    6. Gait abnormality      Comprehensive lower extremity examination and evaluation was performed.  Discussed findings and treatment plan including risks, benefits, and treatment options with patient in detail. Patient agreed with treatment plan.  After verbal consent obtained, nail(s) x9 debrided of length and thickness with nail nipper without incidence  After verbal consent obtained, calluses x1 pared utilizing dermal curette and/or scalpel without incidence  Patient may maintain nails and calluses at home utilizing emery board or pumice stone between visits as needed  Reviewed at home diabetic foot care including daily foot checks   Continue to monitor BG with assistance of PCP.  Discussed tobacco cessation  An After Visit Summary was printed and given to the patient at discharge, including (if requested) any available informative/educational handouts regarding diagnosis, treatment, or medications. All questions were answered to patient/family satisfaction. Should symptoms fail to improve or worsen they agree to call or return to clinic or to go to the Emergency Department. Discussed the importance of following up with any needed screening tests/labs/specialist appointments and any requested follow-up recommended by me today. Importance of maintaining follow-up discussed and patient accepts that missed appointments can delay  diagnosis and potentially lead to worsening of conditions.  Return in about 3 months (around 12/11/2023)., or sooner if acute issues arise.    Lab Frequency Next Occurrence   Basic Metabolic Panel Once 08/08/2022   COVID PRE-OP / PRE-PROCEDURE SCREENING ORDER (NO ISOLATION) - Swab, Nasopharynx Once 08/18/2022   Cardioversion External in Cardiology Department Once 08/18/2022       This document has been electronically signed by FLORENCIA Mojica on September 11, 2023 10:54 CDT

## 2023-09-11 ENCOUNTER — OFFICE VISIT (OUTPATIENT)
Dept: PODIATRY | Facility: CLINIC | Age: 63
End: 2023-09-11
Payer: COMMERCIAL

## 2023-09-11 VITALS
OXYGEN SATURATION: 94 % | BODY MASS INDEX: 44.2 KG/M2 | HEART RATE: 96 BPM | WEIGHT: 275 LBS | SYSTOLIC BLOOD PRESSURE: 124 MMHG | DIASTOLIC BLOOD PRESSURE: 80 MMHG | HEIGHT: 66 IN

## 2023-09-11 DIAGNOSIS — Z89.429 TOE AMPUTEE: ICD-10-CM

## 2023-09-11 DIAGNOSIS — R26.9 GAIT ABNORMALITY: ICD-10-CM

## 2023-09-11 DIAGNOSIS — M79.674 TOE PAIN, BILATERAL: ICD-10-CM

## 2023-09-11 DIAGNOSIS — B35.1 ONYCHOMYCOSIS: Primary | ICD-10-CM

## 2023-09-11 DIAGNOSIS — L84 FOOT CALLUS: ICD-10-CM

## 2023-09-11 DIAGNOSIS — E11.40 TYPE 2 DIABETES MELLITUS WITH DIABETIC NEUROPATHY, WITHOUT LONG-TERM CURRENT USE OF INSULIN: ICD-10-CM

## 2023-09-11 DIAGNOSIS — M79.675 TOE PAIN, BILATERAL: ICD-10-CM

## 2023-09-11 PROCEDURE — 11055 PARING/CUTG B9 HYPRKER LES 1: CPT | Performed by: NURSE PRACTITIONER

## 2023-09-11 PROCEDURE — 11721 DEBRIDE NAIL 6 OR MORE: CPT | Performed by: NURSE PRACTITIONER

## 2023-11-26 ENCOUNTER — HOSPITAL ENCOUNTER (EMERGENCY)
Facility: HOSPITAL | Age: 63
Discharge: HOME | End: 2023-11-26
Attending: EMERGENCY MEDICINE | Admitting: EMERGENCY MEDICINE
Payer: COMMERCIAL

## 2023-11-26 ENCOUNTER — APPOINTMENT (OUTPATIENT)
Dept: CT IMAGING | Facility: HOSPITAL | Age: 63
End: 2023-11-26
Payer: COMMERCIAL

## 2023-11-26 VITALS
HEIGHT: 65 IN | TEMPERATURE: 97.4 F | DIASTOLIC BLOOD PRESSURE: 92 MMHG | WEIGHT: 276 LBS | RESPIRATION RATE: 18 BRPM | HEART RATE: 71 BPM | SYSTOLIC BLOOD PRESSURE: 158 MMHG | BODY MASS INDEX: 45.98 KG/M2 | OXYGEN SATURATION: 94 %

## 2023-11-26 DIAGNOSIS — R11.0 NAUSEA: ICD-10-CM

## 2023-11-26 DIAGNOSIS — R10.9 ABDOMINAL PAIN, UNSPECIFIED ABDOMINAL LOCATION: Primary | ICD-10-CM

## 2023-11-26 DIAGNOSIS — K59.00 CONSTIPATION, UNSPECIFIED CONSTIPATION TYPE: ICD-10-CM

## 2023-11-26 LAB
ALBUMIN SERPL-MCNC: 3.3 G/DL (ref 3.5–5.2)
ALBUMIN/GLOB SERPL: 1.4 G/DL
ALP SERPL-CCNC: 57 U/L (ref 39–117)
ALT SERPL W P-5'-P-CCNC: 14 U/L (ref 1–33)
ANION GAP SERPL CALCULATED.3IONS-SCNC: 9 MMOL/L (ref 5–15)
AST SERPL-CCNC: 12 U/L (ref 1–32)
BASOPHILS # BLD AUTO: 0.05 10*3/MM3 (ref 0–0.2)
BASOPHILS NFR BLD AUTO: 0.6 % (ref 0–1.5)
BILIRUB SERPL-MCNC: 0.2 MG/DL (ref 0–1.2)
BILIRUB UR QL STRIP: NEGATIVE
BUN SERPL-MCNC: 18 MG/DL (ref 8–23)
BUN/CREAT SERPL: 35.3 (ref 7–25)
CALCIUM SPEC-SCNC: 8.3 MG/DL (ref 8.6–10.5)
CHLORIDE SERPL-SCNC: 103 MMOL/L (ref 98–107)
CLARITY UR: CLEAR
CO2 SERPL-SCNC: 27 MMOL/L (ref 22–29)
COLOR UR: YELLOW
CREAT SERPL-MCNC: 0.51 MG/DL (ref 0.57–1)
DEPRECATED RDW RBC AUTO: 46.5 FL (ref 37–54)
EGFRCR SERPLBLD CKD-EPI 2021: 104.4 ML/MIN/1.73
EOSINOPHIL # BLD AUTO: 0.12 10*3/MM3 (ref 0–0.4)
EOSINOPHIL NFR BLD AUTO: 1.4 % (ref 0.3–6.2)
ERYTHROCYTE [DISTWIDTH] IN BLOOD BY AUTOMATED COUNT: 13.3 % (ref 12.3–15.4)
GLOBULIN UR ELPH-MCNC: 2.4 GM/DL
GLUCOSE BLDC GLUCOMTR-MCNC: 123 MG/DL (ref 70–130)
GLUCOSE SERPL-MCNC: 95 MG/DL (ref 65–99)
GLUCOSE UR STRIP-MCNC: ABNORMAL MG/DL
HCT VFR BLD AUTO: 52.9 % (ref 34–46.6)
HGB BLD-MCNC: 16.5 G/DL (ref 12–15.9)
HGB UR QL STRIP.AUTO: NEGATIVE
IMM GRANULOCYTES # BLD AUTO: 0.01 10*3/MM3 (ref 0–0.05)
IMM GRANULOCYTES NFR BLD AUTO: 0.1 % (ref 0–0.5)
INR PPP: 1.03 (ref 0.91–1.09)
KETONES UR QL STRIP: NEGATIVE
LEUKOCYTE ESTERASE UR QL STRIP.AUTO: NEGATIVE
LIPASE SERPL-CCNC: 22 U/L (ref 13–60)
LYMPHOCYTES # BLD AUTO: 2.18 10*3/MM3 (ref 0.7–3.1)
LYMPHOCYTES NFR BLD AUTO: 25.7 % (ref 19.6–45.3)
MAGNESIUM SERPL-MCNC: 1.6 MG/DL (ref 1.6–2.4)
MCH RBC QN AUTO: 29.3 PG (ref 26.6–33)
MCHC RBC AUTO-ENTMCNC: 31.2 G/DL (ref 31.5–35.7)
MCV RBC AUTO: 94 FL (ref 79–97)
MONOCYTES # BLD AUTO: 0.49 10*3/MM3 (ref 0.1–0.9)
MONOCYTES NFR BLD AUTO: 5.8 % (ref 5–12)
NEUTROPHILS NFR BLD AUTO: 5.63 10*3/MM3 (ref 1.7–7)
NEUTROPHILS NFR BLD AUTO: 66.4 % (ref 42.7–76)
NITRITE UR QL STRIP: NEGATIVE
NRBC BLD AUTO-RTO: 0 /100 WBC (ref 0–0.2)
PH UR STRIP.AUTO: <=5 [PH] (ref 5–8)
PLATELET # BLD AUTO: 174 10*3/MM3 (ref 140–450)
PMV BLD AUTO: 10.6 FL (ref 6–12)
POTASSIUM SERPL-SCNC: 4 MMOL/L (ref 3.5–5.2)
PROT SERPL-MCNC: 5.7 G/DL (ref 6–8.5)
PROT UR QL STRIP: NEGATIVE
PROTHROMBIN TIME: 13.6 SECONDS (ref 11.8–14.8)
RBC # BLD AUTO: 5.63 10*6/MM3 (ref 3.77–5.28)
SODIUM SERPL-SCNC: 139 MMOL/L (ref 136–145)
SP GR UR STRIP: 1.02 (ref 1–1.03)
UROBILINOGEN UR QL STRIP: ABNORMAL
WBC NRBC COR # BLD AUTO: 8.48 10*3/MM3 (ref 3.4–10.8)

## 2023-11-26 PROCEDURE — 85025 COMPLETE CBC W/AUTO DIFF WBC: CPT | Performed by: EMERGENCY MEDICINE

## 2023-11-26 PROCEDURE — 25510000001 IOPAMIDOL 61 % SOLUTION: Performed by: EMERGENCY MEDICINE

## 2023-11-26 PROCEDURE — 99285 EMERGENCY DEPT VISIT HI MDM: CPT

## 2023-11-26 PROCEDURE — 85610 PROTHROMBIN TIME: CPT | Performed by: EMERGENCY MEDICINE

## 2023-11-26 PROCEDURE — 80053 COMPREHEN METABOLIC PANEL: CPT | Performed by: EMERGENCY MEDICINE

## 2023-11-26 PROCEDURE — 81003 URINALYSIS AUTO W/O SCOPE: CPT | Performed by: EMERGENCY MEDICINE

## 2023-11-26 PROCEDURE — 83690 ASSAY OF LIPASE: CPT | Performed by: EMERGENCY MEDICINE

## 2023-11-26 PROCEDURE — 74177 CT ABD & PELVIS W/CONTRAST: CPT

## 2023-11-26 PROCEDURE — 82948 REAGENT STRIP/BLOOD GLUCOSE: CPT

## 2023-11-26 PROCEDURE — 83735 ASSAY OF MAGNESIUM: CPT | Performed by: EMERGENCY MEDICINE

## 2023-11-26 RX ORDER — ONDANSETRON 8 MG/1
8 TABLET, ORALLY DISINTEGRATING ORAL EVERY 8 HOURS PRN
Qty: 15 TABLET | Refills: 0 | Status: SHIPPED | OUTPATIENT
Start: 2023-11-26

## 2023-11-26 RX ORDER — SODIUM CHLORIDE 0.9 % (FLUSH) 0.9 %
10 SYRINGE (ML) INJECTION AS NEEDED
Status: DISCONTINUED | OUTPATIENT
Start: 2023-11-26 | End: 2023-11-26 | Stop reason: HOSPADM

## 2023-11-26 RX ADMIN — IOPAMIDOL 100 ML: 612 INJECTION, SOLUTION INTRAVENOUS at 16:33

## 2023-11-26 NOTE — ED PROVIDER NOTES
Subjective   History of Present Illness  62-year-old female presents to the ED with complaint of abdominal pain and nausea.  History of hypertension, diabetes, COPD, A-fib on Eliquis.  She reports several day history of nonspecific periumbilical and epigastric abdominal discomfort.  Describes the pain as a fullness that is associated with nausea.  No reports of vomiting or diarrhea.  Reports associated generalized weakness when she gets nauseated.  No chest pain, shortness of breath, syncope.  No dysuria or hematuria, no urinary frequency or urgency, no flank pain.  Reports similar symptoms with hypoglycemia in the past but glucose normal on arrival to the ED.  Currently complains of mild nausea and mild abdominal discomfort.  Worse with standing, improves with sitting.    History provided by:  Patient      Review of Systems   All other systems reviewed and are negative.      Past Medical History:   Diagnosis Date    Arthritis     Asthma 5/1922    Atrial fibrillation     COPD (chronic obstructive pulmonary disease) 5/1922    Diabetes mellitus     Hypertension     Neuropathy     Sleep apnea     Stroke 5/1922       Allergies   Allergen Reactions    Codeine GI Intolerance       Past Surgical History:   Procedure Laterality Date    APPENDECTOMY      HYSTERECTOMY      JOINT REPLACEMENT Left     knee    TOE OSTEOPHYTE REMOVAL         Family History   Problem Relation Age of Onset    Heart failure Mother     Hypertension Mother     Diabetes Mother     Hypertension Father     Diabetes Father     No Known Problems Sister     Diabetes Brother     Hypertension Brother     Heart disease Brother         Mother    Heart attack Brother        Social History     Socioeconomic History    Marital status:    Tobacco Use    Smoking status: Every Day     Packs/day: 1.00     Years: 25.00     Additional pack years: 0.00     Total pack years: 25.00     Types: Cigarettes     Passive exposure: Past    Smokeless tobacco: Never     Tobacco comments:     Has started back and restarted Chantix   Vaping Use    Vaping Use: Never used   Substance and Sexual Activity    Alcohol use: Never    Drug use: Never    Sexual activity: Not Currently     Partners: Male     Birth control/protection: Hysterectomy           Objective   Physical Exam  Vitals and nursing note reviewed.   Constitutional:       Appearance: Normal appearance. She is obese.   HENT:      Head: Normocephalic and atraumatic.      Nose: Nose normal. No congestion or rhinorrhea.      Mouth/Throat:      Mouth: Mucous membranes are moist.      Pharynx: No oropharyngeal exudate or posterior oropharyngeal erythema.   Eyes:      Extraocular Movements: Extraocular movements intact.      Conjunctiva/sclera: Conjunctivae normal.      Pupils: Pupils are equal, round, and reactive to light.   Cardiovascular:      Rate and Rhythm: Normal rate and regular rhythm.      Heart sounds: Normal heart sounds. No murmur heard.  Pulmonary:      Effort: Pulmonary effort is normal.      Breath sounds: Normal breath sounds. No wheezing, rhonchi or rales.   Abdominal:      General: Abdomen is flat. Bowel sounds are normal.      Palpations: Abdomen is soft.      Tenderness: There is no right CVA tenderness or left CVA tenderness.   Musculoskeletal:      Right lower leg: No edema.      Left lower leg: No edema.   Skin:     General: Skin is warm and dry.      Capillary Refill: Capillary refill takes less than 2 seconds.   Neurological:      General: No focal deficit present.      Mental Status: She is alert and oriented to person, place, and time. Mental status is at baseline.         Procedures       Lab Results (last 24 hours)       Procedure Component Value Units Date/Time    POC Glucose Once [[id number]]  (Normal) Collected: 11/26/23 1404    Specimen: Blood Updated: 11/26/23 1415     Glucose 123 mg/dL      Comment: : [id number] Raquel Spicer ID: [id number]       Urinalysis With Culture If Indicated -  Urine, Clean Catch [[id number]]  (Abnormal) Collected: 11/26/23 1431    Specimen: Urine, Clean Catch Updated: 11/26/23 1455     Color, UA Yellow     Appearance, UA Clear     pH, UA <=5.0     Specific Gravity, UA 1.025     Glucose, UA >=1000 mg/dL (3+)     Ketones, UA Negative     Bilirubin, UA Negative     Blood, UA Negative     Protein, UA Negative     Leuk Esterase, UA Negative     Nitrite, UA Negative     Urobilinogen, UA 1.0 E.U./dL    Narrative:      In absence of clinical symptoms, the presence of pyuria, bacteria, and/or nitrites on the urinalysis result does not correlate with infection.  Urine microscopic not indicated.    CBC & Differential [[id number]]  (Abnormal) Collected: 11/26/23 1436    Specimen: Blood Updated: 11/26/23 1454    Narrative:      The following orders were created for panel order CBC & Differential.  Procedure                               Abnormality         Status                     ---------                               -----------         ------                     CBC Auto Differential[[id number]]        Abnormal            Final result                 Please view results for these tests on the individual orders.    Protime-INR [[id number]]  (Normal) Collected: 11/26/23 1436    Specimen: Blood Updated: 11/26/23 1513     Protime 13.6 Seconds      INR 1.03    CBC Auto Differential [[id number]]  (Abnormal) Collected: 11/26/23 1436    Specimen: Blood Updated: 11/26/23 1454     WBC 8.48 10*3/mm3      RBC 5.63 10*6/mm3      Hemoglobin 16.5 g/dL      Hematocrit 52.9 %      MCV 94.0 fL      MCH 29.3 pg      MCHC 31.2 g/dL      RDW 13.3 %      RDW-SD 46.5 fl      MPV 10.6 fL      Platelets 174 10*3/mm3      Neutrophil % 66.4 %      Lymphocyte % 25.7 %      Monocyte % 5.8 %      Eosinophil % 1.4 %      Basophil % 0.6 %      Immature Grans % 0.1 %      Neutrophils, Absolute 5.63 10*3/mm3      Lymphocytes, Absolute 2.18 10*3/mm3      Monocytes, Absolute 0.49 10*3/mm3      Eosinophils, Absolute  0.12 10*3/mm3      Basophils, Absolute 0.05 10*3/mm3      Immature Grans, Absolute 0.01 10*3/mm3      nRBC 0.0 /100 WBC     Comprehensive Metabolic Panel [[id number]]  (Abnormal) Collected: 11/26/23 1514    Specimen: Blood Updated: 11/26/23 1541     Glucose 95 mg/dL      BUN 18 mg/dL      Creatinine 0.51 mg/dL      Sodium 139 mmol/L      Potassium 4.0 mmol/L      Comment: Slight hemolysis detected by analyzer. Result may be falsely elevated.        Chloride 103 mmol/L      CO2 27.0 mmol/L      Calcium 8.3 mg/dL      Total Protein 5.7 g/dL      Albumin 3.3 g/dL      ALT (SGPT) 14 U/L      AST (SGOT) 12 U/L      Alkaline Phosphatase 57 U/L      Total Bilirubin 0.2 mg/dL      Globulin 2.4 gm/dL      A/G Ratio 1.4 g/dL      BUN/Creatinine Ratio 35.3     Anion Gap 9.0 mmol/L      eGFR 104.4 mL/min/1.73     Narrative:      GFR Normal >60  Chronic Kidney Disease <60  Kidney Failure <15      Magnesium [[id number]]  (Normal) Collected: 11/26/23 1514    Specimen: Blood Updated: 11/26/23 1541     Magnesium 1.6 mg/dL     Lipase [[id number]]  (Normal) Collected: 11/26/23 1514    Specimen: Blood Updated: 11/26/23 1537     Lipase 22 U/L          CT Abdomen Pelvis With Contrast    Result Date: 11/26/2023  CT ABDOMEN PELVIS W CONTRAST- 11/26/2023 4:20 PM CST  HISTORY: abd pain, nausea   COMPARISON: None.  DLP: 1229 mGy cm. All CT scans are performed using dose optimization techniques as appropriate to the performed exam and including at least one of the following: Automated exposure control, adjustment of the mA and/or kV according to size, and the use of the iterative reconstruction technique.  TECHNIQUE: Following the intravenous administration of contrast, helical CT tomographic images of the abdomen and pelvis were acquired. Coronal reformatted images were also provided for review.  FINDINGS: The lung bases and base of the heart are unremarkable.  LIVER: No focal liver lesion. The hepatic vasculature is patent. There is mild  diffuse steatosis of the liver.  BILIARY SYSTEM: The gallbladder is unremarkable. No intrahepatic or extrahepatic ductal dilatation.  PANCREAS: No focal pancreatic lesion.  SPLEEN: Unremarkable.  KIDNEYS AND ADRENALS: The adrenals are unremarkable. There is a 3.1 cm cortical cyst pedunculated from the lower pole of the left kidney. There is a 3.7 cm cyst involving the lower pole of the right kidney. No perinephric fluid collections are present. No evidence of nephrolithiasis.. The ureters are decompressed and normal in appearance.  RETROPERITONEUM: No mass, lymphadenopathy or hemorrhage.  GI TRACT: There is mild constipation. No evidence of diverticular disease or diverticulitis. The small bowel is normal in distribution and appearance. No gastric wall thickening or gastric outlet obstruction.. The appendix is surgically absent..  OTHER: There is no mesenteric mass, lymphadenopathy or fluid collection. The abdominopelvic vasculature is patent. The osseous structures and soft tissues demonstrate no worrisome lesions. There is arthritic change of both SI joints with sclerosis and spurring. A fat-containing periumbilical hernia is present. No evidence of incarceration or herniated bowel loop.  PELVIS: The patient has undergone prior hysterectomy. No free fluid in the pelvis.. The urinary bladder is normal in appearance.      1. Mild constipation with an otherwise normal bowel gas pattern. 2. Mild steatosis of the liver. 3. The patient has undergone prior appendectomy and hysterectomy. No free fluid or localized inflammation is present. 4. Cortical cysts of the lower pole of both kidneys. No nephrolithiasis or obstructive uropathy. 5. Fat-containing periumbilical hernia..    This report was signed and finalized on 11/26/2023 4:44 PM CST by Dr. Celio Remy MD.        ED Course  ED Course as of 11/26/23 1721   Sun Nov 26, 2023   1718 62-year-old female with history of hypertension, diabetes, COPD, A-fib on Eliquis  presents to the ED with nonspecific periumbilical epigastric abdominal discomfort and nausea.  Labs are reassuring.  No evidence of urinary tract infection.  Renal function normal.  CT negative for acute intra-abdominal abnormality.  Did show constipation.  Recommend patient take stool softeners and increase water and fiber in her diet, can follow-up with her primary doctor as needed. [AW]      ED Course User Index  [AW] Robel Soria MD                                           Medical Decision Making  Amount and/or Complexity of Data Reviewed  Labs: ordered.  Radiology: ordered.    Risk  Prescription drug management.        Final diagnoses:   Abdominal pain, unspecified abdominal location   Constipation, unspecified constipation type   Nausea       ED Disposition  ED Disposition       ED Disposition   Discharge    Condition   Stable    Comment   --               Reymundo Torrez MD  [address] [address]  [phone]    Schedule an appointment as soon as possible for a visit in 2 days           Medication List        Changed      ondansetron ODT 8 MG disintegrating tablet  Commonly known as: ZOFRAN-ODT  Place 1 tablet on the tongue Every 8 (Eight) Hours As Needed for Nausea.  What changed:   medication strength  See the new instructions.               Where to Get Your Medications        These medications were sent to [facility] [address] [place] [address] [address] [facility] - [phone]  - [phone]   [address] [facility] [address]      Phone: [phone]   ondansetron ODT 8 MG disintegrating tablet            Robel Soria MD  11/26/23 2563

## 2024-01-15 ENCOUNTER — TELEPHONE (OUTPATIENT)
Dept: PODIATRY | Facility: CLINIC | Age: 64
End: 2024-01-15
Payer: COMMERCIAL

## 2024-01-18 NOTE — PROGRESS NOTES
[facility] - PODIATRY    Today's Date: 01/24/2024     Patient Name: Claudette Keeling  MRN: [id number]  CSN: [id number]  PCP: Reymundo Torrez MD  Referring Provider: No ref. provider found    SUBJECTIVE     Chief Complaint   Patient presents with    Follow-up     Reymundo Torrez MD 07/2023 3 MTH FU DIABETIC- pt states feet doing good- pt denies pain     Diabetes     142mg/dl BG this am      HPI: Claudette Keeling, a 63 y.o.female, comes to clinic as a(n) established patient presenting for diabetic foot exam and complaining of fungal toenails and painful calluses . Patient has h/o arthritis, Asthma, AFib, COPD, DM2, HTN, Neuropathy, Sleep Apnea, Stroke, Tobacco use . Patient is NIDDM with last stated BG level of 142 mg/dl. Notes numbness and tingling in feet. Reports her 3rd toe on right foot was amputated back in 90s d/t uncontrolled DM. Denies any open wounds or sores today. Notes that her toenails are long, thick and crumbly and she has trouble caring for them herself. Reports toenails become painful and tender when they get long. Occasionally uses walker for gait support- does not have it with her today. Currently taking ELIQUIS.  Denies pain. Relates previous treatment(s) including care by podiatry . Denies any constitutional symptoms. No other pedal complaints at this time.    Past Medical History:   Diagnosis Date    Arthritis     Asthma 5/1922    Atrial fibrillation     COPD (chronic obstructive pulmonary disease) 5/1922    Diabetes mellitus     Hypertension     Neuropathy     Sleep apnea     Stroke 5/1922     Past Surgical History:   Procedure Laterality Date    APPENDECTOMY      HYSTERECTOMY      JOINT REPLACEMENT Left     knee    TOE OSTEOPHYTE REMOVAL       Family History   Problem Relation Age of Onset    Heart failure Mother     Hypertension Mother     Diabetes Mother     Hypertension Father     Diabetes Father     No Known Problems Sister     Diabetes Brother     Hypertension  Brother     Heart disease Brother         Mother    Heart attack Brother      Social History     Socioeconomic History    Marital status:    Tobacco Use    Smoking status: Every Day     Packs/day: 1.00     Years: 25.00     Additional pack years: 0.00     Total pack years: 25.00     Types: Cigarettes     Passive exposure: Past    Smokeless tobacco: Never    Tobacco comments:     Has started back and restarted Chantix   Vaping Use    Vaping Use: Never used   Substance and Sexual Activity    Alcohol use: Never    Drug use: Never    Sexual activity: Not Currently     Partners: Male     Birth control/protection: Hysterectomy     Allergies   Allergen Reactions    Codeine GI Intolerance     Current Outpatient Medications   Medication Sig Dispense Refill    apixaban (ELIQUIS) 5 MG tablet tablet Take 1 tablet by mouth Every 12 (Twelve) Hours. Indications: Atrial Fibrillation 60 tablet 11    Budeson-Glycopyrrol-Formoterol (Breztri Aerosphere) 160-9-4.8 MCG/ACT aerosol inhaler Inhale 2 puffs 2 (Two) Times a Day.      Cholecalciferol (Vitamin D3) 1.25 MG (05284 UT) capsule Take 1 capsule by mouth 1 (One) Time Per Week.      dilTIAZem CD (CARDIZEM CD) 120 MG 24 hr capsule Take 1 capsule by mouth Daily. 30 capsule 11    empagliflozin (JARDIANCE) 25 MG tablet tablet Take 1 tablet by mouth Daily.      famotidine (Pepcid) 20 MG tablet Take 1 tablet by mouth 2 (Two) Times a Day As Needed for Heartburn or Indigestion. 180 tablet 0    flecainide (TAMBOCOR) 50 MG tablet Take 1 tablet by mouth Every 12 (Twelve) Hours. 60 tablet 11    furosemide (LASIX) 40 MG tablet Take 1 tablet by mouth Daily As Needed.      gabapentin (NEURONTIN) 300 MG capsule Take 1 capsule by mouth 3 (Three) Times a Day.      lisinopril (PRINIVIL,ZESTRIL) 10 MG tablet Take 1 tablet by mouth Daily. 90 tablet 0    metFORMIN ER (GLUCOPHAGE-XR) 500 MG 24 hr tablet Take 2 tablets by mouth Daily With Breakfast. 180 tablet 1    metoprolol tartrate (LOPRESSOR) 50 MG  tablet Take 1 tablet by mouth 2 (Two) Times a Day. 60 tablet 11    multivitamin with minerals tablet tablet Take 1 tablet by mouth Daily.      mupirocin (BACTROBAN) 2 % ointment Apply topically to each nare 2 times per day 22 g 0    nystatin (MYCOSTATIN) 811996 UNIT/GM powder Apply 1 application  topically to the appropriate area as directed 2 (Two) Times a Day As Needed.      ondansetron ODT (ZOFRAN-ODT) 8 MG disintegrating tablet Place 1 tablet on the tongue Every 8 (Eight) Hours As Needed for Nausea. 15 tablet 0    rosuvastatin (Crestor) 5 MG tablet Take 1 tablet by mouth Daily. 90 tablet 1    Trulicity 1.5 MG/0.5ML solution pen-injector Inject 1 syringe under the skin into the appropriate area as directed 1 (One) Time Per Week.      varenicline (CHANTIX) 1 MG tablet Take 1 tablet by mouth 2 (Two) Times a Day. 60 tablet 5     No current facility-administered medications for this visit.     Review of Systems   Constitutional:  Negative for chills and fever.   HENT:  Negative for congestion.    Respiratory:  Negative for chest tightness and shortness of breath.    Cardiovascular:  Negative for chest pain and leg swelling.   Gastrointestinal:  Negative for constipation, diarrhea, nausea and vomiting.   Musculoskeletal:  Positive for arthralgias and gait problem.        Foot pain   Skin:  Negative for wound.   Neurological:  Positive for numbness.   Psychiatric/Behavioral:  Negative for agitation, behavioral problems and confusion.        OBJECTIVE     Vitals:    01/24/24 0928   BP: 138/76         PHYSICAL EXAM  GEN:   Accompanied by none.     Foot/Ankle Exam    GENERAL  Diabetic foot exam performed    Appearance:  appears stated age and obese  Orientation:  AAOx3  Affect:  appropriate  Gait:  unimpaired  Assistance:  independent and walker (reports she occasionally uses a walker for gait support. Not using today.)  Right shoe gear: casual shoe  Left shoe gear: casual shoe    VASCULAR     Right Foot Vascularity    Dorsalis pedis:  2+  Posterior tibial:  2+  Skin temperature:  warm  Edema grading:  None  CFT:  3  Pedal hair growth:  Absent  Varicosities:  moderate varicosities     Left Foot Vascularity   Dorsalis pedis:  2+  Posterior tibial:  2+  Skin temperature:  warm  Edema grading:  None  CFT:  3  Pedal hair growth:  Absent  Varicosities:  moderate varicosities     NEUROLOGIC     Right Foot Neurologic   Light touch sensation: diminished  Vibratory sensation: diminished  Hot/Cold sensation: diminished  Protective Sensation using [employer] Monofilament:   Sites intact: 8  Sites tested: 10     Left Foot Neurologic   Light touch sensation: diminished  Vibratory sensation: diminished  Hot/Cold sensation:  diminished  Protective Sensation using [employer] Monofilament:   Sites intact: 8  Sites tested: 10    MUSCULOSKELETAL     Right Foot Musculoskeletal    Amputation   Toes amputated: third toe  Ecchymosis:  none  Tenderness:  toenail problem    Arch:  Normal  Hammertoe:  Second toe, fourth toe and fifth toe  Hallux valgus: Yes       Left Foot Musculoskeletal   Ecchymosis:  none  Tenderness:  toenail problem  Arch:  Normal    MUSCLE STRENGTH     Right Foot Muscle Strength   Foot dorsiflexion:  5  Foot plantar flexion:  5  Foot inversion:  5  Foot eversion:  5     Left Foot Muscle Strength   Foot dorsiflexion:  5  Foot plantar flexion:  5  Foot inversion:  5  Foot eversion:  5    RANGE OF MOTION     Right Foot Range of Motion   Foot and ankle ROM within normal limits       Left Foot Range of Motion   Foot and ankle ROM within normal limits      DERMATOLOGIC      Right Foot Dermatologic   Skin  Positive for dryness.   Nails  1.  Positive for onychomycosis, abnormal thickness, subungual debris and dystrophic nail.  2.  Positive for elongated, onychomycosis, abnormal thickness, subungual debris and dystrophic nail.  3.  Absent.  4.  Positive for elongated, abnormal thickness and dystrophic nail.  5.  Positive for  elongated, abnormal thickness and dystrophic nail.     Left Foot Dermatologic   Skin  Positive for dryness.   Nails  1.  Positive for elongated, onychomycosis, abnormal thickness, subungual debris and dystrophic nail.  2.  Positive for elongated, onychomycosis, abnormal thickness, subungual debris and dystrophic nail.  3.  Positive for elongated, onychomycosis, abnormal thickness and dystrophic nail.  4.  Positive for elongated, abnormally thick and dystrophic nail.  5.  Positive for elongated, abnormally thick and dystrophic nail.      RADIOLOGY/NUCLEAR:  No results found.    LABORATORY/CULTURE RESULTS:      PATHOLOGY RESULTS:       ASSESSMENT/PLAN     Diagnoses and all orders for this visit:    1. Onychomycosis (Primary)    2. Type 2 diabetes mellitus with diabetic neuropathy, without long-term current use of insulin    3. Encounter for diabetic foot exam    4. Anticoagulant long-term use    5. Toe amputee    6. Tobacco abuse    7. Obesity, Class III, BMI 40-49.9 (morbid obesity)        Comprehensive lower extremity examination and evaluation was performed.  Discussed findings and treatment plan including risks, benefits, and treatment options with patient in detail. Patient agreed with treatment plan.  After verbal consent obtained, nail(s) x9 debrided of length and thickness with nail nipper without incidence  Patient may maintain nails and calluses at home utilizing emery board or pumice stone between visits as needed  Reviewed at home diabetic foot care including daily foot checks   DFE performed today.   Continue to monitor BG with assistance of PCP.  Discussed tobacco cessation    An After Visit Summary was printed and given to the patient at discharge, including (if requested) any available informative/educational handouts regarding diagnosis, treatment, or medications. All questions were answered to patient/family satisfaction. Should symptoms fail to improve or worsen they agree to call or return to  clinic or to go to the Emergency Department. Discussed the importance of following up with any needed screening tests/labs/specialist appointments and any requested follow-up recommended by me today. Importance of maintaining follow-up discussed and patient accepts that missed appointments can delay diagnosis and potentially lead to worsening of conditions.  Return in about 3 months (around 4/24/2024) for Follow-up in [facility], Follow-up with APRN., or sooner if acute issues arise.        This document has been electronically signed by FLORENCIA Ross on January 24, 2024 10:12 CST

## 2024-01-19 NOTE — PROGRESS NOTES
and suicidal ideas. The patient is not nervous/anxious.    All other systems reviewed and are negative.      Objective   BP (!) 150/88   Pulse 99   Temp 97.6 °F (36.4 °C)   Wt 126.6 kg (279 lb 3.2 oz)   SpO2 93%   BMI 46.46 kg/m²     PHYSICAL EXAM:  Physical Exam  Constitutional:       Appearance: She is well-developed. She is obese. She is ill-appearing (Chronically).   HENT:      Head: Normocephalic and atraumatic.      Mouth/Throat:      Dentition: Abnormal dentition.   Eyes:      General: No scleral icterus.     Conjunctiva/sclera: Conjunctivae normal.   Neck:      Trachea: No tracheal deviation.   Cardiovascular:      Rate and Rhythm: Normal rate and regular rhythm.      Heart sounds: Normal heart sounds. No murmur heard.  Pulmonary:      Effort: Pulmonary effort is normal. No respiratory distress.      Breath sounds: Normal breath sounds.   Abdominal:      General: Bowel sounds are normal. There is no distension.      Palpations: Abdomen is soft. There is no fluid wave, hepatomegaly or splenomegaly.      Tenderness: There is no abdominal tenderness.   Musculoskeletal:         General: Deformity (Arthritic changes of the hands) present. No tenderness.      Cervical back: Normal range of motion and neck supple.   Skin:     General: Skin is warm and dry.      Findings: No rash.   Neurological:      Mental Status: She is alert and oriented to person, place, and time.      Coordination: Coordination normal.   Psychiatric:         Behavior: Behavior normal.         Thought Content: Thought content normal.       CBC reviewed by me  Lab Results   Component Value Date    WBC 7.96 01/23/2024    HGB 16.1 (H) 01/23/2024    HCT 49.4 (H) 01/23/2024    MCV 91.0 01/23/2024     (L) 01/23/2024       VISIT DIAGNOSES  1. Thrombocytopenia (HCC)    2. Polycythemia    3. Cigarette nicotine dependence without complication    4. Obstructive sleep apnea syndrome    5. Personal history of tobacco use        ASSESSMENT/PLAN:

## 2024-01-22 ENCOUNTER — TELEPHONE (OUTPATIENT)
Dept: HEMATOLOGY | Age: 64
End: 2024-01-22

## 2024-01-22 NOTE — TELEPHONE ENCOUNTER
Called patient and reminded patient of their appointment on 1/23/2024 and patient confirmed they would be here.

## 2024-01-23 ENCOUNTER — OFFICE VISIT (OUTPATIENT)
Dept: HEMATOLOGY | Age: 64
End: 2024-01-23
Payer: MEDICARE

## 2024-01-23 ENCOUNTER — HOSPITAL ENCOUNTER (OUTPATIENT)
Dept: INFUSION THERAPY | Age: 64
Discharge: HOME | End: 2024-01-23
Payer: MEDICARE

## 2024-01-23 ENCOUNTER — TELEPHONE (OUTPATIENT)
Dept: PODIATRY | Facility: CLINIC | Age: 64
End: 2024-01-23
Payer: MEDICARE

## 2024-01-23 VITALS
OXYGEN SATURATION: 93 % | SYSTOLIC BLOOD PRESSURE: 150 MMHG | BODY MASS INDEX: 46.46 KG/M2 | TEMPERATURE: 97.6 F | WEIGHT: 279.2 LBS | DIASTOLIC BLOOD PRESSURE: 88 MMHG | HEART RATE: 99 BPM

## 2024-01-23 DIAGNOSIS — Z87.891 PERSONAL HISTORY OF TOBACCO USE: ICD-10-CM

## 2024-01-23 DIAGNOSIS — D69.6 THROMBOCYTOPENIA (HCC): Primary | ICD-10-CM

## 2024-01-23 DIAGNOSIS — G47.33 OBSTRUCTIVE SLEEP APNEA SYNDROME: ICD-10-CM

## 2024-01-23 DIAGNOSIS — F17.210 CIGARETTE NICOTINE DEPENDENCE WITHOUT COMPLICATION: ICD-10-CM

## 2024-01-23 DIAGNOSIS — D75.1 POLYCYTHEMIA: ICD-10-CM

## 2024-01-23 DIAGNOSIS — D69.6 THROMBOCYTOPENIA (HCC): ICD-10-CM

## 2024-01-23 LAB
BASOPHILS # BLD: 0.04 K/UL (ref 0.01–0.08)
BASOPHILS NFR BLD: 0.5 % (ref 0.1–1.2)
EOSINOPHIL # BLD: 0.08 K/UL (ref 0.04–0.54)
EOSINOPHIL NFR BLD: 1 % (ref 0.7–7)
ERYTHROCYTE [DISTWIDTH] IN BLOOD BY AUTOMATED COUNT: 13.6 % (ref 11.7–14.4)
HCT VFR BLD AUTO: 49.4 % (ref 34.1–44.9)
HGB BLD-MCNC: 16.1 G/DL (ref 11.2–15.7)
LYMPHOCYTES # BLD: 1.93 K/UL (ref 1.18–3.74)
LYMPHOCYTES NFR BLD: 24.2 % (ref 19.3–53.1)
MCH RBC QN AUTO: 29.7 PG (ref 25.6–32.2)
MCHC RBC AUTO-ENTMCNC: 32.6 G/DL (ref 32.3–35.5)
MCV RBC AUTO: 91 FL (ref 79.4–94.8)
MONOCYTES # BLD: 0.51 K/UL (ref 0.24–0.82)
MONOCYTES NFR BLD: 6.4 % (ref 4.7–12.5)
NEUTROPHILS # BLD: 5.39 K/UL (ref 1.56–6.13)
NEUTS SEG NFR BLD: 67.8 % (ref 34–71.1)
PLATELET # BLD AUTO: 145 K/UL (ref 182–369)
PMV BLD AUTO: 10.8 FL (ref 7.4–10.4)
RBC # BLD AUTO: 5.43 M/UL (ref 3.93–5.22)
WBC # BLD AUTO: 7.96 K/UL (ref 3.98–10.04)

## 2024-01-23 PROCEDURE — 3017F COLORECTAL CA SCREEN DOC REV: CPT | Performed by: PHYSICIAN ASSISTANT

## 2024-01-23 PROCEDURE — 3077F SYST BP >= 140 MM HG: CPT | Performed by: PHYSICIAN ASSISTANT

## 2024-01-23 PROCEDURE — 1090F PRES/ABSN URINE INCON ASSESS: CPT | Performed by: PHYSICIAN ASSISTANT

## 2024-01-23 PROCEDURE — 4004F PT TOBACCO SCREEN RCVD TLK: CPT | Performed by: PHYSICIAN ASSISTANT

## 2024-01-23 PROCEDURE — G8417 CALC BMI ABV UP PARAM F/U: HCPCS | Performed by: PHYSICIAN ASSISTANT

## 2024-01-23 PROCEDURE — 36415 COLL VENOUS BLD VENIPUNCTURE: CPT

## 2024-01-23 PROCEDURE — 99213 OFFICE O/P EST LOW 20 MIN: CPT | Performed by: PHYSICIAN ASSISTANT

## 2024-01-23 PROCEDURE — 85025 COMPLETE CBC W/AUTO DIFF WBC: CPT

## 2024-01-23 PROCEDURE — G8484 FLU IMMUNIZE NO ADMIN: HCPCS | Performed by: PHYSICIAN ASSISTANT

## 2024-01-23 PROCEDURE — 1123F ACP DISCUSS/DSCN MKR DOCD: CPT | Performed by: PHYSICIAN ASSISTANT

## 2024-01-23 PROCEDURE — 3079F DIAST BP 80-89 MM HG: CPT | Performed by: PHYSICIAN ASSISTANT

## 2024-01-23 PROCEDURE — 99212 OFFICE O/P EST SF 10 MIN: CPT

## 2024-01-23 PROCEDURE — G8427 DOCREV CUR MEDS BY ELIG CLIN: HCPCS | Performed by: PHYSICIAN ASSISTANT

## 2024-01-23 PROCEDURE — G8400 PT W/DXA NO RESULTS DOC: HCPCS | Performed by: PHYSICIAN ASSISTANT

## 2024-01-23 RX ORDER — BUDESONIDE, GLYCOPYRROLATE, AND FORMOTEROL FUMARATE 160; 9; 4.8 UG/1; UG/1; UG/1
2 AEROSOL, METERED RESPIRATORY (INHALATION) 2 TIMES DAILY
COMMUNITY

## 2024-01-23 RX ORDER — NYSTATIN 100000 [USP'U]/G
1 POWDER TOPICAL 2 TIMES DAILY PRN
COMMUNITY

## 2024-01-23 RX ORDER — ONDANSETRON 8 MG/1
8 TABLET, ORALLY DISINTEGRATING ORAL EVERY 8 HOURS PRN
COMMUNITY
Start: 2023-11-26

## 2024-01-23 RX ORDER — FLUTICASONE PROPIONATE 50 MCG
1 SPRAY, SUSPENSION (ML) NASAL PRN
COMMUNITY

## 2024-01-23 ASSESSMENT — ENCOUNTER SYMPTOMS
VOICE CHANGE: 0
NAUSEA: 0
ABDOMINAL DISTENTION: 0
EYE ITCHING: 0
BACK PAIN: 1
PHOTOPHOBIA: 0
COLOR CHANGE: 0
ABDOMINAL PAIN: 0
VOMITING: 0
TROUBLE SWALLOWING: 0
WHEEZING: 0
EYE DISCHARGE: 0
BLOOD IN STOOL: 0
CONSTIPATION: 0
COUGH: 0
SORE THROAT: 0
DIARRHEA: 0

## 2024-01-23 NOTE — TELEPHONE ENCOUNTER
Called patient regarding appt on 01/24/2024. Left message for patient to return call if any questions or concerns arise.

## 2024-01-24 ENCOUNTER — OFFICE VISIT (OUTPATIENT)
Dept: PODIATRY | Facility: CLINIC | Age: 64
End: 2024-01-24
Payer: MEDICARE

## 2024-01-24 VITALS
BODY MASS INDEX: 46.48 KG/M2 | WEIGHT: 279 LBS | SYSTOLIC BLOOD PRESSURE: 138 MMHG | HEIGHT: 65 IN | DIASTOLIC BLOOD PRESSURE: 76 MMHG

## 2024-01-24 DIAGNOSIS — E66.01 OBESITY, CLASS III, BMI 40-49.9 (MORBID OBESITY): ICD-10-CM

## 2024-01-24 DIAGNOSIS — Z89.429 TOE AMPUTEE: ICD-10-CM

## 2024-01-24 DIAGNOSIS — Z79.01 ANTICOAGULANT LONG-TERM USE: ICD-10-CM

## 2024-01-24 DIAGNOSIS — E11.40 TYPE 2 DIABETES MELLITUS WITH DIABETIC NEUROPATHY, WITHOUT LONG-TERM CURRENT USE OF INSULIN: ICD-10-CM

## 2024-01-24 DIAGNOSIS — E11.9 ENCOUNTER FOR DIABETIC FOOT EXAM: ICD-10-CM

## 2024-01-24 DIAGNOSIS — Z72.0 TOBACCO ABUSE: ICD-10-CM

## 2024-01-24 DIAGNOSIS — B35.1 ONYCHOMYCOSIS: Primary | ICD-10-CM

## 2024-01-24 PROCEDURE — 1159F MED LIST DOCD IN RCRD: CPT

## 2024-01-24 PROCEDURE — 3075F SYST BP GE 130 - 139MM HG: CPT

## 2024-01-24 PROCEDURE — 1160F RVW MEDS BY RX/DR IN RCRD: CPT

## 2024-01-24 PROCEDURE — 11721 DEBRIDE NAIL 6 OR MORE: CPT

## 2024-01-24 PROCEDURE — 3078F DIAST BP <80 MM HG: CPT

## 2024-01-24 PROCEDURE — 99213 OFFICE O/P EST LOW 20 MIN: CPT

## 2024-03-07 ENCOUNTER — OFFICE VISIT (OUTPATIENT)
Dept: CARDIOLOGY | Facility: CLINIC | Age: 64
End: 2024-03-07
Payer: MEDICARE

## 2024-03-07 VITALS
BODY MASS INDEX: 46.19 KG/M2 | HEART RATE: 68 BPM | HEIGHT: 65 IN | DIASTOLIC BLOOD PRESSURE: 88 MMHG | RESPIRATION RATE: 18 BRPM | OXYGEN SATURATION: 93 % | WEIGHT: 277.2 LBS | SYSTOLIC BLOOD PRESSURE: 122 MMHG

## 2024-03-07 DIAGNOSIS — I50.32 CHRONIC DIASTOLIC CHF (CONGESTIVE HEART FAILURE): ICD-10-CM

## 2024-03-07 DIAGNOSIS — Z72.0 TOBACCO ABUSE: ICD-10-CM

## 2024-03-07 DIAGNOSIS — E11.65 TYPE 2 DIABETES MELLITUS WITH HYPERGLYCEMIA, WITHOUT LONG-TERM CURRENT USE OF INSULIN: ICD-10-CM

## 2024-03-07 DIAGNOSIS — G47.33 OSA (OBSTRUCTIVE SLEEP APNEA): ICD-10-CM

## 2024-03-07 DIAGNOSIS — E66.1 CLASS 3 DRUG-INDUCED OBESITY WITH SERIOUS COMORBIDITY AND BODY MASS INDEX (BMI) OF 45.0 TO 49.9 IN ADULT: ICD-10-CM

## 2024-03-07 DIAGNOSIS — I48.0 PAROXYSMAL ATRIAL FIBRILLATION: Primary | ICD-10-CM

## 2024-03-07 DIAGNOSIS — J43.8 OTHER EMPHYSEMA: ICD-10-CM

## 2024-03-07 DIAGNOSIS — Z79.01 CHRONIC ANTICOAGULATION: ICD-10-CM

## 2024-03-07 RX ORDER — PSEUDOEPHEDRINE HCL 30 MG
1 TABLET ORAL 2 TIMES DAILY
COMMUNITY

## 2024-03-07 RX ORDER — FLUTICASONE PROPIONATE 50 MCG
SPRAY, SUSPENSION (ML) NASAL
COMMUNITY

## 2024-03-07 NOTE — PROGRESS NOTES
Subjective:     Encounter Date: 09/07/2023      Patient ID: Claudette Keeling is a 63 y.o. female with paroxysmal atrial fibrillation s/p cardioversion on 8/17/2022, chronic anticoagulation, chronic diastolic congestive heart failure, type 2 diabetes, COPD/tobacco abuse, obstructive sleep apnea and obesity.    Chief Complaint: routine follow up  History of Present Illness  Patient presents today for management of atrial fibrillation and diastolic congestive heart failure. Today she reports that she has been doing well. She reports that she has continued to have dyspnea on exertion related to COPD that is unchanged. She reports that she has a good amount of energy. She reports that she has still been able to work. She denies any heart racing or palpitations. She denies any chest pain. She denies leg swelling, orthopnea or PND. She denies any dizziness or near syncope. She was able to get a new mask for her; she reports that she has been compliant with CPAP.  She reports that she doesn't check her BP routinely but it remains controlled at other dr visits. She was started on Ozempic but is having difficulty with price and it being in stock at the pharmacy so it was changed to Trulicity. She is on eliquis and denies any bleeding issues. She follows with Dr Torrez as PCP.      Previous Cardiac Testing and Procedures:   -Echo (5/10/2022): LVEF 61-65%, moderate LVH, diastolic dysfunction, no significant valvular disease  -Hgb A1C (5/10/2022): 7.4  -Lipid panel (5/10/2022): total cholesterol 149, hdl 37, LDL 90 and triglycerides 124  -Cardioversion (6/14/2022): successful cardioversion   -Cardioversion (8/17/2022): Successful cardioversion   -Hgb A1C (8/17/2022): 8.5  -Lipid panel (8/17/2022): total cholesterol 143, hdl 37, LDL 85 and triglycerides 116      The following portions of the patient's history were reviewed and updated as appropriate: allergies, current medications, past family history, past medical history,  past social history, past surgical history and problem list.    Allergies   Allergen Reactions    Codeine GI Intolerance       Current Outpatient Medications:     apixaban (ELIQUIS) 5 MG tablet tablet, Take 1 tablet by mouth Every 12 (Twelve) Hours. Indications: Atrial Fibrillation, Disp: 60 tablet, Rfl: 11    Budeson-Glycopyrrol-Formoterol (Breztri Aerosphere) 160-9-4.8 MCG/ACT aerosol inhaler, Inhale 2 puffs 2 (Two) Times a Day., Disp: , Rfl:     Cholecalciferol (Vitamin D3) 1.25 MG (08257 UT) capsule, Take 1 capsule by mouth 1 (One) Time Per Week., Disp: , Rfl:     dilTIAZem CD (CARDIZEM CD) 120 MG 24 hr capsule, Take 1 capsule by mouth Daily., Disp: 30 capsule, Rfl: 11    empagliflozin (JARDIANCE) 25 MG tablet tablet, Take 1 tablet by mouth Daily., Disp: , Rfl:     famotidine (Pepcid) 20 MG tablet, Take 1 tablet by mouth 2 (Two) Times a Day As Needed for Heartburn or Indigestion., Disp: 180 tablet, Rfl: 0    flecainide (TAMBOCOR) 50 MG tablet, Take 1 tablet by mouth Every 12 (Twelve) Hours., Disp: 60 tablet, Rfl: 11    gabapentin (NEURONTIN) 300 MG capsule, Take 1 capsule by mouth 3 (Three) Times a Day., Disp: , Rfl:     lisinopril (PRINIVIL,ZESTRIL) 10 MG tablet, Take 1 tablet by mouth Daily., Disp: 90 tablet, Rfl: 0    metFORMIN ER (GLUCOPHAGE-XR) 500 MG 24 hr tablet, Take 2 tablets by mouth Daily With Breakfast., Disp: 180 tablet, Rfl: 1    metoprolol tartrate (LOPRESSOR) 50 MG tablet, Take 1 tablet by mouth 2 (Two) Times a Day., Disp: 60 tablet, Rfl: 11    multivitamin with minerals tablet tablet, Take 1 tablet by mouth Daily., Disp: , Rfl:     mupirocin (BACTROBAN) 2 % ointment, Apply topically to each nare 2 times per day, Disp: 22 g, Rfl: 0    nystatin (MYCOSTATIN) 429945 UNIT/GM powder, Apply 1 Application topically to the appropriate area as directed 2 (Two) Times a Day As Needed., Disp: , Rfl:     ondansetron ODT (ZOFRAN-ODT) 8 MG disintegrating tablet, Place 1 tablet on the tongue Every 8 (Eight)  Hours As Needed for Nausea., Disp: 15 tablet, Rfl: 0    rosuvastatin (Crestor) 5 MG tablet, Take 1 tablet by mouth Daily., Disp: 90 tablet, Rfl: 1    Trulicity 1.5 MG/0.5ML solution pen-injector, Inject 1 syringe under the skin into the appropriate area as directed 1 (One) Time Per Week., Disp: , Rfl:     docusate sodium 100 MG capsule, Take 1 capsule by mouth 2 (Two) Times a Day., Disp: , Rfl:     fluticasone (FLONASE) 50 MCG/ACT nasal spray, USE 1 SPRAY(S) INTO NOSTRIL ONCE DAILY, Disp: , Rfl:     furosemide (LASIX) 40 MG tablet, Take 1 tablet by mouth Daily As Needed. (Patient not taking: Reported on 3/7/2024), Disp: , Rfl:     varenicline (CHANTIX) 1 MG tablet, Take 1 tablet by mouth 2 (Two) Times a Day. (Patient not taking: Reported on 3/7/2024), Disp: 60 tablet, Rfl: 5    Past Medical History:   Diagnosis Date    Arthritis     Asthma 5/1922    Atrial fibrillation     COPD (chronic obstructive pulmonary disease) 5/1922    Diabetes mellitus     Hypertension     Neuropathy     Sleep apnea     Stroke 5/1922     Social History     Socioeconomic History    Marital status:    Tobacco Use    Smoking status: Every Day     Current packs/day: 1.00     Average packs/day: 1 pack/day for 25.0 years (25.0 ttl pk-yrs)     Types: Cigarettes     Passive exposure: Past    Smokeless tobacco: Never    Tobacco comments:     Has started back and restarted Chantix   Vaping Use    Vaping status: Never Used   Substance and Sexual Activity    Alcohol use: Never    Drug use: Never    Sexual activity: Not Currently     Partners: Male     Birth control/protection: Hysterectomy       Review of Systems   Constitutional: Negative for malaise/fatigue, weight gain and weight loss.   HENT:  Negative for nosebleeds.    Cardiovascular:  Positive for dyspnea on exertion (unchanged). Negative for chest pain, claudication, irregular heartbeat, leg swelling, near-syncope, orthopnea, palpitations, paroxysmal nocturnal dyspnea and syncope.  "  Respiratory:  Negative for shortness of breath.    Hematologic/Lymphatic: Bruises/bleeds easily.   Musculoskeletal:  Negative for muscle weakness.   Genitourinary:  Negative for hematuria and nocturia.   Neurological:  Negative for dizziness and weakness.   All other systems reviewed and are negative.         Objective:     Vitals reviewed.   Constitutional:       General: Not in acute distress.     Appearance: Normal appearance. Well-developed. Morbidly obese.   Eyes:      Pupils: Pupils are equal, round, and reactive to light.   HENT:      Head: Normocephalic and atraumatic.      Nose: Nose normal.   Neck:      Vascular: No carotid bruit.   Pulmonary:      Effort: Pulmonary effort is normal. No respiratory distress.      Breath sounds: Normal breath sounds. No wheezing. No rales.   Cardiovascular:      Normal rate. Regular rhythm.      Murmurs: There is no murmur.   Edema:     Peripheral edema absent.   Abdominal:      General: There is no distension.      Palpations: Abdomen is soft.   Musculoskeletal: Normal range of motion.      Cervical back: Normal range of motion and neck supple. Skin:     General: Skin is warm.      Findings: No erythema or rash.   Neurological:      General: No focal deficit present.      Mental Status: Alert and oriented to person, place, and time.   Psychiatric:         Attention and Perception: Attention normal.         Mood and Affect: Mood normal.         Speech: Speech normal.         Behavior: Behavior normal.         Thought Content: Thought content normal.         Judgment: Judgment normal.         /88   Pulse 68   Resp 18   Ht 165.1 cm (65\")   Wt 126 kg (277 lb 3.2 oz)   SpO2 93%   BMI 46.13 kg/m²       ECG 12 Lead    Date/Time: 3/7/2024 1:07 PM  Performed by: Paul Coleman APRN    Authorized by: Paul Coleman APRN  Comparison: compared with previous ECG from 9/7/2023  Similar to previous ECG  Rhythm: sinus rhythm  Ectopy: infrequent PVCs  Rate: normal  BPM: " 68          Lab Review:     Results for orders placed during the hospital encounter of 05/09/22    Adult Transthoracic Echo Complete With Contrast if Necessary Per Protocol    Interpretation Summary  · Left ventricular ejection fraction appears to be 61 - 65%. Left ventricular systolic function is normal.  · Left ventricular wall thickness is consistent with moderate concentric hypertrophy.  · Left ventricular diastolic dysfunction is noted.  · Normal right ventricular cavity size and systolic function noted.  · There is mild biatrial enlargement.  · There is no significant (greater than mild) valvular dysfunction.  · Estimated right ventricular systolic pressure from tricuspid regurgitation is normal (<35 mmHg).    Lab Results   Component Value Date    CHOL 143 08/17/2022    TRIG 116 08/17/2022    HDL 37 (L) 08/17/2022    LDL 85 08/17/2022     Lab Results   Component Value Date    HGBA1C 8.50 (H) 08/17/2022     I have personally reviewed echo, hospitalization notes, labs and past office notes prior to patients visit  Assessment:          Diagnosis Plan   1. Paroxysmal atrial fibrillation        2. Chronic anticoagulation        3. Chronic diastolic CHF (congestive heart failure)        4. Other emphysema        5. Tobacco abuse        6. Type 2 diabetes mellitus with hyperglycemia, without long-term current use of insulin        7. ANTHONY (obstructive sleep apnea)        8. Class 3 drug-induced obesity with serious comorbidity and body mass index (BMI) of 45.0 to 49.9 in adult                   Plan:       1. Paroxysmal Atrial Fibrillation: EKG today shows NSR rates of 68. Patient denies any known recurrence of atrial fibrillation. Continue Flecainide, Cardizem and metoprolol. Patient is on Eliquis and denies any bleeding issues.      2. Anticoagulation: Patient is on Eliquis and denies any bleeding issues.     3. Chronic diastolic congestive heart failure: Patient remains euvolemic in office today. Continue lasix 40  mg on daily as needed basis. She has been keeping up with daily weights and they have remained stable. Reviewed signs and symptoms of CHF and what to report to office with patient. Discussed importance of daily weights. Recommended her weigh every morning after urinating and recording it. If she notices a weight gain of 2-3 lbs overnight/5 lbs in a week then she can take a Lasix 40 mg and call office. Recommended low salt diet. Discussed keeping legs elevated as much as possible when sitting throughout the day. Recommended compression stockings or ACE wraps to patient if needed for leg swelling.     4/5. COPD/tobacco abuse: Claudette Keeling  reports that she has been smoking cigarettes. She has a 25 pack-year smoking history. She has been exposed to tobacco smoke. She has never used smokeless tobacco.. I have educated her on the risk of diseases from using tobacco products such as cancer, COPD and heart disease. I advised her to quit and she is not willing to quit. She has drastically decreased her daily amount of cigarettes. I spent 3  minutes counseling the patient.     6. Type 2 diabetes: managed and followed by PCP. A1C 8.5 8/2022    7. Obstructive sleep apnea: compliant with CPAP    8. Obesity: Class 3 Severe Obesity (BMI >=40). Obesity-related health conditions include the following: hypertension and diabetes mellitus. Obesity is unchanged. BMI is is above average; BMI management plan is completed. We discussed portion control and increasing exercise.    I attest that all portions of this note reviewed and all information has been updated by myself to reflect the patient's current status.      I spent 35 minutes caring for Claudette on this date of service. This time includes time spent by me in the following activities:preparing for the visit, reviewing tests, obtaining and/or reviewing a separately obtained history, performing a medically appropriate examination and/or evaluation , counseling and educating  the patient/family/caregiver, ordering medications, tests, or procedures, and documenting information in the medical record    I spent 2 minutes on the separately reported service of EKG. This time is not included in the time used to support the E/M service also reported today.    Patient is to return to office in 1 year or sooner if needed

## 2024-03-28 ENCOUNTER — OFFICE VISIT (OUTPATIENT)
Dept: WOUND CARE | Facility: HOSPITAL | Age: 64
End: 2024-03-28
Payer: MEDICARE

## 2024-03-28 PROCEDURE — G0463 HOSPITAL OUTPT CLINIC VISIT: HCPCS

## 2024-04-16 ENCOUNTER — TELEPHONE (OUTPATIENT)
Dept: PODIATRY | Facility: CLINIC | Age: 64
End: 2024-04-16
Payer: MEDICARE

## 2024-04-16 NOTE — PROGRESS NOTES
[facility] - PODIATRY    Today's Date: 04/18/2024     Patient Name: Claudette Keeling  MRN: [id number]  CSN: [id number]  PCP: Reymundo Torrez MD  Referring Provider: No ref. provider found    SUBJECTIVE     Chief Complaint   Patient presents with    Follow-up     Reymundo Torrez MD 04/10/2024  3 MTH FU DIABETIC-pt states she is here today for diabetic foot/nail care/blister on inside of 3rd toe left foot/corn on rt little toe-pt denies pain     Diabetes     145 mg/dl BG     HPI: Claudette Keeling, a 63 y.o.female, comes to clinic as a(n) established patient presenting for diabetic foot exam and complaining of fungal toenails and painful calluses . Patient has h/o arthritis, Asthma, AFib, COPD, DM2, HTN, Neuropathy, Sleep Apnea, Stroke, Tobacco use . Patient is NIDDM with last stated BG level of 145 mg/dl. Continues to note numbness and tingling in feet. Reports her 3rd toe on right foot was amputated back in 90s d/t uncontrolled DM. Denies any open wounds or sores today. Notes that her toenails are elongated, thickened, and crumbly. She reports she feels uncomfortable caring for them herself d/t her previous history of amputation. Reports toenails become painful and tender when they get long. Occasionally uses walker for gait support- does not have it with her today. She continues to take ELIQUIS daily.  Denies pain today. Relates previous treatment(s) including care by podiatry . Denies any constitutional symptoms. No other pedal complaints at this time.    Past Medical History:   Diagnosis Date    Arthritis     Asthma 5/1922    Atrial fibrillation     COPD (chronic obstructive pulmonary disease) 5/1922    Diabetes mellitus     Hypertension     Neuropathy     Sleep apnea     Stroke 5/1922     Past Surgical History:   Procedure Laterality Date    APPENDECTOMY      HYSTERECTOMY      JOINT REPLACEMENT Left     knee    TOE OSTEOPHYTE REMOVAL       Family History   Problem Relation Age of Onset     Heart failure Mother     Hypertension Mother     Diabetes Mother     Hypertension Father     Diabetes Father     No Known Problems Sister     Diabetes Brother     Hypertension Brother     Heart disease Brother         Mother    Heart attack Brother      Social History     Socioeconomic History    Marital status:    Tobacco Use    Smoking status: Every Day     Current packs/day: 1.00     Average packs/day: 1 pack/day for 25.0 years (25.0 ttl pk-yrs)     Types: Cigarettes     Passive exposure: Past    Smokeless tobacco: Never    Tobacco comments:     Has started back and restarted Chantix   Vaping Use    Vaping status: Never Used   Substance and Sexual Activity    Alcohol use: Never    Drug use: Never    Sexual activity: Not Currently     Partners: Male     Birth control/protection: Hysterectomy     Allergies   Allergen Reactions    Codeine Nausea And Vomiting     Current Outpatient Medications   Medication Sig Dispense Refill    apixaban (ELIQUIS) 5 MG tablet tablet Take 1 tablet by mouth Every 12 (Twelve) Hours. Indications: Atrial Fibrillation 60 tablet 11    Budeson-Glycopyrrol-Formoterol (Breztri Aerosphere) 160-9-4.8 MCG/ACT aerosol inhaler Inhale 2 puffs 2 (Two) Times a Day.      Cholecalciferol (Vitamin D3) 1.25 MG (87345 UT) capsule Take 1 capsule by mouth 1 (One) Time Per Week.      dilTIAZem CD (CARDIZEM CD) 120 MG 24 hr capsule Take 1 capsule by mouth Daily. 30 capsule 11    docusate sodium 100 MG capsule Take 1 capsule by mouth 2 (Two) Times a Day.      empagliflozin (JARDIANCE) 25 MG tablet tablet Take 1 tablet by mouth Daily.      famotidine (Pepcid) 20 MG tablet Take 1 tablet by mouth 2 (Two) Times a Day As Needed for Heartburn or Indigestion. 180 tablet 0    flecainide (TAMBOCOR) 50 MG tablet Take 1 tablet by mouth Every 12 (Twelve) Hours. 60 tablet 11    gabapentin (NEURONTIN) 300 MG capsule Take 1 capsule by mouth 3 (Three) Times a Day.      lisinopril (PRINIVIL,ZESTRIL) 10 MG tablet Take 1  tablet by mouth Daily. 90 tablet 0    metFORMIN ER (GLUCOPHAGE-XR) 500 MG 24 hr tablet Take 2 tablets by mouth Daily With Breakfast. 180 tablet 1    metoprolol tartrate (LOPRESSOR) 50 MG tablet Take 1 tablet by mouth 2 (Two) Times a Day. 60 tablet 11    multivitamin with minerals tablet tablet Take 1 tablet by mouth Daily.      rosuvastatin (Crestor) 5 MG tablet Take 1 tablet by mouth Daily. 90 tablet 1    Trulicity 1.5 MG/0.5ML solution pen-injector Inject 1 syringe under the skin into the appropriate area as directed 1 (One) Time Per Week.      varenicline (CHANTIX) 1 MG tablet Take 1 tablet by mouth 2 (Two) Times a Day. 60 tablet 5    fluticasone (FLONASE) 50 MCG/ACT nasal spray USE 1 SPRAY(S) INTO NOSTRIL ONCE DAILY (Patient not taking: Reported on 4/18/2024)      furosemide (LASIX) 40 MG tablet Take 1 tablet by mouth Daily As Needed. (Patient not taking: Reported on 3/7/2024)       No current facility-administered medications for this visit.     Review of Systems   Constitutional:  Negative for chills and fever.   HENT:  Negative for congestion.    Respiratory:  Negative for chest tightness and shortness of breath.    Cardiovascular:  Negative for chest pain and leg swelling.   Gastrointestinal:  Negative for constipation, diarrhea, nausea and vomiting.   Musculoskeletal:  Positive for arthralgias and gait problem.        Occasionally uses walker   Skin:  Negative for wound.        Thickened, elongated, crumbly toenails   Neurological:  Positive for numbness. Negative for dizziness and weakness.   Hematological:  Bruises/bleeds easily.   Psychiatric/Behavioral:  Negative for agitation, behavioral problems and confusion.        OBJECTIVE     Vitals:    04/18/24 1035   BP: 130/76   Pulse: 78   SpO2: 94%           PHYSICAL EXAM  GEN:   Accompanied by none.     Foot/Ankle Exam    GENERAL  Diabetic foot exam performed    Appearance:  appears stated age and obese  Orientation:  AAOx3  Affect:  appropriate  Gait:   unimpaired  Assistance:  independent and walker (reports she occasionally uses a walker for gait support. Not using today.)  Right shoe gear: casual shoe  Left shoe gear: casual shoe    VASCULAR     Right Foot Vascularity   Dorsalis pedis:  2+  Posterior tibial:  2+  Skin temperature:  warm  Edema grading:  None  CFT:  3  Pedal hair growth:  Absent  Varicosities:  moderate varicosities     Left Foot Vascularity   Dorsalis pedis:  2+  Posterior tibial:  2+  Skin temperature:  warm  Edema grading:  None  CFT:  3  Pedal hair growth:  Absent  Varicosities:  moderate varicosities     NEUROLOGIC     Right Foot Neurologic   Light touch sensation: diminished  Vibratory sensation: diminished  Hot/Cold sensation: diminished  Protective Sensation using [place] Monofilament:   Sites intact: 8  Sites tested: 10     Left Foot Neurologic   Light touch sensation: diminished  Vibratory sensation: diminished  Hot/Cold sensation:  diminished  Protective Sensation using [place] Monofilament:   Sites intact: 8  Sites tested: 10    MUSCULOSKELETAL     Right Foot Musculoskeletal    Amputation   Toes amputated: third toe  Ecchymosis:  none  Tenderness:  toenail problem    Arch:  Normal  Hammertoe:  Second toe, fourth toe and fifth toe  Hallux valgus: Yes       Left Foot Musculoskeletal   Ecchymosis:  none  Tenderness:  toenail problem  Arch:  Normal    MUSCLE STRENGTH     Right Foot Muscle Strength   Foot dorsiflexion:  5  Foot plantar flexion:  5  Foot inversion:  5  Foot eversion:  5     Left Foot Muscle Strength   Foot dorsiflexion:  5  Foot plantar flexion:  5  Foot inversion:  5  Foot eversion:  5    RANGE OF MOTION     Right Foot Range of Motion   Foot and ankle ROM within normal limits       Left Foot Range of Motion   Foot and ankle ROM within normal limits      DERMATOLOGIC      Right Foot Dermatologic   Skin  Positive for dryness.   Nails  1.  Positive for onychomycosis, abnormal thickness, subungual debris and  dystrophic nail.  2.  Positive for elongated, onychomycosis, abnormal thickness, subungual debris and dystrophic nail.  3.  Absent.  4.  Positive for elongated, abnormal thickness and dystrophic nail.  5.  Positive for elongated, abnormal thickness and dystrophic nail.     Left Foot Dermatologic   Skin  Positive for dryness.   Nails  1.  Positive for elongated, onychomycosis, abnormal thickness, subungual debris and dystrophic nail.  2.  Positive for elongated, onychomycosis, abnormal thickness, subungual debris and dystrophic nail.  3.  Positive for elongated, onychomycosis, abnormal thickness and dystrophic nail.  4.  Positive for elongated, abnormally thick and dystrophic nail.  5.  Positive for elongated, abnormally thick and dystrophic nail.    Image:       RADIOLOGY/NUCLEAR:  No results found.    LABORATORY/CULTURE RESULTS:      PATHOLOGY RESULTS:       ASSESSMENT/PLAN     Diagnoses and all orders for this visit:    1. Onychomycosis (Primary)    2. Type 2 diabetes mellitus with diabetic neuropathy, without long-term current use of insulin    3. Anticoagulant long-term use    4. Toe amputee    5. Tobacco abuse    6. Obesity, Class III, BMI 40-49.9 (morbid obesity)    7. Foot callus          Comprehensive lower extremity examination and evaluation was performed.  Discussed findings and treatment plan including risks, benefits, and treatment options with patient in detail. Patient agreed with treatment plan.  After verbal consent obtained, nail(s) x9 debrided of length and thickness with nail nipper without incidence  After verbal consent obtained, calluses x1 pared utilizing dermal curette and/or scalpel without incidence  Patient may maintain nails and calluses at home utilizing emery board or pumice stone between visits as needed  Reviewed at home diabetic foot care including daily foot checks   Continue control and maintenance of DM per PCP.  Discussed tobacco cessation      An After Visit Summary was printed  and given to the patient at discharge, including (if requested) any available informative/educational handouts regarding diagnosis, treatment, or medications. All questions were answered to patient/family satisfaction. Should symptoms fail to improve or worsen they agree to call or return to clinic or to go to the Emergency Department. Discussed the importance of following up with any needed screening tests/labs/specialist appointments and any requested follow-up recommended by me today. Importance of maintaining follow-up discussed and patient accepts that missed appointments can delay diagnosis and potentially lead to worsening of conditions.  Return in about 3 months (around 7/18/2024) for Follow-up with APRN, Follow-up in [facility] or sooner if acute issues arise.        This document has been electronically signed by FLORENCIA Ross on April 18, 2024 16:33 CDT

## 2024-04-17 ENCOUNTER — TELEPHONE (OUTPATIENT)
Dept: PODIATRY | Facility: CLINIC | Age: 64
End: 2024-04-17
Payer: MEDICARE

## 2024-04-17 NOTE — TELEPHONE ENCOUNTER
Called patient regarding appt on 04/18/2024. Left message for patient to return call if any questions or concerns arise.

## 2024-04-18 ENCOUNTER — OFFICE VISIT (OUTPATIENT)
Dept: PODIATRY | Facility: CLINIC | Age: 64
End: 2024-04-18
Payer: MEDICARE

## 2024-04-18 VITALS
SYSTOLIC BLOOD PRESSURE: 130 MMHG | HEART RATE: 78 BPM | HEIGHT: 65 IN | WEIGHT: 278 LBS | BODY MASS INDEX: 46.32 KG/M2 | DIASTOLIC BLOOD PRESSURE: 76 MMHG | OXYGEN SATURATION: 94 %

## 2024-04-18 DIAGNOSIS — B35.1 ONYCHOMYCOSIS: Primary | ICD-10-CM

## 2024-04-18 DIAGNOSIS — Z79.01 ANTICOAGULANT LONG-TERM USE: ICD-10-CM

## 2024-04-18 DIAGNOSIS — E66.01 OBESITY, CLASS III, BMI 40-49.9 (MORBID OBESITY): ICD-10-CM

## 2024-04-18 DIAGNOSIS — Z89.429 TOE AMPUTEE: ICD-10-CM

## 2024-04-18 DIAGNOSIS — Z72.0 TOBACCO ABUSE: ICD-10-CM

## 2024-04-18 DIAGNOSIS — L84 FOOT CALLUS: ICD-10-CM

## 2024-04-18 DIAGNOSIS — E11.40 TYPE 2 DIABETES MELLITUS WITH DIABETIC NEUROPATHY, WITHOUT LONG-TERM CURRENT USE OF INSULIN: ICD-10-CM

## 2024-07-15 NOTE — PROGRESS NOTES
[facility] - PODIATRY    Today's Date: 07/17/2024     Patient Name: Claudette Keeling  MRN: [id number]  CSN: [id number]  PCP: Reymundo Torrez MD  Referring Provider: No ref. provider found    SUBJECTIVE     Chief Complaint   Patient presents with    Follow-up     Reymundo Torrez MD-04/12/2024-3 [facility]-pt states she is here today for diabetic nail/foot care-pt denies pain    Diabetes     HPI: Claudette Keeling, a 63 y.o.female, comes to clinic as a(n) established patient presenting for diabetic foot exam and complaining of fungal toenails and painful calluses . Patient has h/o arthritis, Asthma, AFib, COPD, DM2, HTN, Neuropathy, Sleep Apnea, Stroke, Tobacco use . Patient is NIDDM and unsure of last BG level. Continues to note numbness and tingling in feet. Reports her 3rd toe on right foot was amputated back in 90s d/t uncontrolled DM. Denies any open wounds or sores today. Today patient relays that her toenails are elongated, thickened, and crumbly. She does feel uncomfortable caring for them herself d/t her previous history of amputation.Toenails become painful and tender when elongated or pressure is applied. Occasionally uses walker for gait support- does not have it with her today. She continues to take ELIQUIS daily. .  Denies pain at this current moment other thancorn to right 5th toe is very tender and bothersome at times. Relates previous treatment(s) including care by podiatry . Denies any constitutional symptoms. No other pedal complaints at this time.    Past Medical History:   Diagnosis Date    Arthritis     Asthma 5/1922    Atrial fibrillation     COPD (chronic obstructive pulmonary disease) 5/1922    Diabetes mellitus     Hypertension     Neuropathy     Sleep apnea     Stroke 5/1922     Past Surgical History:   Procedure Laterality Date    APPENDECTOMY      HYSTERECTOMY      JOINT REPLACEMENT Left     knee    TOE OSTEOPHYTE REMOVAL       Family History   Problem Relation  Age of Onset    Heart failure Mother     Hypertension Mother     Diabetes Mother     Hypertension Father     Diabetes Father     No Known Problems Sister     Diabetes Brother     Hypertension Brother     Heart disease Brother         Mother    Heart attack Brother      Social History     Socioeconomic History    Marital status:    Tobacco Use    Smoking status: Every Day     Current packs/day: 1.00     Average packs/day: 1 pack/day for 25.0 years (25.0 ttl pk-yrs)     Types: Cigarettes     Passive exposure: Past    Smokeless tobacco: Never    Tobacco comments:     Has started back and restarted Chantix   Vaping Use    Vaping status: Never Used   Substance and Sexual Activity    Alcohol use: Never    Drug use: Never    Sexual activity: Not Currently     Partners: Male     Birth control/protection: Hysterectomy     Allergies   Allergen Reactions    Codeine Nausea And Vomiting     Current Outpatient Medications   Medication Sig Dispense Refill    apixaban (ELIQUIS) 5 MG tablet tablet Take 1 tablet by mouth Every 12 (Twelve) Hours. Indications: Atrial Fibrillation 60 tablet 11    Budeson-Glycopyrrol-Formoterol (Breztri Aerosphere) 160-9-4.8 MCG/ACT aerosol inhaler Inhale 2 puffs 2 (Two) Times a Day.      Cholecalciferol (Vitamin D3) 1.25 MG (45129 UT) capsule Take 1 capsule by mouth 1 (One) Time Per Week.      Cyanocobalamin (B-12 Compliance Injection) 1000 MCG/ML kit Inject  as directed.      dilTIAZem CD (CARDIZEM CD) 120 MG 24 hr capsule Take 1 capsule by mouth Daily. 30 capsule 11    docusate sodium 100 MG capsule Take 1 capsule by mouth 2 (Two) Times a Day.      empagliflozin (JARDIANCE) 25 MG tablet tablet Take 1 tablet by mouth Daily.      famotidine (Pepcid) 20 MG tablet Take 1 tablet by mouth 2 (Two) Times a Day As Needed for Heartburn or Indigestion. 180 tablet 0    flecainide (TAMBOCOR) 50 MG tablet Take 1 tablet by mouth Every 12 (Twelve) Hours. 60 tablet 11    fluticasone (FLONASE) 50 MCG/ACT nasal  spray       folic acid (FOLVITE) 1 MG tablet Take 1 tablet by mouth Daily.      furosemide (LASIX) 40 MG tablet Take 1 tablet by mouth Daily As Needed.      gabapentin (NEURONTIN) 300 MG capsule Take 1 capsule by mouth 3 (Three) Times a Day.      lisinopril (PRINIVIL,ZESTRIL) 10 MG tablet Take 1 tablet by mouth Daily. 90 tablet 0    metFORMIN ER (GLUCOPHAGE-XR) 500 MG 24 hr tablet Take 2 tablets by mouth Daily With Breakfast. 180 tablet 1    metoprolol tartrate (LOPRESSOR) 50 MG tablet Take 1 tablet by mouth 2 (Two) Times a Day. 60 tablet 11    multivitamin with minerals tablet tablet Take 1 tablet by mouth Daily.      Ozempic, 1 MG/DOSE, 4 MG/3ML solution pen-injector INJECT 1MG SUBCUTANOUSLY EVERY WEEK      rosuvastatin (Crestor) 5 MG tablet Take 1 tablet by mouth Daily. 90 tablet 1    Trulicity 1.5 MG/0.5ML solution pen-injector Inject 1 syringe under the skin into the appropriate area as directed 1 (One) Time Per Week.      varenicline (CHANTIX) 1 MG tablet Take 1 tablet by mouth 2 (Two) Times a Day. 60 tablet 5     No current facility-administered medications for this visit.     Review of Systems   Constitutional:  Negative for chills and fever.   HENT:  Negative for congestion.    Respiratory:  Negative for chest tightness and shortness of breath.    Cardiovascular:  Negative for chest pain and leg swelling.   Gastrointestinal:  Negative for constipation, diarrhea, nausea and vomiting.   Musculoskeletal:  Positive for arthralgias and gait problem.        Occasionally uses walker   Skin:  Negative for wound.        Thickened, elongated, crumbly toenails   Neurological:  Positive for numbness. Negative for dizziness and weakness.   Hematological:  Bruises/bleeds easily.   Psychiatric/Behavioral:  Negative for agitation, behavioral problems and confusion.        OBJECTIVE     Vitals:    07/17/24 0829   BP: 140/100   Pulse: 82   SpO2: 93%             PHYSICAL EXAM  GEN:   Accompanied by none.     Foot/Ankle  Exam    GENERAL  Diabetic foot exam performed    Appearance:  appears stated age and obese  Orientation:  AAOx3  Affect:  appropriate  Gait:  unimpaired  Assistance:  independent and walker (reports she occasionally uses a walker for gait support. Not using today.)  Right shoe gear: casual shoe  Left shoe gear: casual shoe    VASCULAR     Right Foot Vascularity   Dorsalis pedis:  2+  Posterior tibial:  2+  Skin temperature:  warm  Edema grading:  None  CFT:  3  Pedal hair growth:  Absent  Varicosities:  moderate varicosities     Left Foot Vascularity   Dorsalis pedis:  2+  Posterior tibial:  2+  Skin temperature:  warm  Edema grading:  None  CFT:  3  Pedal hair growth:  Absent  Varicosities:  moderate varicosities     NEUROLOGIC     Right Foot Neurologic   Light touch sensation: diminished  Vibratory sensation: diminished  Hot/Cold sensation: diminished  Protective Sensation using [place] Monofilament:   Sites intact: 8  Sites tested: 10     Left Foot Neurologic   Light touch sensation: diminished  Vibratory sensation: diminished  Hot/Cold sensation:  diminished  Protective Sensation using [place] Monofilament:   Sites intact: 8  Sites tested: 10    MUSCULOSKELETAL     Right Foot Musculoskeletal    Amputation   Toes amputated: third toe  Ecchymosis:  none  Tenderness:  toenail problem    Arch:  Normal  Hammertoe:  Second toe, fourth toe and fifth toe  Hallux valgus: Yes       Left Foot Musculoskeletal   Ecchymosis:  none  Tenderness:  toenail problem  Arch:  Normal    MUSCLE STRENGTH     Right Foot Muscle Strength   Foot dorsiflexion:  5  Foot plantar flexion:  5  Foot inversion:  5  Foot eversion:  5     Left Foot Muscle Strength   Foot dorsiflexion:  5  Foot plantar flexion:  5  Foot inversion:  5  Foot eversion:  5    RANGE OF MOTION     Right Foot Range of Motion   Foot and ankle ROM within normal limits       Left Foot Range of Motion   Foot and ankle ROM within normal limits      DERMATOLOGIC       Right Foot Dermatologic   Skin  Positive for dryness.   Nails  1.  Positive for onychomycosis, abnormal thickness, subungual debris and dystrophic nail.  2.  Positive for elongated, onychomycosis, abnormal thickness, subungual debris and dystrophic nail.  3.  Absent.  4.  Positive for elongated, abnormal thickness and dystrophic nail.  5.  Positive for elongated, abnormal thickness and dystrophic nail.     Left Foot Dermatologic   Skin  Positive for dryness.   Nails  1.  Positive for elongated, onychomycosis, abnormal thickness, subungual debris and dystrophic nail.  2.  Positive for elongated, onychomycosis, abnormal thickness, subungual debris and dystrophic nail.  3.  Positive for elongated, onychomycosis, abnormal thickness and dystrophic nail.  4.  Positive for elongated, abnormally thick and dystrophic nail.  5.  Positive for elongated, abnormally thick and dystrophic nail.    Image:       RADIOLOGY/NUCLEAR:  No results found.    LABORATORY/CULTURE RESULTS:      PATHOLOGY RESULTS:       ASSESSMENT/PLAN     Diagnoses and all orders for this visit:    1. Onychomycosis (Primary)    2. Type 2 diabetes mellitus with diabetic neuropathy, without long-term current use of insulin    3. Anticoagulant long-term use    4. Toe amputee    5. Tobacco abuse    6. Obesity, Class III, BMI 40-49.9 (morbid obesity)    7. Foot callus        Comprehensive lower extremity examination and evaluation was performed.  Discussed findings and treatment plan including risks, benefits, and treatment options with patient in detail. Patient agreed with treatment plan.  After verbal consent obtained, nail(s) x9 debrided of length and thickness with nail nipper without incidence  After verbal consent obtained, calluses x3 pared utilizing dermal curette and/or scalpel without incidence  Patient may maintain nails and calluses at home utilizing emery board or pumice stone between visits as needed  Reviewed at home diabetic foot care  including daily foot checks   Continue control and maintenance of DM per PCP.  Discussed tobacco cessation      An After Visit Summary was printed and given to the patient at discharge, including (if requested) any available informative/educational handouts regarding diagnosis, treatment, or medications. All questions were answered to patient/family satisfaction. Should symptoms fail to improve or worsen they agree to call or return to clinic or to go to the Emergency Department. Discussed the importance of following up with any needed screening tests/labs/specialist appointments and any requested follow-up recommended by me today. Importance of maintaining follow-up discussed and patient accepts that missed appointments can delay diagnosis and potentially lead to worsening of conditions.  Return in about 3 months (around 10/17/2024) for Follow-up with APRN, Follow-up in [facility] or sooner if acute issues arise.        This document has been electronically signed by FLORENCIA Ross on July 17, 2024 12:17 CDT

## 2024-07-16 ENCOUNTER — TELEPHONE (OUTPATIENT)
Age: 64
End: 2024-07-16
Payer: MEDICARE

## 2024-07-16 NOTE — TELEPHONE ENCOUNTER
Called patient to confirm appointment on 07/17/24 at 8:30 am. Patient also informed new office address. Patient did not answer left VM.

## 2024-07-17 ENCOUNTER — OFFICE VISIT (OUTPATIENT)
Age: 64
End: 2024-07-17
Payer: MEDICARE

## 2024-07-17 VITALS
WEIGHT: 278 LBS | SYSTOLIC BLOOD PRESSURE: 140 MMHG | DIASTOLIC BLOOD PRESSURE: 100 MMHG | HEART RATE: 82 BPM | HEIGHT: 65 IN | BODY MASS INDEX: 46.32 KG/M2 | OXYGEN SATURATION: 93 %

## 2024-07-17 DIAGNOSIS — E66.01 OBESITY, CLASS III, BMI 40-49.9 (MORBID OBESITY): ICD-10-CM

## 2024-07-17 DIAGNOSIS — Z79.01 ANTICOAGULANT LONG-TERM USE: ICD-10-CM

## 2024-07-17 DIAGNOSIS — Z89.429 TOE AMPUTEE: ICD-10-CM

## 2024-07-17 DIAGNOSIS — L84 FOOT CALLUS: ICD-10-CM

## 2024-07-17 DIAGNOSIS — B35.1 ONYCHOMYCOSIS: Primary | ICD-10-CM

## 2024-07-17 DIAGNOSIS — Z72.0 TOBACCO ABUSE: ICD-10-CM

## 2024-07-17 DIAGNOSIS — E11.40 TYPE 2 DIABETES MELLITUS WITH DIABETIC NEUROPATHY, WITHOUT LONG-TERM CURRENT USE OF INSULIN: ICD-10-CM

## 2024-07-17 RX ORDER — CYANOCOBALAMIN, ISOPROPYL ALCOHOL 1000MCG/ML
KIT INJECTION
COMMUNITY

## 2024-07-17 RX ORDER — SEMAGLUTIDE 1.34 MG/ML
INJECTION, SOLUTION SUBCUTANEOUS
COMMUNITY
Start: 2024-06-01

## 2024-07-17 RX ORDER — FOLIC ACID 1 MG/1
1 TABLET ORAL DAILY
COMMUNITY
Start: 2024-06-30

## 2024-07-23 ENCOUNTER — TELEPHONE (OUTPATIENT)
Dept: HEMATOLOGY | Age: 64
End: 2024-07-23

## 2024-09-11 RX ORDER — DILTIAZEM HYDROCHLORIDE 120 MG/1
120 CAPSULE, COATED, EXTENDED RELEASE ORAL
Qty: 30 CAPSULE | Refills: 6 | Status: SHIPPED | OUTPATIENT
Start: 2024-09-11

## 2024-10-18 NOTE — PROGRESS NOTES
[facility] - PODIATRY    Today's Date: 10/23/2024     Patient Name: Claudette Keeling  MRN: [id number]  CSN: [id number]  PCP: Reymundo Torrez MD  Referring Provider: No ref. provider found    SUBJECTIVE     Chief Complaint   Patient presents with    Follow-up     Reymundo Torrez MD-04/12/2024 3 [facility]     HPI: Claudette Keeling, a 63 y.o.female, comes to clinic as a(n) established patient presenting for diabetic foot exam and complaining of fungal toenails and painful calluses . Patient has h/o arthritis, Asthma, AFib, COPD, DM2, HTN, Neuropathy, Sleep Apnea, Stroke, Tobacco use . Patient is NIDDM and unsure of last BG level. Continues to note numbness and tingling in feet. Reports her 3rd toe on right foot was amputated back in 90s d/t uncontrolled DM. Denies any open wounds or sores today. Reports toenails are elongated, thickened, and crumbly. She does feel uncomfortable caring for them herself d/t her previous history of amputation.Toenails become painful and tender when elongated or pressure is applied. Using Rolator today for ambulation support.  She continues to take ELIQUIS daily.  Denies pain currently. Relates previous treatment(s) including care by podiatry . Denies any constitutional symptoms. No other pedal complaints at this time.    Past Medical History:   Diagnosis Date    Arthritis     Asthma 5/1922    Atrial fibrillation     COPD (chronic obstructive pulmonary disease) 5/1922    Diabetes mellitus     Hypertension     Neuropathy     Sleep apnea     Stroke 5/1922     Past Surgical History:   Procedure Laterality Date    APPENDECTOMY      HYSTERECTOMY      JOINT REPLACEMENT Left     knee    TOE OSTEOPHYTE REMOVAL       Family History   Problem Relation Age of Onset    Heart failure Mother     Hypertension Mother     Diabetes Mother     Hypertension Father     Diabetes Father     No Known Problems Sister     Diabetes Brother     Hypertension Brother      Heart disease Brother         Mother    Heart attack Brother      Social History     Socioeconomic History    Marital status:    Tobacco Use    Smoking status: Every Day     Current packs/day: 1.00     Average packs/day: 1 pack/day for 25.0 years (25.0 ttl pk-yrs)     Types: Cigarettes     Passive exposure: Past    Smokeless tobacco: Never    Tobacco comments:     Has started back and restarted Chantix   Vaping Use    Vaping status: Never Used   Substance and Sexual Activity    Alcohol use: Never    Drug use: Never    Sexual activity: Not Currently     Partners: Male     Birth control/protection: Hysterectomy     Allergies   Allergen Reactions    Codeine Nausea And Vomiting     Current Outpatient Medications   Medication Sig Dispense Refill    apixaban (ELIQUIS) 5 MG tablet tablet Take 1 tablet by mouth Every 12 (Twelve) Hours. Indications: Atrial Fibrillation 60 tablet 11    Budeson-Glycopyrrol-Formoterol (Breztri Aerosphere) 160-9-4.8 MCG/ACT aerosol inhaler Inhale 2 puffs 2 (Two) Times a Day.      Cholecalciferol (Vitamin D3) 1.25 MG (41125 UT) capsule Take 1 capsule by mouth 1 (One) Time Per Week.      dilTIAZem CD (CARDIZEM CD) 120 MG 24 hr capsule Take 1 capsule by mouth once daily 30 capsule 6    docusate sodium 100 MG capsule Take 1 capsule by mouth 2 (Two) Times a Day.      empagliflozin (JARDIANCE) 25 MG tablet tablet Take 1 tablet by mouth Daily.      famotidine (Pepcid) 20 MG tablet Take 1 tablet by mouth 2 (Two) Times a Day As Needed for Heartburn or Indigestion. 180 tablet 0    flecainide (TAMBOCOR) 50 MG tablet TAKE 1 TABLET BY MOUTH EVERY 12 HOURS 180 tablet 1    fluticasone (FLONASE) 50 MCG/ACT nasal spray       folic acid (FOLVITE) 1 MG tablet Take 1 tablet by mouth Daily.      gabapentin (NEURONTIN) 300 MG capsule Take 1 capsule by mouth 3 (Three) Times a Day.      lisinopril (PRINIVIL,ZESTRIL) 10 MG tablet Take 1 tablet by mouth Daily. 90 tablet 0    metFORMIN ER (GLUCOPHAGE-XR) 500 MG 24  hr tablet Take 2 tablets by mouth Daily With Breakfast. 180 tablet 1    metoprolol tartrate (LOPRESSOR) 50 MG tablet Take 1 tablet by mouth twice daily 180 tablet 1    multivitamin with minerals tablet tablet Take 1 tablet by mouth Daily.      Ozempic, 1 MG/DOSE, 4 MG/3ML solution pen-injector INJECT 1MG SUBCUTANOUSLY EVERY WEEK      rosuvastatin (Crestor) 5 MG tablet Take 1 tablet by mouth Daily. 90 tablet 1    Cyanocobalamin (B-12 Compliance Injection) 1000 MCG/ML kit Inject  as directed. (Patient not taking: Reported on 10/23/2024)      furosemide (LASIX) 40 MG tablet Take 1 tablet by mouth Daily As Needed. (Patient not taking: Reported on 10/23/2024)      nystatin (MYCOSTATIN) 972621 UNIT/GM powder APPLY POWDER TOPICALLY TWICE DAILY AS NEEDED      O2 (OXYGEN) 2lpm 8-10hrs per day      Trulicity 1.5 MG/0.5ML solution pen-injector Inject 1 syringe under the skin into the appropriate area as directed 1 (One) Time Per Week.      varenicline (CHANTIX) 1 MG tablet Take 1 tablet by mouth 2 (Two) Times a Day. 60 tablet 5     No current facility-administered medications for this visit.     Review of Systems   Constitutional:  Negative for chills and fever.   HENT:  Negative for congestion.    Respiratory:  Negative for chest tightness and shortness of breath.    Cardiovascular:  Negative for chest pain and leg swelling.   Gastrointestinal:  Negative for constipation, diarrhea, nausea and vomiting.   Musculoskeletal:  Positive for arthralgias and gait problem.        Occasionally uses walker   Skin:  Negative for wound.        Thickened, elongated, crumbly toenails   Neurological:  Positive for numbness. Negative for dizziness and weakness.   Hematological:  Bruises/bleeds easily.   Psychiatric/Behavioral:  Negative for agitation, behavioral problems and confusion.        OBJECTIVE     Vitals:    10/23/24 0835   BP: 142/80   Pulse: 66   SpO2: 95%               PHYSICAL EXAM  GEN:   Accompanied by none.     Foot/Ankle  Exam    GENERAL  Diabetic foot exam performed    Appearance:  appears stated age and obese  Orientation:  AAOx3  Affect:  appropriate  Gait:  unimpaired  Assistance:  independent and walker (reports she occasionally uses a walker for gait support. Not using today.)  Right shoe gear: casual shoe  Left shoe gear: casual shoe    VASCULAR     Right Foot Vascularity   Dorsalis pedis:  2+  Posterior tibial:  2+  Skin temperature:  warm  Edema grading:  None  CFT:  3  Pedal hair growth:  Absent  Varicosities:  moderate varicosities     Left Foot Vascularity   Dorsalis pedis:  2+  Posterior tibial:  2+  Skin temperature:  warm  Edema grading:  None  CFT:  3  Pedal hair growth:  Absent  Varicosities:  moderate varicosities     NEUROLOGIC     Right Foot Neurologic   Light touch sensation: diminished  Vibratory sensation: diminished  Hot/Cold sensation: diminished  Protective Sensation using [place] Monofilament:   Sites intact: 8  Sites tested: 10     Left Foot Neurologic   Light touch sensation: diminished  Vibratory sensation: diminished  Hot/Cold sensation:  diminished  Protective Sensation using [place] Monofilament:   Sites intact: 8  Sites tested: 10    MUSCULOSKELETAL     Right Foot Musculoskeletal    Amputation   Toes amputated: third toe  Ecchymosis:  none  Tenderness:  toenail problem    Arch:  Normal  Hammertoe:  Second toe, fourth toe and fifth toe  Hallux valgus: Yes       Left Foot Musculoskeletal   Ecchymosis:  none  Tenderness:  toenail problem  Arch:  Normal    MUSCLE STRENGTH     Right Foot Muscle Strength   Foot dorsiflexion:  5  Foot plantar flexion:  5  Foot inversion:  5  Foot eversion:  5     Left Foot Muscle Strength   Foot dorsiflexion:  5  Foot plantar flexion:  5  Foot inversion:  5  Foot eversion:  5    RANGE OF MOTION     Right Foot Range of Motion   Foot and ankle ROM within normal limits       Left Foot Range of Motion   Foot and ankle ROM within normal limits      DERMATOLOGIC       Right Foot Dermatologic   Skin  Positive for dryness.   Nails  1.  Positive for onychomycosis, abnormal thickness, subungual debris and dystrophic nail.  2.  Positive for elongated, onychomycosis, abnormal thickness, subungual debris and dystrophic nail.  3.  Absent.  4.  Positive for elongated, abnormal thickness and dystrophic nail.  5.  Positive for elongated, abnormal thickness and dystrophic nail.     Left Foot Dermatologic   Skin  Positive for dryness.   Nails  1.  Positive for elongated, onychomycosis, abnormal thickness, subungual debris and dystrophic nail.  2.  Positive for elongated, onychomycosis, abnormal thickness, subungual debris and dystrophic nail.  3.  Positive for elongated, onychomycosis, abnormal thickness and dystrophic nail.  4.  Positive for elongated, abnormally thick and dystrophic nail.  5.  Positive for elongated, abnormally thick and dystrophic nail.    Image:       RADIOLOGY/NUCLEAR:  No results found.    LABORATORY/CULTURE RESULTS:      PATHOLOGY RESULTS:       ASSESSMENT/PLAN     Diagnoses and all orders for this visit:    1. Onychomycosis (Primary)    2. Type 2 diabetes mellitus with diabetic neuropathy, without long-term current use of insulin    3. Anticoagulant long-term use    4. Toe amputee    5. Tobacco abuse    6. Obesity, Class III, BMI 40-49.9 (morbid obesity)    7. Foot callus          Comprehensive lower extremity examination and evaluation was performed.  Discussed findings and treatment plan including risks, benefits, and treatment options with patient in detail. Patient agreed with treatment plan.  After verbal consent obtained, nail(s) x9 debrided of length and thickness with nail nipper without incidence  After verbal consent obtained, calluses x4 pared utilizing dermal curette and/or scalpel without incidence  Patient may maintain nails and calluses at home utilizing emery board or pumice stone between visits as needed  Reviewed at home diabetic foot care  including daily foot checks   Continue control and maintenance of DM per PCP.  Tobacco cessation needed.      An After Visit Summary was printed and given to the patient at discharge, including (if requested) any available informative/educational handouts regarding diagnosis, treatment, or medications. All questions were answered to patient/family satisfaction. Should symptoms fail to improve or worsen they agree to call or return to clinic or to go to the Emergency Department. Discussed the importance of following up with any needed screening tests/labs/specialist appointments and any requested follow-up recommended by me today. Importance of maintaining follow-up discussed and patient accepts that missed appointments can delay diagnosis and potentially lead to worsening of conditions.  No follow-ups on file., or sooner if acute issues arise.      Advance Care Planning   ACP discussion was declined by the patient. Patient does not have an advance directive, declines further assistance.       This document has been electronically signed by FLORENCIA Ross on October 23, 2024 09:17 CDT

## 2024-10-22 RX ORDER — FLECAINIDE ACETATE 50 MG/1
50 TABLET ORAL EVERY 12 HOURS SCHEDULED
Qty: 180 TABLET | Refills: 1 | Status: SHIPPED | OUTPATIENT
Start: 2024-10-22

## 2024-10-22 RX ORDER — METOPROLOL TARTRATE 50 MG
50 TABLET ORAL 2 TIMES DAILY
Qty: 180 TABLET | Refills: 1 | Status: SHIPPED | OUTPATIENT
Start: 2024-10-22

## 2024-10-23 ENCOUNTER — OFFICE VISIT (OUTPATIENT)
Age: 64
End: 2024-10-23
Payer: MEDICARE

## 2024-10-23 VITALS
BODY MASS INDEX: 44.98 KG/M2 | HEART RATE: 66 BPM | HEIGHT: 65 IN | OXYGEN SATURATION: 95 % | WEIGHT: 270 LBS | SYSTOLIC BLOOD PRESSURE: 142 MMHG | DIASTOLIC BLOOD PRESSURE: 80 MMHG

## 2024-10-23 DIAGNOSIS — E66.01 OBESITY, CLASS III, BMI 40-49.9 (MORBID OBESITY): ICD-10-CM

## 2024-10-23 DIAGNOSIS — Z72.0 TOBACCO ABUSE: ICD-10-CM

## 2024-10-23 DIAGNOSIS — B35.1 ONYCHOMYCOSIS: Primary | ICD-10-CM

## 2024-10-23 DIAGNOSIS — Z89.429 TOE AMPUTEE: ICD-10-CM

## 2024-10-23 DIAGNOSIS — L84 FOOT CALLUS: ICD-10-CM

## 2024-10-23 DIAGNOSIS — Z79.01 ANTICOAGULANT LONG-TERM USE: ICD-10-CM

## 2024-10-23 DIAGNOSIS — E11.40 TYPE 2 DIABETES MELLITUS WITH DIABETIC NEUROPATHY, WITHOUT LONG-TERM CURRENT USE OF INSULIN: ICD-10-CM

## 2024-10-23 RX ORDER — NYSTATIN 100000 [USP'U]/G
POWDER TOPICAL
COMMUNITY

## 2024-10-31 ENCOUNTER — TELEPHONE (OUTPATIENT)
Dept: HEMATOLOGY | Age: 64
End: 2024-10-31

## 2024-10-31 NOTE — TELEPHONE ENCOUNTER
I called patient and left detailed voicemail about their appointment on 11/04/24. I made patient aware not to arrive any earlier than the appointment time and to come at the time of the follow up not the time of the lab appointment if it is different than the follow up appt time. Made patient aware that we are now located at the [facility] at [address]. Located between [facility] and the [facility]. Front entrance faces [facility].

## 2025-01-07 ENCOUNTER — TELEPHONE (OUTPATIENT)
Dept: HEMATOLOGY | Age: 65
End: 2025-01-07

## 2025-01-07 NOTE — TELEPHONE ENCOUNTER

## 2025-01-08 ENCOUNTER — OFFICE VISIT (OUTPATIENT)
Dept: HEMATOLOGY | Age: 65
End: 2025-01-08
Payer: MEDICARE

## 2025-01-08 ENCOUNTER — HOSPITAL ENCOUNTER (OUTPATIENT)
Dept: INFUSION THERAPY | Age: 65
Discharge: HOME | End: 2025-01-08
Payer: MEDICARE

## 2025-01-08 VITALS
HEIGHT: 65 IN | WEIGHT: 267 LBS | HEART RATE: 86 BPM | SYSTOLIC BLOOD PRESSURE: 152 MMHG | DIASTOLIC BLOOD PRESSURE: 98 MMHG | TEMPERATURE: 97.5 F | BODY MASS INDEX: 44.48 KG/M2 | OXYGEN SATURATION: 95 %

## 2025-01-08 DIAGNOSIS — G47.33 OBSTRUCTIVE SLEEP APNEA SYNDROME: ICD-10-CM

## 2025-01-08 DIAGNOSIS — D75.1 POLYCYTHEMIA: ICD-10-CM

## 2025-01-08 DIAGNOSIS — D69.6 THROMBOCYTOPENIA (HCC): ICD-10-CM

## 2025-01-08 DIAGNOSIS — D69.6 THROMBOCYTOPENIA (HCC): Primary | ICD-10-CM

## 2025-01-08 DIAGNOSIS — F17.210 CIGARETTE NICOTINE DEPENDENCE WITHOUT COMPLICATION: ICD-10-CM

## 2025-01-08 LAB
BASOPHILS # BLD: 0.03 K/UL (ref 0.01–0.08)
BASOPHILS NFR BLD: 0.4 % (ref 0.1–1.2)
EOSINOPHIL # BLD: 0.11 K/UL (ref 0.04–0.54)
EOSINOPHIL NFR BLD: 1.6 % (ref 0.7–7)
ERYTHROCYTE [DISTWIDTH] IN BLOOD BY AUTOMATED COUNT: 13.2 % (ref 11.7–14.4)
HCT VFR BLD AUTO: 49.2 % (ref 34.1–44.9)
HGB BLD-MCNC: 16.3 G/DL (ref 11.2–15.7)
LYMPHOCYTES # BLD: 1.97 K/UL (ref 1.18–3.74)
LYMPHOCYTES NFR BLD: 28.1 % (ref 19.3–53.1)
MCH RBC QN AUTO: 31.9 PG (ref 25.6–32.2)
MCHC RBC AUTO-ENTMCNC: 33.1 G/DL (ref 32.3–35.5)
MCV RBC AUTO: 96.3 FL (ref 79.4–94.8)
MONOCYTES # BLD: 0.43 K/UL (ref 0.24–0.82)
MONOCYTES NFR BLD: 6.1 % (ref 4.7–12.5)
NEUTROPHILS # BLD: 4.45 K/UL (ref 1.56–6.13)
NEUTS SEG NFR BLD: 63.7 % (ref 34–71.1)
PLATELET # BLD AUTO: 132 K/UL (ref 182–369)
PMV BLD AUTO: 10.9 FL (ref 7.4–10.4)
RBC # BLD AUTO: 5.11 M/UL (ref 3.93–5.22)
WBC # BLD AUTO: 7 K/UL (ref 3.98–10.04)

## 2025-01-08 PROCEDURE — 99213 OFFICE O/P EST LOW 20 MIN: CPT | Performed by: PHYSICIAN ASSISTANT

## 2025-01-08 PROCEDURE — 1159F MED LIST DOCD IN RCRD: CPT | Performed by: PHYSICIAN ASSISTANT

## 2025-01-08 PROCEDURE — 3077F SYST BP >= 140 MM HG: CPT | Performed by: PHYSICIAN ASSISTANT

## 2025-01-08 PROCEDURE — 85025 COMPLETE CBC W/AUTO DIFF WBC: CPT

## 2025-01-08 PROCEDURE — 1123F ACP DISCUSS/DSCN MKR DOCD: CPT | Performed by: PHYSICIAN ASSISTANT

## 2025-01-08 PROCEDURE — 3080F DIAST BP >= 90 MM HG: CPT | Performed by: PHYSICIAN ASSISTANT

## 2025-01-08 PROCEDURE — 36415 COLL VENOUS BLD VENIPUNCTURE: CPT

## 2025-01-08 PROCEDURE — 1126F AMNT PAIN NOTED NONE PRSNT: CPT | Performed by: PHYSICIAN ASSISTANT

## 2025-01-08 RX ORDER — CYANOCOBALAMIN, ISOPROPYL ALCOHOL 1000MCG/ML
1 KIT INJECTION
COMMUNITY

## 2025-01-08 RX ORDER — FAMOTIDINE 20 MG/1
20 TABLET, FILM COATED ORAL 2 TIMES DAILY
COMMUNITY
Start: 2024-10-30

## 2025-01-08 RX ORDER — PSEUDOEPHEDRINE HCL 30 MG
100 TABLET ORAL 2 TIMES DAILY
COMMUNITY

## 2025-01-08 RX ORDER — FOLIC ACID 1 MG/1
1000 TABLET ORAL DAILY
COMMUNITY

## 2025-01-08 RX ORDER — SEMAGLUTIDE 1.34 MG/ML
1 INJECTION, SOLUTION SUBCUTANEOUS
COMMUNITY

## 2025-01-08 ASSESSMENT — ENCOUNTER SYMPTOMS
TROUBLE SWALLOWING: 0
COUGH: 0
CONSTIPATION: 0
DIARRHEA: 0
ABDOMINAL DISTENTION: 0
BACK PAIN: 1
BLOOD IN STOOL: 0
PHOTOPHOBIA: 0
VOICE CHANGE: 0
EYE ITCHING: 0
ABDOMINAL PAIN: 0
EYE DISCHARGE: 0
VOMITING: 0
COLOR CHANGE: 0
SHORTNESS OF BREATH: 0
WHEEZING: 0
SORE THROAT: 0
NAUSEA: 0

## 2025-01-14 NOTE — PROGRESS NOTES
[facility] - PODIATRY    Today's Date: 01/22/2025     Patient Name: Claudette Keeling  MRN: [id number]  CSN: [id number]  PCP: Reymundo Torrez MD  Referring Provider: No ref. provider found    SUBJECTIVE     Chief Complaint   Patient presents with    Follow-up     Reymundo Torrez MD -12/11/2024-Return in about 3 months [facility]-pt states she is here today for diabetic nail/foot care/possible ingrown toenail left great toe-pt denies pain     Diabetes     HPI: Claudette Keeling, a 64 y.o.female, comes to clinic as a(n) established patient presenting for diabetic foot exam and complaining of fungal toenails and painful calluses . Patient has h/o arthritis, Asthma, AFib, COPD, DM2, HTN, Neuropathy, Sleep Apnea, Stroke, Tobacco use . Patient is NIDDM and unsure of last BG level. Last A1c 6.4%. Continues to note numbness and tingling in feet. Reports her 3rd toe on right foot was amputated back in 90s d/t uncontrolled DM. Denies any open wounds or sores today. Today complains toenails elongated, thickened, and crumbly. She does feel uncomfortable caring for them herself d/t her previous history of amputation. Does not have Rolator with her today but normally does for ambulation support.  She continues to take ELIQUIS daily. Does relay occasional cramping to her legs at night time.  Denies pain. Relates previous treatment(s) including care by podiatry . Denies any constitutional symptoms. No other pedal complaints at this time.    Past Medical History:   Diagnosis Date    Arthritis     Asthma 5/1922    Atrial fibrillation     COPD (chronic obstructive pulmonary disease) 5/1922    Diabetes mellitus     Hypertension     Neuropathy     Sleep apnea     Stroke 5/1922     Past Surgical History:   Procedure Laterality Date    APPENDECTOMY      HYSTERECTOMY      JOINT REPLACEMENT Left     knee    TOE OSTEOPHYTE REMOVAL       Family History   Problem Relation Age of Onset    Heart failure Mother      Hypertension Mother     Diabetes Mother     Hypertension Father     Diabetes Father     No Known Problems Sister     Diabetes Brother     Hypertension Brother     Heart disease Brother         Mother    Heart attack Brother      Social History     Socioeconomic History    Marital status:    Tobacco Use    Smoking status: Every Day     Current packs/day: 1.00     Average packs/day: 1 pack/day for 25.0 years (25.0 ttl pk-yrs)     Types: Cigarettes     Passive exposure: Past    Smokeless tobacco: Never    Tobacco comments:     Has started back and restarted Chantix   Vaping Use    Vaping status: Never Used   Substance and Sexual Activity    Alcohol use: Never    Drug use: Never    Sexual activity: Not Currently     Partners: Male     Birth control/protection: Hysterectomy     Allergies   Allergen Reactions    Codeine Nausea And Vomiting     Current Outpatient Medications   Medication Sig Dispense Refill    apixaban (ELIQUIS) 5 MG tablet tablet Take 1 tablet by mouth Every 12 (Twelve) Hours. Indications: Atrial Fibrillation 60 tablet 11    Budeson-Glycopyrrol-Formoterol (Breztri Aerosphere) 160-9-4.8 MCG/ACT aerosol inhaler Inhale 2 puffs 2 (Two) Times a Day.      Cholecalciferol (Vitamin D3) 1.25 MG (57341 UT) capsule Take 1 capsule by mouth 1 (One) Time Per Week.      Cyanocobalamin (B-12 Compliance Injection) 1000 MCG/ML kit Inject  as directed.      dilTIAZem CD (CARDIZEM CD) 120 MG 24 hr capsule Take 1 capsule by mouth once daily 30 capsule 6    docusate sodium 100 MG capsule Take 1 capsule by mouth 2 (Two) Times a Day.      empagliflozin (JARDIANCE) 25 MG tablet tablet Take 1 tablet by mouth Daily.      famotidine (Pepcid) 20 MG tablet Take 1 tablet by mouth 2 (Two) Times a Day As Needed for Heartburn or Indigestion. 180 tablet 0    flecainide (TAMBOCOR) 50 MG tablet TAKE 1 TABLET BY MOUTH EVERY 12 HOURS 180 tablet 1    fluticasone (FLONASE) 50 MCG/ACT nasal spray       folic acid (FOLVITE) 1 MG tablet  Take 1 tablet by mouth Daily.      furosemide (LASIX) 40 MG tablet Take 1 tablet by mouth Daily As Needed.      gabapentin (NEURONTIN) 300 MG capsule Take 1 capsule by mouth 3 (Three) Times a Day.      lisinopril (PRINIVIL,ZESTRIL) 10 MG tablet Take 1 tablet by mouth Daily. 90 tablet 0    metFORMIN ER (GLUCOPHAGE-XR) 500 MG 24 hr tablet Take 2 tablets by mouth Daily With Breakfast. 180 tablet 1    metoprolol tartrate (LOPRESSOR) 50 MG tablet Take 1 tablet by mouth twice daily 180 tablet 1    multivitamin with minerals tablet tablet Take 1 tablet by mouth Daily.      nystatin (MYCOSTATIN) 117474 UNIT/GM powder APPLY POWDER TOPICALLY TWICE DAILY AS NEEDED      O2 (OXYGEN) 2lpm 8-10hrs per day      Ozempic, 1 MG/DOSE, 4 MG/3ML solution pen-injector INJECT 1MG SUBCUTANOUSLY EVERY WEEK      rosuvastatin (Crestor) 5 MG tablet Take 1 tablet by mouth Daily. 90 tablet 1    varenicline (CHANTIX) 1 MG tablet Take 1 tablet by mouth 2 (Two) Times a Day. 60 tablet 5    Trulicity 1.5 MG/0.5ML solution pen-injector Inject 1 syringe under the skin into the appropriate area as directed 1 (One) Time Per Week. (Patient not taking: Reported on 1/22/2025)       No current facility-administered medications for this visit.     Review of Systems   Constitutional:  Negative for chills and fever.   HENT:  Negative for congestion.    Respiratory:  Negative for chest tightness and shortness of breath.    Cardiovascular:  Negative for chest pain and leg swelling.   Gastrointestinal:  Negative for constipation, diarrhea, nausea and vomiting.   Musculoskeletal:  Positive for arthralgias and gait problem.        Occasionally uses walker   Skin:  Negative for wound.        Thickened, elongated, crumbly toenails   Neurological:  Positive for numbness. Negative for dizziness and weakness.   Hematological:  Bruises/bleeds easily.   Psychiatric/Behavioral:  Negative for agitation, behavioral problems and confusion.        OBJECTIVE     Vitals:     01/22/25 0840   BP: 128/84   Pulse: 82   SpO2: 95%                 PHYSICAL EXAM  GEN:   Accompanied by none.     Foot/Ankle Exam    GENERAL  Diabetic foot exam performed    Appearance:  appears stated age and obese  Orientation:  AAOx3  Affect:  appropriate  Gait:  unimpaired  Assistance:  independent and walker (reports she occasionally uses a walker for gait support. Not using today.)  Right shoe gear: casual shoe  Left shoe gear: casual shoe    VASCULAR     Right Foot Vascularity   Dorsalis pedis:  1+  Posterior tibial:  1+  Skin temperature:  cool  Edema grading:  None  CFT:  3  Pedal hair growth:  Absent  Varicosities:  moderate varicosities     Left Foot Vascularity   Dorsalis pedis:  1+  Posterior tibial:  1+  Skin temperature:  cool  Edema grading:  None  CFT:  3  Pedal hair growth:  Absent  Varicosities:  moderate varicosities     NEUROLOGIC     Right Foot Neurologic   Light touch sensation: diminished  Vibratory sensation: diminished  Hot/Cold sensation: diminished  Protective Sensation using Arco-Lynn Monofilament:   Sites intact: 8  Sites tested: 10     Left Foot Neurologic   Light touch sensation: diminished  Vibratory sensation: diminished  Hot/Cold sensation:  diminished  Protective Sensation using Arco-Lynn Monofilament:   Sites intact: 8  Sites tested: 10    MUSCULOSKELETAL     Right Foot Musculoskeletal    Amputation   Toes amputated: third toe  Ecchymosis:  none  Tenderness:  toenail problem    Arch:  Normal  Hammertoe:  Second toe, fourth toe and fifth toe  Hallux valgus: Yes       Left Foot Musculoskeletal   Ecchymosis:  none  Tenderness:  toenail problem  Arch:  Normal    MUSCLE STRENGTH     Right Foot Muscle Strength   Foot dorsiflexion:  5  Foot plantar flexion:  5  Foot inversion:  5  Foot eversion:  5     Left Foot Muscle Strength   Foot dorsiflexion:  5  Foot plantar flexion:  5  Foot inversion:  5  Foot eversion:  5    RANGE OF MOTION     Right Foot Range of Motion   Foot  and ankle ROM within normal limits       Left Foot Range of Motion   Foot and ankle ROM within normal limits      DERMATOLOGIC      Right Foot Dermatologic   Skin  Positive for abnormal color, corn and dryness.   Nails  1.  Positive for onychomycosis, abnormal thickness, subungual debris and dystrophic nail.  2.  Positive for elongated, onychomycosis, abnormal thickness, subungual debris and dystrophic nail.  3.  Absent.  4.  Positive for elongated, abnormal thickness and dystrophic nail.  5.  Positive for elongated, abnormal thickness and dystrophic nail.     Left Foot Dermatologic   Skin  Positive for abnormal color, corn and dryness.   Nails  1.  Positive for elongated, onychomycosis, abnormal thickness, subungual debris and dystrophic nail.  2.  Positive for elongated, onychomycosis, abnormal thickness, subungual debris and dystrophic nail.  3.  Positive for elongated, onychomycosis, abnormal thickness and dystrophic nail.  4.  Positive for elongated, abnormally thick and dystrophic nail.  5.  Positive for elongated, abnormally thick and dystrophic nail.     Right foot additional comments: Discoloration notes to toes.      Left foot additional comments: Discoloration notes to toes.     Image:       RADIOLOGY/NUCLEAR:  No results found.    LABORATORY/CULTURE RESULTS:      PATHOLOGY RESULTS:       ASSESSMENT/PLAN     Diagnoses and all orders for this visit:    1. Onychomycosis (Primary)    2. Type 2 diabetes mellitus with diabetic neuropathy, without long-term current use of insulin    3. Decreased pedal pulses    4. Discoloration of skin of toe    5. Foot callus    6. Toe amputee    7. Tobacco abuse    8. Anticoagulant long-term use            Comprehensive lower extremity examination and evaluation was performed.  Discussed findings and treatment plan including risks, benefits, and treatment options with patient in detail. Patient agreed with treatment plan.  After verbal consent obtained, nail(s) x9 debrided of  length and thickness with nail nipper without incidence  After verbal consent obtained, calluses x4 pared utilizing dermal curette and/or scalpel without incidence  Patient may maintain nails and calluses at home utilizing emery board or pumice stone between visits as needed  Reviewed at home diabetic foot care including daily foot checks   DFE performed today.  For coolness, discoloration to toes and decreased pedal pulses- offered to send patient for blood flow studies and to refer her to Vascular Surgery- patient declines today. Says that she will consider between now and her next visit. Stressed importance of having this evaluated- patient expresses concerns and fears. Provided education related to PAD and different interventions for this condition. Recommend she utilize compression stockings.  Continue control and maintenance of DM per PCP.  Tobacco cessation needed.      An After Visit Summary was printed and given to the patient at discharge, including (if requested) any available informative/educational handouts regarding diagnosis, treatment, or medications. All questions were answered to patient/family satisfaction. Should symptoms fail to improve or worsen they agree to call or return to clinic or to go to the Emergency Department. Discussed the importance of following up with any needed screening tests/labs/specialist appointments and any requested follow-up recommended by me today. Importance of maintaining follow-up discussed and patient accepts that missed appointments can delay diagnosis and potentially lead to worsening of conditions.  Return in about 3 months (around 4/22/2025) for Follow-up with APRN, Follow-up in [facility]., or sooner if acute issues arise.      Advance Care Planning   ACP discussion was declined by the patient. Patient does not have an advance directive, declines further assistance.       This document has been electronically signed by FLORENCIA Ross on January  22, 2025 09:14 CST

## 2025-01-22 ENCOUNTER — OFFICE VISIT (OUTPATIENT)
Age: 65
End: 2025-01-22
Payer: MEDICARE

## 2025-01-22 VITALS
OXYGEN SATURATION: 95 % | SYSTOLIC BLOOD PRESSURE: 128 MMHG | HEIGHT: 65 IN | WEIGHT: 270 LBS | BODY MASS INDEX: 44.98 KG/M2 | HEART RATE: 82 BPM | DIASTOLIC BLOOD PRESSURE: 84 MMHG

## 2025-01-22 DIAGNOSIS — B35.1 ONYCHOMYCOSIS: Primary | ICD-10-CM

## 2025-01-22 DIAGNOSIS — Z79.01 ANTICOAGULANT LONG-TERM USE: ICD-10-CM

## 2025-01-22 DIAGNOSIS — Z89.429 TOE AMPUTEE: ICD-10-CM

## 2025-01-22 DIAGNOSIS — L81.9 DISCOLORATION OF SKIN OF TOE: ICD-10-CM

## 2025-01-22 DIAGNOSIS — Z72.0 TOBACCO ABUSE: ICD-10-CM

## 2025-01-22 DIAGNOSIS — E11.40 TYPE 2 DIABETES MELLITUS WITH DIABETIC NEUROPATHY, WITHOUT LONG-TERM CURRENT USE OF INSULIN: ICD-10-CM

## 2025-01-22 DIAGNOSIS — R09.89 DECREASED PEDAL PULSES: ICD-10-CM

## 2025-01-22 DIAGNOSIS — L84 FOOT CALLUS: ICD-10-CM

## 2025-02-19 ENCOUNTER — TRANSCRIBE ORDERS (OUTPATIENT)
Dept: ADMINISTRATIVE | Facility: HOSPITAL | Age: 65
End: 2025-02-19
Payer: MEDICARE

## 2025-02-19 DIAGNOSIS — F17.210 NICOTINE DEPENDENCE, CIGARETTES, UNCOMPLICATED: Primary | ICD-10-CM

## 2025-03-14 ENCOUNTER — TRANSCRIBE ORDERS (OUTPATIENT)
Dept: ADMINISTRATIVE | Facility: HOSPITAL | Age: 65
End: 2025-03-14
Payer: MEDICARE

## 2025-03-14 DIAGNOSIS — I73.9 PERIPHERAL VASCULAR DISEASE, UNSPECIFIED: Primary | ICD-10-CM

## 2025-03-14 DIAGNOSIS — I82.413 ACUTE EMBOLISM AND THROMBOSIS OF FEMORAL VEIN, BILATERAL: Primary | ICD-10-CM

## 2025-04-07 RX ORDER — DILTIAZEM HYDROCHLORIDE 120 MG/1
120 CAPSULE, COATED, EXTENDED RELEASE ORAL
Qty: 30 CAPSULE | Refills: 0 | Status: SHIPPED | OUTPATIENT
Start: 2025-04-07

## 2025-04-22 ENCOUNTER — HOSPITAL ENCOUNTER (OUTPATIENT)
Dept: ULTRASOUND IMAGING | Facility: HOSPITAL | Age: 65
Discharge: HOME | End: 2025-04-22
Payer: MEDICARE

## 2025-04-22 DIAGNOSIS — I82.413 ACUTE EMBOLISM AND THROMBOSIS OF FEMORAL VEIN, BILATERAL: ICD-10-CM

## 2025-04-22 DIAGNOSIS — I73.9 PERIPHERAL VASCULAR DISEASE, UNSPECIFIED: ICD-10-CM

## 2025-04-22 PROCEDURE — 93923 UPR/LXTR ART STDY 3+ LVLS: CPT

## 2025-04-22 PROCEDURE — 93970 EXTREMITY STUDY: CPT

## 2025-04-22 PROCEDURE — 93923 UPR/LXTR ART STDY 3+ LVLS: CPT | Performed by: STUDENT IN AN ORGANIZED HEALTH CARE EDUCATION/TRAINING PROGRAM

## 2025-04-22 PROCEDURE — 93925 LOWER EXTREMITY STUDY: CPT

## 2025-04-22 PROCEDURE — 93970 EXTREMITY STUDY: CPT | Performed by: STUDENT IN AN ORGANIZED HEALTH CARE EDUCATION/TRAINING PROGRAM

## 2025-04-22 PROCEDURE — 93925 LOWER EXTREMITY STUDY: CPT | Performed by: STUDENT IN AN ORGANIZED HEALTH CARE EDUCATION/TRAINING PROGRAM

## 2025-04-22 NOTE — PROGRESS NOTES
Subjective:     Encounter Date: 04/23/2025      Patient ID: Claudette C Keeling is a 64 y.o. female with paroxysmal atrial fibrillation s/p cardioversion on 8/17/2022, chronic anticoagulation, chronic diastolic congestive heart failure, type 2 diabetes, COPD/tobacco abuse, obstructive sleep apnea and obesity.    Chief Complaint: no complaints  History of Present Illness  Patient presents today for management of atrial fibrillation and diastolic congestive heart failure. Today she reports that she has been doing well. She reports that she has been having some a little elevated BP. She reports that she has a bad tooth that she is dealing with, she took zithromax last week and felt like it was getting better. She reports that she has continued to have dyspnea on exertion related to COPD that is unchanged. She reports that she has a good amount of energy. She is working at [employer] and has been standing for a bit. She is works a 4-6 hr shift. She denies any heart racing or palpitations. She denies any chest pain. She denies leg swelling, orthopnea or PND. She denies any dizziness or near syncope. She has continued to be compliant with CPAP.  She reports that she doesn't check her BP routinely but it remains controlled at other dr visits. She has been on Trulicity/Ozempic according to what is available. She is on eliquis and denies any bleeding issues. She follows with Dr Torrez as PCP.      Previous Cardiac Testing and Procedures:   -Echo (5/10/2022): LVEF 61-65%, moderate LVH, diastolic dysfunction, no significant valvular disease  -Hgb A1C (5/10/2022): 7.4  -Lipid panel (5/10/2022): total cholesterol 149, hdl 37, LDL 90 and triglycerides 124  -Cardioversion (6/14/2022): successful cardioversion   -Cardioversion (8/17/2022): Successful cardioversion   -Hgb A1C (8/17/2022): 8.5  -Lipid panel (8/17/2022): total cholesterol 143, hdl 37, LDL 85 and triglycerides 116  -Lipid panel (12/20/2024): total cholesterol 151, hdl  46, LDL 77 and triglycerides 162      The following portions of the patient's history were reviewed and updated as appropriate: allergies, current medications, past family history, past medical history, past social history, past surgical history and problem list.    Allergies   Allergen Reactions    Codeine Nausea And Vomiting       Current Outpatient Medications:     apixaban (ELIQUIS) 5 MG tablet tablet, Take 1 tablet by mouth Every 12 (Twelve) Hours. Indications: Atrial Fibrillation, Disp: 60 tablet, Rfl: 11    Budeson-Glycopyrrol-Formoterol (Breztri Aerosphere) 160-9-4.8 MCG/ACT aerosol inhaler, Inhale 2 puffs 2 (Two) Times a Day., Disp: , Rfl:     Cholecalciferol (Vitamin D3) 1.25 MG (06732 UT) capsule, Take 1 capsule by mouth 1 (One) Time Per Week., Disp: , Rfl:     Cyanocobalamin (B-12 Compliance Injection) 1000 MCG/ML kit, Inject  as directed., Disp: , Rfl:     dilTIAZem CD (CARDIZEM CD) 120 MG 24 hr capsule, Take 1 capsule by mouth once daily, Disp: 30 capsule, Rfl: 0    docusate sodium 100 MG capsule, Take 1 capsule by mouth 2 (Two) Times a Day., Disp: , Rfl:     empagliflozin (JARDIANCE) 25 MG tablet tablet, Take 1 tablet by mouth Daily., Disp: , Rfl:     famotidine (Pepcid) 20 MG tablet, Take 1 tablet by mouth 2 (Two) Times a Day As Needed for Heartburn or Indigestion., Disp: 180 tablet, Rfl: 0    flecainide (TAMBOCOR) 50 MG tablet, TAKE 1 TABLET BY MOUTH EVERY 12 HOURS, Disp: 180 tablet, Rfl: 1    fluticasone (FLONASE) 50 MCG/ACT nasal spray, , Disp: , Rfl:     folic acid (FOLVITE) 1 MG tablet, Take 1 tablet by mouth Daily., Disp: , Rfl:     gabapentin (NEURONTIN) 300 MG capsule, Take 1 capsule by mouth 3 (Three) Times a Day., Disp: , Rfl:     lisinopril (PRINIVIL,ZESTRIL) 10 MG tablet, Take 1 tablet by mouth Daily., Disp: 90 tablet, Rfl: 0    metFORMIN ER (GLUCOPHAGE-XR) 500 MG 24 hr tablet, Take 2 tablets by mouth Daily With Breakfast., Disp: 180 tablet, Rfl: 1    metoprolol tartrate (LOPRESSOR) 50 MG  tablet, Take 1 tablet by mouth twice daily, Disp: 180 tablet, Rfl: 1    multivitamin with minerals tablet tablet, Take 1 tablet by mouth Daily., Disp: , Rfl:     nystatin (MYCOSTATIN) 509793 UNIT/GM powder, APPLY POWDER TOPICALLY TWICE DAILY AS NEEDED, Disp: , Rfl:     O2 (OXYGEN), 2lpm 8-10hrs per day, Disp: , Rfl:     Ozempic, 1 MG/DOSE, 4 MG/3ML solution pen-injector, INJECT 1MG SUBCUTANOUSLY EVERY WEEK, Disp: , Rfl:     rosuvastatin (Crestor) 5 MG tablet, Take 1 tablet by mouth Daily., Disp: 90 tablet, Rfl: 1    albuterol sulfate  (90 Base) MCG/ACT inhaler, Inhale 2 puffs Every 4 (Four) Hours As Needed., Disp: , Rfl:     gabapentin (NEURONTIN) 600 MG tablet, Take 1 tablet by mouth 3 (Three) Times a Day., Disp: , Rfl:     Trulicity 1.5 MG/0.5ML solution pen-injector, Inject 1 syringe under the skin into the appropriate area as directed 1 (One) Time Per Week. (Patient not taking: Reported on 1/22/2025), Disp: , Rfl:     Past Medical History:   Diagnosis Date    Arthritis     Asthma 5/1922    Atrial fibrillation     COPD (chronic obstructive pulmonary disease) 5/1922    Diabetes mellitus     Hypertension     Neuropathy     Sleep apnea     Stroke 5/1922     Social History     Socioeconomic History    Marital status:    Tobacco Use    Smoking status: Every Day     Current packs/day: 1.00     Average packs/day: 1 pack/day for 25.0 years (25.0 ttl pk-yrs)     Types: Cigarettes     Passive exposure: Past    Smokeless tobacco: Never    Tobacco comments:     Has started back and restarted Chantix   Vaping Use    Vaping status: Never Used   Substance and Sexual Activity    Alcohol use: Never    Drug use: Never    Sexual activity: Not Currently     Partners: Male     Birth control/protection: Hysterectomy       Review of Systems   Constitutional: Negative for malaise/fatigue, weight gain and weight loss.   HENT:  Negative for nosebleeds.    Cardiovascular:  Positive for dyspnea on exertion (unchanged).  "Negative for chest pain, claudication, irregular heartbeat, leg swelling, near-syncope, orthopnea, palpitations, paroxysmal nocturnal dyspnea and syncope.   Respiratory:  Negative for shortness of breath.    Hematologic/Lymphatic: Bruises/bleeds easily.   Musculoskeletal:  Negative for muscle weakness.   Genitourinary:  Negative for hematuria and nocturia.   Neurological:  Negative for dizziness and weakness.   All other systems reviewed and are negative.         Objective:     Vitals reviewed.   Constitutional:       General: Not in acute distress.     Appearance: Normal appearance. Well-developed. Morbidly obese.   Eyes:      Pupils: Pupils are equal, round, and reactive to light.   HENT:      Head: Normocephalic and atraumatic.      Nose: Nose normal.   Neck:      Vascular: No carotid bruit.   Pulmonary:      Effort: Pulmonary effort is normal. No respiratory distress.      Breath sounds: Normal breath sounds. No wheezing. No rales.   Cardiovascular:      Normal rate. Regular rhythm.      Murmurs: There is no murmur.   Edema:     Peripheral edema absent.   Abdominal:      General: There is no distension.      Palpations: Abdomen is soft.   Musculoskeletal: Normal range of motion.      Cervical back: Normal range of motion and neck supple. Skin:     General: Skin is warm.      Findings: No erythema or rash.   Neurological:      General: No focal deficit present.      Mental Status: Alert and oriented to person, place, and time.   Psychiatric:         Attention and Perception: Attention normal.         Mood and Affect: Mood normal.         Speech: Speech normal.         Behavior: Behavior normal.         Thought Content: Thought content normal.         Judgment: Judgment normal.         /78   Pulse 78   Ht 167.6 cm (66\")   Wt 121 kg (266 lb)   SpO2 93%   BMI 42.93 kg/m²       ECG 12 Lead    Date/Time: 4/23/2025 8:53 AM  Performed by: Paul Coleman APRN    Authorized by: Paul Coleman APRN  " Comparison: compared with previous ECG from 3/7/2024  Similar to previous ECG  Rhythm: sinus rhythm  Rate: normal  BPM: 70          Lab Review:     Results for orders placed during the hospital encounter of 05/09/22    Adult Transthoracic Echo Complete With Contrast if Necessary Per Protocol    Interpretation Summary  · Left ventricular ejection fraction appears to be 61 - 65%. Left ventricular systolic function is normal.  · Left ventricular wall thickness is consistent with moderate concentric hypertrophy.  · Left ventricular diastolic dysfunction is noted.  · Normal right ventricular cavity size and systolic function noted.  · There is mild biatrial enlargement.  · There is no significant (greater than mild) valvular dysfunction.  · Estimated right ventricular systolic pressure from tricuspid regurgitation is normal (<35 mmHg).          Lab Results   Component Value Date    HGBA1C 8.50 (H) 08/17/2022     I have personally reviewed echo, labs and past office notes prior to patients visit  Assessment:          Diagnosis Plan   1. Paroxysmal atrial fibrillation        2. Chronic anticoagulation        3. Chronic diastolic CHF (congestive heart failure)        4. Other emphysema        5. Tobacco abuse        6. Type 2 diabetes mellitus with hyperglycemia, without long-term current use of insulin        7. ANTHONY (obstructive sleep apnea)        8. Class 3 drug-induced obesity with serious comorbidity and body mass index (BMI) of 40.0 to 44.9 in adult                     Plan:       1. Paroxysmal Atrial Fibrillation: EKG today shows NSR rates of 70. Patient denies any known recurrence of atrial fibrillation. Continue Flecainide, Cardizem and metoprolol. Patient is on Eliquis and denies any bleeding issues.      2. Anticoagulation: Patient is on Eliquis and denies any bleeding issues.     3. Chronic diastolic congestive heart failure: Patient remains euvolemic in office today. She has been keeping up with daily weights  and they have remained stable. Reviewed signs and symptoms of CHF and what to report to office with patient. Discussed importance of daily weights. Recommended her weigh every morning after urinating and recording it. If she notices a weight gain of 2-3 lbs overnight/5 lbs in a week then she can take a Lasix 40 mg and call office. Recommended low salt diet. Discussed keeping legs elevated as much as possible when sitting throughout the day. Recommended compression stockings or ACE wraps to patient if needed for leg swelling.     4/5. COPD/tobacco abuse: Claudette C Keeling  reports that she has been smoking cigarettes. She has a 25 pack-year smoking history. She has been exposed to tobacco smoke. She has never used smokeless tobacco.. I have educated her on the risk of diseases from using tobacco products such as cancer, COPD and heart disease. I advised her to quit and she is not willing to quit. She has drastically decreased her daily amount of cigarettes. I spent 3  minutes counseling the patient.     6. Type 2 diabetes: managed and followed by PCP.     7. Obstructive sleep apnea: compliant with CPAP    8. Obesity: Class 3 Severe Obesity (BMI >=40). Obesity-related health conditions include the following: hypertension and diabetes mellitus. Obesity is unchanged. BMI is is above average; BMI management plan is completed. We discussed portion control and increasing exercise.    I attest that all portions of this note reviewed and all information has been updated by myself to reflect the patient's current status.      I spent 36 minutes caring for Claudette on this date of service. This time includes time spent by me in the following activities:preparing for the visit, reviewing tests, obtaining and/or reviewing a separately obtained history, performing a medically appropriate examination and/or evaluation , counseling and educating the patient/family/caregiver, and documenting information in the medical record    I  spent 2 minutes on the separately reported service of EKG. This time is not included in the time used to support the E/M service also reported today.    Patient is to return to office in 1 year or sooner if needed

## 2025-04-23 ENCOUNTER — OFFICE VISIT (OUTPATIENT)
Dept: CARDIOLOGY | Facility: CLINIC | Age: 65
End: 2025-04-23
Payer: MEDICARE

## 2025-04-23 VITALS
BODY MASS INDEX: 42.75 KG/M2 | DIASTOLIC BLOOD PRESSURE: 78 MMHG | OXYGEN SATURATION: 93 % | WEIGHT: 266 LBS | SYSTOLIC BLOOD PRESSURE: 143 MMHG | HEART RATE: 78 BPM | HEIGHT: 66 IN

## 2025-04-23 DIAGNOSIS — E11.65 TYPE 2 DIABETES MELLITUS WITH HYPERGLYCEMIA, WITHOUT LONG-TERM CURRENT USE OF INSULIN: ICD-10-CM

## 2025-04-23 DIAGNOSIS — E66.813 CLASS 3 DRUG-INDUCED OBESITY WITH SERIOUS COMORBIDITY AND BODY MASS INDEX (BMI) OF 40.0 TO 44.9 IN ADULT: ICD-10-CM

## 2025-04-23 DIAGNOSIS — I50.32 CHRONIC DIASTOLIC CHF (CONGESTIVE HEART FAILURE): ICD-10-CM

## 2025-04-23 DIAGNOSIS — E66.1 CLASS 3 DRUG-INDUCED OBESITY WITH SERIOUS COMORBIDITY AND BODY MASS INDEX (BMI) OF 40.0 TO 44.9 IN ADULT: ICD-10-CM

## 2025-04-23 DIAGNOSIS — J43.8 OTHER EMPHYSEMA: ICD-10-CM

## 2025-04-23 DIAGNOSIS — G47.33 OSA (OBSTRUCTIVE SLEEP APNEA): ICD-10-CM

## 2025-04-23 DIAGNOSIS — Z72.0 TOBACCO ABUSE: ICD-10-CM

## 2025-04-23 DIAGNOSIS — I48.0 PAROXYSMAL ATRIAL FIBRILLATION: Primary | ICD-10-CM

## 2025-04-23 DIAGNOSIS — Z79.01 CHRONIC ANTICOAGULATION: ICD-10-CM

## 2025-04-23 RX ORDER — METOPROLOL TARTRATE 50 MG
50 TABLET ORAL 2 TIMES DAILY
Qty: 180 TABLET | Refills: 3 | Status: SHIPPED | OUTPATIENT
Start: 2025-04-23

## 2025-04-23 RX ORDER — FLECAINIDE ACETATE 50 MG/1
50 TABLET ORAL EVERY 12 HOURS SCHEDULED
Qty: 180 TABLET | Refills: 3 | Status: SHIPPED | OUTPATIENT
Start: 2025-04-23

## 2025-04-23 RX ORDER — ALBUTEROL SULFATE 90 UG/1
2 INHALANT RESPIRATORY (INHALATION) EVERY 4 HOURS PRN
COMMUNITY

## 2025-04-23 RX ORDER — GABAPENTIN 600 MG/1
1 TABLET ORAL 3 TIMES DAILY
COMMUNITY

## 2025-04-23 NOTE — PROGRESS NOTES
[facility] - PODIATRY    Today's Date: 04/30/2025     Patient Name: Claudette C Keeling  MRN: [id number]  CSN: [id number]  PCP: Reymundo Torrez MD  Referring Provider: No ref. provider found    SUBJECTIVE     Chief Complaint   Patient presents with    Follow-up     PCP: Reymundo Torrez MD 03/12/25  3 months (around 4/22/2025) for Follow-up with APRN, Follow-up in [facility].   Pt states she is here today for diabetic foot/nail care. Pt states corn on right 5th digit is back. Pt denies pain.     Diabetes     HPI: Claudette C Keeling, a 64 y.o.female, comes to clinic as a(n) established patient presenting for diabetic foot exam and complaining of fungal toenails and painful calluses . Patient has h/o arthritis, Asthma, AFib, COPD, DM2, HTN, Neuropathy, Sleep Apnea, Stroke, Tobacco use . Patient is NIDDM and unsure of last BG level. Last A1c ~5%.  Continues to note numbness and tingling in feet. Reports her 3rd toe on right foot was amputated back in 90s d/t uncontrolled DM. Denies any open wounds or sores today. Toenails in need of care d/t length, thickness, and irregularity. Notes the return of a painful corn to right 5th toe. She does feel uncomfortable caring for them herself d/t her previous history of amputation. Utilizing Rolator for ambulation support.  She continues to take ELIQUIS daily. Does relay occasional cramping to her legs at night time.  Reports ordered blood flow studies on patient.  She did have these test performed a couple weeks ago.  Reports everything was normal.  Denies pain. Relates previous treatment(s) including care by podiatry . Denies any constitutional symptoms. No other pedal complaints at this time.    Past Medical History:   Diagnosis Date    Arthritis     Asthma 5/1922    Atrial fibrillation     COPD (chronic obstructive pulmonary disease) 5/1922    Diabetes mellitus     Hypertension     Neuropathy     Sleep apnea     Stroke 5/1922     Past Surgical  History:   Procedure Laterality Date    APPENDECTOMY      HYSTERECTOMY      JOINT REPLACEMENT Left     knee    TOE OSTEOPHYTE REMOVAL       Family History   Problem Relation Age of Onset    Heart failure Mother     Hypertension Mother     Diabetes Mother     Hypertension Father     Diabetes Father     No Known Problems Sister     Diabetes Brother     Hypertension Brother     Heart disease Brother         Mother    Heart attack Brother      Social History     Socioeconomic History    Marital status:    Tobacco Use    Smoking status: Every Day     Current packs/day: 1.00     Average packs/day: 1 pack/day for 25.0 years (25.0 ttl pk-yrs)     Types: Cigarettes     Passive exposure: Past    Smokeless tobacco: Never    Tobacco comments:     Has started back and restarted Chantix   Vaping Use    Vaping status: Never Used   Substance and Sexual Activity    Alcohol use: Never    Drug use: Never    Sexual activity: Not Currently     Partners: Male     Birth control/protection: Hysterectomy     Allergies   Allergen Reactions    Codeine Nausea And Vomiting     Current Outpatient Medications   Medication Sig Dispense Refill    albuterol sulfate  (90 Base) MCG/ACT inhaler Inhale 2 puffs Every 4 (Four) Hours As Needed.      apixaban (ELIQUIS) 5 MG tablet tablet Take 1 tablet by mouth Every 12 (Twelve) Hours. Indications: Atrial Fibrillation 60 tablet 11    Budeson-Glycopyrrol-Formoterol (Breztri Aerosphere) 160-9-4.8 MCG/ACT aerosol inhaler Inhale 2 puffs 2 (Two) Times a Day.      Cholecalciferol (Vitamin D3) 1.25 MG (99207 UT) capsule Take 1 capsule by mouth 1 (One) Time Per Week.      Cyanocobalamin (B-12 Compliance Injection) 1000 MCG/ML kit Inject  as directed.      dilTIAZem CD (CARDIZEM CD) 120 MG 24 hr capsule Take 1 capsule by mouth once daily 30 capsule 0    docusate sodium 100 MG capsule Take 1 capsule by mouth 2 (Two) Times a Day.      empagliflozin (JARDIANCE) 25 MG tablet tablet Take 1 tablet by mouth  Daily.      famotidine (Pepcid) 20 MG tablet Take 1 tablet by mouth 2 (Two) Times a Day As Needed for Heartburn or Indigestion. 180 tablet 0    flecainide (TAMBOCOR) 50 MG tablet TAKE 1 TABLET BY MOUTH EVERY 12 HOURS 180 tablet 3    fluticasone (FLONASE) 50 MCG/ACT nasal spray       folic acid (FOLVITE) 1 MG tablet Take 1 tablet by mouth Daily.      gabapentin (NEURONTIN) 300 MG capsule Take 1 capsule by mouth 3 (Three) Times a Day.      gabapentin (NEURONTIN) 600 MG tablet Take 1 tablet by mouth 3 (Three) Times a Day.      lisinopril (PRINIVIL,ZESTRIL) 10 MG tablet Take 1 tablet by mouth Daily. 90 tablet 0    metFORMIN ER (GLUCOPHAGE-XR) 500 MG 24 hr tablet Take 2 tablets by mouth Daily With Breakfast. 180 tablet 1    metoprolol tartrate (LOPRESSOR) 50 MG tablet Take 1 tablet by mouth twice daily 180 tablet 3    multivitamin with minerals tablet tablet Take 1 tablet by mouth Daily.      nystatin (MYCOSTATIN) 292555 UNIT/GM powder APPLY POWDER TOPICALLY TWICE DAILY AS NEEDED      O2 (OXYGEN) 2lpm 8-10hrs per day      Ozempic, 1 MG/DOSE, 4 MG/3ML solution pen-injector INJECT 1MG SUBCUTANOUSLY EVERY WEEK      rosuvastatin (Crestor) 5 MG tablet Take 1 tablet by mouth Daily. 90 tablet 1    Trulicity 1.5 MG/0.5ML solution pen-injector Inject 1.5 mg under the skin into the appropriate area as directed 1 (One) Time Per Week.       No current facility-administered medications for this visit.     Review of Systems   Constitutional:  Negative for chills and fever.   HENT:  Negative for congestion.    Respiratory:  Negative for chest tightness and shortness of breath.    Cardiovascular:  Negative for chest pain and leg swelling.   Gastrointestinal:  Negative for constipation, diarrhea, nausea and vomiting.   Musculoskeletal:  Positive for arthralgias and gait problem.   Skin:  Negative for wound.        Thickened, elongated, crumbly toenails   Neurological:  Positive for numbness. Negative for dizziness and weakness.    Hematological:  Bruises/bleeds easily.   Psychiatric/Behavioral:  Negative for agitation, behavioral problems and confusion.        OBJECTIVE     Vitals:    04/30/25 1522   BP: 124/80   Pulse: 73   SpO2: 96%                   PHYSICAL EXAM  GEN:   Accompanied by none.     Foot/Ankle Exam    GENERAL  Diabetic foot exam performed    Appearance:  appears stated age and obese  Orientation:  AAOx3  Affect:  appropriate  Gait:  unimpaired  Assistance:  (Rollator)  Right shoe gear: casual shoe  Left shoe gear: casual shoe    VASCULAR     Right Foot Vascularity   Dorsalis pedis:  1+  Posterior tibial:  1+  Skin temperature:  cool  Edema grading:  None  CFT:  3  Pedal hair growth:  Absent  Varicosities:  moderate varicosities     Left Foot Vascularity   Dorsalis pedis:  1+  Posterior tibial:  1+  Skin temperature:  cool  Edema grading:  None  CFT:  3  Pedal hair growth:  Absent  Varicosities:  moderate varicosities     NEUROLOGIC     Right Foot Neurologic   Light touch sensation: diminished  Vibratory sensation: diminished  Hot/Cold sensation: diminished  Protective Sensation using L[place]-Lynn Monofilament:   Sites intact: 8  Sites tested: 10     Left Foot Neurologic   Light touch sensation: diminished  Vibratory sensation: diminished  Hot/Cold sensation:  diminished  Protective Sensation using Lima-Lynn Monofilament:   Sites intact: 8  Sites tested: 10    MUSCULOSKELETAL     Right Foot Musculoskeletal    Amputation   Toes amputated: third toe  Ecchymosis:  none  Tenderness:  toe 5 tenderness and toenail problem    Arch:  Normal  Hammertoe:  Second toe, fourth toe and fifth toe  Hallux valgus: Yes       Left Foot Musculoskeletal   Ecchymosis:  none  Tenderness:  toenail problem  Arch:  Normal    MUSCLE STRENGTH     Right Foot Muscle Strength   Foot dorsiflexion:  5  Foot plantar flexion:  5  Foot inversion:  5  Foot eversion:  5     Left Foot Muscle Strength   Foot dorsiflexion:  5  Foot plantar flexion:   5  Foot inversion:  5  Foot eversion:  5    RANGE OF MOTION     Right Foot Range of Motion   Foot and ankle ROM within normal limits       Left Foot Range of Motion   Foot and ankle ROM within normal limits      DERMATOLOGIC      Right Foot Dermatologic   Skin  Positive for abnormal color, corn and dryness.   Nails  1.  Positive for onychomycosis, abnormal thickness, subungual debris and dystrophic nail.  2.  Positive for elongated, onychomycosis, abnormal thickness, subungual debris and dystrophic nail.  3.  Absent.  4.  Positive for elongated, abnormal thickness and dystrophic nail.  5.  Positive for elongated, abnormal thickness and dystrophic nail.     Left Foot Dermatologic   Skin  Positive for abnormal color, corn and dryness.   Nails  1.  Positive for elongated, onychomycosis, abnormal thickness, subungual debris and dystrophic nail.  2.  Positive for elongated, onychomycosis, abnormal thickness, subungual debris and dystrophic nail.  3.  Positive for elongated, onychomycosis, abnormal thickness and dystrophic nail.  4.  Positive for elongated, abnormally thick and dystrophic nail.  5.  Positive for elongated, abnormally thick and dystrophic nail.     Right foot additional comments: Discoloration notes to toes.      Left foot additional comments: Discoloration notes to toes.     Image:       RADIOLOGY/NUCLEAR:  US Venous Doppler Lower Extremity Bilateral (duplex)  Result Date: 4/22/2025  Narrative: History: Swelling      Impression: Impression: There is no evidence of deep venous thrombosis or superficial thrombophlebitis of right or left lower extremities.  Comments: Bilateral lower extremity venous duplex exam was performed using color Doppler flow, Doppler waveform analysis, and grayscale imaging, with and without compression. There is no evidence of deep venous thrombosis in the common femoral, superficial femoral, popliteal, peroneal, anterior tibial, and posterior tibial veins bilaterally. No thrombus  is identified in the saphenofemoral junctions and greater saphenous veins bilaterally.    This report was signed and finalized on 4/22/2025 2:20 PM by Filiberto Patterson.      US Arterial Doppler Lower Extremity Complete  Result Date: 4/22/2025  Narrative: History: PAD  Comments: Grayscale imaging as well as color flow duplex were used to evaluate bilateral lower extremity arterial systems.  On the right, the peak systolic velocity in the common femoral artery measured 113.1 cm/s. In the profunda femoris artery measured 101.8 cm/s. In the proximal SFA measured 146.4 cm/s. The mid SFA measured 109.5 cm/s. In the distal SFA measured 78.3 cm/s. In the popliteal artery measured 86.8 cm/s. In the posterior tibial artery measured 45.3 cm/s. In the anterior tibial artery measured 82.4 cm/s.  On the left, the peak systolic velocity in the common femoral artery measured 153.8 cm/s. In the profunda femoris artery measured 92.3 cm/s. In the proximal SFA measured 120.8 cm/s. In the mid SFA measured 169.2 cm/s. In the distal SFA measured 64.1 cm/s. In the popliteal artery measured 50.4 cm/s. In the posterior tibial artery measured 71.4 cm/s. In the anterior tibial artery measured 85.2 cm/s.      Impression: There is no evidence of hemodynamically significant arterial stenosis in the bilateral lower extremities.  This report was signed and finalized on 4/22/2025 1:53 PM by Filiberto Patterson.      US Ankle / Brachial Indices Extremity Complete  Result Date: 4/22/2025  Narrative:  History: PAD  Comments: Bilateral lower extremity arterial with multi-level pulse volume recordings and segmental pressures were performed at rest and stress.  The right ankle/brachial index is 1.05. The waveforms are triphasic at the ankle without dampening. These findings are consistent with no significant arterial insufficiency of the right lower extremity at rest.  The left ankle/brachial index is 0.97. The waveforms are triphasic at the ankle without  dampening. These findings are consistent with no significant arterial insufficiency of the left lower extremity at rest.      Impression: Impression: 1. No significant arterial insufficiency of the right lower extremity at rest. 2. No significant arterial insufficiency of the left lower extremity at rest.    This report was signed and finalized on 4/22/2025 1:50 PM by Filiberto Patterson.        LABORATORY/CULTURE RESULTS:      PATHOLOGY RESULTS:       ASSESSMENT/PLAN     Diagnoses and all orders for this visit:    1. Onychomycosis (Primary)    2. Type 2 diabetes mellitus with diabetic neuropathy, without long-term current use of insulin    3. Encounter for diabetic foot exam    4. Foot callus    5. Toe amputee    6. Tobacco abuse    7. Anticoagulant long-term use    8. Gait abnormality              Comprehensive lower extremity examination and evaluation was performed.  Discussed findings and treatment plan including risks, benefits, and treatment options with patient in detail. Patient agreed with treatment plan.  After verbal consent obtained, nail(s) x9 debrided of length and thickness with nail nipper without incidence  After verbal consent obtained, calluses x4 pared utilizing dermal curette and/or scalpel without incidence  Patient may maintain nails and calluses at home utilizing emery board or pumice stone between visits as needed  Reviewed at home diabetic foot care including daily foot checks   DFE performed today.  No recent A1c for review.  Reviewed blood flow studies from April 22, 2025.  TEJAL and venous duplex were both normal.  Continue control and maintenance of DM per PCP.  Tobacco cessation needed.      An After Visit Summary was printed and given to the patient at discharge, including (if requested) any available informative/educational handouts regarding diagnosis, treatment, or medications. All questions were answered to patient/family satisfaction. Should symptoms fail to improve or worsen they  agree to call or return to clinic or to go to the Emergency Department. Discussed the importance of following up with any needed screening tests/labs/specialist appointments and any requested follow-up recommended by me today. Importance of maintaining follow-up discussed and patient accepts that missed appointments can delay diagnosis and potentially lead to worsening of conditions.  Return in about 3 months (around 7/30/2025) for Follow-up with APRN, Follow-up in [facility] or sooner if acute issues arise.      Advance Care Planning   ACP discussion was declined by the patient. Patient does not have an advance directive, declines further assistance.       This document has been electronically signed by FLORENCIA Ross on May 1, 2025 07:58 CDT

## 2025-04-30 ENCOUNTER — OFFICE VISIT (OUTPATIENT)
Age: 65
End: 2025-04-30
Payer: MEDICARE

## 2025-04-30 VITALS
SYSTOLIC BLOOD PRESSURE: 124 MMHG | BODY MASS INDEX: 42.75 KG/M2 | HEART RATE: 73 BPM | HEIGHT: 66 IN | DIASTOLIC BLOOD PRESSURE: 80 MMHG | WEIGHT: 266 LBS | OXYGEN SATURATION: 96 %

## 2025-04-30 DIAGNOSIS — R26.9 GAIT ABNORMALITY: ICD-10-CM

## 2025-04-30 DIAGNOSIS — E11.9 ENCOUNTER FOR DIABETIC FOOT EXAM: ICD-10-CM

## 2025-04-30 DIAGNOSIS — B35.1 ONYCHOMYCOSIS: Primary | ICD-10-CM

## 2025-04-30 DIAGNOSIS — Z89.429 TOE AMPUTEE: ICD-10-CM

## 2025-04-30 DIAGNOSIS — Z79.01 ANTICOAGULANT LONG-TERM USE: ICD-10-CM

## 2025-04-30 DIAGNOSIS — E11.40 TYPE 2 DIABETES MELLITUS WITH DIABETIC NEUROPATHY, WITHOUT LONG-TERM CURRENT USE OF INSULIN: ICD-10-CM

## 2025-04-30 DIAGNOSIS — Z72.0 TOBACCO ABUSE: ICD-10-CM

## 2025-04-30 DIAGNOSIS — L84 FOOT CALLUS: ICD-10-CM

## 2025-05-05 RX ORDER — DILTIAZEM HYDROCHLORIDE 120 MG/1
120 CAPSULE, COATED, EXTENDED RELEASE ORAL
Qty: 90 CAPSULE | Refills: 3 | Status: SHIPPED | OUTPATIENT
Start: 2025-05-05

## 2025-05-23 ENCOUNTER — HOSPITAL ENCOUNTER (OUTPATIENT)
Dept: GENERAL RADIOLOGY | Facility: HOSPITAL | Age: 65
Discharge: HOME | End: 2025-05-23
Payer: MEDICARE

## 2025-05-23 ENCOUNTER — TRANSCRIBE ORDERS (OUTPATIENT)
Dept: MAMMOGRAPHY | Facility: HOSPITAL | Age: 65
End: 2025-05-23
Payer: MEDICARE

## 2025-05-23 DIAGNOSIS — M25.562 LEFT KNEE PAIN, UNSPECIFIED CHRONICITY: ICD-10-CM

## 2025-05-23 DIAGNOSIS — M25.562 LEFT KNEE PAIN, UNSPECIFIED CHRONICITY: Primary | ICD-10-CM

## 2025-05-23 PROCEDURE — 73562 X-RAY EXAM OF KNEE 3: CPT

## 2025-07-22 NOTE — PROGRESS NOTES
[facility] - PODIATRY    Today's Date: 07/30/2025     Patient Name: Claudette C Keeling  MRN: [id number]  CSN: [id number]  PCP: Reymundo Torrez MD  Referring Provider: No ref. provider found    SUBJECTIVE     Chief Complaint   Patient presents with    Follow-up     PCP: Reymundo Torrez MD 05/03/25  3 months (around 7/30/2025) for Follow-up with APRN, Follow[facility]up in [facility].   Pt states she is here today for diabetic foot/nail care. Pt denies pain. Pt is ambulating with walker.     Diabetes     HPI: Claudette C Keeling, a 64 y.o.female, comes to clinic as a(n) established patient presenting for diabetic foot exam and complaining of fungal toenails and painful calluses. Patient has h/o arthritis, Asthma, AFib, COPD, DM2, HTN, Neuropathy, Sleep Apnea, Stroke, Tobacco use. Patient is NIDDM and unsure of last BG level. Unsure of last A1c- reports that she just had blood work drawn this morning.Continues to note numbness and tingling in feet. Reports her 3rd toe on right foot was amputated back in 90s d/t uncontrolled DM. Denies any open wounds or sores.Toenails are long, thick, irregular and ready for a trim. She does feel uncomfortable caring for them herself d/t her previous history of amputation. Utilizing Rolator for ambulation support. Notes she did have a fall between now and her last visit. Landed on her left knee- reports she did not hurt her feet or lower legs. Notes she is feeling better since then.  She continues to take ELIQUIS daily.    Denies pain. Relates previous treatment(s) including care by podiatry. Denies any constitutional symptoms. No other pedal complaints at this time.    Past Medical History:   Diagnosis Date    Arthritis     Asthma 5/1922    Atrial fibrillation     COPD (chronic obstructive pulmonary disease) 5/1922    Diabetes mellitus     Hypertension     Neuropathy     Sleep apnea     Stroke 5/1922     Past Surgical History:   Procedure Laterality Date     APPENDECTOMY      HYSTERECTOMY      JOINT REPLACEMENT Left     knee    TOE OSTEOPHYTE REMOVAL       Family History   Problem Relation Age of Onset    Heart failure Mother     Hypertension Mother     Diabetes Mother     Hypertension Father     Diabetes Father     No Known Problems Sister     Diabetes Brother     Hypertension Brother     Heart disease Brother         Mother    Heart attack Brother      Social History     Socioeconomic History    Marital status:    Tobacco Use    Smoking status: Every Day     Current packs/day: 1.00     Average packs/day: 1 pack/day for 25.0 years (25.0 ttl pk-yrs)     Types: Cigarettes     Passive exposure: Past    Smokeless tobacco: Never    Tobacco comments:     Has started back and restarted Chantix   Vaping Use    Vaping status: Never Used   Substance and Sexual Activity    Alcohol use: Never    Drug use: Never    Sexual activity: Not Currently     Partners: Male     Birth control/protection: Hysterectomy     Allergies   Allergen Reactions    Codeine Nausea And Vomiting     Current Outpatient Medications   Medication Sig Dispense Refill    albuterol sulfate  (90 Base) MCG/ACT inhaler Inhale 2 puffs Every 4 (Four) Hours As Needed.      apixaban (ELIQUIS) 5 MG tablet tablet Take 1 tablet by mouth Every 12 (Twelve) Hours. Indications: Atrial Fibrillation 60 tablet 11    Budeson-Glycopyrrol-Formoterol (Breztri Aerosphere) 160-9-4.8 MCG/ACT aerosol inhaler Inhale 2 puffs 2 (Two) Times a Day.      Cholecalciferol (Vitamin D3) 1.25 MG (53753 UT) capsule Take 1 capsule by mouth 1 (One) Time Per Week.      Cyanocobalamin (B-12 Compliance Injection) 1000 MCG/ML kit Inject  as directed.      dilTIAZem CD (CARDIZEM CD) 120 MG 24 hr capsule Take 1 capsule by mouth once daily 90 capsule 3    docusate sodium 100 MG capsule Take 1 capsule by mouth 2 (Two) Times a Day.      empagliflozin (JARDIANCE) 25 MG tablet tablet Take 1 tablet by mouth Daily.      famotidine (Pepcid) 20 MG  tablet Take 1 tablet by mouth 2 (Two) Times a Day As Needed for Heartburn or Indigestion. 180 tablet 0    flecainide (TAMBOCOR) 50 MG tablet TAKE 1 TABLET BY MOUTH EVERY 12 HOURS 180 tablet 3    fluticasone (FLONASE) 50 MCG/ACT nasal spray       folic acid (FOLVITE) 1 MG tablet Take 1 tablet by mouth Daily.      gabapentin (NEURONTIN) 300 MG capsule Take 1 capsule by mouth 3 (Three) Times a Day.      gabapentin (NEURONTIN) 600 MG tablet Take 1 tablet by mouth 3 (Three) Times a Day.      lisinopril (PRINIVIL,ZESTRIL) 10 MG tablet Take 1 tablet by mouth Daily. 90 tablet 0    metFORMIN ER (GLUCOPHAGE-XR) 500 MG 24 hr tablet Take 2 tablets by mouth Daily With Breakfast. 180 tablet 1    metoprolol tartrate (LOPRESSOR) 50 MG tablet Take 1 tablet by mouth twice daily 180 tablet 3    multivitamin with minerals tablet tablet Take 1 tablet by mouth Daily.      nystatin (MYCOSTATIN) 580556 UNIT/GM powder APPLY POWDER TOPICALLY TWICE DAILY AS NEEDED      O2 (OXYGEN) 2lpm 8-10hrs per day      Ozempic, 1 MG/DOSE, 4 MG/3ML solution pen-injector INJECT 1MG SUBCUTANOUSLY EVERY WEEK      rosuvastatin (Crestor) 5 MG tablet Take 1 tablet by mouth Daily. 90 tablet 1    Trulicity 1.5 MG/0.5ML solution pen-injector Inject 1.5 mg under the skin into the appropriate area as directed 1 (One) Time Per Week.       No current facility-administered medications for this visit.     Review of Systems   Constitutional:  Negative for chills and fever.   HENT:  Negative for congestion.    Respiratory:  Negative for chest tightness and shortness of breath.    Cardiovascular:  Negative for chest pain and leg swelling.   Gastrointestinal:  Negative for constipation, diarrhea, nausea and vomiting.   Musculoskeletal:  Positive for arthralgias and gait problem.   Skin:  Negative for wound.        Thickened, elongated, crumbly toenails   Neurological:  Positive for numbness. Negative for dizziness and weakness.   Hematological:  Bruises/bleeds easily.    Psychiatric/Behavioral:  Negative for agitation, behavioral problems and confusion.        OBJECTIVE     Vitals:    07/30/25 0943   BP: 124/80   Pulse: 70   SpO2: 95%       PHYSICAL EXAM  GEN:   Accompanied by none.     Foot/Ankle Exam    GENERAL  Diabetic foot exam performed    Appearance:  appears stated age and obese  Orientation:  AAOx3  Affect:  appropriate  Gait:  unimpaired  Assistance:  (Rollator)  Right shoe gear: casual shoe  Left shoe gear: casual shoe    VASCULAR     Right Foot Vascularity   Dorsalis pedis:  1+  Posterior tibial:  1+  Skin temperature:  cool  Edema grading:  None  CFT:  3  Pedal hair growth:  Absent  Varicosities:  moderate varicosities     Left Foot Vascularity   Dorsalis pedis:  1+  Posterior tibial:  1+  Skin temperature:  cool  Edema grading:  None  CFT:  3  Pedal hair growth:  Absent  Varicosities:  moderate varicosities     NEUROLOGIC     Right Foot Neurologic   Light touch sensation: diminished  Vibratory sensation: diminished  Hot/Cold sensation: diminished  Protective Sensation using [place] Monofilament:   Sites intact: 8  Sites tested: 10     Left Foot Neurologic   Light touch sensation: diminished  Vibratory sensation: diminished  Hot/Cold sensation:  diminished  Protective Sensation using [place] Monofilament:   Sites intact: 8  Sites tested: 10    MUSCULOSKELETAL     Right Foot Musculoskeletal    Amputation   Toes amputated: third toe  Ecchymosis:  none  Tenderness:  toe 5 tenderness and toenail problem    Arch:  Normal  Hammertoe:  Second toe, fourth toe and fifth toe  Hallux valgus: Yes       Left Foot Musculoskeletal   Ecchymosis:  none  Tenderness:  toenail problem  Arch:  Normal    MUSCLE STRENGTH     Right Foot Muscle Strength   Foot dorsiflexion:  5  Foot plantar flexion:  5  Foot inversion:  5  Foot eversion:  5     Left Foot Muscle Strength   Foot dorsiflexion:  5  Foot plantar flexion:  5  Foot inversion:  5  Foot eversion:  5    RANGE OF  MOTION     Right Foot Range of Motion   Foot and ankle ROM within normal limits       Left Foot Range of Motion   Foot and ankle ROM within normal limits      DERMATOLOGIC      Right Foot Dermatologic   Skin  Positive for abnormal color, corn and dryness.   Nails  1.  Positive for onychomycosis, abnormal thickness, subungual debris and dystrophic nail.  2.  Positive for elongated, onychomycosis, abnormal thickness, subungual debris and dystrophic nail.  3.  Absent.  4.  Positive for elongated, abnormal thickness and dystrophic nail.  5.  Positive for elongated, abnormal thickness and dystrophic nail.     Left Foot Dermatologic   Skin  Positive for abnormal color, corn and dryness.   Nails  1.  Positive for elongated, onychomycosis, abnormal thickness, subungual debris and dystrophic nail.  2.  Positive for elongated, onychomycosis, abnormal thickness, subungual debris and dystrophic nail.  3.  Positive for elongated, onychomycosis, abnormal thickness and dystrophic nail.  4.  Positive for elongated, abnormally thick and dystrophic nail.  5.  Positive for elongated, abnormally thick and dystrophic nail.    Image:       RADIOLOGY/NUCLEAR:  No results found.      LABORATORY/CULTURE RESULTS:      PATHOLOGY RESULTS:       ASSESSMENT/PLAN     Diagnoses and all orders for this visit:    1. Onychomycosis (Primary)    2. Type 2 diabetes mellitus with diabetic neuropathy, without long-term current use of insulin    3. Foot callus    4. Toe amputee    5. Tobacco abuse    6. Anticoagulant long-term use    7. Gait abnormality      Comprehensive lower extremity examination and evaluation was performed.  Discussed findings and treatment plan including risks, benefits, and treatment options with patient in detail. Patient agreed with treatment plan.  After verbal consent obtained, nail(s) x9 debrided of length and thickness with nail nipper without incidence  After verbal consent obtained, calluses x4 pared utilizing dermal curette  and/or scalpel without incidence  Patient may maintain nails and calluses at home utilizing emery board or pumice stone between visits as needed  Reviewed at home diabetic foot care including daily foot checks   No recent A1c for review.  Continue control and maintenance of DM per PCP.  Maurice use of rollator as needed for ambulatory support.  Tobacco cessation needed.  Otherwise follow-up in 3 months for routine DFC.  Encouraged to call with questions or concerns in the meantime.      An After Visit Summary was printed and given to the patient at discharge, including (if requested) any available informative/educational handouts regarding diagnosis, treatment, or medications. All questions were answered to patient/family satisfaction. Should symptoms fail to improve or worsen they agree to call or return to clinic or to go to the Emergency Department. Discussed the importance of following up with any needed screening tests/labs/specialist appointments and any requested follow-up recommended by me today. Importance of maintaining follow-up discussed and patient accepts that missed appointments can delay diagnosis and potentially lead to worsening of conditions.  Return in about 3 months (around 10/30/2025) for Follow-up with APRN, Follow-up in [facility]., or sooner if acute issues arise.      Advance Care Planning   ACP discussion was declined by the patient. Patient does not have an advance directive, declines further assistance.     I spent 10 minutes caring for Claudette on this date of service. This time includes time spent by me in the following activities: preparing for the visit, performing a medically appropriate examination and/or evaluation, counseling and educating the patient/family/caregiver, and documenting information in the medical record  I spent 5 minutes on the separately reported service of toenail and HPK debridement.. This time is not included in the time used to support the E/M service  also reported today.      This document has been electronically signed by FLORENCIA Ross on July 30, 2025 10:25 CDT

## 2025-07-30 ENCOUNTER — OFFICE VISIT (OUTPATIENT)
Age: 65
End: 2025-07-30
Payer: MEDICARE

## 2025-07-30 VITALS
HEIGHT: 66 IN | BODY MASS INDEX: 42.75 KG/M2 | WEIGHT: 266 LBS | DIASTOLIC BLOOD PRESSURE: 80 MMHG | SYSTOLIC BLOOD PRESSURE: 124 MMHG | OXYGEN SATURATION: 95 % | HEART RATE: 70 BPM

## 2025-07-30 DIAGNOSIS — Z89.429 TOE AMPUTEE: ICD-10-CM

## 2025-07-30 DIAGNOSIS — Z72.0 TOBACCO ABUSE: ICD-10-CM

## 2025-07-30 DIAGNOSIS — E11.40 TYPE 2 DIABETES MELLITUS WITH DIABETIC NEUROPATHY, WITHOUT LONG-TERM CURRENT USE OF INSULIN: ICD-10-CM

## 2025-07-30 DIAGNOSIS — Z79.01 ANTICOAGULANT LONG-TERM USE: ICD-10-CM

## 2025-07-30 DIAGNOSIS — R26.9 GAIT ABNORMALITY: ICD-10-CM

## 2025-07-30 DIAGNOSIS — B35.1 ONYCHOMYCOSIS: Primary | ICD-10-CM

## 2025-07-30 DIAGNOSIS — L84 FOOT CALLUS: ICD-10-CM

## (undated) DEVICE — MCLASS OSCILLATING SAW BLADE 19 X 1.27 (0.050") X 90 MM: Brand: MCLASS

## (undated) DEVICE — Z INACTIVE USE 2660664 SOLUTION IRRIG 3000ML 0.9% SOD CHL USP UROMATIC PLAS CONT

## (undated) DEVICE — THREE QUARTER SHEET: Brand: CONVERTORS

## (undated) DEVICE — Device

## (undated) DEVICE — LARGE BONE HALL BLADE, RECIPROCATOR, 12.5 X 76 X 1.27 MM: Brand: HALL

## (undated) DEVICE — SOLUTION IV IRRIG POUR BRL 0.9% SODIUM CHL 2F7124

## (undated) DEVICE — DRESSING FOAM W4XL12IN SIL RECT ADH WTRPRF FLM BK W/ BORD

## (undated) DEVICE — DRESSING COMPR UNNA BOOT 6 YDX4 IN CALAMINE TAN COFLX UBC

## (undated) DEVICE — GLOVE SURG SZ 8 CRM LTX FREE POLYISOPRENE POLYMER BEAD ANTI

## (undated) DEVICE — SUTURE VCRL SZ 2-0 L36IN ABSRB UD L36MM CT-1 1/2 CIR J945H

## (undated) DEVICE — MINOR CDS: Brand: MEDLINE INDUSTRIES, INC.

## (undated) DEVICE — ABDOMINAL PAD: Brand: DERMACEA

## (undated) DEVICE — GLOVE SURG SZ 75 L12IN FNGR THK87MIL DK GRN LTX FREE ISOLEX

## (undated) DEVICE — CHLORAPREP 26ML ORANGE

## (undated) DEVICE — SURGICAL PROCEDURE PACK KNEE TOT DBD CDS LOURDES HOSP LF

## (undated) DEVICE — SUTURE NONABSORBABLE MONOFILAMENT 4-0 PS-2 18 IN BLK ETHILON 1667G

## (undated) DEVICE — SUTURE FIBERWIRE SZ 2 L38IN NONABSORBABLE BLU L36.6MM 1/2 AR7202

## (undated) DEVICE — SUTURE MCRYL SZ 3-0 L18IN ABSRB UD L19MM PS-2 3/8 CIR PRIM Y497G

## (undated) DEVICE — GLOVE SURG SZ 8 L12IN FNGR THK13MIL BRN LTX SYN POLYMER W

## (undated) DEVICE — ASTOUND STANDARD SURGICAL GOWN, XXL: Brand: CONVERTORS

## (undated) DEVICE — Device: Brand: POWER-FLO®

## (undated) DEVICE — TOWEL,OR,DSP,ST,BLUE,DLX,4/PK,20PK/CS: Brand: MEDLINE

## (undated) DEVICE — ZIMMER® STERILE DISPOSABLE TOURNIQUET CUFF WITH PLC, DUAL PORT, SINGLE BLADDER, 34 IN. (86 CM)